# Patient Record
Sex: FEMALE | Race: WHITE | NOT HISPANIC OR LATINO | Employment: FULL TIME | URBAN - METROPOLITAN AREA
[De-identification: names, ages, dates, MRNs, and addresses within clinical notes are randomized per-mention and may not be internally consistent; named-entity substitution may affect disease eponyms.]

---

## 2017-11-08 ENCOUNTER — LAB (OUTPATIENT)
Dept: LAB | Facility: CLINIC | Age: 27
End: 2017-11-08
Payer: COMMERCIAL

## 2017-11-08 ENCOUNTER — TRANSCRIBE ORDERS (OUTPATIENT)
Dept: URGENT CARE | Facility: CLINIC | Age: 27
End: 2017-11-08

## 2017-11-08 DIAGNOSIS — Z02.1 PRE-EMPLOYMENT EXAMINATION: Primary | ICD-10-CM

## 2017-11-08 DIAGNOSIS — Z02.1 PRE-EMPLOYMENT EXAMINATION: ICD-10-CM

## 2017-11-08 PROCEDURE — 86480 TB TEST CELL IMMUN MEASURE: CPT

## 2017-11-08 PROCEDURE — 36415 COLL VENOUS BLD VENIPUNCTURE: CPT

## 2017-11-08 PROCEDURE — 86787 VARICELLA-ZOSTER ANTIBODY: CPT

## 2017-11-09 LAB — VZV IGG SER IA-ACNC: NORMAL

## 2017-11-10 LAB
ANNOTATION COMMENT IMP: NORMAL
GAMMA INTERFERON BACKGROUND BLD IA-ACNC: 0.09 IU/ML
M TB IFN-G BLD-IMP: NEGATIVE
M TB IFN-G CD4+ BCKGRND COR BLD-ACNC: <0.01 IU/ML
M TB IFN-G CD4+ T-CELLS BLD-ACNC: 0.06 IU/ML
MITOGEN IGNF BLD-ACNC: >10 IU/ML
QUANTIFERON-TB GOLD IN TUBE: NORMAL
SERVICE CMNT-IMP: NORMAL

## 2017-12-22 ENCOUNTER — APPOINTMENT (OUTPATIENT)
Dept: LAB | Facility: HOSPITAL | Age: 27
End: 2017-12-22
Attending: INTERNAL MEDICINE
Payer: COMMERCIAL

## 2017-12-22 ENCOUNTER — ALLSCRIPTS OFFICE VISIT (OUTPATIENT)
Dept: OTHER | Facility: OTHER | Age: 27
End: 2017-12-22

## 2017-12-22 ENCOUNTER — TRANSCRIBE ORDERS (OUTPATIENT)
Dept: ADMINISTRATIVE | Facility: HOSPITAL | Age: 27
End: 2017-12-22

## 2017-12-22 DIAGNOSIS — E10.9 TYPE 1 DIABETES MELLITUS WITHOUT COMPLICATIONS (HCC): ICD-10-CM

## 2017-12-22 LAB
ALBUMIN SERPL BCP-MCNC: 3.9 G/DL (ref 3.5–5)
ALP SERPL-CCNC: 52 U/L (ref 46–116)
ALT SERPL W P-5'-P-CCNC: 19 U/L (ref 12–78)
ANION GAP SERPL CALCULATED.3IONS-SCNC: 4 MMOL/L (ref 4–13)
AST SERPL W P-5'-P-CCNC: 18 U/L (ref 5–45)
BILIRUB SERPL-MCNC: 0.6 MG/DL (ref 0.2–1)
BUN SERPL-MCNC: 13 MG/DL (ref 5–25)
CALCIUM SERPL-MCNC: 8.8 MG/DL (ref 8.3–10.1)
CHLORIDE SERPL-SCNC: 103 MMOL/L (ref 100–108)
CHOLEST SERPL-MCNC: 195 MG/DL (ref 50–200)
CO2 SERPL-SCNC: 31 MMOL/L (ref 21–32)
CREAT SERPL-MCNC: 0.72 MG/DL (ref 0.6–1.3)
CREAT UR-MCNC: 146 MG/DL
EST. AVERAGE GLUCOSE BLD GHB EST-MCNC: 183 MG/DL
GFR SERPL CREATININE-BSD FRML MDRD: 116 ML/MIN/1.73SQ M
GLUCOSE P FAST SERPL-MCNC: 160 MG/DL (ref 65–99)
HBA1C MFR BLD: 8 % (ref 4.2–6.3)
HDLC SERPL-MCNC: 111 MG/DL (ref 40–60)
LDLC SERPL CALC-MCNC: 75 MG/DL (ref 0–100)
MICROALBUMIN UR-MCNC: 6 MG/L (ref 0–20)
MICROALBUMIN/CREAT 24H UR: 4 MG/G CREATININE (ref 0–30)
POTASSIUM SERPL-SCNC: 4 MMOL/L (ref 3.5–5.3)
PROT SERPL-MCNC: 7.1 G/DL (ref 6.4–8.2)
SODIUM SERPL-SCNC: 138 MMOL/L (ref 136–145)
TRIGL SERPL-MCNC: 44 MG/DL
TSH SERPL DL<=0.05 MIU/L-ACNC: 0.92 UIU/ML (ref 0.36–3.74)

## 2017-12-22 PROCEDURE — 83036 HEMOGLOBIN GLYCOSYLATED A1C: CPT

## 2017-12-22 PROCEDURE — 80061 LIPID PANEL: CPT

## 2017-12-22 PROCEDURE — 84443 ASSAY THYROID STIM HORMONE: CPT

## 2017-12-22 PROCEDURE — 82043 UR ALBUMIN QUANTITATIVE: CPT

## 2017-12-22 PROCEDURE — 36415 COLL VENOUS BLD VENIPUNCTURE: CPT

## 2017-12-22 PROCEDURE — 80053 COMPREHEN METABOLIC PANEL: CPT

## 2017-12-22 PROCEDURE — 82570 ASSAY OF URINE CREATININE: CPT

## 2017-12-23 NOTE — CONSULTS
Assessment   1  Diabetes mellitus type 1 (250 01) (E10 9)    Plan   Diabetes mellitus type 1    · (1) COMPREHENSIVE METABOLIC PANEL; Status:Active; Requested for:30Uvl3450;    Perform:[facility]; Due:20Ahs4373; Ordered; For:Diabetes mellitus type 1; Ordered By:Kendrick Cm;   · (1) HEMOGLOBIN A1C; Status:Active; Requested VVY:72ULO8832; Perform:[facility]; QUE:[id number];JMMFWLO; For:Diabetes mellitus type 1; Ordered By:Kendrick Cm;   · (1) HEMOGLOBIN A1C; Status:Active; Requested for:57Fmc3164;    Perform:[facility]; Due:63Thk5360; Ordered; For:Diabetes mellitus type 1; Ordered By:Kendrick Cm;   · (1) LIPID PANEL FASTING W DIRECT LDL REFLEX; Status:Active; Requested    for:83Qho2690;    Perform:[facility]; Due:20Oyc5681; Ordered; For:Diabetes mellitus type 1; Ordered By:Kendrick Cm;   · (1) MICROALBUMIN CREATININE RATIO, RANDOM URINE; Status:Active; Requested    for:36Zpc1685;    Perform:[facility]; Due:55Yof1752; Ordered; For:Diabetes mellitus type 1; Ordered By:Kendrick Cm;   · (1) TSH; Status:Active; Requested for:96Hga5958;    Perform:[facility]; Due:37Gcx9070; Ordered; For:Diabetes mellitus type 1; Ordered By:Robert Cm;   · *1 - SL DIABETES SELF MANAGEMENT TRAINING OUTPATIENT Co-Management     dexcom diagnostic cgm  Status: Hold For - Scheduling  Requested for:    10Ycd8639   Ordered; For: Diabetes mellitus type 1; Ordered By: Angely Butler Performed:  Due: 30WIO9010  Upgrade : Yes  Pump Training : Yes  requires instruction : No  Needs requiring Individual DSMT? : No  Self-Management Education/Trainng : Individual Education  Care Summary provided  : Yes   · Follow-up visit in 3 months Evaluation and Treatment  Follow-up  Status: Complete     Done: 69TJC7579   Ordered; For: Diabetes mellitus type 1; Ordered By: Angely Butler Performed:  Due: 43UHC9042; Last Updated By: Shira Paz; 12/22/2017 12:13:05 PM    Discussion/Summary   Discussion Summary:    Type 1 diabetes: Reviewing the patient's insulin pump download reveals average blood sugar of 199 and she is checking her sugar 1 time per day based on insulin pump data  She uses 35 3 units per day on average  53% is in her basal rate in 47% is in her bolus  Reviewing blood sugars reveals inconsistent glucose data due to infrequent checks and occasional hyperglycemia in the morning  I am hesitant to increase her basal rates overnight given her clinical history of hypoglycemia overnight as the highs in the morning could be related to rebound from hypoglycemia  Discussed diagnostic CGM with the patient and she is agreeable to dexcom diagnostic CGM which I have ordered  Additionally I provided her log sheets that she can send me logs in a few weeks for review  Additionally, I have ordered an appointment with our education team for discussion about pump upgrade  The patient expressed interest in the Medtronic 670G system which I think would be good for her given her blood sugars as well as her lifestyle/work schedule  After that appointment, if it is feasible and she is still interested in that pump, we will start the process of acquiring this  Will see her back in 3 months with an A1c just prior  We will check blood work today for maintenance purposes  insulin pump settings are: rates: 12 a 0 85, 8 a 0 8, 10 P 0 8 to carb ratio 11 45 110  Counseling Documentation With Imm: The patient was counseled regarding diagnostic results,-- instructions for management,-- risk factor reductions,-- impressions,-- importance of compliance with treatment  Medication SE Review and Pt Understands Tx: The treatment plan was reviewed with the patient/guardian   The patient/guardian understands and agrees with the treatment plan      Chief Complaint   Chief Complaint Free Text Note Form: consult      History of Present Illness   HPI: 25-year-old female history of type 1 diabetes for 10 years presents to establish care  No known complications of diabetes  Overall, she feels well and denies polyuria polydipsia blurred vision  She follows with her eye doctor regularly  No symptoms of neuropathy  She uses an animas insulin pump for delivery of insulin with below listed settings  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 and does admit given this schedule she is unable to check her sugar and input carbs and glucose levels in her pump  She has had her current pump for several years and believe she may be due for a new 1  She has never worn a continuous glucose monitor  She denies frequent hypoglycemia, but does report occasional hypoglycemia overnight  She does note that she feels hypoglycemia symptoms below 70  She is very comfortable with counting carbs  Has not had recent lab work due to changes in appointment in insurance  Review of Systems   Endo Adult ROS Female New Patient:      Constitutional/General: no change in ring size,-- no change in shoe size,-- no chills,-- no dizziness,-- no fainting,-- no fatigue,-- no fever,-- no forgetfulness,-- no headache,-- no loss of sleep,-- no recent weight loss,-- no nervousness,-- no numbness,-- no temperature intolerance,-- no excessive sweating-- and-- no weight gain  Muscle/Joint/Bone: no arm pain,-- no back pain,-- no hip pain,-- no leg pain,-- no foot pain,-- no neck pain,-- no hand pain,-- no shoulder pain,-- no arm weakness,-- no back weakness,-- no hip weakness,-- no leg weakness,-- no foot weakness,-- no neck weakness,-- no hand weakness,-- no shoulder weakness,-- no arm numbness,-- no back numbness,-- no hip numbness,-- no leg numbness,-- no foot numbness,-- no neck numbness,-- no hand numbness-- and-- no shoulder numbness        Gastrointestinal: no constipation,-- no diarrhea,-- no excessive hunger,-- no excessive thirst,-- no nausea,-- no poor appetite,-- no rectal bleeding,-- no stomach pain,-- no vomiting-- and-- no vomiting blood  Cardiovascular: no chest pain,-- no hypertension,-- no irregular heart beat,-- no hypotension,-- no poor circulation,-- no rapid heart beat-- and-- no ankle swelling  Eye/Ear/Nose/Throat: no bleeding gums,-- no blurred vision,-- no difficulty swallowing,-- no double vision,-- no gritty eyes,-- no hoarseness,-- no hearing loss,-- no persistent cough,-- no sinus problems,-- not seeing flashes-- and-- not seeing halos  Skin: no easy bruising,-- no hives,-- no itching,-- no change in a mole,-- no rashes,-- no scar-- and-- no slow healing sores  Genitourinary no blood in urine,-- no frequent urination,-- no night time urination-- and-- no painful urination  Genitourinary - Reproductive normal period,-- no bleeding between periods,-- no breast lump,-- no hot flashes,-- no nipple discharge,-- no vaginal discharge,-- not pregnant-- and-- no children  date of last menstruation is unknown  periods occur every 32 days periods usually lasts 5 days             ROS Reviewed:    ROS reviewed  Past Medical History   Active Problems And Past Medical History Reviewed: The active problems and past medical history were reviewed and updated today  Surgical History   Surgical History Reviewed: The surgical history was reviewed and updated today  Family History   Family History Reviewed: The family history was reviewed and updated today  Social History   Social History Reviewed: The social history was reviewed and updated today  Current Meds    1  Apidra 100 UNIT/ML Injection Solution; Therapy: 08MEI7986 to Recorded   2  OneTouch Ultra Blue In [employer]; Therapy: 61XOM1721 to Recorded  Medication List Reviewed: The medication list was reviewed and updated today  Allergies   1  No Known Drug Allergies  2   Seasonal    Vitals   Vital Signs    Recorded: 68Pjf8288 11:30AM   Heart Rate 72   Systolic 316   Diastolic 62   Height 5 ft 2 2 in Weight 137 lb 6 oz   BMI Calculated 24 96   BSA Calculated 1 63     Physical Exam        Constitutional      General appearance: No acute distress, well appearing and well nourished  Eyes      Conjunctiva and lids: No swelling, erythema, or discharge  Pupils: Equal, round and reactive to light  The sclera are anicteric  Extraocular movements are intact  Ears, Nose, Mouth, and Throat      Neck: The neck is supple  The thyroid is normal in size with no palpable nodules  Pulmonary      Respiratory effort: No increased work of breathing or signs of respiratory distress  Auscultation of lungs: Clear to auscultation bilaterally with normal chest expansion  Cardiovascular      Auscultation of heart: Normal rate and rhythm with no murmurs, gallops or rubs  Examination of extremities for edema and/or varicosities: Normal        Lymphatic      Palpation of lymph nodes: No supraclavicular or suboccipital lymphadenopathy  Skin      Skin and subcutaneous tissue: Normal skin temperature and color  Neurologic      Motor Strength: Strength is 5/5 bilaterally         Psychiatric      Orientation to person, place and time: Normal        Mood and affect: Affect and attention span are normal        Future Appointments      Date/Time Provider Specialty Site   03/23/2018 11:15 AM Nelly Munguia, [address] [facility]     Signatures    Electronically signed by : OLIVER Rosenberg ; Dec 22 2017 12:26PM EST                       (Author)

## 2018-01-23 VITALS
WEIGHT: 137.38 LBS | DIASTOLIC BLOOD PRESSURE: 62 MMHG | HEART RATE: 72 BPM | SYSTOLIC BLOOD PRESSURE: 110 MMHG | HEIGHT: 62 IN | BODY MASS INDEX: 25.28 KG/M2

## 2018-03-01 DIAGNOSIS — E10.9 TYPE 1 DIABETES MELLITUS WITHOUT COMPLICATIONS (HCC): ICD-10-CM

## 2018-03-15 ENCOUNTER — TELEPHONE (OUTPATIENT)
Dept: ENDOCRINOLOGY | Facility: HOSPITAL | Age: 28
End: 2018-03-15

## 2018-03-22 ENCOUNTER — APPOINTMENT (OUTPATIENT)
Dept: LAB | Facility: HOSPITAL | Age: 28
End: 2018-03-22
Attending: INTERNAL MEDICINE
Payer: COMMERCIAL

## 2018-03-22 DIAGNOSIS — E10.9 TYPE 1 DIABETES MELLITUS WITHOUT COMPLICATIONS (HCC): ICD-10-CM

## 2018-03-22 LAB
EST. AVERAGE GLUCOSE BLD GHB EST-MCNC: 174 MG/DL
HBA1C MFR BLD: 7.7 % (ref 4.2–6.3)

## 2018-03-22 PROCEDURE — 83036 HEMOGLOBIN GLYCOSYLATED A1C: CPT

## 2018-03-22 PROCEDURE — 36415 COLL VENOUS BLD VENIPUNCTURE: CPT

## 2018-03-22 RX ORDER — MELATONIN
COMMUNITY
Start: 2013-07-23 | End: 2018-09-11

## 2018-03-22 RX ORDER — MULTIVITAMIN
CAPSULE ORAL
COMMUNITY
Start: 2013-07-23 | End: 2019-05-09 | Stop reason: ALTCHOICE

## 2018-03-22 NOTE — TELEPHONE ENCOUNTER
Reached out to insurance, paperwork was filled out and sent  Received call from insurance today who stated Dexcom needs to received the proper documentation before proceeding  CMN was faxed on 3/21/18

## 2018-03-23 ENCOUNTER — OFFICE VISIT (OUTPATIENT)
Dept: ENDOCRINOLOGY | Facility: HOSPITAL | Age: 28
End: 2018-03-23
Payer: COMMERCIAL

## 2018-03-23 VITALS
DIASTOLIC BLOOD PRESSURE: 66 MMHG | HEIGHT: 61 IN | HEART RATE: 80 BPM | SYSTOLIC BLOOD PRESSURE: 102 MMHG | BODY MASS INDEX: 25.68 KG/M2 | WEIGHT: 136 LBS

## 2018-03-23 DIAGNOSIS — Z96.41 INSULIN PUMP IN PLACE: ICD-10-CM

## 2018-03-23 DIAGNOSIS — E55.9 VITAMIN D INSUFFICIENCY: ICD-10-CM

## 2018-03-23 DIAGNOSIS — E10.649 TYPE 1 DIABETES MELLITUS WITH HYPOGLYCEMIA AND WITHOUT COMA (HCC): Primary | ICD-10-CM

## 2018-03-23 PROBLEM — E10.9 DIABETES MELLITUS TYPE 1 (HCC): Status: ACTIVE | Noted: 2017-12-22

## 2018-03-23 PROCEDURE — 99214 OFFICE O/P EST MOD 30 MIN: CPT | Performed by: NURSE PRACTITIONER

## 2018-03-23 NOTE — PROGRESS NOTES
Follow Up - Ravindar Jensen 33 y o  female MRN: [id number]   @ Encounter: [id number]      Assessment/Plan     Assessment: This is a 33y o -year-old female with type 1 diabetes on insulin pump therapy  Plan:  1  Type 1 diabetes:  Reviewed and reinforced the importance of checking blood sugars regularly and during data into the pump for review  Discussed diagnostic CGM to gain more insight into her glycemic control throughout the day, as well as, for personal use  Due to lack of blood sugar information in the pump, there is not enough information to make changes to her pump settings at this time  Additionally, I have ordered an appointment with our education team for discussion about pump upgrade  The patient expressed interest in the Medtronic 670G system which would be good for her given her blood sugars, as well as, her lifestyle/work schedule  She was also provided supplemental information on the Medtronic 670 G insulin pump with the Medtronic representative contact information at the time of the visit  Will see her back in 2 months  2   Vitamin-D deficiency:  Continue supplementation with vitamin D3 daily  CC: Diabetes Follow Up    History of Present Illness     HPI:  20-year-old female history of type 1 diabetes for 10 years presents to establish care  No known complications of diabetes  Overall, she feels well  She admits to some mild and infrequent episodes of hypoglycemia which were readily identified and treated appropriately with juice or soda  She does admit to some excessive thirst when her blood sugars are high  She follows with her eye doctor regularly, her last diabetic eye exam was in June 2017  She denies blurry vision and retinopathy  No symptoms of neuropathy  She uses an animas ping insulin pump and utilizes Apidra insulin with below listed settings:  BASAL rates:   12 am- 0 85  8 am-   0 8,   10 pm- 0 8     Insulin to carb ratio: 11   Correction:  45   BG Target:  110  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 and does admit given this schedule she is unable to check her sugar and input carbs and glucose levels in her pump  She has had her current pump for several years and is interested in upgrade She has never worn a continuous glucose monitor  She does note that she feels hypoglycemia symptoms below 70  She is very comfortable with counting carbs  For her vitamin-D deficiency, she supplements with 1000 units of vitamin D3 daily and a woman's Health multivitamin  Consults    Review of Systems   Constitutional: Positive for fatigue (Works night shift)  Negative for chills and fever  HENT: Negative  Eyes: Negative  Negative for visual disturbance  Respiratory: Negative  Negative for chest tightness and shortness of breath  Cardiovascular: Negative  Negative for chest pain and palpitations  Gastrointestinal: Negative  Negative for abdominal pain, constipation, diarrhea and vomiting  Endocrine: Positive for polydipsia ( at times when blood sugars are high) and polyuria ( at times when blood sugars are high)  Negative for cold intolerance, heat intolerance and polyphagia  Genitourinary: Negative  Musculoskeletal: Negative  Skin: Negative  Allergic/Immunologic: Negative  Neurological: Negative  Negative for dizziness, syncope, light-headedness and headaches  Hematological: Negative  Psychiatric/Behavioral: Negative  Historical Information   No past medical history on file  No past surgical history on file    Social History   History   Alcohol use Not on file     History   Drug use: Unknown     History   Smoking Status    Never Smoker   Smokeless Tobacco    Never Used     Family History:   Family History   Problem Relation Age of Onset    No Known Problems Mother     No Known Problems Father     Diabetes type I Brother        Meds/Allergies   Current Outpatient Prescriptions   Medication Sig Dispense Refill  acetone, urine, test strip       glucagon 1 MG injection Inject 1 mL into muscle  Inject entire contents   glucose blood (ONE TOUCH ULTRA TEST) test strip by In Vitro route      insulin aspart (NovoLOG) 100 units/mL injection Up to 100 units daily as directed   insulin glulisine (APIDRA) 100 units/mL injection Inject as directed      Multiple Vitamin (MULTIVITAMIN) capsule Take by mouth      ONE TOUCH LANCETS MISC       cholecalciferol (VITAMIN D3) 1,000 units tablet Take by mouth       No current facility-administered medications for this visit  Allergies   Allergen Reactions    Other      Other reaction(s): Resp Distress  Other reaction(s): Resp Sympt Mild  Other reaction(s): Resp Distress    Mold Extract  [Trichophyton]      Other reaction(s): Resp Sympt Mild    Molds & Smuts      Other reaction(s): Resp Sympt Mild       Objective   Vitals: Blood pressure 102/66, pulse 80, height 5' 1" (1 549 m), weight 61 7 kg (136 lb)  Physical Exam   Constitutional: She is oriented to person, place, and time  She appears well-developed and well-nourished  No distress  HENT:   Head: Normocephalic and atraumatic  Mouth/Throat: Oropharynx is clear and moist    Eyes: Conjunctivae and EOM are normal  Pupils are equal, round, and reactive to light  Neck: Normal range of motion  Neck supple  No tracheal deviation present  No thyromegaly present  Cardiovascular: Normal rate, regular rhythm, normal heart sounds and intact distal pulses  Pulmonary/Chest: Effort normal and breath sounds normal  No respiratory distress  She has no wheezes  She has no rales  She exhibits no tenderness  Abdominal: Soft  Bowel sounds are normal  She exhibits no distension  There is no tenderness  Musculoskeletal: Normal range of motion  She exhibits no edema, tenderness or deformity  Neurological: She is alert and oriented to person, place, and time  No cranial nerve deficit   Coordination normal    Skin: Skin is warm and dry  No rash noted  No erythema  No pallor  Psychiatric: She has a normal mood and affect  Her behavior is normal  Judgment and thought content normal    Vitals reviewed  Lab Results:     Results from last 7 days  Lab Units 03/22/18  1357   HEMOGLOBIN A1C % 7 7*     No results found for: WBC, HGB, HCT, MCV, PLT  Lab Results   Component Value Date/Time    BUN 13 12/22/2017 12:33 PM     12/22/2017 12:33 PM    K 4 0 12/22/2017 12:33 PM     12/22/2017 12:33 PM    CO2 31 12/22/2017 12:33 PM    CREATININE 0 72 12/22/2017 12:33 PM    AST 18 12/22/2017 12:33 PM    ALT 19 12/22/2017 12:33 PM    ALB 3 9 12/22/2017 12:33 PM     No results for input(s): CHOL, HDL, LDL, TRIG, VLDL in the last 72 hours  No results found for: Fred Salcido  No results found for: POCGLU    Portions of the record may have been created with voice recognition software

## 2018-03-23 NOTE — PATIENT INSTRUCTIONS
Please follow up with diabetic education for a DEXCOM trial to gain more insight into your blood sugars  They can also meet with you to discuss insulin pump options if you are available for upgrade  For now, please check your blood sugars and enter the information in the pump for review

## 2018-03-27 DIAGNOSIS — E10.65 TYPE 1 DIABETES MELLITUS WITH HYPERGLYCEMIA (HCC): Primary | ICD-10-CM

## 2018-04-09 ENCOUNTER — OFFICE VISIT (OUTPATIENT)
Dept: DIABETES SERVICES | Facility: CLINIC | Age: 28
End: 2018-04-09

## 2018-04-09 ENCOUNTER — OFFICE VISIT (OUTPATIENT)
Dept: ENDOCRINOLOGY | Facility: CLINIC | Age: 28
End: 2018-04-09
Payer: COMMERCIAL

## 2018-04-09 DIAGNOSIS — E11.8 TYPE 2 DIABETES MELLITUS WITH COMPLICATION, UNSPECIFIED WHETHER LONG TERM INSULIN USE: Primary | ICD-10-CM

## 2018-04-09 DIAGNOSIS — E10.649 TYPE 1 DIABETES MELLITUS WITH HYPOGLYCEMIA AND WITHOUT COMA (HCC): ICD-10-CM

## 2018-04-09 PROCEDURE — 95250 CONT GLUC MNTR PHYS/QHP EQP: CPT | Performed by: INTERNAL MEDICINE

## 2018-04-09 PROCEDURE — DEXON

## 2018-04-09 NOTE — PROGRESS NOTES
Dexcom Professional Training    Met with Santo Bermudez for [employer]  Training today included:    - Explained procedure to [facility]  - Patient agreement signed  - Checked sensor expiration date; Sensor lot number: [id number], Transmitter Id: [id number]  - Patient has a blood glucose meter and strips  -  has been charged and unblinded  - Transmitter has been cleaned with alcohol and dried  - Discussed site selection; identified insertion site: left upper abdomin  - Cleansed the skin with alcohol  - Inserted sensor per instructions: smooth tape on the skin, insert sensor, withdraw needle, remove insertion tool  - Snapped in grey transmitter  - Verified transmitter fully snapped in on both sides (2clicks)  - Removed transmitter latch  - Disposed insertion tool in sharps container  - Started Sensor and verified communication with  (antenna symbol)    Patient instructions reviewed with patient:    - Perform 2 start up finger stick BG's 2 hours after insertion and calibrate  - Calibrate  every 12 hours using BG meter readings  - Fill out log sheets, one for each day  - Sensor is waterproof for showering and swimming, remove for hot tub, MRI  -  is not waterproof  - Remove if redness, bleeding, swelling, discomfort at site  - Removal is in 1 week with return instructions  Sensor inserted by: Belen Contreras will return in one week for sensor removal and data download        Domingo Mustafa, SHELIA  [address] [place] [address] [address] [facility] [id number]

## 2018-04-09 NOTE — PATIENT INSTRUCTIONS
In 2 hours you will be prompted to enter 2 blood glucoses back to back  Then test glucose daily at least every 12 hours,  Best to do before 1st meal , bedtime, and another before meals

## 2018-04-10 NOTE — PROGRESS NOTES
Continous glucose monitoring dexcom placement     Date/Time 4/9/2018 12:45 PM     Performed by  Flavia Brooke by Coni Greenberg       Consent: Verbal consent obtained  Written consent obtained    Risks and benefits: risks, benefits and alternatives were discussed  Consent given by: patient  Patient understanding: patient states understanding of the procedure being performed  Patient consent: the patient's understanding of the procedure matches consent given  Procedure consent: procedure consent matches procedure scheduled  Relevant documents: relevant documents present and verified  Test results: test results not available  Site marked: the operative site was not marked  Imaging studies: imaging studies not available  Required items: required blood products, implants, devices, and special equipment available  Patient identity confirmed: verbally with patient      Site preparation: Isopropyl alcohol    Local anesthesia used: no     Anesthesia   Local anesthesia used: no     Sedation   Patient sedated: no      Specimen: no    Culture: no   Procedure Details   Patient tolerance: Patient tolerated the procedure well with no immediate complications

## 2018-04-16 ENCOUNTER — OFFICE VISIT (OUTPATIENT)
Dept: ENDOCRINOLOGY | Facility: HOSPITAL | Age: 28
End: 2018-04-16
Payer: COMMERCIAL

## 2018-04-16 ENCOUNTER — OFFICE VISIT (OUTPATIENT)
Dept: DIABETES SERVICES | Facility: CLINIC | Age: 28
End: 2018-04-16
Payer: COMMERCIAL

## 2018-04-16 DIAGNOSIS — E10.649 TYPE 1 DIABETES MELLITUS WITH HYPOGLYCEMIA AND WITHOUT COMA (HCC): ICD-10-CM

## 2018-04-16 DIAGNOSIS — E10.65 TYPE 1 DIABETES MELLITUS WITH HYPERGLYCEMIA (HCC): ICD-10-CM

## 2018-04-16 PROCEDURE — DEXOFF

## 2018-04-16 PROCEDURE — G0108 DIAB MANAGE TRN  PER INDIV: HCPCS

## 2018-04-16 PROCEDURE — 95251 CONT GLUC MNTR ANALYSIS I&R: CPT | Performed by: INTERNAL MEDICINE

## 2018-04-17 ENCOUNTER — TELEPHONE (OUTPATIENT)
Dept: ENDOCRINOLOGY | Facility: HOSPITAL | Age: 28
End: 2018-04-17

## 2018-04-17 NOTE — PATIENT INSTRUCTIONS
Contact pump company and the endocrinology office once she selects the pump  Contact Anneliese (medtronic) regarding replacement for her current pump

## 2018-04-17 NOTE — PROGRESS NOTES
Continous glucose monitoring dexcom intrepretation     Date/Time 4/17/2018 11:35 AM     Performed by  Viri Breen by Jose Willoughby       Consent: Verbal consent obtained  Written consent obtained    Risks and benefits: risks, benefits and alternatives were discussed  Consent given by: patient  Patient understanding: patient states understanding of the procedure being performed  Patient consent: the patient's understanding of the procedure matches consent given  Procedure consent: procedure consent matches procedure scheduled  Relevant documents: relevant documents present and verified  Test results: test results available and properly labeled  Required items: required blood products, implants, devices, and special equipment available  Patient identity confirmed: verbally with patient      Local anesthesia used: no     Anesthesia   Local anesthesia used: no     Sedation   Patient sedated: no      Specimen: no    Culture: no

## 2018-04-17 NOTE — PROGRESS NOTES
Mee Stokes is aware that the [address] is no longer  Her current pump needs to be replaced, she is able to give her bolus doses and it is administering her basal dose but her screen has turned a pinkish color and is is difficulty to view  She has already checked with her insurance regarding coverage for a new pump  She was seen today to view pump options  After wearing the DexCom sensor she does want to use a pump and sensor  She is interested in either the Medtronic 670  And guardian sensor or the tslim with the DexCom sensor  She will contact the endocrinology office once she decides on the pump that will best meet her needs  OmniPod, Medtronic and Tslim were demonstrated and she was given information regarding contacting the pump representatives and pump information fron the manufactory

## 2018-04-17 NOTE — PROGRESS NOTES
Dexcom Professional Removal     [facility]  via [facility] to provider for review   - Cleared Data and reblinded   - Sanitized transmitter,  and case    Sensor Removal By: Oswaldo Thomson  Download Performed by: Oswaldo Thomson    Date: 4/16/18     She is using an [employer] pump, her screen is difficulty to see  I did download her pump but the only information that appeared  Is her setting  I have recommended that she obtain a new pump soon

## 2018-04-17 NOTE — TELEPHONE ENCOUNTER
Left message advising that Soto reviewed her Dexcom and she was to increase her midnight basal rate to 0 700

## 2018-05-01 ENCOUNTER — TELEPHONE (OUTPATIENT)
Dept: ENDOCRINOLOGY | Facility: HOSPITAL | Age: 28
End: 2018-05-01

## 2018-05-01 DIAGNOSIS — E10.9 TYPE 1 DIABETES MELLITUS WITHOUT COMPLICATION (HCC): Primary | ICD-10-CM

## 2018-07-03 ENCOUNTER — LAB (OUTPATIENT)
Dept: LAB | Facility: HOSPITAL | Age: 28
End: 2018-07-03
Payer: COMMERCIAL

## 2018-07-03 ENCOUNTER — OFFICE VISIT (OUTPATIENT)
Dept: ENDOCRINOLOGY | Facility: HOSPITAL | Age: 28
End: 2018-07-03
Payer: COMMERCIAL

## 2018-07-03 VITALS
HEART RATE: 82 BPM | BODY MASS INDEX: 26.58 KG/M2 | SYSTOLIC BLOOD PRESSURE: 100 MMHG | WEIGHT: 140.8 LBS | HEIGHT: 61 IN | DIASTOLIC BLOOD PRESSURE: 64 MMHG

## 2018-07-03 DIAGNOSIS — E55.9 VITAMIN D INSUFFICIENCY: ICD-10-CM

## 2018-07-03 DIAGNOSIS — Z96.41 INSULIN PUMP IN PLACE: ICD-10-CM

## 2018-07-03 DIAGNOSIS — E10.649 TYPE 1 DIABETES MELLITUS WITH HYPOGLYCEMIA AND WITHOUT COMA (HCC): ICD-10-CM

## 2018-07-03 DIAGNOSIS — E10.649 TYPE 1 DIABETES MELLITUS WITH HYPOGLYCEMIA AND WITHOUT COMA (HCC): Primary | ICD-10-CM

## 2018-07-03 LAB
ALBUMIN SERPL BCP-MCNC: 3.6 G/DL (ref 3.5–5)
ALP SERPL-CCNC: 55 U/L (ref 46–116)
ALT SERPL W P-5'-P-CCNC: 15 U/L (ref 12–78)
ANION GAP SERPL CALCULATED.3IONS-SCNC: 9 MMOL/L (ref 4–13)
AST SERPL W P-5'-P-CCNC: 14 U/L (ref 5–45)
BILIRUB SERPL-MCNC: 0.5 MG/DL (ref 0.2–1)
BUN SERPL-MCNC: 12 MG/DL (ref 5–25)
CALCIUM SERPL-MCNC: 8.2 MG/DL (ref 8.3–10.1)
CHLORIDE SERPL-SCNC: 102 MMOL/L (ref 100–108)
CO2 SERPL-SCNC: 29 MMOL/L (ref 21–32)
CREAT SERPL-MCNC: 0.95 MG/DL (ref 0.6–1.3)
EST. AVERAGE GLUCOSE BLD GHB EST-MCNC: 186 MG/DL
GFR SERPL CREATININE-BSD FRML MDRD: 82 ML/MIN/1.73SQ M
GLUCOSE SERPL-MCNC: 110 MG/DL (ref 65–140)
HBA1C MFR BLD: 8.1 % (ref 4.2–6.3)
POTASSIUM SERPL-SCNC: 3.6 MMOL/L (ref 3.5–5.3)
PROT SERPL-MCNC: 6.9 G/DL (ref 6.4–8.2)
SODIUM SERPL-SCNC: 140 MMOL/L (ref 136–145)

## 2018-07-03 PROCEDURE — 83036 HEMOGLOBIN GLYCOSYLATED A1C: CPT

## 2018-07-03 PROCEDURE — 99214 OFFICE O/P EST MOD 30 MIN: CPT | Performed by: NURSE PRACTITIONER

## 2018-07-03 PROCEDURE — 80053 COMPREHEN METABOLIC PANEL: CPT

## 2018-07-03 PROCEDURE — 36415 COLL VENOUS BLD VENIPUNCTURE: CPT

## 2018-07-03 NOTE — PATIENT INSTRUCTIONS
Please follow up to initate DEX COM for personal use  Discuss starting T-Slim pump with Diabetes Education  For now, please check your blood sugars and enter the information in the pump for review  Please stop by the office on Friday, as discussed, to download your pump  Obtain a diabetic eye exam, as discussed

## 2018-07-03 NOTE — PROGRESS NOTES
Gordon French 33 y o  female MRN: [id number]    Encounter: [id number]      Assessment/Plan     Assessment: This is a 33y o -year-old female with type 1 diabetes on insulin pump therapy  Plan:  1  Type 1 diabetes on insulin pump therapy:  Unfortunately, her animas pump could not be downloaded today  She will present the office this week for pump download to review her blood sugars  Applicable changes will be made to her pump settings after review of the download report later this week  She has recently participated in a dex com diagnostic trial and has met with diabetes education to gain more information for a pump upgrade  She is interested in utilizing the dex com continuous glucose monitor with the T slim pump  She will continue to work with diabetic education and her insurance to start the process of the upgrade      2   Vitamin-D deficiency:  Continue supplementation with vitamin D3 daily  CC:  Type 1 Diabetes follow-up    History of Present Illness     HPI:  21-year-old female history of type 1 diabetes for 10 years presents to establish care  No known complications of diabetes  Overall, she feels well  She admits to some mild and infrequent episodes of hypoglycemia which were readily identified and treated appropriately with juice or soda  She does admit to some excessive thirst when her blood sugars are high  Her most recent hemoglobin A1c from July 3, 2018 was 8 1  She follows with her eye doctor regularly, her last diabetic eye exam was in June 2017  She denies blurry vision and retinopathy  No symptoms of neuropathy   She uses an animas ping insulin pump and utilizes NovoLog insulin with below listed settings from previous encounter:  BASAL rates:   12 am- 0 85  8 am-   0 8  10 pm- 0 8      Insulin to carb ratio: 11   Correction:  45   BG Target:  110        She participated in a diagnostic dex com trial in April 2018 and met with diabetes education to learn more about her options for another insulin pump  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 and does admit given this schedule she is unable to check her sugar and input carbs and glucose levels in her pump  She has had her current pump for several years and is interested in upgrade She has never worn a continuous glucose monitor  She does note that she feels hypoglycemia symptoms below 70  She is very comfortable with counting carbs      For her vitamin-D deficiency, she supplements with 1000 units of vitamin D3 daily and a woman's Health multivitamin         Review of Systems   Constitutional: Negative  Negative for chills and fever  HENT: Negative  Eyes: Negative  Respiratory: Negative  Negative for chest tightness and shortness of breath  Cardiovascular: Negative  Negative for chest pain  Gastrointestinal: Negative  Negative for abdominal pain, constipation, diarrhea and vomiting  Genitourinary: Negative  Musculoskeletal: Negative  Skin: Negative  Allergic/Immunologic: Negative  Neurological: Negative  Negative for dizziness, syncope, light-headedness and headaches  Hematological: Negative  Psychiatric/Behavioral: Negative  All other systems reviewed and are negative  Historical Information   No past medical history on file  No past surgical history on file  Social History   History   Alcohol use Not on file     History   Drug use: Unknown     History   Smoking Status    Never Smoker   Smokeless Tobacco    Never Used     Family History:   Family History   Problem Relation Age of Onset    No Known Problems Mother     No Known Problems Father     Diabetes type I Brother        Meds/Allergies   Current Outpatient Prescriptions   Medication Sig Dispense Refill    acetone, urine, test strip       cholecalciferol (VITAMIN D3) 1,000 units tablet Take by mouth      glucagon 1 MG injection Inject 1 mL into muscle  Inject entire contents        glucose blood (ONE TOUCH ULTRA TEST) test strip by In Vitro route      insulin aspart (NovoLOG) 100 units/mL injection Use up to 120 units daily via insulin pump  30 mL 11    Multiple Vitamin (MULTIVITAMIN) capsule Take by mouth      ONE TOUCH LANCETS MISC        No current facility-administered medications for this visit  Allergies   Allergen Reactions    Other      Other reaction(s): Resp Distress  Other reaction(s): Resp Sympt Mild  Other reaction(s): Resp Distress    Mold Extract  [Trichophyton]      Other reaction(s): Resp Sympt Mild    Molds & Smuts      Other reaction(s): Resp Sympt Mild       Objective   Vitals: Blood pressure 100/64, pulse 82, height 5' 1" (1 549 m), weight 63 9 kg (140 lb 12 8 oz)  Physical Exam   Constitutional: She is oriented to person, place, and time  She appears well-developed and well-nourished  HENT:   Head: Normocephalic and atraumatic  Mouth/Throat: Oropharynx is clear and moist    Eyes: Conjunctivae and EOM are normal  Pupils are equal, round, and reactive to light  Neck: Normal range of motion  Neck supple  Cardiovascular: Normal rate, regular rhythm, normal heart sounds and intact distal pulses  Pulmonary/Chest: Effort normal and breath sounds normal    Abdominal: Soft  Bowel sounds are normal    Musculoskeletal: Normal range of motion  Neurological: She is alert and oriented to person, place, and time  Skin: Skin is warm and dry  Psychiatric: She has a normal mood and affect  Her behavior is normal  Judgment and thought content normal    Vitals reviewed      Lab Results:   Lab Results   Component Value Date/Time    Hemoglobin A1C 8 1 (H) 07/03/2018 10:44 AM    Hemoglobin A1C 7 7 (H) 03/22/2018 01:57 PM    Hemoglobin A1C 8 0 (H) 12/22/2017 12:33 PM    BUN 12 07/03/2018 10:44 AM    BUN 13 12/22/2017 12:33 PM    Sodium 140 07/03/2018 10:44 AM    Sodium 138 12/22/2017 12:33 PM    Potassium 3 6 07/03/2018 10:44 AM    Potassium 4 0 12/22/2017 12:33 PM    Chloride 102 07/03/2018 10:44 AM    Chloride 103 12/22/2017 12:33 PM    CO2 29 07/03/2018 10:44 AM    CO2 31 12/22/2017 12:33 PM    Creatinine 0 95 07/03/2018 10:44 AM    Creatinine 0 72 12/22/2017 12:33 PM    AST 14 07/03/2018 10:44 AM    AST 18 12/22/2017 12:33 PM    ALT 15 07/03/2018 10:44 AM    ALT 19 12/22/2017 12:33 PM    Albumin 3 6 07/03/2018 10:44 AM    Albumin 3 9 12/22/2017 12:33 PM    Cholesterol 195 12/22/2017 12:33 PM    HDL, Direct 111 (H) 12/22/2017 12:33 PM    Triglycerides 44 12/22/2017 12:33 PM     Portions of the record may have been created with voice recognition software  Occasional wrong word or "sound a like" substitutions may have occurred due to the inherent limitations of voice recognition software  Read the chart carefully and recognize, using context, where substitutions have occurred

## 2018-08-02 DIAGNOSIS — E10.9 TYPE 1 DIABETES MELLITUS WITHOUT COMPLICATION (HCC): ICD-10-CM

## 2018-08-23 LAB
LEFT EYE DIABETIC RETINOPATHY: NORMAL
RIGHT EYE DIABETIC RETINOPATHY: NORMAL

## 2018-09-07 DIAGNOSIS — Z13.6 SCREENING FOR CARDIOVASCULAR CONDITION: Primary | ICD-10-CM

## 2018-09-11 ENCOUNTER — APPOINTMENT (OUTPATIENT)
Dept: LAB | Facility: CLINIC | Age: 28
End: 2018-09-11
Payer: COMMERCIAL

## 2018-09-11 ENCOUNTER — OFFICE VISIT (OUTPATIENT)
Dept: FAMILY MEDICINE CLINIC | Facility: CLINIC | Age: 28
End: 2018-09-11
Payer: COMMERCIAL

## 2018-09-11 VITALS
HEART RATE: 74 BPM | SYSTOLIC BLOOD PRESSURE: 118 MMHG | RESPIRATION RATE: 12 BRPM | WEIGHT: 144.02 LBS | TEMPERATURE: 97 F | DIASTOLIC BLOOD PRESSURE: 76 MMHG | BODY MASS INDEX: 26.5 KG/M2 | HEIGHT: 62 IN

## 2018-09-11 DIAGNOSIS — Z00.00 WELL ADULT EXAM: Primary | ICD-10-CM

## 2018-09-11 DIAGNOSIS — Z13.6 SCREENING FOR CARDIOVASCULAR CONDITION: ICD-10-CM

## 2018-09-11 LAB
CHOLEST SERPL-MCNC: 188 MG/DL (ref 50–200)
HDLC SERPL-MCNC: 102 MG/DL (ref 40–60)
LDLC SERPL CALC-MCNC: 75 MG/DL (ref 0–100)
TRIGL SERPL-MCNC: 53 MG/DL

## 2018-09-11 PROCEDURE — 36415 COLL VENOUS BLD VENIPUNCTURE: CPT

## 2018-09-11 PROCEDURE — 99385 PREV VISIT NEW AGE 18-39: CPT | Performed by: NURSE PRACTITIONER

## 2018-09-11 PROCEDURE — 80061 LIPID PANEL: CPT

## 2018-09-11 NOTE — PROGRESS NOTES
Assessment/Plan   Diagnoses and all orders for this visit:    Well adult exam  Comments: Without abnormal findings  HA1C-7 5, await lipid profile  Chief Complaint   Patient presents with    Physical Exam     33years old        Subjective   Patient ID: Johanne Short is a 33 y o  female  Vitals:    09/11/18 0918   BP: 118/76   Pulse: 74   Resp: 12   Temp: (!) 97 °F (36 1 °C)      Patient here today for a physical and to establish care with this practice  Marie Barone is a type 1 insulin-dependent diabetic, with an insulin pump which is regulated by Dr Dorothea Lozano, endocrinologist   She sees Dr Dorothea Lozano every 3 months  Depression screening was completed today negative, diabetic foot exam was completed in July, but endocrine  Today there are no ulcerations or opening in her skin  Her last diabetic eye exam was July of 2018  Her next urine microalbumin is scheduled for December of 2018, hemoglobin A1c was 7 5 in July  Today she will obtain her lipid profile for her employee wellness benefit  She sees a dentist twice a year  Next GYN exam with Pap is scheduled for October of 2018  She has no new issues with her health or anything that she would like to discuss today  The following portions of the patient's history were reviewed and updated as appropriate: allergies, current medications, past medical history, past social history, past surgical history and problem list     Review of Systems   Constitutional: Negative  Negative for fatigue and fever  HENT: Negative  Negative for congestion, rhinorrhea, sinus pain and sinus pressure  Eyes: Negative  Respiratory: Negative  Negative for cough and shortness of breath  Cardiovascular: Negative  Gastrointestinal: Negative  Endocrine: Negative  Genitourinary: Negative  Musculoskeletal: Negative  Negative for arthralgias and myalgias  Skin: Negative  Allergic/Immunologic: Negative  Neurological: Negative  Hematological: Negative  Psychiatric/Behavioral: Negative  Objective     Physical Exam   Constitutional: She is oriented to person, place, and time  She appears well-developed and well-nourished  No distress  HENT:   Head: Normocephalic and atraumatic  Right Ear: External ear normal    Left Ear: External ear normal    Nose: Nose normal    Mouth/Throat: Oropharynx is clear and moist  No oropharyngeal exudate  Eyes: Conjunctivae and EOM are normal  Pupils are equal, round, and reactive to light  Right eye exhibits no discharge  Left eye exhibits no discharge  Neck: Normal range of motion  Neck supple  No thyromegaly present  Cardiovascular: Normal rate, regular rhythm, normal heart sounds and intact distal pulses  No murmur heard  Pulmonary/Chest: Effort normal and breath sounds normal    Abdominal: Soft  Bowel sounds are normal  She exhibits no distension and no mass  There is no tenderness  There is no guarding  Musculoskeletal: Normal range of motion  She exhibits no edema, tenderness or deformity  Lymphadenopathy:     She has no cervical adenopathy  Neurological: She is alert and oriented to person, place, and time  She displays normal reflexes  No cranial nerve deficit  She exhibits normal muscle tone  Coordination normal    Skin: Skin is warm and dry  Capillary refill takes less than 2 seconds  No rash noted  No erythema  Psychiatric: She has a normal mood and affect  Her behavior is normal  Judgment and thought content normal    Nursing note and vitals reviewed      Allergies   Allergen Reactions    Other      Other reaction(s): Resp Distress  Other reaction(s): Resp Sympt Mild  Other reaction(s): Resp Distress    Mold Extract  [Trichophyton]      Other reaction(s): Resp Sympt Mild    Molds & Smuts      Other reaction(s): Resp Sympt Mild    Pollen Extract      Other reaction(s): Resp Sympt Mild     Patient Active Problem List   Diagnosis    [facility]  Insulin pump in place    Vitamin D insufficiency       Current Outpatient Prescriptions:     glucose blood (ONE TOUCH ULTRA TEST) test strip, by In Vitro route, Disp: , Rfl:     insulin aspart (NovoLOG) 100 units/mL injection, Use up to 120 units daily via insulin pump , Disp: 30 mL, Rfl: 1    Multiple Vitamin (MULTIVITAMIN) capsule, Take by mouth, Disp: , Rfl:     ONE TOUCH LANCETS MISC, , Disp: , Rfl:   Social History     Social History    Marital status: /Civil Union     Spouse name: N/A    Number of children: N/A    Years of education: N/A     Occupational History    Not on file       Social History Main Topics    Smoking status: Never Smoker    Smokeless tobacco: Never Used    Alcohol use Not on file    Drug use: Unknown    Sexual activity: Not on file     Other Topics Concern    Not on file     Social History Narrative    No narrative on file     Family History   Problem Relation Age of Onset    No Known Problems Mother     No Known Problems Father     Diabetes type I Brother

## 2018-09-11 NOTE — PATIENT INSTRUCTIONS

## 2018-09-19 ENCOUNTER — TELEPHONE (OUTPATIENT)
Dept: ENDOCRINOLOGY | Facility: HOSPITAL | Age: 28
End: 2018-09-19

## 2018-09-19 NOTE — TELEPHONE ENCOUNTER
Dorita from [employer] came into the office today, she wanted to let you know that she trained the pt on t:slim pump

## 2018-10-02 ENCOUNTER — OFFICE VISIT (OUTPATIENT)
Dept: OBGYN CLINIC | Facility: CLINIC | Age: 28
End: 2018-10-02
Payer: COMMERCIAL

## 2018-10-02 VITALS
DIASTOLIC BLOOD PRESSURE: 62 MMHG | HEIGHT: 62 IN | SYSTOLIC BLOOD PRESSURE: 118 MMHG | WEIGHT: 145.8 LBS | BODY MASS INDEX: 26.83 KG/M2

## 2018-10-02 DIAGNOSIS — Z12.4 CERVICAL CANCER SCREENING: Primary | ICD-10-CM

## 2018-10-02 PROCEDURE — G0145 SCR C/V CYTO,THINLAYER,RESCR: HCPCS | Performed by: OBSTETRICS & GYNECOLOGY

## 2018-10-02 PROCEDURE — 99385 PREV VISIT NEW AGE 18-39: CPT | Performed by: OBSTETRICS & GYNECOLOGY

## 2018-10-02 NOTE — PROGRESS NOTES
A/p    1  Annual exam    Pap with reflex HPV unsure of last one > 1 year and never had abnormal pap    Discussed schedule and need for yearly exams but pap every 3 years if normal     2  Preconceptual Counseling   Type 1 DM - on pump    Last HGA1c was 8 1   Pt under the care of endocrinology and working on getting the cont monitor for better control    Advised the risk of DM in pregnancy and the higher the A1c the higher the risk to the baby and mom - fetal demise, cardiac, neurological ect    She and  just stopped using condoms x 1 month advised if not pregnant now would wait to see the endocrinologist on the 14th and gets the new HgA1c to try   Pt is currently taking MV so cont   Advised to avoid ETOH     Pt is agreeable with that       32 y/o Po  Presents for annual exam and to discuss pregnancy     Past medical / social / surgical / family history reviewed and updated   Medication and allergies discussed in detail and updated       Review of Systems - History obtained from chart review and the patient  General ROS: negative  Psychological ROS: negative  Ophthalmic ROS: negative  ENT ROS: negative  Allergy and Immunology ROS: negative  Hematological and Lymphatic ROS: negative  Endocrine ROS: negative  Breast ROS: negative for breast lumps  Respiratory ROS: no cough, shortness of breath, or wheezing  Cardiovascular ROS: no chest pain or dyspnea on exertion  Gastrointestinal ROS: no abdominal pain, change in bowel habits, or black or bloody stools  Genito-Urinary ROS: no dysuria, trouble voiding, or hematuria  Musculoskeletal ROS: negative  Neurological ROS: no TIA or stroke symptoms  Dermatological ROS: negative    /62   Ht 5' 1 5" (1 562 m)   Wt 66 1 kg (145 lb 12 8 oz)   LMP 08/28/2018 (Exact Date)   BMI 27 10 kg/m²     Physical Exam   Constitutional: She is oriented to person, place, and time  She appears well-developed  Eyes: Pupils are equal, round, and reactive to light     Neck: Normal range of motion  No thyromegaly present  Cardiovascular: Normal rate  Pulmonary/Chest: Effort normal  No respiratory distress  Right breast exhibits no inverted nipple, no mass and no tenderness  Left breast exhibits no inverted nipple, no mass and no tenderness  Breasts are symmetrical    Abdominal: Soft  She exhibits no distension  There is no tenderness  Genitourinary: Vagina normal and uterus normal  Cervix exhibits no motion tenderness and no discharge  Right adnexum displays no mass, no tenderness and no fullness  Left adnexum displays no mass, no tenderness and no fullness  No vaginal discharge found  Lymphadenopathy:     She has no cervical adenopathy  Neurological: She is alert and oriented to person, place, and time  Psychiatric: She has a normal mood and affect  Her behavior is normal  Judgment and thought content normal    Vitals reviewed

## 2018-10-02 NOTE — PATIENT INSTRUCTIONS
Pap Smear   GENERAL INFORMATION:   What is a Pap smear? A Pap smear, or Pap test, is a procedure to check your cervix for abnormal cells  The cervix is the narrow opening at the bottom of your uterus  The cervix meets the top part of the vagina  How do I prepare for a Pap smear? The best time to schedule the test is right after your period stops  Do not have a Pap smear during your monthly period  Do not have intercourse or put anything in your vagina for 24 hours before your test    What will happen during a Pap smear? · You will lie on your back and place your feet on footrests called stirrups  Your caregiver will gently insert a device called a speculum into your vagina  The speculum is used to spread the walls of your vagina so he can see your cervix  He will use a thin brush or cotton swab to collect cells from the inside of your cervix  · Your caregiver will also collect cells from the surface of your cervix with a plastic or wooden tool called a spatula  He may also gently scrape the upper part of your vagina for a sample  The samples are placed in a container with liquid or on a glass slide  They are sent to a lab and examined for abnormal cells  How often do I need a Pap smear? Pap smears are usually done every 1 to 3 years  You may need a Pap smear more often if you have any of the following:  · Positive test result for the human papillomavirus (HPV)    · Cervical intraepithelial neoplasm or cervical cancer    · HIV    · A weak immune system    · Exposure to diethylstilbestrol (BOBO) medicine when your mother was pregnant with you  CARE AGREEMENT:   You have the right to help plan your care  Learn about your health condition and how it may be treated  Discuss treatment options with your caregivers to decide what care you want to receive  You always have the right to refuse treatment  The above information is an  only   It is not intended as medical advice for individual conditions or treatments  Talk to your doctor, nurse or pharmacist before following any medical regimen to see if it is safe and effective for you  © 2014 [address] is for End User's use only and may not be sold, redistributed or otherwise used for commercial purposes  All illustrations and images included in CareNotes® are the copyrighted property of [employer]  or Valentin [employer]

## 2018-10-04 LAB
LAB AP GYN PRIMARY INTERPRETATION: NORMAL
LAB AP LMP: NORMAL
Lab: NORMAL

## 2018-10-05 ENCOUNTER — TELEPHONE (OUTPATIENT)
Dept: OBGYN CLINIC | Facility: CLINIC | Age: 28
End: 2018-10-05

## 2018-12-19 ENCOUNTER — LAB (OUTPATIENT)
Dept: LAB | Facility: HOSPITAL | Age: 28
End: 2018-12-19
Payer: COMMERCIAL

## 2018-12-19 DIAGNOSIS — E10.649 TYPE 1 DIABETES MELLITUS WITH HYPOGLYCEMIA AND WITHOUT COMA (HCC): ICD-10-CM

## 2018-12-19 LAB
ALBUMIN SERPL BCP-MCNC: 3.6 G/DL (ref 3.5–5)
ALP SERPL-CCNC: 54 U/L (ref 46–116)
ALT SERPL W P-5'-P-CCNC: 15 U/L (ref 12–78)
ANION GAP SERPL CALCULATED.3IONS-SCNC: 8 MMOL/L (ref 4–13)
AST SERPL W P-5'-P-CCNC: 14 U/L (ref 5–45)
BILIRUB SERPL-MCNC: 0.7 MG/DL (ref 0.2–1)
BUN SERPL-MCNC: 14 MG/DL (ref 5–25)
CALCIUM SERPL-MCNC: 8.3 MG/DL (ref 8.3–10.1)
CHLORIDE SERPL-SCNC: 102 MMOL/L (ref 100–108)
CO2 SERPL-SCNC: 29 MMOL/L (ref 21–32)
CREAT SERPL-MCNC: 0.82 MG/DL (ref 0.6–1.3)
EST. AVERAGE GLUCOSE BLD GHB EST-MCNC: 174 MG/DL
GFR SERPL CREATININE-BSD FRML MDRD: 98 ML/MIN/1.73SQ M
GLUCOSE P FAST SERPL-MCNC: 157 MG/DL (ref 65–99)
HBA1C MFR BLD: 7.7 % (ref 4.2–6.3)
POTASSIUM SERPL-SCNC: 4.1 MMOL/L (ref 3.5–5.3)
PROT SERPL-MCNC: 7 G/DL (ref 6.4–8.2)
SODIUM SERPL-SCNC: 139 MMOL/L (ref 136–145)

## 2018-12-19 PROCEDURE — 80053 COMPREHEN METABOLIC PANEL: CPT

## 2018-12-19 PROCEDURE — 36415 COLL VENOUS BLD VENIPUNCTURE: CPT

## 2018-12-19 PROCEDURE — 83036 HEMOGLOBIN GLYCOSYLATED A1C: CPT

## 2018-12-20 ENCOUNTER — OFFICE VISIT (OUTPATIENT)
Dept: ENDOCRINOLOGY | Facility: HOSPITAL | Age: 28
End: 2018-12-20
Payer: COMMERCIAL

## 2018-12-20 VITALS
HEART RATE: 86 BPM | BODY MASS INDEX: 27.68 KG/M2 | SYSTOLIC BLOOD PRESSURE: 114 MMHG | DIASTOLIC BLOOD PRESSURE: 70 MMHG | HEIGHT: 62 IN | WEIGHT: 150.4 LBS

## 2018-12-20 DIAGNOSIS — Z96.41 INSULIN PUMP IN PLACE: ICD-10-CM

## 2018-12-20 DIAGNOSIS — E55.9 VITAMIN D INSUFFICIENCY: ICD-10-CM

## 2018-12-20 DIAGNOSIS — E10.65 TYPE 1 DIABETES MELLITUS WITH HYPERGLYCEMIA (HCC): Primary | ICD-10-CM

## 2018-12-20 PROCEDURE — 99214 OFFICE O/P EST MOD 30 MIN: CPT | Performed by: NURSE PRACTITIONER

## 2018-12-20 PROCEDURE — 95251 CONT GLUC MNTR ANALYSIS I&R: CPT | Performed by: NURSE PRACTITIONER

## 2018-12-20 RX ORDER — AZITHROMYCIN 250 MG/1
TABLET, FILM COATED ORAL
Refills: 0 | COMMUNITY
Start: 2018-12-17 | End: 2019-04-24

## 2018-12-20 NOTE — PROGRESS NOTES
Davis Altamirano 33 y o  female MRN: [id number]    Encounter: [id number]      Assessment/Plan     Assessment: This is a 33y o -year-old female with type 1 diabetes on insulin pump therapy with vitamin-D deficiency  Plan:  1   Type 1 diabetes on insulin pump therapy:  Dex com report and T slim report downloaded and reviewed with the patient at the time of the visit  Her most recent hemoglobin A1c is elevated to 7 7  However she did recently transition to a T slim tandem pump with the dex com continuous glucose monitor  This was further complicated in recent weeks as she was trying to utilize Apidra via the pump which caused multiple errors resulting in hyperglycemia for extended periods  She was advised that Apidra is not recommended for T slim pumps and not to use it  She has since switched to insulin and her blood sugars have begun to moderate and normalized  For now, we will continue her current pump settings  Dex com clarity and T slim connect accounts worse tablets 2 in the office visit today  She will perform downloads in approximately 2 weeks for review so that further adjustments can be made  Check hemoglobin A1c, comprehensive metabolic panel, lipid panel, urine microalbumin and TSH prior to next visit      2   Vitamin-D deficiency:  Continue supplementation with vitamin D3 daily  CC:   Type 1 Diabetes follow-up    History of Present Illness     HPI:  61-year-old female history of type 1 diabetes for 10 years presents for follow-up  No known complications of diabetes  Overall, she feels well   She admits to some mild and infrequent episodes of hypoglycemia which were readily identified and treated appropriately with juice or soda   She does admit to some excessive thirst when her blood sugars are high  Her most recent hemoglobin A1c from December 19, 2018 was 7 7  She states that she was trained on the T-slim Tandem pump with [facility] and started in October 2018  Anton Hoover  She uses T-Slim Tandem insulin pump and utilizes NovoLog insulin with below listed settings from previous encounter:  BASAL rates:   12 am- 0 85  8 am-   0 8  10 pm- 0 8      Insulin to carb ratio: 11   Correction:  45   BG Target:  110        She states that she is up-to-date with her annual diabetic eye exam as of August 2018 and denies retinopathy  She has no complaints about her feet and does not follow Podiatry for regular diabetic foot care      For her vitamin-D deficiency, she supplements with 1000 units of vitamin D3 daily and a woman's Health multivitamin       Review of Systems   Constitutional: Positive for fatigue (Tired at time)  Negative for chills and fever  HENT: Negative  Negative for trouble swallowing and voice change  Eyes: Negative  Negative for photophobia, pain, discharge, redness, itching and visual disturbance  Respiratory: Negative  Negative for chest tightness and shortness of breath  Cardiovascular: Negative  Negative for chest pain  Gastrointestinal: Negative  Negative for abdominal pain, constipation, diarrhea and vomiting  Endocrine: Negative for cold intolerance, heat intolerance, polydipsia, polyphagia and polyuria  Genitourinary: Negative  Musculoskeletal: Negative  Skin: Negative  Allergic/Immunologic: Negative  Neurological: Negative  Negative for dizziness, syncope, light-headedness and headaches  Hematological: Negative  Psychiatric/Behavioral: Negative  All other systems reviewed and are negative      Historical Information   Past Medical History:   Diagnosis Date    Diabetes [facility])      Past Surgical History:   Procedure Laterality Date    TONSILLECTOMY AND ADENOIDECTOMY       Social History   History   Alcohol Use    Yes     History   Drug use: Unknown     History   Smoking Status    Never Smoker   Smokeless Tobacco    Never Used     Family History:   Family History   Problem Relation Age of Onset    No Known Problems Mother     No Known Problems Father     Diabetes type I Brother     Skin cancer Family        Meds/Allergies   Current Outpatient Prescriptions   Medication Sig Dispense Refill    glucose blood (ONE TOUCH ULTRA TEST) test strip by In Vitro route      insulin aspart (NovoLOG) 100 units/mL injection Use up to 120 units daily via insulin pump  30 mL 1    Multiple Vitamin (MULTIVITAMIN) capsule Take by mouth      ONE TOUCH LANCETS MISC        No current facility-administered medications for this visit  Allergies   Allergen Reactions    Other      Other reaction(s): Resp Distress  Other reaction(s): Resp Sympt Mild  Other reaction(s): Resp Distress    Mold Extract  [Trichophyton]      Other reaction(s): Resp Sympt Mild    Molds & Smuts      Other reaction(s): Resp Sympt Mild    Pollen Extract      Other reaction(s): Resp Sympt Mild       Objective     Physical Exam   Constitutional: She is oriented to person, place, and time  She appears well-developed and well-nourished  No distress  HENT:   Head: Normocephalic and atraumatic  Mouth/Throat: Oropharynx is clear and moist    Eyes: Pupils are equal, round, and reactive to light  Conjunctivae and EOM are normal  Right eye exhibits no discharge  Left eye exhibits no discharge  No scleral icterus  Neck: Normal range of motion  Neck supple  No JVD present  No tracheal deviation present  No thyromegaly present  Cardiovascular: Normal rate, regular rhythm, normal heart sounds and intact distal pulses  Exam reveals no gallop and no friction rub  No murmur heard  Pulmonary/Chest: Effort normal and breath sounds normal  No stridor  No respiratory distress  She has no wheezes  She has no rales  She exhibits no tenderness  Abdominal: Soft  Bowel sounds are normal  She exhibits no distension  There is no tenderness  Musculoskeletal: Normal range of motion  She exhibits no edema, tenderness or deformity  Lymphadenopathy:     She has no cervical adenopathy     Neurological: She is alert and oriented to person, place, and time  She displays normal reflexes  No cranial nerve deficit  Coordination normal    Skin: Skin is warm and dry  No rash noted  No erythema  No pallor  Dry skin   Psychiatric: She has a normal mood and affect  Her behavior is normal  Judgment and thought content normal    Vitals reviewed  Lab Results:   Lab Results   Component Value Date/Time    Hemoglobin A1C 7 7 (H) 12/19/2018 03:40 PM    Hemoglobin A1C 8 1 (H) 07/03/2018 10:44 AM    Hemoglobin A1C 7 7 (H) 03/22/2018 01:57 PM    BUN 14 12/19/2018 03:40 PM    BUN 12 07/03/2018 10:44 AM    BUN 13 12/22/2017 12:33 PM    Potassium 4 1 12/19/2018 03:40 PM    Potassium 3 6 07/03/2018 10:44 AM    Potassium 4 0 12/22/2017 12:33 PM    Chloride 102 12/19/2018 03:40 PM    Chloride 102 07/03/2018 10:44 AM    Chloride 103 12/22/2017 12:33 PM    CO2 29 12/19/2018 03:40 PM    CO2 29 07/03/2018 10:44 AM    CO2 31 12/22/2017 12:33 PM    Creatinine 0 82 12/19/2018 03:40 PM    Creatinine 0 95 07/03/2018 10:44 AM    Creatinine 0 72 12/22/2017 12:33 PM    AST 14 12/19/2018 03:40 PM    AST 14 07/03/2018 10:44 AM    AST 18 12/22/2017 12:33 PM    ALT 15 12/19/2018 03:40 PM    ALT 15 07/03/2018 10:44 AM    ALT 19 12/22/2017 12:33 PM    Albumin 3 6 12/19/2018 03:40 PM    Albumin 3 6 07/03/2018 10:44 AM    Albumin 3 9 12/22/2017 12:33 PM    HDL, Direct 102 (H) 09/11/2018 10:05 AM    HDL, Direct 111 (H) 12/22/2017 12:33 PM    Triglycerides 53 09/11/2018 10:05 AM    Triglycerides 44 12/22/2017 12:33 PM     Portions of the record may have been created with voice recognition software  Occasional wrong word or "sound a like" substitutions may have occurred due to the inherent limitations of voice recognition software  Read the chart carefully and recognize, using context, where substitutions have occurred

## 2018-12-20 NOTE — PATIENT INSTRUCTIONS
Be mindful of diet  Stay active and stay hydrated  Please confirm that you are set with T-Connect and DEX COM clarity to share information with our office  Please perform another DEX COM download in 2 weeks for review  Contact the office when you download the report  Obtain lab work as discussed  Do not use Apridra in your pump

## 2019-01-08 ENCOUNTER — TELEPHONE (OUTPATIENT)
Dept: ENDOCRINOLOGY | Facility: HOSPITAL | Age: 29
End: 2019-01-08

## 2019-01-08 NOTE — TELEPHONE ENCOUNTER
Reviewed her dex com from December 25th through January 7th  Her average glucose was 139 with a standard deviation of 60  She is generally well controlled with minimal hyper and hypoglycemia  For now, continue current settings

## 2019-01-31 DIAGNOSIS — E10.9 TYPE 1 DIABETES MELLITUS WITHOUT COMPLICATION (HCC): ICD-10-CM

## 2019-02-05 ENCOUNTER — TELEPHONE (OUTPATIENT)
Dept: ENDOCRINOLOGY | Facility: HOSPITAL | Age: 29
End: 2019-02-05

## 2019-02-05 NOTE — TELEPHONE ENCOUNTER
Spoke to patient  She will change sensor and see if it levels out  She did calibrate and it is now reading 390s on sensor but 240 on glucometer  Advised her to call on call if needed  Advised if running lower consistently can use temp basal decrease  No signs or symptoms of adrenal insufficiency but if lows continue may need to check for it

## 2019-02-05 NOTE — TELEPHONE ENCOUNTER
Called and left voicemail  Also noted in my voicemail that she can call on call provider if needed  I would advise her to use her glucometer reading  I would see if she can calibrate her dexcom with the regular meter reading  Since her meter reading is 290 and she still has insulin on board I would ask her to hold off on additional insulin and check her fingerstick with her regular meter in another hour to make sure it is trending down

## 2019-03-18 ENCOUNTER — TELEPHONE (OUTPATIENT)
Dept: OBGYN CLINIC | Facility: CLINIC | Age: 29
End: 2019-03-18

## 2019-03-22 ENCOUNTER — APPOINTMENT (OUTPATIENT)
Dept: LAB | Facility: HOSPITAL | Age: 29
End: 2019-03-22
Payer: COMMERCIAL

## 2019-03-22 DIAGNOSIS — E10.65 TYPE 1 DIABETES MELLITUS WITH HYPERGLYCEMIA (HCC): ICD-10-CM

## 2019-03-22 LAB
ALBUMIN SERPL BCP-MCNC: 3.8 G/DL (ref 3.5–5)
ALP SERPL-CCNC: 58 U/L (ref 46–116)
ALT SERPL W P-5'-P-CCNC: 19 U/L (ref 12–78)
ANION GAP SERPL CALCULATED.3IONS-SCNC: 8 MMOL/L (ref 4–13)
AST SERPL W P-5'-P-CCNC: 14 U/L (ref 5–45)
BILIRUB SERPL-MCNC: 0.4 MG/DL (ref 0.2–1)
BUN SERPL-MCNC: 17 MG/DL (ref 5–25)
CALCIUM SERPL-MCNC: 9 MG/DL (ref 8.3–10.1)
CHLORIDE SERPL-SCNC: 104 MMOL/L (ref 100–108)
CHOLEST SERPL-MCNC: 198 MG/DL (ref 50–200)
CO2 SERPL-SCNC: 30 MMOL/L (ref 21–32)
CREAT SERPL-MCNC: 0.94 MG/DL (ref 0.6–1.3)
CREAT UR-MCNC: 317 MG/DL
EST. AVERAGE GLUCOSE BLD GHB EST-MCNC: 146 MG/DL
GFR SERPL CREATININE-BSD FRML MDRD: 83 ML/MIN/1.73SQ M
GLUCOSE P FAST SERPL-MCNC: 89 MG/DL (ref 65–99)
HBA1C MFR BLD: 6.7 % (ref 4.2–6.3)
HDLC SERPL-MCNC: 88 MG/DL (ref 40–60)
LDLC SERPL CALC-MCNC: 100 MG/DL (ref 0–100)
MICROALBUMIN UR-MCNC: 15.1 MG/L (ref 0–20)
MICROALBUMIN/CREAT 24H UR: 5 MG/G CREATININE (ref 0–30)
NONHDLC SERPL-MCNC: 110 MG/DL
POTASSIUM SERPL-SCNC: 3.8 MMOL/L (ref 3.5–5.3)
PROT SERPL-MCNC: 7.3 G/DL (ref 6.4–8.2)
SODIUM SERPL-SCNC: 142 MMOL/L (ref 136–145)
TRIGL SERPL-MCNC: 48 MG/DL
TSH SERPL DL<=0.05 MIU/L-ACNC: 1.73 UIU/ML (ref 0.36–3.74)

## 2019-03-22 PROCEDURE — 83036 HEMOGLOBIN GLYCOSYLATED A1C: CPT

## 2019-03-22 PROCEDURE — 80061 LIPID PANEL: CPT

## 2019-03-22 PROCEDURE — 80053 COMPREHEN METABOLIC PANEL: CPT

## 2019-03-22 PROCEDURE — 82570 ASSAY OF URINE CREATININE: CPT

## 2019-03-22 PROCEDURE — 84443 ASSAY THYROID STIM HORMONE: CPT

## 2019-03-22 PROCEDURE — 82043 UR ALBUMIN QUANTITATIVE: CPT

## 2019-03-22 PROCEDURE — 36415 COLL VENOUS BLD VENIPUNCTURE: CPT

## 2019-03-25 ENCOUNTER — OFFICE VISIT (OUTPATIENT)
Dept: ENDOCRINOLOGY | Facility: HOSPITAL | Age: 29
End: 2019-03-25
Payer: COMMERCIAL

## 2019-03-25 VITALS
SYSTOLIC BLOOD PRESSURE: 102 MMHG | BODY MASS INDEX: 26.76 KG/M2 | HEIGHT: 62 IN | HEART RATE: 96 BPM | WEIGHT: 145.4 LBS | DIASTOLIC BLOOD PRESSURE: 62 MMHG

## 2019-03-25 DIAGNOSIS — Z96.41 INSULIN PUMP IN PLACE: ICD-10-CM

## 2019-03-25 DIAGNOSIS — E10.9 TYPE 1 DIABETES MELLITUS WITHOUT COMPLICATION (HCC): Primary | ICD-10-CM

## 2019-03-25 DIAGNOSIS — E55.9 VITAMIN D INSUFFICIENCY: ICD-10-CM

## 2019-03-25 PROCEDURE — 99214 OFFICE O/P EST MOD 30 MIN: CPT | Performed by: INTERNAL MEDICINE

## 2019-03-25 NOTE — PROGRESS NOTES
3/25/2019    Assessment/Plan      Diagnoses and all orders for this visit:    Type 1 diabetes mellitus without complication ([facility] )  -     HEMOGLOBIN A1C W/ EAG ESTIMATION Lab Collect; Future  -     Comprehensive metabolic panel Lab Collect; Future  -     Lipid Panel with Direct LDL reflex Lab Collect; Future  -     insulin aspart (NovoLOG) 100 units/mL injection; Use up to 120 units daily via insulin pump  Insulin pump in place    Vitamin D insufficiency        Assessment/Plan:  1  Type 1 DM with insulin pump status:  A1c is much improved  She has no immediate plans for pregnancy but this may change in the near future  I suggested she touch base with her OB as if she was to [address] [place] [address] she will have seen looks Maternal-Fetal Medicine manage her diabetes her pregnancy  She subjectively does note when she gives an insulin bolus prior to her meals, she has hypoglycemia than hyperglycemia later  I showed her how to use the extended bolus feature on her insulin pump to see if this helps mood things out a little bit  Follow-up in 3 months with labs as ordered above just prior  CC: Diabetes Consult    History of Present Illness     HPI: Kiko Tanner is a 30y o  year old female with type 1 diabetes for 11 years  She is on insulin at home and takes NovoLog via insulin pump  She denies any polyuria, polydipsia, nocturia and blurry vision  She denies neuropathy, nephropathy and retinopathy  Hypoglycemic episodes: No using basal IQ feature of tandem x2 pump  The patient's last eye exam was in August 2018  Blood Sugar/Glucometer/Pump/CGM review: Insulin pump download from 2/23 through 3/24 shows on C GM average sugar was 161  She is using about 30 units per day and testing over 9 times daily  She is having consistent pattern of high blood sugars before lunch and occasionally at dinner time    67% of her total daily insulin is basal     For vitamin-D insufficiency she supplements with 1000 units of vitamin-D daily as well as a multivitamin  Review of Systems   Constitutional: Negative for fatigue  HENT: Negative for trouble swallowing and voice change  Eyes: Negative for visual disturbance  Respiratory: Negative for shortness of breath  Cardiovascular: Negative for palpitations and leg swelling  Gastrointestinal: Negative for abdominal pain, nausea and vomiting  Endocrine: Negative for polydipsia and polyuria  Musculoskeletal: Negative for arthralgias and myalgias  Skin: Negative for rash  Neurological: Negative for dizziness, tremors and weakness  Hematological: Negative for adenopathy  Psychiatric/Behavioral: Negative for agitation and confusion  Historical Information   Past Medical History:   Diagnosis Date    Diabetes [facility])      Past Surgical History:   Procedure Laterality Date    TONSILLECTOMY AND ADENOIDECTOMY       Social History   Social History     Substance and Sexual Activity   Alcohol Use Yes     Social History     Substance and Sexual Activity   Drug Use Not on file     Social History     Tobacco Use   Smoking Status Never Smoker   Smokeless Tobacco Never Used     Family History:   Family History   Problem Relation Age of Onset    No Known Problems Mother     No Known Problems Father     Diabetes type I Brother     Skin cancer Family        Meds/Allergies   Current Outpatient Medications   Medication Sig Dispense Refill    glucose blood (ONE TOUCH ULTRA TEST) test strip by In Vitro route      insulin aspart (NovoLOG) 100 units/mL injection Use up to 120 units daily via insulin pump  30 mL 11    Multiple Vitamin (MULTIVITAMIN) capsule Take by mouth      ONE TOUCH LANCETS MISC       azithromycin (ZITHROMAX) 250 mg tablet take 2 tablets by mouth today then take 1 tablet DAILY FOR 4 DAYS  0     No current facility-administered medications for this visit        Allergies   Allergen Reactions    Other      Other reaction(s): Resp Distress  Other reaction(s): Resp Sympt Mild  Other reaction(s): Resp Distress    Mold Extract  [Trichophyton]      Other reaction(s): Resp Sympt Mild    Molds & Smuts      Other reaction(s): Resp Sympt Mild    Pollen Extract      Other reaction(s): Resp Sympt Mild       Objective   Vitals: Blood pressure 102/62, pulse 96, height 5' 1 5" (1 562 m), weight 66 kg (145 lb 6 4 oz)  Invasive Devices          None          Physical Exam   Constitutional: She is oriented to person, place, and time  She appears well-developed and well-nourished  No distress  HENT:   Head: Normocephalic and atraumatic  Neck: Normal range of motion  Neck supple  No thyromegaly present  Cardiovascular: Normal rate and regular rhythm  Pulses:       Dorsalis pedis pulses are 2+ on the right side, and 2+ on the left side  Posterior tibial pulses are 2+ on the right side, and 2+ on the left side  Pulmonary/Chest: Effort normal and breath sounds normal    Abdominal: Soft  Bowel sounds are normal    Musculoskeletal: Normal range of motion  She exhibits no edema  Feet:   Right Foot:   Skin Integrity: Negative for ulcer, skin breakdown, erythema, warmth, callus or dry skin  Left Foot:   Skin Integrity: Negative for ulcer, skin breakdown, erythema, warmth, callus or dry skin  Neurological: She is alert and oriented to person, place, and time  She exhibits normal muscle tone  Skin: Skin is warm and dry  No rash noted  She is not diaphoretic  Psychiatric: She has a normal mood and affect  Her behavior is normal    Vitals reviewed  Patient's shoes and socks removed  Right Foot/Ankle   Right Foot Inspection  Skin Exam: skin normal and skin intact no dry skin, no warmth, no callus, no erythema, no maceration, no abnormal color, no pre-ulcer, no ulcer and no callus                            Sensory   Vibration: intact    Monofilament testing: intact  Vascular    The right DP pulse is 2+  The right PT pulse is 2+       Left Foot/Ankle  Left Foot Inspection  Skin Exam: skin normal and skin intactno dry skin, no warmth, no erythema, no maceration, normal color, no pre-ulcer, no ulcer and no callus                                         Sensory   Vibration: intact    Monofilament: intact  Vascular    The left DP pulse is 2+  The left PT pulse is 2+  Assign Risk Category:  No deformity present; No loss of protective sensation;        Risk: 0        The history was obtained from the review of the chart and from the patient      Lab Results:    Most recent Alc is  Lab Results   Component Value Date    HGBA1C 6 7 (H) 03/22/2019           No components found for: HA1C  No components found for: GLU    Lab Results   Component Value Date    CREATININE 0 94 03/22/2019    CREATININE 0 82 12/19/2018    CREATININE 0 95 07/03/2018    BUN 17 03/22/2019    K 3 8 03/22/2019     03/22/2019    CO2 30 03/22/2019     eGFR   Date Value Ref Range Status   03/22/2019 83 ml/min/1 73sq m Final     No components found for: [facility]    Lab Results   Component Value Date    HDL 88 (H) 03/22/2019    TRIG 48 03/22/2019       Lab Results   Component Value Date    ALT 19 03/22/2019    AST 14 03/22/2019    ALKPHOS 58 03/22/2019       No results found for: TSH, FREET4, TSI    Recent Results (from the past 47274 hour(s))   Hemoglobin A1c    Collection Time: 03/22/18  1:57 PM   Result Value Ref Range    Hemoglobin A1C 7 7 (H) 4 2 - 6 3 %     mg/dl   HEMOGLOBIN A1C W/ EAG ESTIMATION Lab Collect    Collection Time: 07/03/18 10:44 AM   Result Value Ref Range    Hemoglobin A1C 8 1 (H) 4 2 - 6 3 %     mg/dl   Comprehensive metabolic panel Lab Collect    Collection Time: 07/03/18 10:44 AM   Result Value Ref Range    Sodium 140 136 - 145 mmol/L    Potassium 3 6 3 5 - 5 3 mmol/L    Chloride 102 100 - 108 mmol/L    CO2 29 21 - 32 mmol/L    ANION GAP 9 4 - 13 mmol/L    BUN 12 5 - 25 mg/dL    Creatinine 0 95 0 60 - 1 30 mg/dL    Glucose 110 65 - 140 mg/dL    Calcium 8 2 (L) 8 3 - 10 1 mg/dL    AST 14 5 - 45 U/L    ALT 15 12 - 78 U/L    Alkaline Phosphatase 55 46 - 116 U/L    Total Protein 6 9 6 4 - 8 2 g/dL    Albumin 3 6 3 5 - 5 0 g/dL    Total Bilirubin 0 50 0 20 - 1 00 mg/dL    eGFR 82 ml/min/1 73sq m    Diabetes Eye Exam    Collection Time: 08/23/18 12:00 AM   Result Value Ref Range    Right Eye Diabetic Retinopathy None     Left Eye Diabetic Retinopathy None    Lipid Panel with Direct LDL reflex    Collection Time: 09/11/18 10:05 AM   Result Value Ref Range    Cholesterol 188 50 - 200 mg/dL    Triglycerides 53 <=150 mg/dL    HDL, Direct 102 (H) 40 - 60 mg/dL    LDL Calculated 75 0 - 100 mg/dL   Liquid-based pap, screening    Collection Time: 10/02/18  8:59 AM   Result Value Ref Range    Case Report       Gynecologic Cytology Report                       Case: [id number]                                  Authorizing Provider:  Magalis Pierre MD          Collected:           10/02/2018 0859              Ordering Location:     [facility] [address] [facility]    Received:            10/02/2018 0859                                     [facility]                                                                   First Screen:          AUTUMN Rudolph                                                       Specimen:    LIQUID-BASED PAP, SCREENING, Cervix, Endocervical                                          Primary Interpretation Negative for intraepithelial lesion or malignancy     Specimen Adequacy       Satisfactory for evaluation  Endocervical/transformation zone component present  Additional Information       [employer]'s FDA approved ,  and ThinPrep Imaging System are utilized with strict adherence to the 's instruction manual to prepare gynecologic and non-gynecologic cytology specimens for the production of ThinPrep slides as well as for gynecologic ThinPrep imaging  These processes have been validated by our laboratory and/or by the     The Pap test is not a diagnostic procedure and should not be used as the sole means to detect cervical cancer  It is only a screening procedure to aid in the detection of cervical cancer and its precursors  Both false-negative and false-positive results have been experienced  Your patient's test result should be interpreted in this context together with the history and clinical findings        LMP 8/28/2018    HEMOGLOBIN A1C W/ EAG ESTIMATION Lab Collect    Collection Time: 12/19/18  3:40 PM   Result Value Ref Range    Hemoglobin A1C 7 7 (H) 4 2 - 6 3 %     mg/dl   Comprehensive metabolic panel Lab Collect    Collection Time: 12/19/18  3:40 PM   Result Value Ref Range    Sodium 139 136 - 145 mmol/L    Potassium 4 1 3 5 - 5 3 mmol/L    Chloride 102 100 - 108 mmol/L    CO2 29 21 - 32 mmol/L    ANION GAP 8 4 - 13 mmol/L    BUN 14 5 - 25 mg/dL    Creatinine 0 82 0 60 - 1 30 mg/dL    Glucose, Fasting 157 (H) 65 - 99 mg/dL    Calcium 8 3 8 3 - 10 1 mg/dL    AST 14 5 - 45 U/L    ALT 15 12 - 78 U/L    Alkaline Phosphatase 54 46 - 116 U/L    Total Protein 7 0 6 4 - 8 2 g/dL    Albumin 3 6 3 5 - 5 0 g/dL    Total Bilirubin 0 70 0 20 - 1 00 mg/dL    eGFR 98 ml/min/1 73sq m   HEMOGLOBIN A1C W/ EAG ESTIMATION Lab Collect    Collection Time: 03/22/19 12:25 PM   Result Value Ref Range    Hemoglobin A1C 6 7 (H) 4 2 - 6 3 %     mg/dl   Comprehensive metabolic panel Lab Collect    Collection Time: 03/22/19 12:25 PM   Result Value Ref Range    Sodium 142 136 - 145 mmol/L    Potassium 3 8 3 5 - 5 3 mmol/L    Chloride 104 100 - 108 mmol/L    CO2 30 21 - 32 mmol/L    ANION GAP 8 4 - 13 mmol/L    BUN 17 5 - 25 mg/dL    Creatinine 0 94 0 60 - 1 30 mg/dL    Glucose, Fasting 89 65 - 99 mg/dL    Calcium 9 0 8 3 - 10 1 mg/dL    AST 14 5 - 45 U/L    ALT 19 12 - 78 U/L    Alkaline Phosphatase 58 46 - 116 U/L    Total Protein 7 3 6 4 - 8 2 g/dL    Albumin 3 8 3 5 - 5 0 g/dL    Total Bilirubin 0 40 0 20 - 1 00 mg/dL    eGFR 83 ml/min/1 73sq m   Lipid panel Lab Collect Lab Collect    Collection Time: 03/22/19 12:25 PM   Result Value Ref Range    Cholesterol 198 50 - 200 mg/dL    Triglycerides 48 <=150 mg/dL    HDL, Direct 88 (H) 40 - 60 mg/dL    LDL Calculated 100 0 - 100 mg/dL    Non-HDL-Chol (CHOL-HDL) 110 mg/dl   Microalbumin / creatinine urine ratio Lab Collect    Collection Time: 03/22/19 12:25 PM   Result Value Ref Range    Creatinine, Ur 317 0 mg/dL    Microalbum  ,U,Random 15 1 0 0 - 20 0 mg/L    Microalb Creat Ratio 5 0 - 30 mg/g creatinine   TSH, 3rd generation Lab Collect    Collection Time: 03/22/19 12:25 PM   Result Value Ref Range    TSH 3RD GENERATON 1 730 0 358 - 3 740 uIU/mL         No future appointments  Portions of the record may have been created with voice recognition software  Occasional wrong word or "sound a like" substitutions may have occurred due to the inherent limitations of voice recognition software  Read the chart carefully and recognize, using context, where substitutions have occurred

## 2019-04-20 ENCOUNTER — DOCUMENTATION (OUTPATIENT)
Dept: LABOR AND DELIVERY | Facility: HOSPITAL | Age: 29
End: 2019-04-20

## 2019-04-22 ENCOUNTER — TELEPHONE (OUTPATIENT)
Dept: OBGYN CLINIC | Facility: CLINIC | Age: 29
End: 2019-04-22

## 2019-04-22 ENCOUNTER — TELEPHONE (OUTPATIENT)
Dept: PERINATAL CARE | Facility: CLINIC | Age: 29
End: 2019-04-22

## 2019-04-22 DIAGNOSIS — O24.011 TYPE 1 DIABETES MELLITUS IN PREGNANCY, FIRST TRIMESTER: Primary | ICD-10-CM

## 2019-04-24 ENCOUNTER — OFFICE VISIT (OUTPATIENT)
Dept: OBGYN CLINIC | Facility: CLINIC | Age: 29
End: 2019-04-24
Payer: COMMERCIAL

## 2019-04-24 VITALS — DIASTOLIC BLOOD PRESSURE: 64 MMHG | WEIGHT: 143.4 LBS | BODY MASS INDEX: 26.66 KG/M2 | SYSTOLIC BLOOD PRESSURE: 110 MMHG

## 2019-04-24 DIAGNOSIS — N91.2 AMENORRHEA: Primary | ICD-10-CM

## 2019-04-24 DIAGNOSIS — E10.9 TYPE 1 DIABETES MELLITUS WITHOUT COMPLICATION (HCC): ICD-10-CM

## 2019-04-24 LAB — SL AMB POCT URINE HCG: POSITIVE

## 2019-04-24 PROCEDURE — 99213 OFFICE O/P EST LOW 20 MIN: CPT | Performed by: OBSTETRICS & GYNECOLOGY

## 2019-04-24 PROCEDURE — 81025 URINE PREGNANCY TEST: CPT | Performed by: OBSTETRICS & GYNECOLOGY

## 2019-04-25 ENCOUNTER — OFFICE VISIT (OUTPATIENT)
Dept: PERINATAL CARE | Facility: CLINIC | Age: 29
End: 2019-04-25
Payer: COMMERCIAL

## 2019-04-25 VITALS
BODY MASS INDEX: 27.08 KG/M2 | HEART RATE: 120 BPM | HEIGHT: 61 IN | WEIGHT: 143.4 LBS | SYSTOLIC BLOOD PRESSURE: 114 MMHG | DIASTOLIC BLOOD PRESSURE: 79 MMHG

## 2019-04-25 VITALS
HEIGHT: 61 IN | BODY MASS INDEX: 27 KG/M2 | HEART RATE: 120 BPM | WEIGHT: 143 LBS | SYSTOLIC BLOOD PRESSURE: 114 MMHG | DIASTOLIC BLOOD PRESSURE: 79 MMHG

## 2019-04-25 DIAGNOSIS — Z96.41 INSULIN PUMP IN PLACE: ICD-10-CM

## 2019-04-25 DIAGNOSIS — O24.011 PRE-EXISTING TYPE 1 DIABETES MELLITUS WITH HYPERGLYCEMIA DURING PREGNANCY IN FIRST TRIMESTER (HCC): Primary | ICD-10-CM

## 2019-04-25 DIAGNOSIS — E10.65 PRE-EXISTING TYPE 1 DIABETES MELLITUS WITH HYPERGLYCEMIA DURING PREGNANCY IN FIRST TRIMESTER (HCC): Primary | ICD-10-CM

## 2019-04-25 DIAGNOSIS — E10.9 TYPE 1 DIABETES MELLITUS WITHOUT COMPLICATION (HCC): Primary | ICD-10-CM

## 2019-04-25 DIAGNOSIS — Z3A.01 5 WEEKS GESTATION OF PREGNANCY: ICD-10-CM

## 2019-04-25 DIAGNOSIS — Z3A.01 LESS THAN 8 WEEKS GESTATION OF PREGNANCY: ICD-10-CM

## 2019-04-25 PROCEDURE — G0108 DIAB MANAGE TRN  PER INDIV: HCPCS | Performed by: DIETITIAN, REGISTERED

## 2019-04-25 PROCEDURE — 99215 OFFICE O/P EST HI 40 MIN: CPT | Performed by: NURSE PRACTITIONER

## 2019-04-26 PROBLEM — O24.011 PRE-EXISTING TYPE 1 DIABETES MELLITUS WITH HYPERGLYCEMIA DURING PREGNANCY IN FIRST TRIMESTER (HCC): Status: ACTIVE | Noted: 2019-04-26

## 2019-04-26 PROBLEM — Z3A.01 LESS THAN 8 WEEKS GESTATION OF PREGNANCY: Status: ACTIVE | Noted: 2019-04-26

## 2019-04-26 PROBLEM — E10.65 PRE-EXISTING TYPE 1 DIABETES MELLITUS WITH HYPERGLYCEMIA DURING PREGNANCY IN FIRST TRIMESTER (HCC): Status: ACTIVE | Noted: 2019-04-26

## 2019-04-26 RX ORDER — BLOOD SUGAR DIAGNOSTIC
STRIP MISCELLANEOUS
Qty: 300 EACH | Refills: 3 | Status: SHIPPED | OUTPATIENT
Start: 2019-04-26 | End: 2020-10-13 | Stop reason: SDUPTHER

## 2019-04-29 ENCOUNTER — TELEPHONE (OUTPATIENT)
Dept: PERINATAL CARE | Facility: CLINIC | Age: 29
End: 2019-04-29

## 2019-04-30 ENCOUNTER — TELEPHONE (OUTPATIENT)
Dept: OBGYN CLINIC | Facility: CLINIC | Age: 29
End: 2019-04-30

## 2019-04-30 ENCOUNTER — ULTRASOUND (OUTPATIENT)
Dept: OBGYN CLINIC | Facility: CLINIC | Age: 29
End: 2019-04-30
Payer: COMMERCIAL

## 2019-04-30 DIAGNOSIS — O20.9 BLEEDING IN EARLY PREGNANCY: ICD-10-CM

## 2019-04-30 PROCEDURE — 76817 TRANSVAGINAL US OBSTETRIC: CPT | Performed by: OBSTETRICS & GYNECOLOGY

## 2019-04-30 PROCEDURE — 99214 OFFICE O/P EST MOD 30 MIN: CPT | Performed by: OBSTETRICS & GYNECOLOGY

## 2019-05-02 ENCOUNTER — DOCUMENTATION (OUTPATIENT)
Dept: OBGYN CLINIC | Facility: CLINIC | Age: 29
End: 2019-05-02

## 2019-05-02 ENCOUNTER — OFFICE VISIT (OUTPATIENT)
Dept: PERINATAL CARE | Facility: CLINIC | Age: 29
End: 2019-05-02
Payer: COMMERCIAL

## 2019-05-02 ENCOUNTER — DOCUMENTATION (OUTPATIENT)
Dept: PERINATAL CARE | Facility: CLINIC | Age: 29
End: 2019-05-02

## 2019-05-02 VITALS
HEIGHT: 61 IN | SYSTOLIC BLOOD PRESSURE: 113 MMHG | BODY MASS INDEX: 26.7 KG/M2 | WEIGHT: 141.4 LBS | HEART RATE: 106 BPM | DIASTOLIC BLOOD PRESSURE: 72 MMHG

## 2019-05-02 DIAGNOSIS — E10.65 PRE-EXISTING TYPE 1 DIABETES MELLITUS WITH HYPERGLYCEMIA DURING PREGNANCY IN FIRST TRIMESTER (HCC): Primary | ICD-10-CM

## 2019-05-02 DIAGNOSIS — O24.011 PRE-EXISTING TYPE 1 DIABETES MELLITUS WITH HYPERGLYCEMIA DURING PREGNANCY IN FIRST TRIMESTER (HCC): Primary | ICD-10-CM

## 2019-05-02 DIAGNOSIS — Z3A.01 LESS THAN 8 WEEKS GESTATION OF PREGNANCY: ICD-10-CM

## 2019-05-02 DIAGNOSIS — Z96.41 INSULIN PUMP IN PLACE: ICD-10-CM

## 2019-05-02 PROCEDURE — 99214 OFFICE O/P EST MOD 30 MIN: CPT | Performed by: NURSE PRACTITIONER

## 2019-05-03 ENCOUNTER — APPOINTMENT (OUTPATIENT)
Dept: LAB | Facility: HOSPITAL | Age: 29
End: 2019-05-03
Attending: OBSTETRICS & GYNECOLOGY
Payer: COMMERCIAL

## 2019-05-03 DIAGNOSIS — N91.2 AMENORRHEA: ICD-10-CM

## 2019-05-03 LAB
ABO GROUP BLD: NORMAL
BACTERIA UR QL AUTO: ABNORMAL /HPF
BASOPHILS # BLD AUTO: 0.07 THOUSANDS/ΜL (ref 0–0.1)
BASOPHILS NFR BLD AUTO: 1 % (ref 0–1)
BILIRUB UR QL STRIP: NEGATIVE
BLD GP AB SCN SERPL QL: NEGATIVE
CLARITY UR: CLEAR
COLOR UR: YELLOW
EOSINOPHIL # BLD AUTO: 0.22 THOUSAND/ΜL (ref 0–0.61)
EOSINOPHIL NFR BLD AUTO: 2 % (ref 0–6)
ERYTHROCYTE [DISTWIDTH] IN BLOOD BY AUTOMATED COUNT: 12.7 % (ref 11.6–15.1)
GLUCOSE UR STRIP-MCNC: NEGATIVE MG/DL
HBV SURFACE AG SER QL: NORMAL
HCT VFR BLD AUTO: 37.9 % (ref 34.8–46.1)
HGB BLD-MCNC: 12 G/DL (ref 11.5–15.4)
HGB UR QL STRIP.AUTO: ABNORMAL
IMM GRANULOCYTES # BLD AUTO: 0.03 THOUSAND/UL (ref 0–0.2)
IMM GRANULOCYTES NFR BLD AUTO: 0 % (ref 0–2)
KETONES UR STRIP-MCNC: ABNORMAL MG/DL
LEUKOCYTE ESTERASE UR QL STRIP: NEGATIVE
LYMPHOCYTES # BLD AUTO: 2.58 THOUSANDS/ΜL (ref 0.6–4.47)
LYMPHOCYTES NFR BLD AUTO: 28 % (ref 14–44)
MCH RBC QN AUTO: 26.7 PG (ref 26.8–34.3)
MCHC RBC AUTO-ENTMCNC: 31.7 G/DL (ref 31.4–37.4)
MCV RBC AUTO: 84 FL (ref 82–98)
MONOCYTES # BLD AUTO: 0.64 THOUSAND/ΜL (ref 0.17–1.22)
MONOCYTES NFR BLD AUTO: 7 % (ref 4–12)
MUCOUS THREADS UR QL AUTO: ABNORMAL
NEUTROPHILS # BLD AUTO: 5.71 THOUSANDS/ΜL (ref 1.85–7.62)
NEUTS SEG NFR BLD AUTO: 62 % (ref 43–75)
NITRITE UR QL STRIP: NEGATIVE
NON-SQ EPI CELLS URNS QL MICRO: ABNORMAL /HPF
NRBC BLD AUTO-RTO: 0 /100 WBCS
PH UR STRIP.AUTO: 6 [PH]
PLATELET # BLD AUTO: 254 THOUSANDS/UL (ref 149–390)
PMV BLD AUTO: 10.9 FL (ref 8.9–12.7)
PROT UR STRIP-MCNC: NEGATIVE MG/DL
RBC # BLD AUTO: 4.5 MILLION/UL (ref 3.81–5.12)
RBC #/AREA URNS AUTO: ABNORMAL /HPF
RH BLD: POSITIVE
RUBV IGG SERPL IA-ACNC: 152.9 IU/ML
SP GR UR STRIP.AUTO: >=1.03 (ref 1–1.03)
SPECIMEN EXPIRATION DATE: NORMAL
UROBILINOGEN UR QL STRIP.AUTO: 0.2 E.U./DL
WBC # BLD AUTO: 9.25 THOUSAND/UL (ref 4.31–10.16)
WBC #/AREA URNS AUTO: ABNORMAL /HPF

## 2019-05-03 PROCEDURE — 36415 COLL VENOUS BLD VENIPUNCTURE: CPT

## 2019-05-03 PROCEDURE — 87086 URINE CULTURE/COLONY COUNT: CPT

## 2019-05-03 PROCEDURE — 80081 OBSTETRIC PANEL INC HIV TSTG: CPT

## 2019-05-03 PROCEDURE — 81001 URINALYSIS AUTO W/SCOPE: CPT

## 2019-05-04 LAB — BACTERIA UR CULT: NORMAL

## 2019-05-05 ENCOUNTER — DOCUMENTATION (OUTPATIENT)
Dept: LABOR AND DELIVERY | Facility: HOSPITAL | Age: 29
End: 2019-05-05

## 2019-05-05 LAB — RPR SER QL: NORMAL

## 2019-05-06 ENCOUNTER — TELEPHONE (OUTPATIENT)
Dept: OBGYN CLINIC | Facility: CLINIC | Age: 29
End: 2019-05-06

## 2019-05-06 LAB — HIV 1+2 AB+HIV1 P24 AG SERPL QL IA: NORMAL

## 2019-05-07 ENCOUNTER — TELEPHONE (OUTPATIENT)
Dept: PERINATAL CARE | Facility: CLINIC | Age: 29
End: 2019-05-07

## 2019-05-09 ENCOUNTER — OFFICE VISIT (OUTPATIENT)
Dept: PERINATAL CARE | Facility: CLINIC | Age: 29
End: 2019-05-09
Payer: COMMERCIAL

## 2019-05-09 VITALS
HEIGHT: 61 IN | HEART RATE: 95 BPM | SYSTOLIC BLOOD PRESSURE: 137 MMHG | BODY MASS INDEX: 27.23 KG/M2 | WEIGHT: 144.2 LBS | DIASTOLIC BLOOD PRESSURE: 89 MMHG

## 2019-05-09 DIAGNOSIS — Z46.81 INSULIN PUMP TITRATION: ICD-10-CM

## 2019-05-09 DIAGNOSIS — Z3A.01 LESS THAN 8 WEEKS GESTATION OF PREGNANCY: ICD-10-CM

## 2019-05-09 DIAGNOSIS — O24.011 PRE-EXISTING TYPE 1 DIABETES MELLITUS WITH HYPERGLYCEMIA DURING PREGNANCY IN FIRST TRIMESTER (HCC): Primary | ICD-10-CM

## 2019-05-09 DIAGNOSIS — E10.65 PRE-EXISTING TYPE 1 DIABETES MELLITUS WITH HYPERGLYCEMIA DURING PREGNANCY IN FIRST TRIMESTER (HCC): Primary | ICD-10-CM

## 2019-05-09 DIAGNOSIS — Z96.41 INSULIN PUMP IN PLACE: ICD-10-CM

## 2019-05-09 PROBLEM — Z3A.10 10 WEEKS GESTATION OF PREGNANCY: Status: ACTIVE | Noted: 2019-04-26

## 2019-05-09 PROCEDURE — 99214 OFFICE O/P EST MOD 30 MIN: CPT | Performed by: NURSE PRACTITIONER

## 2019-05-10 ENCOUNTER — APPOINTMENT (OUTPATIENT)
Dept: LAB | Facility: HOSPITAL | Age: 29
End: 2019-05-10
Payer: COMMERCIAL

## 2019-05-10 DIAGNOSIS — E10.65 PRE-EXISTING TYPE 1 DIABETES MELLITUS WITH HYPERGLYCEMIA DURING PREGNANCY IN FIRST TRIMESTER (HCC): ICD-10-CM

## 2019-05-10 DIAGNOSIS — O24.011 PRE-EXISTING TYPE 1 DIABETES MELLITUS WITH HYPERGLYCEMIA DURING PREGNANCY IN FIRST TRIMESTER (HCC): ICD-10-CM

## 2019-05-10 DIAGNOSIS — Z3A.01 LESS THAN 8 WEEKS GESTATION OF PREGNANCY: ICD-10-CM

## 2019-05-10 DIAGNOSIS — Z96.41 INSULIN PUMP IN PLACE: ICD-10-CM

## 2019-05-10 LAB
ALBUMIN SERPL BCP-MCNC: 3.6 G/DL (ref 3.5–5)
ALP SERPL-CCNC: 49 U/L (ref 46–116)
ALT SERPL W P-5'-P-CCNC: 18 U/L (ref 12–78)
ANION GAP SERPL CALCULATED.3IONS-SCNC: 11 MMOL/L (ref 4–13)
AST SERPL W P-5'-P-CCNC: 12 U/L (ref 5–45)
BILIRUB SERPL-MCNC: 0.4 MG/DL (ref 0.2–1)
BUN SERPL-MCNC: 10 MG/DL (ref 5–25)
CALCIUM SERPL-MCNC: 8.6 MG/DL (ref 8.3–10.1)
CHLORIDE SERPL-SCNC: 101 MMOL/L (ref 100–108)
CO2 SERPL-SCNC: 25 MMOL/L (ref 21–32)
CREAT SERPL-MCNC: 0.7 MG/DL (ref 0.6–1.3)
EST. AVERAGE GLUCOSE BLD GHB EST-MCNC: 140 MG/DL
GFR SERPL CREATININE-BSD FRML MDRD: 118 ML/MIN/1.73SQ M
GLUCOSE P FAST SERPL-MCNC: 116 MG/DL (ref 65–99)
HBA1C MFR BLD: 6.5 % (ref 4.2–6.3)
POTASSIUM SERPL-SCNC: 3.7 MMOL/L (ref 3.5–5.3)
PROT SERPL-MCNC: 6.9 G/DL (ref 6.4–8.2)
SODIUM SERPL-SCNC: 137 MMOL/L (ref 136–145)
TSH SERPL DL<=0.05 MIU/L-ACNC: 1.6 UIU/ML (ref 0.36–3.74)

## 2019-05-10 PROCEDURE — 83036 HEMOGLOBIN GLYCOSYLATED A1C: CPT | Performed by: NURSE PRACTITIONER

## 2019-05-10 PROCEDURE — 36415 COLL VENOUS BLD VENIPUNCTURE: CPT | Performed by: NURSE PRACTITIONER

## 2019-05-10 PROCEDURE — 84443 ASSAY THYROID STIM HORMONE: CPT

## 2019-05-10 PROCEDURE — 80053 COMPREHEN METABOLIC PANEL: CPT

## 2019-05-15 ENCOUNTER — OFFICE VISIT (OUTPATIENT)
Dept: PERINATAL CARE | Facility: CLINIC | Age: 29
End: 2019-05-15
Payer: COMMERCIAL

## 2019-05-15 VITALS
WEIGHT: 141 LBS | SYSTOLIC BLOOD PRESSURE: 103 MMHG | HEIGHT: 61 IN | HEART RATE: 85 BPM | BODY MASS INDEX: 26.62 KG/M2 | DIASTOLIC BLOOD PRESSURE: 70 MMHG

## 2019-05-15 DIAGNOSIS — E10.65 PRE-EXISTING TYPE 1 DIABETES MELLITUS WITH HYPERGLYCEMIA DURING PREGNANCY IN FIRST TRIMESTER (HCC): Primary | ICD-10-CM

## 2019-05-15 DIAGNOSIS — Z3A.01 LESS THAN 8 WEEKS GESTATION OF PREGNANCY: ICD-10-CM

## 2019-05-15 DIAGNOSIS — Z46.81 INSULIN PUMP TITRATION: ICD-10-CM

## 2019-05-15 DIAGNOSIS — O24.011 PRE-EXISTING TYPE 1 DIABETES MELLITUS WITH HYPERGLYCEMIA DURING PREGNANCY IN FIRST TRIMESTER (HCC): Primary | ICD-10-CM

## 2019-05-15 PROCEDURE — 99214 OFFICE O/P EST MOD 30 MIN: CPT | Performed by: NURSE PRACTITIONER

## 2019-05-20 ENCOUNTER — ULTRASOUND (OUTPATIENT)
Dept: PERINATAL CARE | Facility: CLINIC | Age: 29
End: 2019-05-20
Payer: COMMERCIAL

## 2019-05-20 VITALS
BODY MASS INDEX: 26.62 KG/M2 | WEIGHT: 141 LBS | DIASTOLIC BLOOD PRESSURE: 52 MMHG | HEART RATE: 85 BPM | HEIGHT: 61 IN | SYSTOLIC BLOOD PRESSURE: 104 MMHG

## 2019-05-20 DIAGNOSIS — Z46.81 INSULIN PUMP TITRATION: ICD-10-CM

## 2019-05-20 DIAGNOSIS — E10.65 PRE-EXISTING TYPE 1 DIABETES MELLITUS WITH HYPERGLYCEMIA DURING PREGNANCY IN FIRST TRIMESTER (HCC): Primary | ICD-10-CM

## 2019-05-20 DIAGNOSIS — Z36.89 ENCOUNTER TO ESTABLISH GESTATIONAL AGE USING ULTRASOUND: ICD-10-CM

## 2019-05-20 DIAGNOSIS — Z3A.09 9 WEEKS GESTATION OF PREGNANCY: ICD-10-CM

## 2019-05-20 DIAGNOSIS — O24.011 PRE-EXISTING TYPE 1 DIABETES MELLITUS WITH HYPERGLYCEMIA DURING PREGNANCY IN FIRST TRIMESTER (HCC): Primary | ICD-10-CM

## 2019-05-20 PROCEDURE — 76801 OB US < 14 WKS SINGLE FETUS: CPT | Performed by: OBSTETRICS & GYNECOLOGY

## 2019-05-20 PROCEDURE — 99241 PR OFFICE CONSULTATION NEW/ESTAB PATIENT 15 MIN: CPT | Performed by: OBSTETRICS & GYNECOLOGY

## 2019-05-21 ENCOUNTER — TELEPHONE (OUTPATIENT)
Dept: PERINATAL CARE | Facility: CLINIC | Age: 29
End: 2019-05-21

## 2019-05-29 ENCOUNTER — INITIAL PRENATAL (OUTPATIENT)
Dept: OBGYN CLINIC | Facility: CLINIC | Age: 29
End: 2019-05-29

## 2019-05-29 DIAGNOSIS — Z34.01 ENCOUNTER FOR SUPERVISION OF NORMAL FIRST PREGNANCY IN FIRST TRIMESTER: Primary | ICD-10-CM

## 2019-05-29 DIAGNOSIS — Z46.81 INSULIN PUMP TITRATION: ICD-10-CM

## 2019-05-29 DIAGNOSIS — Z3A.01 LESS THAN 8 WEEKS GESTATION OF PREGNANCY: ICD-10-CM

## 2019-05-29 PROCEDURE — OBC

## 2019-05-29 RX ORDER — ASPIRIN 81 MG/1
162 TABLET, CHEWABLE ORAL DAILY
COMMUNITY
End: 2019-12-21 | Stop reason: HOSPADM

## 2019-05-30 ENCOUNTER — INITIAL PRENATAL (OUTPATIENT)
Dept: OBGYN CLINIC | Facility: CLINIC | Age: 29
End: 2019-05-30

## 2019-05-30 VITALS
DIASTOLIC BLOOD PRESSURE: 62 MMHG | WEIGHT: 140.6 LBS | BODY MASS INDEX: 26.55 KG/M2 | SYSTOLIC BLOOD PRESSURE: 94 MMHG | HEIGHT: 61 IN

## 2019-05-30 DIAGNOSIS — Z3A.10 10 WEEKS GESTATION OF PREGNANCY: ICD-10-CM

## 2019-05-30 DIAGNOSIS — Z46.81 INSULIN PUMP TITRATION: ICD-10-CM

## 2019-05-30 DIAGNOSIS — Z11.3 SCREENING FOR STD (SEXUALLY TRANSMITTED DISEASE): Primary | ICD-10-CM

## 2019-05-30 DIAGNOSIS — Z3A.01 LESS THAN 8 WEEKS GESTATION OF PREGNANCY: ICD-10-CM

## 2019-05-30 DIAGNOSIS — E10.9 TYPE 1 DIABETES MELLITUS WITHOUT COMPLICATION (HCC): ICD-10-CM

## 2019-05-30 PROCEDURE — 87591 N.GONORRHOEAE DNA AMP PROB: CPT | Performed by: OBSTETRICS & GYNECOLOGY

## 2019-05-30 PROCEDURE — 87491 CHLMYD TRACH DNA AMP PROBE: CPT | Performed by: OBSTETRICS & GYNECOLOGY

## 2019-05-30 PROCEDURE — PNV: Performed by: OBSTETRICS & GYNECOLOGY

## 2019-05-31 LAB
C TRACH DNA SPEC QL NAA+PROBE: NEGATIVE
N GONORRHOEA DNA SPEC QL NAA+PROBE: NEGATIVE

## 2019-06-11 ENCOUNTER — HOSPITAL ENCOUNTER (OUTPATIENT)
Dept: NON INVASIVE DIAGNOSTICS | Facility: CLINIC | Age: 29
Discharge: HOME | End: 2019-06-11
Payer: COMMERCIAL

## 2019-06-11 DIAGNOSIS — E10.9 TYPE 1 DIABETES MELLITUS WITHOUT COMPLICATION (HCC): ICD-10-CM

## 2019-06-11 PROCEDURE — 93005 ELECTROCARDIOGRAM TRACING: CPT

## 2019-06-11 PROCEDURE — 93010 ELECTROCARDIOGRAM REPORT: CPT | Performed by: INTERNAL MEDICINE

## 2019-06-14 ENCOUNTER — APPOINTMENT (OUTPATIENT)
Dept: LAB | Facility: HOSPITAL | Age: 29
End: 2019-06-14
Attending: OBSTETRICS & GYNECOLOGY
Payer: COMMERCIAL

## 2019-06-14 DIAGNOSIS — E10.9 TYPE 1 DIABETES MELLITUS WITHOUT COMPLICATION (HCC): ICD-10-CM

## 2019-06-14 LAB
CREAT 24H UR-MRATE: 1.1 G/24HR (ref 0.6–1.8)
CREAT CL 24H UR+SERPL-VRATE: 134.85 ML/MIN (ref 75–115)
CREAT SERPL-MCNC: 0.6 MG/DL (ref 0.6–1.3)
CREAT UR-MCNC: 55.9 MG/DL
PROT 24H UR-MCNC: <120 MG/24 HRS (ref 40–150)
SPECIMEN VOL UR: 2000 ML
SPECIMEN VOL UR: 2000 ML

## 2019-06-14 PROCEDURE — 82575 CREATININE CLEARANCE TEST: CPT

## 2019-06-14 PROCEDURE — 82565 ASSAY OF CREATININE: CPT

## 2019-06-14 PROCEDURE — 84156 ASSAY OF PROTEIN URINE: CPT

## 2019-06-21 ENCOUNTER — ROUTINE PRENATAL (OUTPATIENT)
Dept: PERINATAL CARE | Facility: CLINIC | Age: 29
End: 2019-06-21
Payer: COMMERCIAL

## 2019-06-21 VITALS
WEIGHT: 142.8 LBS | SYSTOLIC BLOOD PRESSURE: 107 MMHG | HEIGHT: 61 IN | HEART RATE: 90 BPM | BODY MASS INDEX: 26.96 KG/M2 | DIASTOLIC BLOOD PRESSURE: 69 MMHG

## 2019-06-21 DIAGNOSIS — Z3A.13 13 WEEKS GESTATION OF PREGNANCY: ICD-10-CM

## 2019-06-21 DIAGNOSIS — O24.011 PREGNANCY COMPLICATED BY PRE-EXISTING TYPE 1 DIABETES, FIRST TRIMESTER: Primary | ICD-10-CM

## 2019-06-21 PROCEDURE — 99212 OFFICE O/P EST SF 10 MIN: CPT | Performed by: OBSTETRICS & GYNECOLOGY

## 2019-06-21 PROCEDURE — 76801 OB US < 14 WKS SINGLE FETUS: CPT | Performed by: OBSTETRICS & GYNECOLOGY

## 2019-06-24 DIAGNOSIS — E10.9 TYPE 1 DIABETES MELLITUS WITHOUT COMPLICATION (HCC): ICD-10-CM

## 2019-06-27 ENCOUNTER — ROUTINE PRENATAL (OUTPATIENT)
Dept: OBGYN CLINIC | Facility: CLINIC | Age: 29
End: 2019-06-27
Payer: COMMERCIAL

## 2019-06-27 VITALS
WEIGHT: 147 LBS | HEIGHT: 61 IN | BODY MASS INDEX: 27.75 KG/M2 | DIASTOLIC BLOOD PRESSURE: 58 MMHG | SYSTOLIC BLOOD PRESSURE: 108 MMHG

## 2019-06-27 DIAGNOSIS — Z3A.14 14 WEEKS GESTATION OF PREGNANCY: Primary | ICD-10-CM

## 2019-06-27 DIAGNOSIS — Z46.81 INSULIN PUMP TITRATION: ICD-10-CM

## 2019-06-27 LAB
SL AMB  POCT GLUCOSE, UA: NORMAL
SL AMB LEUKOCYTE ESTERASE,UA: 25
SL AMB POCT BILIRUBIN,UA: NORMAL
SL AMB POCT BLOOD,UA: NORMAL
SL AMB POCT CLARITY,UA: CLEAR
SL AMB POCT COLOR,UA: YELLOW
SL AMB POCT KETONES,UA: NORMAL
SL AMB POCT NITRITE,UA: NORMAL
SL AMB POCT PH,UA: 5
SL AMB POCT SPECIFIC GRAVITY,UA: 1.02
SL AMB POCT URINE PROTEIN: NORMAL
SL AMB POCT UROBILINOGEN: NORMAL

## 2019-06-27 PROCEDURE — 81002 URINALYSIS NONAUTO W/O SCOPE: CPT | Performed by: OBSTETRICS & GYNECOLOGY

## 2019-06-27 PROCEDURE — PNV: Performed by: OBSTETRICS & GYNECOLOGY

## 2019-07-05 ENCOUNTER — TELEPHONE (OUTPATIENT)
Dept: OBGYN CLINIC | Facility: CLINIC | Age: 29
End: 2019-07-05

## 2019-07-11 ENCOUNTER — TELEPHONE (OUTPATIENT)
Dept: PERINATAL CARE | Facility: CLINIC | Age: 29
End: 2019-07-11

## 2019-07-12 ENCOUNTER — ULTRASOUND (OUTPATIENT)
Dept: PERINATAL CARE | Facility: CLINIC | Age: 29
End: 2019-07-12
Payer: COMMERCIAL

## 2019-07-12 VITALS
HEIGHT: 61 IN | DIASTOLIC BLOOD PRESSURE: 58 MMHG | BODY MASS INDEX: 27.75 KG/M2 | HEART RATE: 85 BPM | SYSTOLIC BLOOD PRESSURE: 108 MMHG | WEIGHT: 147 LBS

## 2019-07-12 DIAGNOSIS — O24.012 PREGNANCY COMPLICATED BY PRE-EXISTING TYPE 1 DIABETES, SECOND TRIMESTER: Primary | ICD-10-CM

## 2019-07-12 DIAGNOSIS — Z3A.16 16 WEEKS GESTATION OF PREGNANCY: ICD-10-CM

## 2019-07-12 PROCEDURE — 99213 OFFICE O/P EST LOW 20 MIN: CPT | Performed by: OBSTETRICS & GYNECOLOGY

## 2019-07-12 PROCEDURE — 76805 OB US >/= 14 WKS SNGL FETUS: CPT | Performed by: OBSTETRICS & GYNECOLOGY

## 2019-07-13 NOTE — PROGRESS NOTES
Simon Gonzalez is here today with her partner Kylee Tavares for an early anatomy scan secondary to her type 1 diabetes  She is on a insulin pump and has a continuous glucose sensor  Her last hemoglobin A1c was 5 8  Her baseline preeclamptic labs were normal   She states she has an eye exam set up for August 20th  Her EKG showed left atrial enlargement with normal sinus rhythm and it was read as a borderline EKG  Will message the cardiologist who read the EKG (Dr Hanna ) as I think this is just related to pregnancy changes  She reports she is down loading her blood sugars for our office but I cannot see any sugars after May 20th  Will  message diabetes education to review  She is set up for a visit with diabetes education on 07/25  Her ultrasound today for early anatomy shows normal fetal growth and no fetal malformations were suggested by today's findings  Follow up  Recommend a follow-up ultrasound for anatomy in four weeks and fetal echo in 6-8 weeks  Recommend close follow-up with her blood sugars by diabetes education at least once per week  The majority of time (greater then 50%) was spent on counseling and coordination of care of this patient and /or family member  Approximate face to face time was 15 minutes        Saira Hagan MD

## 2019-07-22 DIAGNOSIS — E10.65 PRE-EXISTING TYPE 1 DIABETES MELLITUS WITH HYPERGLYCEMIA DURING PREGNANCY IN SECOND TRIMESTER (HCC): Primary | ICD-10-CM

## 2019-07-22 DIAGNOSIS — O24.012 PRE-EXISTING TYPE 1 DIABETES MELLITUS WITH HYPERGLYCEMIA DURING PREGNANCY IN SECOND TRIMESTER (HCC): Primary | ICD-10-CM

## 2019-07-24 ENCOUNTER — APPOINTMENT (OUTPATIENT)
Dept: LAB | Facility: HOSPITAL | Age: 29
End: 2019-07-24
Attending: INTERNAL MEDICINE
Payer: COMMERCIAL

## 2019-07-24 DIAGNOSIS — E10.65 PRE-EXISTING TYPE 1 DIABETES MELLITUS WITH HYPERGLYCEMIA DURING PREGNANCY IN SECOND TRIMESTER (HCC): ICD-10-CM

## 2019-07-24 DIAGNOSIS — O24.012 PRE-EXISTING TYPE 1 DIABETES MELLITUS WITH HYPERGLYCEMIA DURING PREGNANCY IN SECOND TRIMESTER (HCC): ICD-10-CM

## 2019-07-24 DIAGNOSIS — E10.9 TYPE 1 DIABETES MELLITUS WITHOUT COMPLICATION (HCC): ICD-10-CM

## 2019-07-24 LAB
CHOLEST SERPL-MCNC: 211 MG/DL (ref 50–200)
EST. AVERAGE GLUCOSE BLD GHB EST-MCNC: 103 MG/DL
HBA1C MFR BLD: 5.2 % (ref 4.2–6.3)
HDLC SERPL-MCNC: 104 MG/DL (ref 40–60)
LDLC SERPL CALC-MCNC: 92 MG/DL (ref 0–100)
TRIGL SERPL-MCNC: 73 MG/DL

## 2019-07-24 PROCEDURE — 83036 HEMOGLOBIN GLYCOSYLATED A1C: CPT

## 2019-07-24 PROCEDURE — 36415 COLL VENOUS BLD VENIPUNCTURE: CPT

## 2019-07-24 PROCEDURE — 80061 LIPID PANEL: CPT

## 2019-07-25 ENCOUNTER — OFFICE VISIT (OUTPATIENT)
Dept: PERINATAL CARE | Facility: CLINIC | Age: 29
End: 2019-07-25
Payer: COMMERCIAL

## 2019-07-25 ENCOUNTER — ROUTINE PRENATAL (OUTPATIENT)
Dept: OBGYN CLINIC | Facility: CLINIC | Age: 29
End: 2019-07-25

## 2019-07-25 VITALS
WEIGHT: 146.4 LBS | HEART RATE: 82 BPM | DIASTOLIC BLOOD PRESSURE: 78 MMHG | BODY MASS INDEX: 27.64 KG/M2 | SYSTOLIC BLOOD PRESSURE: 116 MMHG | HEIGHT: 61 IN

## 2019-07-25 VITALS
BODY MASS INDEX: 27.75 KG/M2 | HEART RATE: 76 BPM | DIASTOLIC BLOOD PRESSURE: 67 MMHG | HEIGHT: 61 IN | SYSTOLIC BLOOD PRESSURE: 101 MMHG | WEIGHT: 147 LBS

## 2019-07-25 DIAGNOSIS — O24.012 PRE-EXISTING TYPE 1 DIABETES MELLITUS DURING PREGNANCY IN SECOND TRIMESTER: Primary | ICD-10-CM

## 2019-07-25 DIAGNOSIS — Z46.81 INSULIN PUMP TITRATION: ICD-10-CM

## 2019-07-25 DIAGNOSIS — Z3A.18 18 WEEKS GESTATION OF PREGNANCY: ICD-10-CM

## 2019-07-25 DIAGNOSIS — E10.9 TYPE 1 DIABETES MELLITUS WITHOUT COMPLICATION (HCC): Primary | ICD-10-CM

## 2019-07-25 PROCEDURE — 99354 PR PROLONGED SVC OUTPATIENT SETTING 1ST HOUR: CPT | Performed by: NURSE PRACTITIONER

## 2019-07-25 PROCEDURE — PNV: Performed by: OBSTETRICS & GYNECOLOGY

## 2019-07-25 PROCEDURE — 99215 OFFICE O/P EST HI 40 MIN: CPT | Performed by: NURSE PRACTITIONER

## 2019-07-25 NOTE — PATIENT INSTRUCTIONS
1  Continue 95810 calorie GDM diet with 3 meals and 3 snacks including recommended combination of carb, protein and fat per meal/snack  2  No more than 8 to 10 hours of fasting overnight  3  Basal 1 and work basal settings provided  4  Please do not give extra correction bolus  5  A1c 5 2% better than goal   6  Always have glucose available for hypoglycemia, use 15 by 15 rule  7  Fetal growth ultrasound every 4 weeks  Keep Level II ultrasound and fetal echo as scheduled  8  Continue follow-up with your OB and MFM as recommended  9  Follow-up in 1 month

## 2019-07-25 NOTE — PATIENT INSTRUCTIONS
Pregnancy at 23 to 22 [address]:   What changes are happening in my body? Now that you are in your second trimester, you have more energy  You may also be feeling hungrier than usual  You may be gaining about ½ to 1 pound a week, and your pregnancy is beginning to show  You may need to start wearing maternity clothes  As your baby gets larger, you may have other symptoms  These may include body aches or stretch marks on your abdomen, breasts, thighs, or buttocks  How do I care for myself at this stage of my pregnancy? · Eat a variety of healthy foods  Healthy foods include fruits, vegetables, whole-grain breads, low-fat dairy foods, beans, lean meats, and fish  Drink liquids as directed  Ask how much liquid to drink each day and which liquids are best for you  Limit caffeine to less than 200 milligrams each day  Limit your intake of fish to 2 servings each week  Choose fish low in mercury such as canned light tuna, shrimp, salmon, cod, or tilapia  Do not  eat fish high in mercury such as swordfish, tilefish, kimberley mackerel, and shark  · Take prenatal vitamins as directed  Your need for certain vitamins and minerals, such as folic acid, increases during pregnancy  Prenatal vitamins provide some of the extra vitamins and minerals you need  Prenatal vitamins may also help to decrease the risk of certain birth defects  · Talk to your healthcare provider about exercise  Moderate exercise can help you stay fit  Your healthcare provider will help you plan an exercise program that is safe for you during pregnancy  · Do not smoke  If you smoke, it is never too late to quit  Smoking increases your risk of a miscarriage and other health problems during your pregnancy  Smoking can cause your baby to be born too early or weigh less at birth  Ask your healthcare provider for information if you need help quitting  · Do not drink alcohol    Alcohol passes from your body to your baby through the placenta  It can affect your baby's brain development and cause fetal alcohol syndrome (FAS)  FAS is a group of conditions that causes mental, behavior, and growth problems  · Talk to your healthcare provider before you take any medicines  Many medicines may harm your baby if you take them when you are pregnant  Do not take any medicines, vitamins, herbs, or supplements without first talking to your healthcare provider  Never use illegal or street drugs (such as marijuana or cocaine) while you are pregnant  What are some safety tips during pregnancy? · Avoid hot tubs and saunas  Do not use a hot tub or sauna while you are pregnant, especially during your first trimester  Hot tubs and saunas may raise your baby's temperature and increase the risk of birth defects  · Avoid toxoplasmosis  This is an infection caused by eating raw meat or being around infected cat feces  It can cause birth defects, miscarriages, and other problems  Wash your hands after you touch raw meat  Make sure any meat is well-cooked before you eat it  Avoid raw eggs and unpasteurized milk  Use gloves or ask someone else to clean your cat's litter box while you are pregnant  What changes are happening with my baby? By 22 weeks, your baby is about 8 inches long from the top of the head to the rump (baby's bottom)  Your baby also weighs about 1 pound  Your baby is becoming much more active  You may be able to feel the baby move inside you now  The first movements may not be that noticeable  They may feel like a fluttering sensation  As time goes on, your baby's movements will become stronger and more noticeable  What do I need to know about prenatal care? During the first 28 weeks of your pregnancy, you will see your healthcare provider once a month  Your healthcare provider will check your blood pressure and weight  You may also need the following:  · A urine test  may also be done to check for sugar and protein   These can be signs of gestational diabetes or infection  Protein in your urine may also be a sign of preeclampsia  Preeclampsia is a condition that can develop during week 20 or later of your pregnancy  It causes high blood pressure, and it can cause problems with your kidneys and other organs  · Fundal height  is a measurement of your uterus to check your baby's growth  This number is usually the same as the number of weeks that you have been pregnant  · A fetal ultrasound  shows pictures of your baby inside your uterus  It shows your baby's development  The movement and position of your baby can also be seen  Your healthcare provider may be able to tell you what your baby's gender is during the ultrasound  · Your baby's heart rate  will be checked  When should I seek immediate care? · You develop a severe headache that does not go away  · You have new or increased vision changes, such as blurred or spotted vision  · You have new or increased swelling in your face or hands  · You have vaginal spotting or bleeding  · Your water broke or you feel warm water gushing or trickling from your vagina  When should I contact my healthcare provider? · You have abdominal cramps, pressure, or tightening  · You have a change in vaginal discharge  · You cannot keep food or drinks down, and you are losing weight  · You have chills or a fever  · You have vaginal itching, burning, or pain  · You have yellow, green, white, or foul-smelling vaginal discharge  · You have pain or burning when you urinate, less urine than usual, or pink or bloody urine  · You have questions or concerns about your condition or care  CARE AGREEMENT:   You have the right to help plan your care  Learn about your health condition and how it may be treated  Discuss treatment options with your caregivers to decide what care you want to receive  You always have the right to refuse treatment   The above information is an  only  It is not intended as medical advice for individual conditions or treatments  Talk to your doctor, nurse or pharmacist before following any medical regimen to see if it is safe and effective for you  © 2017 [employer] [employer] Information is for End User's use only and may not be sold, redistributed or otherwise used for commercial purposes  All illustrations and images included in CareNotes® are the copyrighted property of A D A M , Inc  or Valentin Dickinson

## 2019-07-25 NOTE — Clinical Note
Maynor Portillo,Just saw Josuémary today for prenatal visit, doing well  She was wondering if you had any response from the cardiologist regarding her EKG which showed left atrial enlargement  You had planned to message them to see if there is any concerns or evaluation needed for this  Patient does have appointment later today with Juany and she will ask at that time as well

## 2019-07-25 NOTE — PROGRESS NOTES
Patient is doing well  Some probable fetal movement noted  1  18 4 weeks-doing well  To follow up in 4 weeks time for prenatal visit  Patient declines genetic testing  2   Diabetes-noted since age 14  She is on insulin pump with overall good control  She has appointment with [facility] gestational diabetes program later today  24 hour urine testing was okay  She did have recent lipid profile with slightly elevated cholesterol and LDL, but this could be affected by pregnancy  EKG with some left atrial enlargement which was noted by Dr Katelyn Mccord, who planned to message the reading cardiologist   I have not seen any documentation regarding this and I will message Dr Katelyn Mccord regarding this  The patient also plans to message her as well since she she was awaiting that evaluation  Most recent hemoglobin A1c was 5 2  She has follow-up [facility] ultrasound at 20 weeks and fetal echocardiogram after that  Testing after 32 weeks is recommended with delivery between 39-40 if good control is noted

## 2019-07-25 NOTE — PROGRESS NOTES
Assessment/Plan:       Diagnoses and all orders for this visit:    Pre-existing type 1 diabetes mellitus during pregnancy in second trimester    18 weeks gestation of pregnancy    Insulin pump titration        1  Continue 21156 calorie GDM diet with 3 meals and 3 snacks including recommended combination of carb, protein and fat per meal/snack  2  No more than 8 to 10 hours of fasting overnight  3  Basal 1 and work basal settings provided  4  Please do not give extra correction bolus  5  A1c 5 2% better than goal   6  Always have glucose available for hypoglycemia, use 15 by 15 rule  7  Fetal growth ultrasound every 4 weeks  Keep Level II ultrasound and fetal echo as scheduled  8  Continue follow-up with your OB and MFM as recommended  9  Follow-up in 1 month  Continue Novolog via T-slim  Basal 1 settings: MN to 2 AM @ 0 65 units/hour; 2 AM to 6 AM @ 0 50 units/hour; 6 AM to MN @ 0 65 units/hour  ISF 1:45, ICR 1:11, BG target 90  Total basal 15 units  Work basal settings: MN to 11 AM @ 0 65 units/hour; 11 AM to 1 PM @ 0 50 units/hour; 1 PM to MN @ 0 65 units/hour  ISF 1:45, ICR 1:11, BG target 90  Total basal 15 3 units  Subjective:      Patient ID: Patsy Arellano is a 30 y o  female  [place] 12/22/19, currently 18 4/7 weeks gestation  Pre-existing T1DM on Novolog via T-slim insulin pump  Here for follow-up and insulin pump titration  On personal Dexcom G6, reports issues with hypoglycemia during sleep hours treating with a juice box, times identified 1 PM post working night shift, at 3 AM and 6 AM on off nights  Self monitoring blood glucose  Reports eating every 2 hours during wake hours  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   present during visit  Insulin pump and GCM downloaded during visit and reviewed with patient  Morris Duckworth is still correcting high glucose readings but has decreased the amount of times   Per download lowest glucose reading of 20 but Whit reports no glucose reading that low  The following portions of the patient's history were reviewed and updated as appropriate: allergies, current medications  Allergies   Allergen Reactions    Other      Other reaction(s): Resp Distress  Other reaction(s): Resp Sympt Mild  Other reaction(s): Resp Distress    Mold Extract  [Trichophyton]      Other reaction(s): Resp Sympt Mild    Molds & Smuts      Other reaction(s): Resp Sympt Mild    Pollen Extract      Other reaction(s): Resp Sympt Mild     Current Outpatient Medications on File Prior to Visit   Medication Sig Dispense Refill    insulin aspart (NovoLOG) 100 units/mL injection Use up to 120 units daily via insulin pump  40 mL 5    ONETOUCH DELICA LANCETS FINE MISC Use up to 8 a day and as directed  300 each 3    ONETOUCH VERIO test strip Test up to 8 times a day and as instructed 300 each 3    Prenatal Vit-DSS-Fe Cbn-FA (PRENATAL AD PO) Take by mouth      Urine Glucose-Ketones Test STRP by In Vitro route      aspirin 81 mg chewable tablet Chew 162 mg daily       No current facility-administered medications on file prior to visit  Review of Systems   Constitutional: Negative for fatigue and fever  HENT: Positive for congestion  Negative for trouble swallowing  Eyes: Negative for visual disturbance  Respiratory: Negative for cough and shortness of breath  Cardiovascular: Negative  Gastrointestinal: Negative for constipation, nausea and vomiting  Endocrine: Positive for polyphagia  Negative for polydipsia and polyuria  Genitourinary: Negative for difficulty urinating and vaginal bleeding  Musculoskeletal: Negative for arthralgias and myalgias  Skin: Negative for rash  Allergic/Immunologic: Positive for environmental allergies  Neurological: Negative for dizziness, light-headedness and headaches  Psychiatric/Behavioral: Negative for sleep disturbance           Objective:        Component Value Date/Time    BA1C 5 2 07/24/2019 0819    BA1C 5 8 06/14/2019 1247      /67 (BP Location: Right arm, Patient Position: Sitting, Cuff Size: Standard)   Pulse 76   Ht 5' 1" (1 549 m)   Wt 66 7 kg (147 lb)   LMP 03/17/2019 (Exact Date)   BMI 27 78 kg/m²      Physical Exam   Constitutional: She is oriented to person, place, and time  She appears well-developed and well-nourished  HENT:   Head: Normocephalic  Eyes: Conjunctivae and lids are normal    Neck: Normal range of motion  Pulmonary/Chest: Effort normal    Musculoskeletal: Normal range of motion  Neurological: She is alert and oriented to person, place, and time  Psychiatric: She has a normal mood and affect  Her speech is normal and behavior is normal  Thought content normal      heart and lungs not ausculted         Time in:14:30 PM  Time out:16:00 PM

## 2019-07-29 ENCOUNTER — OFFICE VISIT (OUTPATIENT)
Dept: FAMILY MEDICINE CLINIC | Facility: CLINIC | Age: 29
End: 2019-07-29
Payer: COMMERCIAL

## 2019-07-29 ENCOUNTER — TELEPHONE (OUTPATIENT)
Dept: OBGYN CLINIC | Facility: CLINIC | Age: 29
End: 2019-07-29

## 2019-07-29 ENCOUNTER — DOCUMENTATION (OUTPATIENT)
Dept: OBGYN CLINIC | Facility: CLINIC | Age: 29
End: 2019-07-29

## 2019-07-29 VITALS
DIASTOLIC BLOOD PRESSURE: 70 MMHG | HEART RATE: 105 BPM | SYSTOLIC BLOOD PRESSURE: 110 MMHG | HEIGHT: 61 IN | OXYGEN SATURATION: 99 % | BODY MASS INDEX: 27.45 KG/M2 | WEIGHT: 145.4 LBS | TEMPERATURE: 99.1 F

## 2019-07-29 DIAGNOSIS — R51.9 ACUTE NONINTRACTABLE HEADACHE, UNSPECIFIED HEADACHE TYPE: Primary | ICD-10-CM

## 2019-07-29 PROCEDURE — 3008F BODY MASS INDEX DOCD: CPT | Performed by: FAMILY MEDICINE

## 2019-07-29 PROCEDURE — 99213 OFFICE O/P EST LOW 20 MIN: CPT | Performed by: FAMILY MEDICINE

## 2019-07-29 NOTE — PROGRESS NOTES
Patient called on 7/28/19 with complaints of headache and nausea and vomiting  She inquired about possible preeclampsia  She was counseled that this is exceedingly unlikely at 19 weeks gestation  More likely, she has some type of viral infection or other illness  Practical recommendations were reviewed  She will continue oral hydration  Her nausea and vomiting has already improved and she will call with any worsening of symptoms  She is diabetic, with overall good control at this time

## 2019-07-29 NOTE — LETTER
July 29, 2019     Patient: Sasha Moy   YOB: 1990   Date of Visit: 7/29/2019       To Whom it May Concern:    Sasha Moy is under my professional care  She was seen in my office on 7/29/2019  She may return to work on 8/1/2019  If you have any questions or concerns, please don't hesitate to call           Sincerely,          CODY Cisse        CC: No Recipients

## 2019-07-29 NOTE — PROGRESS NOTES
Assessment/Plan   Diagnoses and all orders for this visit:    Acute nonintractable headache, unspecified headache type        Chief Complaint   Patient presents with    Headache     Headaches for 2 days, nasal congested, eyes puffy, and pressure face       Subjective   Patient ID: Magui Alcazar is a 30 y o  female  Vitals:    07/29/19 1127   BP: 110/70   Pulse: 105   Temp: 99 1 °F (37 3 °C)   SpO2: 99%     Type 1 diabetic - pregnant (19 weeks) due date 12/22/2019  Feels like she is drinking enough water  Had a low BGM yesterday - drank juice- "usually then spike  But then returns to norm" - yesterday FBS was 140 - "unusual for me"  Headache    This is a new problem  Episode onset: 2 days ago in the PM noticed the "beginnings of a headache", left eye around left side of head to base of right side of neck, 19 weeks pregnant called OB 1 time dose of motrin was allowed without relief, "pressure in head with bending" OB thought sinus inf  The problem occurs constantly  The problem has been waxing and waning  The pain is located in the left unilateral region  The pain radiates to the left neck  The pain quality is not similar to prior headaches  Quality: "pressure" Pain scale: 3/10 with rest and 6/10 with bending over  The pain is moderate  Associated symptoms include eye pain ("behind left eye"), neck pain, sinus pressure, vomiting (vomited 1 x -"more sensitive since pregnancy") and weight loss  Pertinent negatives include no abdominal pain, abnormal behavior, back pain, blurred vision, dizziness, drainage, ear pain, eye redness, eye watering, facial sweating, fever, hearing loss, insomnia, loss of balance, muscle aches, nausea, numbness, phonophobia, photophobia, rhinorrhea, scalp tenderness, seizures, sore throat, swollen glands, tingling or weakness  The symptoms are aggravated by activity  She has tried acetaminophen (1 500 mg tylenol and 400 mg motrin) for the symptoms   The treatment provided mild (reports that it may feel less intense than yesterday) relief  There is no history of migraine headaches, recent head traumas, sinus disease or TMJ  The following portions of the patient's history were reviewed and updated as appropriate: allergies, past family history, past medical history, past social history, past surgical history and problem list     Review of Systems   Constitutional: Positive for activity change and weight loss  Negative for chills, fatigue and fever  HENT: Positive for sinus pressure  Negative for ear pain, hearing loss, rhinorrhea and sore throat  Eyes: Positive for pain ("behind left eye")  Negative for blurred vision, photophobia and redness  Respiratory: Negative  Cardiovascular: Negative  Gastrointestinal: Positive for vomiting (vomited 1 x -"more sensitive since pregnancy")  Negative for abdominal pain and nausea  Endocrine: Negative  Genitourinary: Negative  Musculoskeletal: Positive for neck pain  Negative for back pain  Skin: Negative  Allergic/Immunologic: Negative  Neurological: Positive for headaches  Negative for dizziness, tingling, seizures, weakness, numbness and loss of balance  Hematological: Negative  Psychiatric/Behavioral: Negative  The patient does not have insomnia  Objective     Physical Exam   Constitutional: She is oriented to person, place, and time  She appears well-developed and well-nourished  No distress  HENT:   Head: Normocephalic and atraumatic  Right Ear: External ear normal  Tympanic membrane is not perforated, not erythematous and not bulging  No middle ear effusion  Left Ear: External ear normal  Tympanic membrane is not perforated, not erythematous and not bulging  No middle ear effusion  Nose: No mucosal edema, rhinorrhea or sinus tenderness  Left sinus exhibits maxillary sinus tenderness     Mouth/Throat: Uvula is midline, oropharynx is clear and moist and mucous membranes are normal  No oropharyngeal exudate or posterior oropharyngeal erythema  Tonsils are 1+ on the right  Tonsils are 1+ on the left  No tonsillar exudate  Eyes: Pupils are equal, round, and reactive to light  Conjunctivae and EOM are normal  Right eye exhibits no discharge  Left eye exhibits no discharge  No scleral icterus  Neck: Normal range of motion  Neck supple  No thyromegaly present  Cardiovascular: Normal rate, regular rhythm, normal heart sounds and intact distal pulses  No murmur heard  Pulmonary/Chest: Effort normal and breath sounds normal  No respiratory distress  She has no wheezes  Abdominal: Soft  Musculoskeletal: Normal range of motion  She exhibits no edema  Lymphadenopathy:     She has no cervical adenopathy  Neurological: She is alert and oriented to person, place, and time  Skin: Skin is warm and dry  Capillary refill takes less than 2 seconds  She is not diaphoretic  Psychiatric: She has a normal mood and affect  Her behavior is normal  Judgment and thought content normal    Nursing note and vitals reviewed      Allergies   Allergen Reactions    Other      Other reaction(s): Resp Distress  Other reaction(s): Resp Sympt Mild  Other reaction(s): Resp Distress    Mold Extract  [Trichophyton]      Other reaction(s): Resp Sympt Mild    Molds & Smuts      Other reaction(s): Resp Sympt Mild    Pollen Extract      Other reaction(s): Resp Sympt Mild     Patient Active Problem List   Diagnosis    Type 1 diabetes mellitus without complication (HCC)    Insulin pump in place    Vitamin D insufficiency    Insulin pump titration    Pre-existing type 1 diabetes mellitus during pregnancy in second trimester    18 weeks gestation of pregnancy     Current Outpatient Medications:     aspirin 81 mg chewable tablet, Chew 162 mg daily, Disp: , Rfl:     insulin aspart (NovoLOG) 100 units/mL injection, Use up to 120 units daily via insulin pump , Disp: 40 mL, Rfl: 5    ONETOUCH DELICA LANCETS FINE MISC, Use up to 8 a day and as directed , Disp: 300 each, Rfl: 3    ONETOUCH VERIO test strip, Test up to 8 times a day and as instructed, Disp: 300 each, Rfl: 3    Prenatal Vit-DSS-Fe Cbn-FA (PRENATAL AD PO), Take by mouth, Disp: , Rfl:     Urine Glucose-Ketones Test STRP, by In Vitro route, Disp: , Rfl:   Social History     Socioeconomic History    Marital status: /Civil Union     Spouse name: Not on file    Number of children: Not on file    Years of education: Not on file    Highest education level: Not on file   Occupational History    Not on file   Social Needs    Financial resource strain: Not on file    Food insecurity:     Worry: Not on file     Inability: Not on file    Transportation needs:     Medical: Not on file     Non-medical: Not on file   Tobacco Use    Smoking status: Never Smoker    Smokeless tobacco: Never Used   Substance and Sexual Activity    Alcohol use: Not Currently    Drug use: Never    Sexual activity: Yes     Birth control/protection: None   Lifestyle    Physical activity:     Days per week: Not on file     Minutes per session: Not on file    Stress: Not on file   Relationships    Social connections:     Talks on phone: Not on file     Gets together: Not on file     Attends Yarsanism service: Not on file     Active member of club or organization: Not on file     Attends meetings of clubs or organizations: Not on file     Relationship status: Not on file    Intimate partner violence:     Fear of current or ex partner: Not on file     Emotionally abused: Not on file     Physically abused: Not on file     Forced sexual activity: Not on file   Other Topics Concern    Not on file   Social History Narrative    Not on file     Family History   Problem Relation Age of Onset    No Known Problems Mother     No Known Problems Father     Diabetes type I Brother     Skin cancer Family

## 2019-07-29 NOTE — PATIENT INSTRUCTIONS
1  Increase fluid intake  2  Try benadryl and NSS nasal irrigation   3  Normal dose of tylenol  4   Rest and call OB with what transpired

## 2019-08-06 LAB
ATRIAL RATE: 83 BPM
P AXIS: 57 DEGREES
PR INTERVAL: 128 MS
QRS AXIS: 72 DEGREES
QRSD INTERVAL: 84 MS
QT INTERVAL: 348 MS
QTC INTERVAL: 408 MS
T WAVE AXIS: 44 DEGREES
VENTRICULAR RATE: 83 BPM

## 2019-08-09 ENCOUNTER — DOCUMENTATION (OUTPATIENT)
Dept: PERINATAL CARE | Facility: CLINIC | Age: 29
End: 2019-08-09

## 2019-08-09 ENCOUNTER — ROUTINE PRENATAL (OUTPATIENT)
Dept: PERINATAL CARE | Facility: CLINIC | Age: 29
End: 2019-08-09
Payer: COMMERCIAL

## 2019-08-09 VITALS
DIASTOLIC BLOOD PRESSURE: 54 MMHG | HEIGHT: 61 IN | HEART RATE: 99 BPM | BODY MASS INDEX: 27.53 KG/M2 | SYSTOLIC BLOOD PRESSURE: 102 MMHG | WEIGHT: 145.8 LBS

## 2019-08-09 DIAGNOSIS — Z36.86 ENCOUNTER FOR ANTENATAL SCREENING FOR CERVICAL LENGTH: ICD-10-CM

## 2019-08-09 DIAGNOSIS — Z3A.20 20 WEEKS GESTATION OF PREGNANCY: ICD-10-CM

## 2019-08-09 DIAGNOSIS — O24.012 PRE-EXISTING TYPE 1 DIABETES MELLITUS DURING PREGNANCY IN SECOND TRIMESTER: Primary | ICD-10-CM

## 2019-08-09 PROCEDURE — 99212 OFFICE O/P EST SF 10 MIN: CPT | Performed by: OBSTETRICS & GYNECOLOGY

## 2019-08-09 PROCEDURE — 76817 TRANSVAGINAL US OBSTETRIC: CPT | Performed by: OBSTETRICS & GYNECOLOGY

## 2019-08-09 PROCEDURE — 76811 OB US DETAILED SNGL FETUS: CPT | Performed by: OBSTETRICS & GYNECOLOGY

## 2019-08-09 NOTE — PROGRESS NOTES
Please refer to the [facility] ultrasound report in Ob Procedures for additional information regarding the visit to the [facility]  today

## 2019-08-09 NOTE — LETTER
August 9, 2019     Sarmad Arambula MD  [address] [facility]    Patient: Ruthie Ibrahim   YOB: 1990   Date of Visit: 8/9/2019       Dear Dr Andino Sees: Thank you for referring Ruthie Ibrahim to me for evaluation  Below are my notes for this consultation  If you have questions, please do not hesitate to call me  I look forward to following your patient along with you  Sincerely,        Sandrita Almanzar MD        CC: No Recipients  Sandrita Almanzar MD  8/9/2019  9:18 AM  Sign at close encounter  Please refer to the [facility] ultrasound report in Ob Procedures for additional information regarding the visit to the [facility]  today

## 2019-08-09 NOTE — PROGRESS NOTES
A transvaginal ultrasound was performed  Sonographer note on use of High Level Disinfection Process (Trophon) for transvaginal probe# 2  used, serial # [id number]    Yin Russ RDMS

## 2019-08-21 ENCOUNTER — ROUTINE PRENATAL (OUTPATIENT)
Dept: PERINATAL CARE | Facility: CLINIC | Age: 29
End: 2019-08-21
Payer: COMMERCIAL

## 2019-08-21 VITALS
HEART RATE: 90 BPM | BODY MASS INDEX: 28.05 KG/M2 | DIASTOLIC BLOOD PRESSURE: 56 MMHG | HEIGHT: 61 IN | SYSTOLIC BLOOD PRESSURE: 100 MMHG | WEIGHT: 148.6 LBS

## 2019-08-21 DIAGNOSIS — Z3A.22 22 WEEKS GESTATION OF PREGNANCY: ICD-10-CM

## 2019-08-21 DIAGNOSIS — O24.012 PRE-EXISTING TYPE 1 DIABETES MELLITUS DURING PREGNANCY IN SECOND TRIMESTER: Primary | ICD-10-CM

## 2019-08-21 PROCEDURE — 93325 DOPPLER ECHO COLOR FLOW MAPG: CPT | Performed by: OBSTETRICS & GYNECOLOGY

## 2019-08-21 PROCEDURE — 99212 OFFICE O/P EST SF 10 MIN: CPT | Performed by: OBSTETRICS & GYNECOLOGY

## 2019-08-21 PROCEDURE — 76827 ECHO EXAM OF FETAL HEART: CPT | Performed by: OBSTETRICS & GYNECOLOGY

## 2019-08-21 PROCEDURE — 76825 ECHO EXAM OF FETAL HEART: CPT | Performed by: OBSTETRICS & GYNECOLOGY

## 2019-08-21 NOTE — PROGRESS NOTES
Please refer to the [facility] ultrasound report in Ob Procedures for additional information regarding the visit to the [facility]  today    Nicholas Schuster MD

## 2019-08-21 NOTE — LETTER
August 21, 2019     Abner Luke MD  [facility]    Patient: Sasha oMy   YOB: 1990   Date of Visit: 8/21/2019       Dear Dr Darshana Teixeira: Thank you for referring Sasha Moy to me for evaluation  Below are my notes for this consultation  If you have questions, please do not hesitate to call me  I look forward to following your patient along with you  Sincerely,        Ga Roca MD        CC: No Recipients  Ga Roca MD  8/21/2019  2:42 PM  Sign at close encounter    Please refer to the [facility] ultrasound report in Ob Procedures for additional information regarding the visit to the [facility]  today    Ga Roca MD

## 2019-08-22 ENCOUNTER — OFFICE VISIT (OUTPATIENT)
Dept: PERINATAL CARE | Facility: CLINIC | Age: 29
End: 2019-08-22
Payer: COMMERCIAL

## 2019-08-22 ENCOUNTER — ROUTINE PRENATAL (OUTPATIENT)
Dept: OBGYN CLINIC | Facility: CLINIC | Age: 29
End: 2019-08-22

## 2019-08-22 VITALS
DIASTOLIC BLOOD PRESSURE: 64 MMHG | SYSTOLIC BLOOD PRESSURE: 107 MMHG | HEART RATE: 86 BPM | HEIGHT: 61 IN | BODY MASS INDEX: 28.28 KG/M2 | WEIGHT: 149.8 LBS

## 2019-08-22 VITALS
BODY MASS INDEX: 27.98 KG/M2 | WEIGHT: 148.2 LBS | HEIGHT: 61 IN | HEART RATE: 83 BPM | DIASTOLIC BLOOD PRESSURE: 68 MMHG | SYSTOLIC BLOOD PRESSURE: 104 MMHG

## 2019-08-22 DIAGNOSIS — Z3A.22 22 WEEKS GESTATION OF PREGNANCY: ICD-10-CM

## 2019-08-22 DIAGNOSIS — Z46.81 INSULIN PUMP TITRATION: ICD-10-CM

## 2019-08-22 DIAGNOSIS — Z3A.22 22 WEEKS GESTATION OF PREGNANCY: Primary | ICD-10-CM

## 2019-08-22 DIAGNOSIS — O24.012 PRE-EXISTING TYPE 1 DIABETES MELLITUS DURING PREGNANCY IN SECOND TRIMESTER: Primary | ICD-10-CM

## 2019-08-22 DIAGNOSIS — E55.9 VITAMIN D INSUFFICIENCY: ICD-10-CM

## 2019-08-22 DIAGNOSIS — O24.012 PRE-EXISTING TYPE 1 DIABETES MELLITUS DURING PREGNANCY IN SECOND TRIMESTER: ICD-10-CM

## 2019-08-22 PROCEDURE — PNV: Performed by: OBSTETRICS & GYNECOLOGY

## 2019-08-22 PROCEDURE — 99215 OFFICE O/P EST HI 40 MIN: CPT | Performed by: NURSE PRACTITIONER

## 2019-08-22 NOTE — PATIENT INSTRUCTIONS
Pregnancy at 23 to 26 [facility] [address]:   What changes are happening in my body? You are now close to or at the beginning of the third trimester  The third trimester starts at 24 weeks and ends with delivery  As your baby gets larger, you may develop certain symptoms  These may include pain in your back or down the sides of your abdomen  You may also have stretch marks on your abdomen, breasts, thighs, or buttocks  You may also have constipation  How do I care for myself at this stage of my pregnancy? · Eat a variety of healthy foods  Healthy foods include fruits, vegetables, whole-grain breads, low-fat dairy foods, beans, lean meats, and fish  Drink liquids as directed  Ask how much liquid to drink each day and which liquids are best for you  Limit caffeine to less than 200 milligrams each day  Limit your intake of fish to 2 servings each week  Choose fish low in mercury such as canned light tuna, shrimp, salmon, cod, or tilapia  Do not  eat fish high in mercury such as swordfish, tilefish, kimberley mackerel, and shark  · Manage back pain  Do not stand for long periods of time or lift heavy items  Use good posture while you stand, squat, or bend  Wear low-heeled shoes with good support  Rest may also help to relieve back pain  Ask your healthcare provider about exercises you can do to strengthen your back muscles  · Take prenatal vitamins as directed  Your need for certain vitamins and minerals, such as folic acid, increases during pregnancy  Prenatal vitamins provide some of the extra vitamins and minerals you need  Prenatal vitamins may also help to decrease the risk of certain birth defects  · Talk to your healthcare provider about exercise  Moderate exercise can help you stay fit  Your healthcare provider will help you plan an exercise program that is safe for you during pregnancy  · Do not smoke  If you smoke, it is never too late to quit   Smoking increases your risk of a miscarriage and other health problems during your pregnancy  Smoking can cause your baby to be born too early or weigh less at birth  Ask your healthcare provider for information if you need help quitting  · Do not drink alcohol  Alcohol passes from your body to your baby through the placenta  It can affect your baby's brain development and cause fetal alcohol syndrome (FAS)  FAS is a group of conditions that causes mental, behavior, and growth problems  · Talk to your healthcare provider before you take any medicines  Many medicines may harm your baby if you take them when you are pregnant  Do not take any medicines, vitamins, herbs, or supplements without first talking to your healthcare provider  Never use illegal or street drugs (such as marijuana or cocaine) while you are pregnant  What are some safety tips during pregnancy? · Avoid hot tubs and saunas  Do not use a hot tub or sauna while you are pregnant, especially during your first trimester  Hot tubs and saunas may raise your baby's temperature and increase the risk of birth defects  · Avoid toxoplasmosis  This is an infection caused by eating raw meat or being around infected cat feces  It can cause birth defects, miscarriages, and other problems  Wash your hands after you touch raw meat  Make sure any meat is well-cooked before you eat it  Avoid raw eggs and unpasteurized milk  Use gloves or ask someone else to clean your cat's litter box while you are pregnant  What changes are happening with my baby? By 26 weeks, your baby will weigh about 2 pounds  Your baby will be about 10 inches long from the top of the head to the rump (baby's bottom)  Your baby's movements are much stronger now  Your baby's eyes are almost completely formed and can partially open  Your baby also sleeps and wakes up  What do I need to know about prenatal care? Your healthcare provider will check your blood pressure and weight   You may also need the following:  · A urine test  may also be done to check for sugar and protein  These can be signs of gestational diabetes or infection  Protein in your urine may also be a sign of preeclampsia  Preeclampsia is a condition that can develop during week 20 or later of your pregnancy  It causes high blood pressure, and it can cause problems with your kidneys and other organs  · Fundal height  is a measurement of your uterus to check your baby's growth  This number is usually the same as the number of weeks that you have been pregnant  · Your baby's heart rate  will be checked  When should I seek immediate care? · You develop a severe headache that does not go away  · You have new or increased vision changes, such as blurred or spotted vision  · You have new or increased swelling in your face or hands  · You have vaginal spotting or bleeding  · Your water broke or you feel warm water gushing or trickling from your vagina  When should I contact my healthcare provider? · You have abdominal cramps, pressure, or tightening  · You have a change in vaginal discharge  · You have light bleeding  · You have chills or a fever  · You have vaginal itching, burning, or pain  · You have yellow, green, white, or foul-smelling vaginal discharge  · You have pain or burning when you urinate, less urine than usual, or pink or bloody urine  · You have questions or concerns about your condition or care  CARE AGREEMENT:   You have the right to help plan your care  Learn about your health condition and how it may be treated  Discuss treatment options with your caregivers to decide what care you want to receive  You always have the right to refuse treatment  The above information is an  only  It is not intended as medical advice for individual conditions or treatments   Talk to your doctor, nurse or pharmacist before following any medical regimen to see if it is safe and effective for you   © 2017 [facility] Information is for End User's use only and may not be sold, redistributed or otherwise used for commercial purposes  All illustrations and images included in CareNotes® are the copyrighted property of A D A M , Inc  or Valentin Dickinson

## 2019-08-22 NOTE — PATIENT INSTRUCTIONS
1  Due to pregnancy hyperglycemia and hypoglycemia (readings <60), basal rates for work and day adjusted  Refer to settings sheet  2  Remember during wake hours main basal rate will be 0 75 units/hour  3  When starting night shift switch to work basal settings at 9 PM and then when off switch to day basal settings after waking up  4  Always have glucose available for hypoglycemia, use 15 by 15 rule  5  Continue baby aspirin and prenatal vitamin  6  Fetal growth ultrasound every 4 weeks or as recommended by MFM  7  Starting at 32 weeks gestation, NST twice a week and MARTINEZ weekly  8  Continue Novolog via T-slim insulin pump and personal Dexcom CGM G6    9  Continue follow-up with your OBGYN and MFM as recommended  10  Download in 1 week or sooner if needed  11  Please contact office with pump failure issues  12  Follow-up in 2 weeks  13  Back up plan with pump failure, have Lantus available

## 2019-08-22 NOTE — PROGRESS NOTES
Assessment/Plan:     Diagnoses and all orders for this visit:    Pre-existing type 1 diabetes mellitus during pregnancy in second trimester    Insulin pump titration    Vitamin D insufficiency    22 weeks gestation of pregnancy    Other orders  -     glucagon (GLUCAGON EMERGENCY) 1 MG injection; Inject 1 mg under the skin once as needed for low blood sugar      1  Due to pregnancy hyperglycemia and hypoglycemia (readings <60), basal rates for work and day adjusted  Refer to settings sheet  2  Remember during wake hours main basal rate will be 0 75 units/hour  3  When starting night shift switch to work basal settings at 9 PM and then when off switch to day basal settings after waking up  4  Always have glucose available for hypoglycemia, use 15 by 15 rule  5  Continue baby aspirin and prenatal vitamin  6  Fetal growth ultrasound every 4 weeks or as recommended by MFM  7  Starting at 32 weeks gestation, NST twice a week and MARTINEZ weekly  8  Continue Novolog via T-slim insulin pump and personal Dexcom CGM G6    9  Continue follow-up with your OBGYN and MFM as recommended  10  Download in 1 week or sooner if needed  11  Please contact office with pump failure issues  12  Follow-up in 2 weeks  13  Back up plan with pump failure, have Lantus available  Refer to device settings sheet with insulin pump adjustments  Last A1c 5 2% better than goal    Encouraged not to micro-bolus and try bolus recommended dose before meals  Has glucagon at home and  knows how to use  Subjective:      Patient ID: Kashmir Mejia is a 30 y o  female  , [place] [birth year], currently 22 4/7 weeks gestation  Pre-existing T1DM, on Novolog via T-slim insulin pump and personal CGM  Here for follow-up insulin pump titration  Insulin pump and Dexcom G6 downloaded in office  Reports reviewed with Whit during office visit, 3 different times identified as issues with glucose readings less than 60   Works night shift 3 days a week and together we came up with another work and day basal settings  Had EKG with cardiology follow-up and everything was within normal        The following portions of the patient's history were reviewed and updated as appropriate: allergies, current medications, problem list      Allergies   Allergen Reactions    Other      Other reaction(s): Resp Distress  Other reaction(s): Resp Sympt Mild  Other reaction(s): Resp Distress    Mold Extract  [Trichophyton]      Other reaction(s): Resp Sympt Mild    Molds & Smuts      Other reaction(s): Resp Sympt Mild    Pollen Extract      Other reaction(s): Resp Sympt Mild     Current Outpatient Medications on File Prior to Visit   Medication Sig Dispense Refill    aspirin 81 mg chewable tablet Chew 162 mg daily      glucagon (GLUCAGON EMERGENCY) 1 MG injection Inject 1 mg under the skin once as needed for low blood sugar      insulin aspart (NovoLOG) 100 units/mL injection Use up to 120 units daily via insulin pump  40 mL 5    ONETOUCH DELICA LANCETS FINE MISC Use up to 8 a day and as directed  300 each 3    ONETOUCH VERIO test strip Test up to 8 times a day and as instructed 300 each 3    Prenatal Vit-DSS-Fe Cbn-FA (PRENATAL AD PO) Take by mouth      Urine Glucose-Ketones Test STRP by In Vitro route       No current facility-administered medications on file prior to visit  Review of Systems   Constitutional: Negative for fatigue and fever  HENT: Positive for congestion  Negative for trouble swallowing  Eyes: Negative for visual disturbance  Respiratory: Negative for cough and shortness of breath  Cardiovascular: Negative  Gastrointestinal: Negative for constipation, nausea and vomiting  Endocrine: Negative for cold intolerance, heat intolerance, polydipsia, polyphagia and polyuria  Genitourinary: Negative for difficulty urinating and vaginal bleeding  Musculoskeletal: Negative for arthralgias and myalgias     Skin: Negative for rash    Allergic/Immunologic: Positive for environmental allergies  Neurological: Positive for headaches  Negative for dizziness and light-headedness  Psychiatric/Behavioral: Negative for sleep disturbance  Objective:  Refer to attachments for insulin pump and CGM download  Component Value Date/Time    HGBA1C 5 2 07/24/2019 0819    HGBA1C 5 8 06/14/2019 1247      /64 (BP Location: Right arm, Patient Position: Sitting, Cuff Size: Standard)   Pulse 86   Ht 5' 1" (1 549 m)   Wt 67 9 kg (149 lb 12 8 oz)   LMP 03/17/2019 (Exact Date)   BMI 28 30 kg/m²        Physical Exam   Constitutional: She is oriented to person, place, and time  She appears well-developed and well-nourished  She is active and cooperative  HENT:   Head: Normocephalic  Eyes: Conjunctivae are normal    Cardiovascular: Normal rate, regular rhythm, S1 normal and S2 normal    Pulmonary/Chest: Effort normal and breath sounds normal    Musculoskeletal: Normal range of motion  Neurological: She is alert and oriented to person, place, and time  Skin: Skin is warm, dry and intact  Psychiatric: She has a normal mood and affect   Her speech is normal and behavior is normal  Thought content normal

## 2019-08-22 NOTE — PROGRESS NOTES
Patient is doing well  Seen for prenatal visit  1  22 4 weeks-doing well  She denies any complaints  Fetal movement and  labor precautions were reviewed  She will follow-up in 4 weeks time for prenatal visit and CBC/RPR blood draw  2  Diabetes -since age 14  Patient on insulin pump with overall good control  Recent hemoglobin A1c was 5 2  She continues close monitoring with the [facility] gestational diabetes program   Fetal echocardiogram done yesterday was normal   She will follow-up as recommended by them, with appointment later this week  Cardiology review of her EKG was normal   Testing after 32 weeks is recommended with delivery between 39-40 weeks

## 2019-08-26 LAB
LEFT EYE DIABETIC RETINOPATHY: NORMAL
RIGHT EYE DIABETIC RETINOPATHY: NORMAL

## 2019-08-26 PROCEDURE — 3072F LOW RISK FOR RETINOPATHY: CPT | Performed by: FAMILY MEDICINE

## 2019-09-05 ENCOUNTER — TELEPHONE (OUTPATIENT)
Dept: PERINATAL CARE | Facility: CLINIC | Age: 29
End: 2019-09-05

## 2019-09-05 ENCOUNTER — OFFICE VISIT (OUTPATIENT)
Dept: PERINATAL CARE | Facility: CLINIC | Age: 29
End: 2019-09-05
Payer: COMMERCIAL

## 2019-09-05 ENCOUNTER — APPOINTMENT (OUTPATIENT)
Dept: LAB | Facility: HOSPITAL | Age: 29
End: 2019-09-05
Payer: COMMERCIAL

## 2019-09-05 VITALS
HEIGHT: 61 IN | DIASTOLIC BLOOD PRESSURE: 77 MMHG | WEIGHT: 154.4 LBS | BODY MASS INDEX: 29.15 KG/M2 | SYSTOLIC BLOOD PRESSURE: 116 MMHG | HEART RATE: 98 BPM

## 2019-09-05 DIAGNOSIS — O24.012 PRE-EXISTING TYPE 1 DIABETES MELLITUS WITH HYPERGLYCEMIA DURING PREGNANCY IN SECOND TRIMESTER (HCC): Primary | ICD-10-CM

## 2019-09-05 DIAGNOSIS — Z46.81 INSULIN PUMP TITRATION: ICD-10-CM

## 2019-09-05 DIAGNOSIS — Z3A.24 24 WEEKS GESTATION OF PREGNANCY: ICD-10-CM

## 2019-09-05 DIAGNOSIS — E55.9 VITAMIN D INSUFFICIENCY: ICD-10-CM

## 2019-09-05 DIAGNOSIS — E10.65 PRE-EXISTING TYPE 1 DIABETES MELLITUS WITH HYPERGLYCEMIA DURING PREGNANCY IN SECOND TRIMESTER (HCC): Primary | ICD-10-CM

## 2019-09-05 DIAGNOSIS — Z3A.22 22 WEEKS GESTATION OF PREGNANCY: ICD-10-CM

## 2019-09-05 DIAGNOSIS — E10.9 TYPE 1 DIABETES MELLITUS WITHOUT COMPLICATION (HCC): ICD-10-CM

## 2019-09-05 DIAGNOSIS — O24.012 PRE-EXISTING TYPE 1 DIABETES MELLITUS DURING PREGNANCY IN SECOND TRIMESTER: ICD-10-CM

## 2019-09-05 LAB
ALBUMIN SERPL BCP-MCNC: 2.9 G/DL (ref 3.5–5)
ALP SERPL-CCNC: 53 U/L (ref 46–116)
ALT SERPL W P-5'-P-CCNC: 16 U/L (ref 12–78)
ANION GAP SERPL CALCULATED.3IONS-SCNC: 7 MMOL/L (ref 4–13)
AST SERPL W P-5'-P-CCNC: 14 U/L (ref 5–45)
BILIRUB SERPL-MCNC: 0.2 MG/DL (ref 0.2–1)
BUN SERPL-MCNC: 8 MG/DL (ref 5–25)
CALCIUM SERPL-MCNC: 8.3 MG/DL (ref 8.3–10.1)
CHLORIDE SERPL-SCNC: 103 MMOL/L (ref 100–108)
CO2 SERPL-SCNC: 28 MMOL/L (ref 21–32)
CREAT SERPL-MCNC: 0.51 MG/DL (ref 0.6–1.3)
EST. AVERAGE GLUCOSE BLD GHB EST-MCNC: 105 MG/DL
GFR SERPL CREATININE-BSD FRML MDRD: 131 ML/MIN/1.73SQ M
GLUCOSE SERPL-MCNC: 99 MG/DL (ref 65–140)
HBA1C MFR BLD: 5.3 % (ref 4.2–6.3)
POTASSIUM SERPL-SCNC: 3.3 MMOL/L (ref 3.5–5.3)
PROT SERPL-MCNC: 6.4 G/DL (ref 6.4–8.2)
SODIUM SERPL-SCNC: 138 MMOL/L (ref 136–145)

## 2019-09-05 PROCEDURE — 80053 COMPREHEN METABOLIC PANEL: CPT

## 2019-09-05 PROCEDURE — 36415 COLL VENOUS BLD VENIPUNCTURE: CPT | Performed by: NURSE PRACTITIONER

## 2019-09-05 PROCEDURE — 99215 OFFICE O/P EST HI 40 MIN: CPT | Performed by: NURSE PRACTITIONER

## 2019-09-05 PROCEDURE — 83036 HEMOGLOBIN GLYCOSYLATED A1C: CPT | Performed by: NURSE PRACTITIONER

## 2019-09-05 NOTE — PATIENT INSTRUCTIONS
Thank you for choosing [facility] [id number] for your  care today  If you have any questions about your ultrasound or care, please do not hesitate to contact us or your primary obstetrician  Continue taking your aspirin 162mg daily for prevention of preeclampsia  If you have asthma and notice an increase in symptom frequency or severity, consider stopping this medication  Please communicate your blood sugars at least weekly with the diabetic educators at the [address] [place] [address] Diabetes in Pregnancy program   The telephone number is [phone]  The e-mail address is [email or web address]  If you do not hear back from the program within 48 hours of sending your blood sugars, please call CODY Scruggs at [phone]  Thank you

## 2019-09-05 NOTE — PATIENT INSTRUCTIONS
1  Due to hyperglycemia, all basal rates increased and ISF decreased from 45 to 30 and ICR decreased from 10 to 8   2  Please do not micro-bolus especially with decreasing ISF  3  Continue self monitoring blood glucose before meals and 2 hours after each meal    4  Continue Novolog via T-slim insulin pump and personal Dexcom G6   5  Always have glucose available for hypoglycemia, use 15 by 15 rule  6  Continue GDM diet with 3 meals and 3 snacks including recommended combination of carb, protein and fat per meal/snack  7  No more than 8 to 10 hours of fasting during sleep hours  8  Continue prenatal vitamin and baby aspirin  9  Fetal growth ultrasound every 4 weeks or as recommended by MFM  10  Pending A1c, CMP within normal for pregnancy  11  Download on Monday, 9/9/19    12  Contact office with any concerns or issues  13  Follow-up in 4 weeks

## 2019-09-05 NOTE — PROGRESS NOTES
Assessment/Plan:       Diagnoses and all orders for this visit:    Pre-existing type 1 diabetes mellitus with hyperglycemia during pregnancy in second trimester (HCC)    Insulin pump titration    Type 1 diabetes mellitus without complication (HCC)  -     insulin aspart (NovoLOG) 100 units/mL injection; Use up to 120 units daily via insulin pump  24 weeks gestation of pregnancy      1  Due to hyperglycemia, all basal rates increased and ISF decreased from 45 to 30 and ICR decreased from 10 to 8   2  Please do not micro-bolus especially with decreasing ISF  3  Continue self monitoring blood glucose before meals and 2 hours after each meal    4  Continue Novolog via T-slim insulin pump and personal Dexcom G6   5  Always have glucose available for hypoglycemia, use 15 by 15 rule  6  Continue GDM diet with 3 meals and 3 snacks including recommended combination of carb, protein and fat per meal/snack  7  No more than 8 to 10 hours of fasting during sleep hours  8  Continue prenatal vitamin and baby aspirin  9  Fetal growth ultrasound every 4 weeks or as recommended by MFM  10  Pending A1c, CMP within normal for pregnancy  11  Download on Monday, 19    12  Contact office with any concerns or issues  13  Follow-up in 4 weeks  Refer to attachment for device settings changes  Subjective:      Patient ID: Nguyen Guevara is a 30 y o  female  , [place] [birth year]  Pre-existing T1DM, on Novolog via T-slim insulin pump  Here for follow up diabetes and pregnancy management  Insulin pump downloaded during visit and reviewed with Whit   present during visit  Reports noted increase in glucose readings  Works 7 PM to 7 AM, has been switching basal settings between work and off day  Reports eating every 2 hours         The following portions of the patient's history were reviewed and updated as appropriate: allergies, current medications, past family history, past medical history, past social history, past surgical history and problem list     Allergies   Allergen Reactions    Other      Other reaction(s): Resp Distress  Other reaction(s): Resp Sympt Mild  Other reaction(s): Resp Distress    Mold Extract  [Trichophyton]      Other reaction(s): Resp Sympt Mild    Molds & Smuts      Other reaction(s): Resp Sympt Mild    Pollen Extract      Other reaction(s): Resp Sympt Mild     Current Outpatient Medications on File Prior to Visit   Medication Sig Dispense Refill    aspirin 81 mg chewable tablet Chew 162 mg daily      glucagon (GLUCAGON EMERGENCY) 1 MG injection Inject 1 mg under the skin once as needed for low blood sugar      ONETOUCH DELICA LANCETS FINE MISC Use up to 8 a day and as directed  300 each 3    ONETOUCH VERIO test strip Test up to 8 times a day and as instructed 300 each 3    Prenatal Vit-DSS-Fe Cbn-FA (PRENATAL AD PO) Take by mouth      Urine Glucose-Ketones Test STRP by In Vitro route      [DISCONTINUED] insulin aspart (NovoLOG) 100 units/mL injection Use up to 120 units daily via insulin pump  40 mL 5     No current facility-administered medications on file prior to visit  Review of Systems   Constitutional: Positive for fatigue  Negative for fever  HENT: Positive for congestion  Negative for sore throat and trouble swallowing  Eyes: Negative for visual disturbance  Respiratory: Negative for cough and shortness of breath  Cardiovascular: Negative  Gastrointestinal: Negative for constipation, diarrhea, nausea and vomiting  Endocrine: Positive for polyphagia  Negative for polydipsia and polyuria  Genitourinary: Negative for difficulty urinating and vaginal bleeding  Musculoskeletal: Positive for back pain (intermittently)  Skin: Negative for rash  Allergic/Immunologic: Positive for environmental allergies  Negative for food allergies  Neurological: Positive for headaches  Negative for dizziness and light-headedness     Psychiatric/Behavioral: Negative for sleep disturbance  Objective:  Refer to attachment for T-slim insulin pump download, glucose reading of 23 not a true reading, data input incorrectly into insulin pump  Over the last week, lowest glucose reading noted was 58 with increase in glucose readings above recommended goals during pregnancy  Component Value Date/Time    HGBA1C 5 2 07/24/2019 0819    HGBA1C 5 8 06/14/2019 1247      /77 (BP Location: Right arm, Patient Position: Sitting, Cuff Size: Standard)   Pulse 98   Ht 5' 1" (1 549 m)   Wt 70 kg (154 lb 6 4 oz)   LMP 03/17/2019 (Exact Date)   BMI 29 17 kg/m²        Physical Exam   Constitutional: She is oriented to person, place, and time  She appears well-developed and well-nourished  She is cooperative  HENT:   Head: Normocephalic  Eyes: Conjunctivae and lids are normal    Cardiovascular: Normal rate, regular rhythm, S1 normal and S2 normal    Pulmonary/Chest: Effort normal and breath sounds normal    Musculoskeletal: Normal range of motion  Neurological: She is alert and oriented to person, place, and time  Skin: Skin is warm, dry and intact  Psychiatric: She has a normal mood and affect   Her speech is normal and behavior is normal  Judgment and thought content normal  Cognition and memory are normal          Time in:11:10 AM   Time out:12:05 PM

## 2019-09-05 NOTE — TELEPHONE ENCOUNTER
----- Message from Loki Bonds MD sent at 9/5/2019  6:27 PM EDT -----  I reviewed the lab study today and the results are normal

## 2019-09-06 ENCOUNTER — ULTRASOUND (OUTPATIENT)
Dept: PERINATAL CARE | Facility: CLINIC | Age: 29
End: 2019-09-06
Payer: COMMERCIAL

## 2019-09-06 VITALS
SYSTOLIC BLOOD PRESSURE: 100 MMHG | BODY MASS INDEX: 28.81 KG/M2 | HEIGHT: 61 IN | DIASTOLIC BLOOD PRESSURE: 62 MMHG | HEART RATE: 98 BPM | WEIGHT: 152.6 LBS

## 2019-09-06 DIAGNOSIS — Z36.89 ENCOUNTER FOR ULTRASOUND TO CHECK FETAL GROWTH: ICD-10-CM

## 2019-09-06 DIAGNOSIS — O24.012 PRE-EXISTING TYPE 1 DIABETES MELLITUS DURING PREGNANCY IN SECOND TRIMESTER: Primary | ICD-10-CM

## 2019-09-06 DIAGNOSIS — Z3A.24 24 WEEKS GESTATION OF PREGNANCY: ICD-10-CM

## 2019-09-06 DIAGNOSIS — Z96.41 INSULIN PUMP IN PLACE: ICD-10-CM

## 2019-09-06 PROCEDURE — 76816 OB US FOLLOW-UP PER FETUS: CPT | Performed by: OBSTETRICS & GYNECOLOGY

## 2019-09-06 PROCEDURE — 99212 OFFICE O/P EST SF 10 MIN: CPT | Performed by: OBSTETRICS & GYNECOLOGY

## 2019-09-06 NOTE — PROGRESS NOTES
[facility]: Ms Daisy Ortega was seen today at 24w5d for fetal growth assessment ultrasound  See ultrasound report under "OB Procedures" tab  Please don't hesitate to contact our office with any concerns or questions    Tami Booth MD

## 2019-09-16 ENCOUNTER — TELEPHONE (OUTPATIENT)
Dept: ENDOCRINOLOGY | Facility: HOSPITAL | Age: 29
End: 2019-09-16

## 2019-09-18 ENCOUNTER — ROUTINE PRENATAL (OUTPATIENT)
Dept: OBGYN CLINIC | Facility: CLINIC | Age: 29
End: 2019-09-18

## 2019-09-18 VITALS
SYSTOLIC BLOOD PRESSURE: 102 MMHG | BODY MASS INDEX: 29.27 KG/M2 | HEIGHT: 61 IN | DIASTOLIC BLOOD PRESSURE: 70 MMHG | WEIGHT: 155 LBS

## 2019-09-18 DIAGNOSIS — Z46.81 INSULIN PUMP TITRATION: ICD-10-CM

## 2019-09-18 DIAGNOSIS — Z3A.26 26 WEEKS GESTATION OF PREGNANCY: ICD-10-CM

## 2019-09-18 DIAGNOSIS — O24.013 PRE-EXISTING TYPE 1 DIABETES MELLITUS DURING PREGNANCY IN THIRD TRIMESTER: ICD-10-CM

## 2019-09-18 PROCEDURE — PNV: Performed by: OBSTETRICS & GYNECOLOGY

## 2019-09-18 NOTE — PATIENT INSTRUCTIONS
Pregnancy at 32 to 30 [address]:   What changes are happening in my body? You may notice new symptoms such as shortness of breath, heartburn, or swelling of your ankles and feet  You may also have trouble sleeping or contractions  How do I care for myself at this stage of my pregnancy? · Eat a variety of healthy foods  Healthy foods include fruits, vegetables, whole-grain breads, low-fat dairy foods, beans, lean meats, and fish  Drink liquids as directed  Ask how much liquid to drink each day and which liquids are best for you  Limit caffeine to less than 200 milligrams each day  Limit your intake of fish to 2 servings each week  Choose fish low in mercury such as canned light tuna, shrimp, salmon, cod, or tilapia  Do not  eat fish high in mercury such as swordfish, tilefish, kimberley mackerel, and shark  · Manage heartburn  by eating 4 or 5 small meals each day instead of large meals  Avoid spicy food  · Manage swelling  by lying down and putting your feet up  · Take prenatal vitamins as directed  Your need for certain vitamins and minerals, such as folic acid, increases during pregnancy  Prenatal vitamins provide some of the extra vitamins and minerals you need  Prenatal vitamins may also help to decrease the risk of certain birth defects  · Talk to your healthcare provider about exercise  Moderate exercise can help you stay fit  Your healthcare provider will help you plan an exercise program that is safe for you during pregnancy  · Do not smoke  If you smoke, it is never too late to quit  Smoking increases your risk of a miscarriage and other health problems during your pregnancy  Smoking can cause your baby to be born too early or weigh less at birth  Ask your healthcare provider for information if you need help quitting  · Do not drink alcohol  Alcohol passes from your body to your baby through the placenta   It can affect your baby's brain development and cause fetal alcohol syndrome (FAS)  FAS is a group of conditions that causes mental, behavior, and growth problems  · Talk to your healthcare provider before you take any medicines  Many medicines may harm your baby if you take them when you are pregnant  Do not take any medicines, vitamins, herbs, or supplements without first talking to your healthcare provider  Never use illegal or street drugs (such as marijuana or cocaine) while you are pregnant  What are some safety tips during pregnancy? · Avoid hot tubs and saunas  Do not use a hot tub or sauna while you are pregnant, especially during your first trimester  Hot tubs and saunas may raise your baby's temperature and increase the risk of birth defects  · Avoid toxoplasmosis  This is an infection caused by eating raw meat or being around infected cat feces  It can cause birth defects, miscarriages, and other problems  Wash your hands after you touch raw meat  Make sure any meat is well-cooked before you eat it  Avoid raw eggs and unpasteurized milk  Use gloves or ask someone else to clean your cat's litter box while you are pregnant  What changes are happening with my baby? By 30 weeks, your baby may weigh more than 3 pounds  Your baby may be about 11 inches long from the top of the head to the rump (baby's bottom)  Your baby's eyes open and close now  Your baby's kicks and movements are more forceful at this time  What do I need to know about prenatal care? Your healthcare provider will check your blood pressure and weight  You may also need the following:  · Blood tests  may be done to check for anemia or blood type  · A urine test  may also be done to check for sugar and protein  These can be signs of gestational diabetes or infection  Protein in your urine may also be a sign of preeclampsia  Preeclampsia is a condition that can develop during week 20 or later of your pregnancy   It causes high blood pressure, and it can cause problems with your kidneys and other organs  · A Tdap vaccine and flu vaccine  may be recommended by your healthcare provider  · A gestational diabetes screen  will be done using an oral glucose tolerance test (OGTT)  An OGTT starts with a blood sugar level check after you have not eaten for 8 hours  You are then given a glucose drink  Your blood sugar level is checked after 1 hour, 2 hours, and sometimes 3 hours  Healthcare providers look at how much your blood sugar level increases from the first check  · Fundal height  is a measurement of your uterus to check your baby's growth  This number is usually the same as the number of weeks that you have been pregnant  Your healthcare provider may also check your baby's position  · Your baby's heart rate  will be checked  When should I seek immediate care? · You develop a severe headache that does not go away  · You have new or increased vision changes, such as blurred or spotted vision  · You have new or increased swelling in your face or hands  · You have vaginal spotting or bleeding  · Your water broke or you feel warm water gushing or trickling from your vagina  When should I contact my healthcare provider? · You have more than 5 contractions in 1 hour  · You notice any changes in your baby's movements  · You have abdominal cramps, pressure, or tightening  · You have a change in vaginal discharge  · You have chills or a fever  · You have vaginal itching, burning, or pain  · You have yellow, green, white, or foul-smelling vaginal discharge  · You have pain or burning when you urinate, less urine than usual, or pink or bloody urine  · You have questions or concerns about your condition or care  CARE AGREEMENT:   You have the right to help plan your care  Learn about your health condition and how it may be treated  Discuss treatment options with your caregivers to decide what care you want to receive   You always have the right to refuse treatment  The above information is an  only  It is not intended as medical advice for individual conditions or treatments  Talk to your doctor, nurse or pharmacist before following any medical regimen to see if it is safe and effective for you  © 2017 [address] [employer] [employer] Information is for End User's use only and may not be sold, redistributed or otherwise used for commercial purposes  All illustrations and images included in CareNotes® are the copyrighted property of A D A M , Inc  or Valentin Dickinson

## 2019-09-18 NOTE — PROGRESS NOTES
Patient was seen today for prenatal visit  1  26 3 weeks-doing well  Fetal movement and  labor precautions were reviewed  Laboratory sheet for CBC and RPR were given, Glucola not needed due to diabetes history  Flu shot is recommended  Follow-up 3-4 weeks for prenatal visit and Tdap with possible flu shot  Breast pump prescription given at patient request   2   Diabetes-diagnosed since age 14  Continues on insulin pump  Recent hemoglobin A1c was 5 3  She states her sugars have been slightly elevated recently and she will continue to follow-up with Doris Peters in the gestational diabetes program   Other testing including fetal echocardiogram, EKG were normal   Testing after 32 weeks is recommended

## 2019-09-24 ENCOUNTER — APPOINTMENT (OUTPATIENT)
Dept: LAB | Facility: CLINIC | Age: 29
End: 2019-09-24
Payer: COMMERCIAL

## 2019-09-24 ENCOUNTER — OFFICE VISIT (OUTPATIENT)
Dept: FAMILY MEDICINE CLINIC | Facility: CLINIC | Age: 29
End: 2019-09-24
Payer: COMMERCIAL

## 2019-09-24 VITALS
HEIGHT: 61 IN | TEMPERATURE: 97.3 F | OXYGEN SATURATION: 98 % | DIASTOLIC BLOOD PRESSURE: 70 MMHG | RESPIRATION RATE: 18 BRPM | HEART RATE: 90 BPM | WEIGHT: 158.5 LBS | BODY MASS INDEX: 29.92 KG/M2 | SYSTOLIC BLOOD PRESSURE: 104 MMHG

## 2019-09-24 DIAGNOSIS — Z3A.26 26 WEEKS GESTATION OF PREGNANCY: ICD-10-CM

## 2019-09-24 DIAGNOSIS — L72.9 CYST OF SUBCUTANEOUS TISSUE: Primary | ICD-10-CM

## 2019-09-24 LAB
ERYTHROCYTE [DISTWIDTH] IN BLOOD BY AUTOMATED COUNT: 13.1 % (ref 11.6–15.1)
HCT VFR BLD AUTO: 32.9 % (ref 34.8–46.1)
HGB BLD-MCNC: 10.3 G/DL (ref 11.5–15.4)
MCH RBC QN AUTO: 27.2 PG (ref 26.8–34.3)
MCHC RBC AUTO-ENTMCNC: 31.3 G/DL (ref 31.4–37.4)
MCV RBC AUTO: 87 FL (ref 82–98)
PLATELET # BLD AUTO: 183 THOUSANDS/UL (ref 149–390)
PMV BLD AUTO: 11.6 FL (ref 8.9–12.7)
RBC # BLD AUTO: 3.78 MILLION/UL (ref 3.81–5.12)
WBC # BLD AUTO: 10.54 THOUSAND/UL (ref 4.31–10.16)

## 2019-09-24 PROCEDURE — 36415 COLL VENOUS BLD VENIPUNCTURE: CPT

## 2019-09-24 PROCEDURE — 85027 COMPLETE CBC AUTOMATED: CPT

## 2019-09-24 PROCEDURE — 86592 SYPHILIS TEST NON-TREP QUAL: CPT

## 2019-09-24 PROCEDURE — 99213 OFFICE O/P EST LOW 20 MIN: CPT | Performed by: NURSE PRACTITIONER

## 2019-09-24 NOTE — PROGRESS NOTES
Assessment/Plan   Diagnoses and all orders for this visit:    Cyst of subcutaneous tissue        Chief Complaint   Patient presents with    Cyst     dime sized cyst on right groin for 4 days       Subjective   Patient ID: Mila Croeas is a 30 y o  female  Vitals:    09/24/19 0830   BP: 104/70   Pulse: 90   Resp: 18   Temp: (!) 97 3 °F (36 3 °C)   SpO2: 98%     Tosha is here today with c/o a lump on her upper inner thigh  The noticed it 4 days ago due to the tenderness and redness but as each day has progressed it has become painless, non tender and redness has resolved  Now all that remains is a dime sized firm lesion  She is 26 weeks pregnant with a history of Type 1 diabetes, she was referred her for exam by OB/GYN for evaluation  The following portions of the patient's history were reviewed and updated as appropriate: allergies, past family history, past medical history, past social history, past surgical history and problem list     Review of Systems   Constitutional: Negative  HENT: Negative  Eyes: Negative  Respiratory: Negative  Cardiovascular: Negative  Gastrointestinal: Negative  Endocrine: Negative  Genitourinary: Negative  Musculoskeletal: Negative  Skin: Positive for color change  Allergic/Immunologic: Negative  Neurological: Negative  Hematological: Negative  Negative for adenopathy  Psychiatric/Behavioral: Negative  Objective     Physical Exam   Constitutional: She is oriented to person, place, and time  She appears well-developed and well-nourished  No distress  HENT:   Head: Normocephalic and atraumatic  Eyes: Conjunctivae are normal  Right eye exhibits no discharge  Left eye exhibits no discharge  Neck: Normal range of motion  Neck supple  Cardiovascular: Normal rate, regular rhythm, normal heart sounds and intact distal pulses  No murmur heard    Pulmonary/Chest: Effort normal and breath sounds normal  No respiratory distress  She has no wheezes  Abdominal: Soft  She exhibits distension (26 week gestation)  There is no tenderness  Musculoskeletal: Normal range of motion  Neurological: She is alert and oriented to person, place, and time  Skin: Skin is warm and dry  Capillary refill takes less than 2 seconds  She is not diaphoretic  Psychiatric: She has a normal mood and affect  Her behavior is normal  Judgment and thought content normal    Nursing note and vitals reviewed      Allergies   Allergen Reactions    Other      Other reaction(s): Resp Distress  Other reaction(s): Resp Sympt Mild  Other reaction(s): Resp Distress    Mold Extract  [Trichophyton]      Other reaction(s): Resp Sympt Mild    Molds & Smuts      Other reaction(s): Resp Sympt Mild    Pollen Extract      Other reaction(s): Resp Sympt Mild     Patient Active Problem List   Diagnosis    Type 1 diabetes mellitus without complication ([facility])    Insulin pump in place    Vitamin D insufficiency    Insulin pump titration    Pre-existing type 1 diabetes mellitus during pregnancy in third trimester    26 weeks gestation of pregnancy       Current Outpatient Medications:     aspirin 81 mg chewable tablet, Chew 162 mg daily, Disp: , Rfl:     glucagon (GLUCAGON EMERGENCY) 1 MG injection, Inject 1 mg under the skin once as needed for low blood sugar, Disp: , Rfl:     insulin aspart (NovoLOG) 100 units/mL injection, Use up to 120 units daily via insulin pump , Disp: 40 mL, Rfl: 5    ONETOUCH DELICA LANCETS FINE MISC, Use up to 8 a day and as directed , Disp: 300 each, Rfl: 3    ONETOUCH VERIO test strip, Test up to 8 times a day and as instructed, Disp: 300 each, Rfl: 3    Prenatal Vit-DSS-Fe Cbn-FA (PRENATAL AD PO), Take by mouth, Disp: , Rfl:     Urine Glucose-Ketones Test STRP, by In Vitro route, Disp: , Rfl:   Social History     Socioeconomic History    Marital status: /Civil Union     Spouse name: Not on file    Number of children: Not on file    Years of education: Not on file    Highest education level: Not on file   Occupational History    Not on file   Social Needs    Financial resource strain: Not on file    Food insecurity:     Worry: Not on file     Inability: Not on file    Transportation needs:     Medical: Not on file     Non-medical: Not on file   Tobacco Use    Smoking status: Never Smoker    Smokeless tobacco: Never Used   Substance and Sexual Activity    Alcohol use: Not Currently    Drug use: Never    Sexual activity: Yes     Birth control/protection: None   Lifestyle    Physical activity:     Days per week: Not on file     Minutes per session: Not on file    Stress: Not on file   Relationships    Social connections:     Talks on phone: Not on file     Gets together: Not on file     Attends Yarsanism service: Not on file     Active member of club or organization: Not on file     Attends meetings of clubs or organizations: Not on file     Relationship status: Not on file    Intimate partner violence:     Fear of current or ex partner: Not on file     Emotionally abused: Not on file     Physically abused: Not on file     Forced sexual activity: Not on file   Other Topics Concern    Not on file   Social History Narrative    Not on file     Family History   Problem Relation Age of Onset    No Known Problems Mother     No Known Problems Father     Diabetes type I Brother     Skin cancer Family

## 2019-09-25 LAB — RPR SER QL: NORMAL

## 2019-09-26 ENCOUNTER — TELEPHONE (OUTPATIENT)
Dept: OBGYN CLINIC | Facility: CLINIC | Age: 29
End: 2019-09-26

## 2019-09-26 ENCOUNTER — DOCUMENTATION (OUTPATIENT)
Dept: PERINATAL CARE | Facility: OTHER | Age: 29
End: 2019-09-26

## 2019-09-26 ENCOUNTER — TELEPHONE (OUTPATIENT)
Dept: PERINATAL CARE | Facility: CLINIC | Age: 29
End: 2019-09-26

## 2019-09-26 NOTE — TELEPHONE ENCOUNTER
I called Josuémary and asked her to come at 65 instead of 930 for ultrasound in [facility] office of [facility] on 10/18/19

## 2019-09-27 NOTE — PROGRESS NOTES
Date:  19  RE: Jaycee Ye    : [id number]  Estimated Date of Delivery: 19  EGA: 27w5d  OB/GYN: [facility]    Pre-existing type 1 diabetes  T-slim insulin pump and personal Dexcom G6  Insulin: Novolog    Spoke with patient by phone yesterday to discuss download and insulin pump settings  Patient reported to self adjusting settings on  following pump download on  which can be viewed on attached files  Patient now reports improved blood sugars since self adjusting  Patient reported FBG range upper 60's to 70's and 2 hr pp <150 mg/d  She also reported that Wednesday,  2 hr pp dinner measurement was 200 mg/dl  Patient stated she ate a pasta meal containing spinach, 3 ounces of protein with paige sauce  Patient reported she does not micro-bolus as often since visit with CODY Pelayo on   Current regimen:  Two basal rate settings for days off and work days  Patient works as a RN 7PM-7AM three days per week  Patient reported she adjusted work basal rate setting for 11 AM to 0 75 u/hr and also changes Carb Ratio to 1u:7 0 g, all other setting remain the same  2000 calorie gestational diabetes meal plan; 3 meals and 3 snacks including recommended combination of carb, protein and fat per meal/snack  SMBG 8 times per day with a Onetouch Verio glucose meter  Plan:  Advised that self adjusting is not recommended  Suggested patient report concerns for recommendations  Continue current settings  Advised patient to continue diet and avoid higher fat meals with sauces and to watch carb portion sizes, include protein with all meals/snacks  19 A1c 5 3%  19 US: AC 24%, EFW 28%, fetal growth and MARTINEZ appeared normal   Patient has a follow up appointment with Fred Henry on 10/2/19     Date due to report next:  Requested patient download pump again on Monday,  for [address] to review       Jw Sutton RD,LDN,CDE  Diabetes Educator   [facility]

## 2019-10-01 ENCOUNTER — TELEPHONE (OUTPATIENT)
Dept: FAMILY MEDICINE CLINIC | Facility: CLINIC | Age: 29
End: 2019-10-01

## 2019-10-01 NOTE — TELEPHONE ENCOUNTER
Pt called and said the lump on her groin is larger pt was seen last week call [phone] call anytime best 10 or 10;30am

## 2019-10-02 ENCOUNTER — OFFICE VISIT (OUTPATIENT)
Dept: FAMILY MEDICINE CLINIC | Facility: CLINIC | Age: 29
End: 2019-10-02
Payer: COMMERCIAL

## 2019-10-02 ENCOUNTER — TELEPHONE (OUTPATIENT)
Dept: OBGYN CLINIC | Facility: CLINIC | Age: 29
End: 2019-10-02

## 2019-10-02 ENCOUNTER — OFFICE VISIT (OUTPATIENT)
Dept: PERINATAL CARE | Facility: CLINIC | Age: 29
End: 2019-10-02

## 2019-10-02 VITALS
HEIGHT: 61 IN | WEIGHT: 158 LBS | HEART RATE: 81 BPM | DIASTOLIC BLOOD PRESSURE: 64 MMHG | BODY MASS INDEX: 29.83 KG/M2 | TEMPERATURE: 98 F | OXYGEN SATURATION: 99 % | SYSTOLIC BLOOD PRESSURE: 98 MMHG

## 2019-10-02 VITALS
BODY MASS INDEX: 29.91 KG/M2 | HEART RATE: 101 BPM | DIASTOLIC BLOOD PRESSURE: 78 MMHG | WEIGHT: 158.4 LBS | SYSTOLIC BLOOD PRESSURE: 110 MMHG | HEIGHT: 61 IN

## 2019-10-02 DIAGNOSIS — O24.013 PRE-EXISTING TYPE 1 DIABETES MELLITUS WITH HYPERGLYCEMIA DURING PREGNANCY IN THIRD TRIMESTER (HCC): Primary | ICD-10-CM

## 2019-10-02 DIAGNOSIS — Z3A.28 28 WEEKS GESTATION OF PREGNANCY: ICD-10-CM

## 2019-10-02 DIAGNOSIS — E10.65 PRE-EXISTING TYPE 1 DIABETES MELLITUS WITH HYPERGLYCEMIA DURING PREGNANCY IN THIRD TRIMESTER (HCC): Primary | ICD-10-CM

## 2019-10-02 DIAGNOSIS — L02.419 ABSCESS OF THIGH: Primary | ICD-10-CM

## 2019-10-02 DIAGNOSIS — E10.649 HYPOGLYCEMIA DUE TO TYPE 1 DIABETES MELLITUS (HCC): ICD-10-CM

## 2019-10-02 DIAGNOSIS — Z46.81 INSULIN PUMP TITRATION: ICD-10-CM

## 2019-10-02 PROCEDURE — 99213 OFFICE O/P EST LOW 20 MIN: CPT | Performed by: NURSE PRACTITIONER

## 2019-10-02 PROCEDURE — NC001 PR NO CHARGE: Performed by: NURSE PRACTITIONER

## 2019-10-02 RX ORDER — CEPHALEXIN 500 MG/1
500 CAPSULE ORAL 4 TIMES DAILY
Qty: 28 CAPSULE | Refills: 0 | Status: SHIPPED | OUTPATIENT
Start: 2019-10-02 | End: 2019-10-07 | Stop reason: SDUPTHER

## 2019-10-02 RX ORDER — CEPHALEXIN 500 MG/1
500 CAPSULE ORAL 4 TIMES DAILY
Qty: 28 CAPSULE | Refills: 0 | Status: SHIPPED | OUTPATIENT
Start: 2019-10-02 | End: 2019-10-02 | Stop reason: SDUPTHER

## 2019-10-02 NOTE — PATIENT INSTRUCTIONS
1  Continue Novolog via T-slim insulin pump with current basal settings  2  Due to 2 hours PP>120 secondary to snacks and lack of protein, recommend adjusting ISF to 1:30 and ICR to 1:8  Follow diet as recommended by dietitian, bolus for meal and snack  Only bolus before meals for a total of 3 times a day  Try not to stack insulin  3  Please contact office iwith hyperglycemia or hypoglycemia for further adjustments  Download by Friday, 10/4/19 and as recommended  4  Last A1c 5 3%, repeat A1c after 10/5/19    5  Always have glucose available for hypoglycemia and use 15 by 15 rule  6  Continue 2000 calorie GDM diet with 3 meals and 3 snacks including recommended combination of carb, protein and fat per meal/snack  7  Fetal growth ultrasound every 4 weeks or as recommended by MFM  8  Starting at 32 weeks gestation NST twice a week and MARTINEZ weekly  9  Continue prenatal vitamin and baby aspirin  10  Continue follow up with OBGYN and MFM as recommended  11  Glucose goals FBS 90 or less, 1 hour post meal 135 or less and 2 hours post meal 120 or less, overnight 60 to 100    12  Follow up in 4 weeks

## 2019-10-02 NOTE — PROGRESS NOTES
Assessment/Plan:     Diagnoses and all orders for this visit:    Pre-existing type 1 diabetes mellitus with hyperglycemia during pregnancy in third trimester ([facility] [address] )    Hypoglycemia due to type 1 diabetes mellitus ([facility] [address] )    28 weeks gestation of pregnancy    Insulin pump titration      1  Continue Novolog via T-slim insulin pump with current basal settings  2  Due to 2 hours PP>120 secondary to snacks and lack of protein, recommend adjusting ISF to 1:30 and ICR to 1:8  Follow diet as recommended by dietitian, bolus for meal and snack  Only bolus before meals for a total of 3 times a day  Try not to stack insulin  3  Please contact office iwith hyperglycemia or hypoglycemia for further adjustments  Download by Friday, 10/4/19 and as recommended  4  Last A1c 5 3%, repeat A1c after 10/5/19    5  Always have glucose available for hypoglycemia and use 15 by 15 rule  6  Continue 2000 calorie GDM diet with 3 meals and 3 snacks including recommended combination of carb, protein and fat per meal/snack  7  Fetal growth ultrasound every 4 weeks or as recommended by MFM  8  Starting at 32 weeks gestation NST twice a week and MARTINEZ weekly  9  Continue prenatal vitamin and baby aspirin  10  Continue follow up with OBGYN and MFM as recommended  11  Glucose goals FBS 90 or less, 1 hour post meal 135 or less and 2 hours post meal 120 or less, overnight 60 to 100    12  Follow up in 4 weeks  Current TDD around 45 units, per current gestation, T1DM and 0 8-0 9 units/kg calculation current TDD should be between 57 to 64 units a day  Whit needs to increase bolus doses before meals and stop stacking insulin  Wide fluctuations noted in glucose readings during 12 hour night shifts  Unable to make adjustments to basal rates secondary to glucose readings within normal during sleep hours   Hyperglycemia noted with increase in eating during work hours, encouraged to try to eat combination of carbohydrates and protein plus bolus for both meal/snack  Subjective:      Patient ID: Augustus Glass is a 30 y o  female  , [place] 19, currently 28 3/7 weeks gestation  Pre-existing T1DM on Novolog via T-slim insulin pump  Here for follow-up insulin pump download and titration  Reports increase in hunger, does eat every 2 hours  Has had a low reading of 47 with symptoms  Reports self adjusting insulin pump due to increase in glucose readings noted, decreased ICR from 1:8 to 1:7 and ISF from 1:27 to 1:25  Does have a Dexcom CGM in place but during sleep hours has noticed glucose readings are inaccurate, she is aware SMBG are used to guide treatment  Diet reviewed by Tyrone García RD during visit and snacks may be an issue at times causing above goal glucose  Whit is stacking insulin and encouraged not to stack insulin  The following portions of the patient's history were reviewed and updated as appropriate: allergies, current medications and problem list     Allergies   Allergen Reactions    Other      Other reaction(s): Resp Distress  Other reaction(s): Resp Sympt Mild  Other reaction(s): Resp Distress    Mold Extract  [Trichophyton]      Other reaction(s): Resp Sympt Mild    Molds & Smuts      Other reaction(s): Resp Sympt Mild    Pollen Extract      Other reaction(s): Resp Sympt Mild     Current Outpatient Medications on File Prior to Visit   Medication Sig Dispense Refill    aspirin 81 mg chewable tablet Chew 162 mg daily      glucagon (GLUCAGON EMERGENCY) 1 MG injection Inject 1 mg under the skin once as needed for low blood sugar      insulin aspart (NovoLOG) 100 units/mL injection Use up to 120 units daily via insulin pump  40 mL 5    ONETOUCH DELICA LANCETS FINE MISC Use up to 8 a day and as directed   300 each 3    ONETOUCH VERIO test strip Test up to 8 times a day and as instructed 300 each 3    Prenatal Vit-DSS-Fe Cbn-FA (PRENATAL AD PO) Take by mouth      Urine Glucose-Ketones Test STRP by In Vitro route No current facility-administered medications on file prior to visit  Review of Systems   Constitutional: Positive for fatigue  Negative for fever  HENT: Negative for congestion, sore throat and trouble swallowing  Eyes: Negative for visual disturbance  Respiratory: Negative for cough and shortness of breath  Cardiovascular: Negative  Gastrointestinal: Negative for constipation, diarrhea, nausea and vomiting  Endocrine: Positive for polyphagia  Negative for polydipsia and polyuria  Genitourinary: Negative for difficulty urinating and vaginal bleeding  Musculoskeletal: Positive for back pain  Skin: Negative for rash  Neurological: Negative for dizziness, light-headedness and headaches  Psychiatric/Behavioral: Negative for sleep disturbance  Objective:        Component Value Date/Time    HGBA1C 5 3 09/05/2019 0859    HGBA1C 5 2 07/24/2019 0819      /78 (BP Location: Right arm, Patient Position: Sitting, Cuff Size: Standard)   Pulse 101   Ht 5' 1" (1 549 m)   Wt 71 8 kg (158 lb 6 4 oz)   LMP 03/17/2019 (Exact Date)   BMI 29 93 kg/m²        Physical Exam not completed  Time spent analyzing download and diet/bolus education     Time in:09:05AM  Time out:10:20 AM

## 2019-10-02 NOTE — PROGRESS NOTES
Assessment/Plan   Diagnoses and all orders for this visit:    Abscess of thigh  -     Discontinue: cephalexin (KEFLEX) 500 mg capsule; Take 1 capsule (500 mg total) by mouth 4 (four) times a day for 7 days  -     cephalexin (KEFLEX) 500 mg capsule; Take 1 capsule (500 mg total) by mouth 4 (four) times a day for 7 days        Chief Complaint   Patient presents with    Follow-up     f/u for cyst on thigh       Subjective   Patient ID: Afsaneh Hood is a 30 y o  female  Vitals:    10/02/19 1105   BP: 98/64   Pulse: 81   Temp: 98 °F (36 7 °C)   SpO2: 99%     Rachel Galvan returns today with c/o redness developing in groin area around cyst  Was here last week 9/24 with flesh tone cyst, without redness  Yesterday while in the shower she noticed the lump felt larger  Reports only has pain with the rubbing of the seam of her scrubs, which is "irritaing" not painful  She is 28 weeks gestation, good movement noted of fetus, no changes  She reports that she is unable to see the lump due to her baby bump but denies it feeling hot to touch  She denies fever, malaise of fatigue  She does not appeal       The following portions of the patient's history were reviewed and updated as appropriate: allergies, current medications, past family history, past medical history, past surgical history and problem list     Review of Systems   Constitutional: Negative  HENT: Negative  Eyes: Negative  Respiratory: Negative  Cardiovascular: Negative  Gastrointestinal: Negative  Endocrine: Negative  Genitourinary: Negative  Musculoskeletal: Negative  Skin: Positive for color change  Allergic/Immunologic: Negative  Neurological: Negative  Hematological: Negative  Psychiatric/Behavioral: Negative  Objective     Physical Exam   Constitutional: She is oriented to person, place, and time  She appears well-developed and well-nourished  No distress  HENT:   Head: Normocephalic and atraumatic     Eyes: Conjunctivae are normal  Right eye exhibits no discharge  Left eye exhibits no discharge  Neck: Normal range of motion  Neck supple  Cardiovascular: Normal rate, regular rhythm, normal heart sounds and intact distal pulses  No murmur heard  Pulmonary/Chest: Effort normal and breath sounds normal    Abdominal: Soft  She exhibits distension (28 week gestation )  Musculoskeletal: Normal range of motion  She exhibits no edema or tenderness  Lymphadenopathy:        Right: No inguinal adenopathy present  Neurological: She is alert and oriented to person, place, and time  Skin: Skin is warm, dry and intact  Capillary refill takes less than 2 seconds  She is not diaphoretic  Psychiatric: She has a normal mood and affect  Her behavior is normal  Judgment and thought content normal    Nursing note and vitals reviewed      Allergies   Allergen Reactions    Other      Other reaction(s): Resp Distress  Other reaction(s): Resp Sympt Mild  Other reaction(s): Resp Distress    Mold Extract  [Trichophyton]      Other reaction(s): Resp Sympt Mild    Molds & Smuts      Other reaction(s): Resp Sympt Mild    Pollen Extract      Other reaction(s): Resp Sympt Mild     Patient Active Problem List   Diagnosis    Type 1 diabetes mellitus without complication (HCC)    Insulin pump in place    Vitamin D insufficiency    Insulin pump titration    Pre-existing type 1 diabetes mellitus during pregnancy in third trimester    28 weeks gestation of pregnancy    Hypoglycemia due to type 1 diabetes mellitus (HCC)       Current Outpatient Medications:     aspirin 81 mg chewable tablet, Chew 162 mg daily, Disp: , Rfl:     glucagon (GLUCAGON EMERGENCY) 1 MG injection, Inject 1 mg under the skin once as needed for low blood sugar, Disp: , Rfl:     insulin aspart (NovoLOG) 100 units/mL injection, Use up to 120 units daily via insulin pump , Disp: 40 mL, Rfl: 5    ONETOUCH DELICA LANCETS FINE MISC, Use up to 8 a day and as directed , Disp: 300 each, Rfl: 3    ONETOUCH VERIO test strip, Test up to 8 times a day and as instructed, Disp: 300 each, Rfl: 3    Prenatal Vit-DSS-Fe Cbn-FA (PRENATAL AD PO), Take by mouth, Disp: , Rfl:     Urine Glucose-Ketones Test STRP, by In Vitro route, Disp: , Rfl:     cephalexin (KEFLEX) 500 mg capsule, Take 1 capsule (500 mg total) by mouth 4 (four) times a day for 7 days, Disp: 28 capsule, Rfl: 0  Social History     Socioeconomic History    Marital status: /Civil Union     Spouse name: Not on file    Number of children: Not on file    Years of education: Not on file    Highest education level: Not on file   Occupational History    Not on file   Social Needs    Financial resource strain: Not on file    Food insecurity:     Worry: Not on file     Inability: Not on file    Transportation needs:     Medical: Not on file     Non-medical: Not on file   Tobacco Use    Smoking status: Never Smoker    Smokeless tobacco: Never Used   Substance and Sexual Activity    Alcohol use: Not Currently    Drug use: Never    Sexual activity: Yes     Birth control/protection: None   Lifestyle    Physical activity:     Days per week: Not on file     Minutes per session: Not on file    Stress: Not on file   Relationships    Social connections:     Talks on phone: Not on file     Gets together: Not on file     Attends Restorationist service: Not on file     Active member of club or organization: Not on file     Attends meetings of clubs or organizations: Not on file     Relationship status: Not on file    Intimate partner violence:     Fear of current or ex partner: Not on file     Emotionally abused: Not on file     Physically abused: Not on file     Forced sexual activity: Not on file   Other Topics Concern    Not on file   Social History Narrative    Not on file     Family History   Problem Relation Age of Onset    No Known Problems Mother     No Known Problems Father     Diabetes type I Brother  Skin cancer Family

## 2019-10-02 NOTE — PATIENT INSTRUCTIONS
Abscess   WHAT YOU NEED TO KNOW:   A warm compress may help your abscess drain  Your healthcare provider may make a cut in the abscess so it can drain  You may need surgery to remove an abscess that is on your hands or buttocks  DISCHARGE INSTRUCTIONS:   Return to the emergency department if:   · The area around your abscess becomes very painful, warm, or has red streaks  · You have a fever and chills  · Your heart is beating faster than usual      · You feel faint or confused  Contact your healthcare provider if:   · Your abscess gets bigger or does not get better  · Your abscess returns  · You have questions or concerns about your condition or care  Medicines: You may  need any of the following:  · Antibiotics  help treat a bacterial infection  · Acetaminophen  decreases pain and fever  It is available without a doctor's order  Ask how much to take and how often to take it  Follow directions  Acetaminophen can cause liver damage if not taken correctly  · NSAIDs , such as ibuprofen, help decrease swelling, pain, and fever  This medicine is available with or without a doctor's order  NSAIDs can cause stomach bleeding or kidney problems in certain people  If you take blood thinner medicine, always ask your healthcare provider if NSAIDs are safe for you  Always read the medicine label and follow directions  · Take your medicine as directed  Contact your healthcare provider if you think your medicine is not helping or if you have side effects  Tell him or her if you are allergic to any medicine  Keep a list of the medicines, vitamins, and herbs you take  Include the amounts, and when and why you take them  Bring the list or the pill bottles to follow-up visits  Carry your medicine list with you in case of an emergency  Self-care:   · Apply a warm compress to your abscess  This will help it open and drain  Wet a washcloth in warm, but not hot, water  Apply the compress for 10 minutes  Repeat this 4 times each day  Do not  press on an abscess or try to open it with a needle  You may push the bacteria deeper or into your blood  · Do not share your clothes, towels, or sheets with anyone  This can spread the infection to others  · Wash your hands often  This can help prevent the spread of germs  Use soap and water or an alcohol-based hand rub  Care for your wound after it is drained:   · Care for your wound as directed  If your healthcare provider says it is okay, carefully remove the bandage and gauze packing  You may need to soak the gauze to get it out of your wound  Clean your wound and the area around it as directed  Dry the area and put on new, clean bandages  Change your bandages when they get wet or dirty  · Ask your healthcare provider how to change the gauze in your wound  Keep track of how many pieces of gauze are placed inside the wound  Do not put too much packing in the wound  Do not pack the gauze too tightly in your wound  Follow up with your healthcare provider in 1 to 3 days: You may need to have your packing removed or your bandage changed  Write down your questions so you remember to ask them during your visits  © 2017 [employer]  Information is for End User's use only and may not be sold, redistributed or otherwise used for commercial purposes  All illustrations and images included in CareNotes® are the copyrighted property of [employer]  or Valentin Dickinson  The above information is an  only  It is not intended as medical advice for individual conditions or treatments  Talk to your doctor, nurse or pharmacist before following any medical regimen to see if it is safe and effective for you

## 2019-10-02 NOTE — TELEPHONE ENCOUNTER
Can you please call Tosha and schedule her this aM  If you need to open my schedule early just ask Suleman Schultz  I need to see her early because I will probably be ordering stat US

## 2019-10-02 NOTE — TELEPHONE ENCOUNTER
Called and left message for patient that I scheduled her a 10:30 appt and to call us back if that appt doesn't work for her

## 2019-10-07 ENCOUNTER — DOCUMENTATION (OUTPATIENT)
Dept: PERINATAL CARE | Facility: CLINIC | Age: 29
End: 2019-10-07

## 2019-10-07 ENCOUNTER — OFFICE VISIT (OUTPATIENT)
Dept: FAMILY MEDICINE CLINIC | Facility: CLINIC | Age: 29
End: 2019-10-07
Payer: COMMERCIAL

## 2019-10-07 VITALS
HEART RATE: 99 BPM | TEMPERATURE: 98.1 F | OXYGEN SATURATION: 98 % | DIASTOLIC BLOOD PRESSURE: 68 MMHG | WEIGHT: 156.8 LBS | RESPIRATION RATE: 15 BRPM | BODY MASS INDEX: 29.6 KG/M2 | SYSTOLIC BLOOD PRESSURE: 104 MMHG | HEIGHT: 61 IN

## 2019-10-07 DIAGNOSIS — L02.419 ABSCESS OF THIGH: ICD-10-CM

## 2019-10-07 PROCEDURE — 99212 OFFICE O/P EST SF 10 MIN: CPT | Performed by: NURSE PRACTITIONER

## 2019-10-07 PROCEDURE — 3008F BODY MASS INDEX DOCD: CPT | Performed by: NURSE PRACTITIONER

## 2019-10-07 RX ORDER — CEPHALEXIN 500 MG/1
500 CAPSULE ORAL 4 TIMES DAILY
Qty: 12 CAPSULE | Refills: 0 | Status: SHIPPED | OUTPATIENT
Start: 2019-10-07 | End: 2019-10-10

## 2019-10-07 NOTE — PROGRESS NOTES
Date:  10/07/19  RE: Huma Gregory    :   Estimated Date of Delivery: 19  EGA: 29w1d  OB/GYN: [facility]    Pre-existing type 1 diabetes  T-slim insulin pump and personal Dexcom G6  Insulin: Novolog    Patient sent CONEXANCE MD messages regarding increase in glucose readings, currently dealing with thigh abscess and on Keflex  Insulin pump downloaded and self adjustments made to basal/bolus settings secondary to increase in glucose readings  Informed last insulin pump TDD 45 units and this download TDD around 42 units  Per current gestation, T1DM and 0 80-0 90 units/kg calculation TDD can range from 57 to 64 units  Current regimen:  Two basal rate settings for days off and work days  Patient works as a RN 7PM-7AM three days per week  Refer to attached download and current insulin pump settings, unknown when basal/bolus rates self adjusted  2000 calorie gestational diabetes meal plan; 3 meals and 3 snacks including recommended combination of carb, protein and fat per meal/snack  SMBG 8 times per day with a Onetouch Verio glucose meter and Dexcom CGM in place but is relying on SMBG for management  Plan:  CONEXANCE MD message to patient regarding TDD range, 45% of 57 units equals 25 65 units for daily basal, currently highest daily basal 21 6 units  Per calculation basal rates during sleep hours can be 0 85 units/hour and during wake hours up to 1 05 units/hour for main basal rate  Due to 2 hours PP>120, decrease ICR to 1:7 and ISF to 1:27 per previous insulin pump download  If 2 hours PP>120 continue, will decrease ICR to 1:6    Encouraged to report when basal/bolus settings adjusted and to wait at least 3 days to assess glucose readings before making further adjustments  Always recommend bolus before main meals and to avoid micro-boluses for correction  Pending response thru MyChart  Continue diet as recommended with combination of carbohydrates, protein and fat per meal/snack  Continue SMBG as recommended and always have glucose available to treat hypoglycemia, using 15 by 15 rule  9/5/19 A1c 5 3%, repeat A1c   9/6/19 US: AC 24%, EFW 28%, fetal growth and MARTINEZ appeared normal  Repeat ultrasound as scheduled  Date due to report next:  Download on Thursday, 10/10/19 with insulin pump adjustments       CODY Anthony, CDE  Diabetes Educator   [facility]

## 2019-10-07 NOTE — PROGRESS NOTES
Assessment/Plan   Diagnoses and all orders for this visit:    Abscess of thigh  -     cephalexin (KEFLEX) 500 mg capsule; Take 1 capsule (500 mg total) by mouth 4 (four) times a day for 3 days        Chief Complaint   Patient presents with    Follow-up     follow up for abscess on thigh        Subjective   Patient ID: Victorina Henderson is a 30 y o  female  Vitals:    10/07/19 1101   BP: 104/68   Pulse: 99   Resp: 15   Temp: 98 1 °F (36 7 °C)   SpO2: 98%     Follow up for infected cyst, right upper groin- cyst now reduced to 1/3 of the original size, without redness, slight bruising  No tenderness  Will complete the course of keflex and continue warm compresses  She will call if redness or increased size returns       The following portions of the patient's history were reviewed and updated as appropriate: allergies, current medications, past family history, past medical history, past surgical history and problem list     Review of Systems   Constitutional: Negative  Negative for fatigue and fever  HENT: Negative  Eyes: Negative  Respiratory: Negative  Cardiovascular: Negative  Gastrointestinal: Negative  Endocrine: Negative  Genitourinary: Negative  Musculoskeletal: Negative  Skin: Positive for color change  Allergic/Immunologic: Negative  Neurological: Negative  Hematological: Negative  Psychiatric/Behavioral: Negative  Objective     Physical Exam   Constitutional: She is oriented to person, place, and time  She appears well-developed and well-nourished  No distress  HENT:   Head: Normocephalic and atraumatic  Eyes: Conjunctivae are normal  Right eye exhibits no discharge  Left eye exhibits no discharge  Neck: Normal range of motion  Neck supple  Cardiovascular: Normal rate and regular rhythm  Pulmonary/Chest: Effort normal and breath sounds normal    Musculoskeletal: Normal range of motion  She exhibits no tenderness     Neurological: She is alert and oriented to person, place, and time  Skin: Skin is warm and dry  Capillary refill takes less than 2 seconds  She is not diaphoretic  Psychiatric: She has a normal mood and affect  Her behavior is normal  Judgment and thought content normal    Nursing note and vitals reviewed      Allergies   Allergen Reactions    Other      Other reaction(s): Resp Distress  Other reaction(s): Resp Sympt Mild  Other reaction(s): Resp Distress    Mold Extract  [Trichophyton]      Other reaction(s): Resp Sympt Mild    Molds & Smuts      Other reaction(s): Resp Sympt Mild    Pollen Extract      Other reaction(s): Resp Sympt Mild     Patient Active Problem List   Diagnosis    Type 1 diabetes mellitus without complication (HCC)    Insulin pump in place    Vitamin D insufficiency    Insulin pump titration    Pre-existing type 1 diabetes mellitus during pregnancy in third trimester    28 weeks gestation of pregnancy    Hypoglycemia due to type 1 diabetes mellitus (HCC)       Current Outpatient Medications:     aspirin 81 mg chewable tablet, Chew 162 mg daily, Disp: , Rfl:     cephalexin (KEFLEX) 500 mg capsule, Take 1 capsule (500 mg total) by mouth 4 (four) times a day for 3 days, Disp: 12 capsule, Rfl: 0    glucagon (GLUCAGON EMERGENCY) 1 MG injection, Inject 1 mg under the skin once as needed for low blood sugar, Disp: , Rfl:     insulin aspart (NovoLOG) 100 units/mL injection, Use up to 120 units daily via insulin pump , Disp: 40 mL, Rfl: 5    ONETOUCH DELICA LANCETS FINE MISC, Use up to 8 a day and as directed , Disp: 300 each, Rfl: 3    ONETOUCH VERIO test strip, Test up to 8 times a day and as instructed, Disp: 300 each, Rfl: 3    Prenatal Vit-DSS-Fe Cbn-FA (PRENATAL AD PO), Take by mouth, Disp: , Rfl:   Social History     Socioeconomic History    Marital status: /Civil Union     Spouse name: Not on file    Number of children: Not on file    Years of education: Not on file   [place] education level: Not on file   Occupational History    Not on file   Social Needs    Financial resource strain: Not on file    Food insecurity:     Worry: Not on file     Inability: Not on file    Transportation needs:     Medical: Not on file     Non-medical: Not on file   Tobacco Use    Smoking status: Never Smoker    Smokeless tobacco: Never Used   Substance and Sexual Activity    Alcohol use: Not Currently    Drug use: Never    Sexual activity: Yes     Birth control/protection: None   Lifestyle    Physical activity:     Days per week: Not on file     Minutes per session: Not on file    Stress: Not on file   Relationships    Social connections:     Talks on phone: Not on file     Gets together: Not on file     Attends Orthodoxy service: Not on file     Active member of club or organization: Not on file     Attends meetings of clubs or organizations: Not on file     Relationship status: Not on file    Intimate partner violence:     Fear of current or ex partner: Not on file     Emotionally abused: Not on file     Physically abused: Not on file     Forced sexual activity: Not on file   Other Topics Concern    Not on file   Social History Narrative    Not on file     Family History   Problem Relation Age of Onset    No Known Problems Mother     No Known Problems Father     Diabetes type I Brother     Skin cancer Family

## 2019-10-16 ENCOUNTER — TELEPHONE (OUTPATIENT)
Dept: ENDOCRINOLOGY | Facility: HOSPITAL | Age: 29
End: 2019-10-16

## 2019-10-16 ENCOUNTER — ROUTINE PRENATAL (OUTPATIENT)
Dept: OBGYN CLINIC | Facility: CLINIC | Age: 29
End: 2019-10-16
Payer: COMMERCIAL

## 2019-10-16 VITALS
SYSTOLIC BLOOD PRESSURE: 108 MMHG | DIASTOLIC BLOOD PRESSURE: 70 MMHG | HEART RATE: 83 BPM | HEIGHT: 61 IN | WEIGHT: 161 LBS | BODY MASS INDEX: 30.4 KG/M2

## 2019-10-16 DIAGNOSIS — Z3A.30 30 WEEKS GESTATION OF PREGNANCY: Primary | ICD-10-CM

## 2019-10-16 DIAGNOSIS — Z46.81 INSULIN PUMP TITRATION: ICD-10-CM

## 2019-10-16 DIAGNOSIS — E10.649 HYPOGLYCEMIA DUE TO TYPE 1 DIABETES MELLITUS (HCC): ICD-10-CM

## 2019-10-16 DIAGNOSIS — O24.013 PRE-EXISTING TYPE 1 DIABETES MELLITUS DURING PREGNANCY IN THIRD TRIMESTER: ICD-10-CM

## 2019-10-16 PROCEDURE — PNV: Performed by: OBSTETRICS & GYNECOLOGY

## 2019-10-16 PROCEDURE — 90472 IMMUNIZATION ADMIN EACH ADD: CPT | Performed by: OBSTETRICS & GYNECOLOGY

## 2019-10-16 PROCEDURE — 90471 IMMUNIZATION ADMIN: CPT | Performed by: OBSTETRICS & GYNECOLOGY

## 2019-10-16 PROCEDURE — 90715 TDAP VACCINE 7 YRS/> IM: CPT | Performed by: OBSTETRICS & GYNECOLOGY

## 2019-10-16 PROCEDURE — 90686 IIV4 VACC NO PRSV 0.5 ML IM: CPT | Performed by: OBSTETRICS & GYNECOLOGY

## 2019-10-16 NOTE — PROGRESS NOTES
Patient was seen today for follow-up visit  1  30 3 weeks-doing well  Good fetal movement noted  Fetal movement and  labor precautions were reviewed  Flu shot and Tdap were given today  She will follow-up in 2 weeks time for prenatal visit  2  Anemia-hemoglobin 10 3  Recommend iron 1-2 times daily as tolerated  She can use Colace for stool softener as needed  3  Diabetes-patient on insulin pump since diagnosis age 14  She will continue to follow-up with [facility] gestational diabetes program   Fetal echo and maternal EKG were normal   Testing after 32 weeks is recommended and we will coordinate this through our office and M

## 2019-10-16 NOTE — PATIENT INSTRUCTIONS
Pregnancy at [address]:   What changes are happening in my body? You may continue to have symptoms such as shortness of breath, heartburn, contractions, or swelling of your ankles and feet  You may be gaining about 1 pound a week now  How do I care for myself at this stage of my pregnancy? · Eat a variety of healthy foods  Healthy foods include fruits, vegetables, whole-grain breads, low-fat dairy foods, beans, lean meats, and fish  Drink liquids as directed  Ask how much liquid to drink each day and which liquids are best for you  Limit caffeine to less than 200 milligrams each day  Limit your intake of fish to 2 servings each week  Choose fish low in mercury such as canned light tuna, shrimp, salmon, cod, or tilapia  Do not  eat fish high in mercury such as swordfish, tilefish, kimberley mackerel, and shark  · Manage heartburn  by eating 4 or 5 small meals each day instead of large meals  Avoid spicy food  · Manage swelling  by lying down and putting your feet up  · Take prenatal vitamins as directed  Your need for certain vitamins and minerals, such as folic acid, increases during pregnancy  Prenatal vitamins provide some of the extra vitamins and minerals you need  Prenatal vitamins may also help to decrease the risk of certain birth defects  · Talk to your healthcare provider about exercise  Moderate exercise can help you stay fit  Your healthcare provider will help you plan an exercise program that is safe for you during pregnancy  · Do not smoke  If you smoke, it is never too late to quit  Smoking increases your risk of a miscarriage and other health problems during your pregnancy  Smoking can cause your baby to be born too early or weigh less at birth  Ask your healthcare provider for information if you need help quitting  · Do not drink alcohol  Alcohol passes from your body to your baby through the placenta   It can affect your baby's brain development and cause fetal alcohol syndrome (FAS)  FAS is a group of conditions that causes mental, behavior, and growth problems  · Talk to your healthcare provider before you take any medicines  Many medicines may harm your baby if you take them when you are pregnant  Do not take any medicines, vitamins, herbs, or supplements without first talking to your healthcare provider  Never use illegal or street drugs (such as marijuana or cocaine) while you are pregnant  What are some safety tips during pregnancy? · Avoid hot tubs and saunas  Do not use a hot tub or sauna while you are pregnant, especially during your first trimester  Hot tubs and saunas may raise your baby's temperature and increase the risk of birth defects  · Avoid toxoplasmosis  This is an infection caused by eating raw meat or being around infected cat feces  It can cause birth defects, miscarriages, and other problems  Wash your hands after you touch raw meat  Make sure any meat is well-cooked before you eat it  Avoid raw eggs and unpasteurized milk  Use gloves or ask someone else to clean your cat's litter box while you are pregnant  What changes are happening with my baby? By 34 weeks, your baby may weigh more than 5 pounds  Your baby will be about 12 ½ inches long from the top of the head to the rump (baby's bottom)  Your baby is gaining about ½ pound a week  Your baby's eyes open and close now  Your baby's kicks and movements are more forceful at this time  What do I need to know about prenatal care? Your healthcare provider will check your blood pressure and weight  You may also need the following:  · A urine test  may also be done to check for sugar and protein  These can be signs of gestational diabetes or infection  Protein in your urine may also be a sign of preeclampsia  Preeclampsia is a condition that can develop during week 20 or later of your pregnancy   It causes high blood pressure, and it can cause problems with your kidneys and other organs  · A Tdap vaccine  may be recommended by your healthcare provider  · Fundal height  is a measurement of your uterus to check your baby's growth  This number is usually the same as the number of weeks that you have been pregnant  Your healthcare provider may also check your baby's position  · Your baby's heart rate  will be checked  When should I seek immediate care? · You develop a severe headache that does not go away  · You have new or increased vision changes, such as blurred or spotted vision  · You have new or increased swelling in your face or hands  · You have vaginal spotting or bleeding  · Your water broke or you feel warm water gushing or trickling from your vagina  When should I contact my healthcare provider? · You have more than 5 contractions in 1 hour  · You notice any changes in your baby's movements  · You have abdominal cramps, pressure, or tightening  · You have a change in vaginal discharge  · You have chills or a fever  · You have vaginal itching, burning, or pain  · You have yellow, green, white, or foul-smelling vaginal discharge  · You have pain or burning when you urinate, less urine than usual, or pink or bloody urine  · You have questions or concerns about your condition or care  CARE AGREEMENT:   You have the right to help plan your care  Learn about your health condition and how it may be treated  Discuss treatment options with your caregivers to decide what care you want to receive  You always have the right to refuse treatment  The above information is an  only  It is not intended as medical advice for individual conditions or treatments  Talk to your doctor, nurse or pharmacist before following any medical regimen to see if it is safe and effective for you    © 2017 [employer] Information is for End User's use only and may not be sold, redistributed or otherwise used for commercial purposes  All illustrations and images included in CareNotes® are the copyrighted property of A [employer]  or Valentin Dickinson

## 2019-10-18 ENCOUNTER — ULTRASOUND (OUTPATIENT)
Dept: PERINATAL CARE | Facility: CLINIC | Age: 29
End: 2019-10-18
Payer: COMMERCIAL

## 2019-10-18 VITALS
SYSTOLIC BLOOD PRESSURE: 106 MMHG | HEIGHT: 61 IN | WEIGHT: 160.4 LBS | HEART RATE: 92 BPM | DIASTOLIC BLOOD PRESSURE: 62 MMHG | BODY MASS INDEX: 30.29 KG/M2

## 2019-10-18 DIAGNOSIS — O24.013 PRE-EXISTING TYPE 1 DIABETES MELLITUS DURING PREGNANCY IN THIRD TRIMESTER: Primary | ICD-10-CM

## 2019-10-18 DIAGNOSIS — Z3A.30 30 WEEKS GESTATION OF PREGNANCY: ICD-10-CM

## 2019-10-18 PROCEDURE — 76816 OB US FOLLOW-UP PER FETUS: CPT | Performed by: OBSTETRICS & GYNECOLOGY

## 2019-10-18 PROCEDURE — 99212 OFFICE O/P EST SF 10 MIN: CPT | Performed by: OBSTETRICS & GYNECOLOGY

## 2019-10-18 NOTE — PATIENT INSTRUCTIONS
Kick Counts in Pregnancy   WHAT YOU NEED TO KNOW:   Kick counts measure how much your baby is moving in your womb  A kick from your baby can be felt as a twist, turn, swish, roll, or jab  It is common to feel your baby kicking at 26 to 28 weeks of pregnancy  You may feel your baby kick as early as 20 weeks of pregnancy  DISCHARGE INSTRUCTIONS:   Return to the emergency department if:   · You feel your baby kick less as the day goes on      · You do not feel any kicks in a day  Contact your healthcare provider if:   · You feel a change in the number of kicks or movements of your baby  · You feel fewer than 10 kicks within 2 hours after counting twice  · You have questions or concerns about your baby's movements  Why measure kick counts:  Your baby's movement may provide information about your baby's health  He may move less, or not at all, if there are problems  He may move less if he does not have enough room to grow in your uterus (womb)  He may also move less if he is not getting enough oxygen or nutrition from the placenta  Tell your healthcare provider as soon as you feel a change in your baby's movements  Problems that are found earlier are easier to treat  When to measure kick counts:   · Measure kick counts at the same time every day  · Measure kick counts when your baby is awake and most active  Your baby may be most active in the evening  · Measure kick counts after a meal or snack  Your baby may be more active after you eat  Wait 2 hours after you drink liquids that contain caffeine  Caffeine can make your baby more active than usual     · You should not smoke while you are pregnant  Smoking increases the risk of health problems for you and for your baby during your pregnancy  If you do smoke, wait 2 hours to measure kick counts  Nicotine can make your baby more active than usual   How to measure kick counts:  Check that your baby is awake before you measure kick counts   You can wake up your baby by lightly pushing on your belly, walking, or drinking something cold  Your healthcare provider may tell you different ways to measure kick counts  He may tell you to do the following:  · Use a chart or clock to keep track of the time you start and finish counting  · Sit in a chair or lie on your left side  · Place your hands on the largest part of your belly  · Count until you reach 10 kicks  Write down how much time it takes to count 10 kicks  · It may take 30 minutes to 2 hours to count 10 kicks  It should not take more than 2 hours to count 10 kicks  · If you do not feel 10 kicks within 2 hours, wait 1 hour and count again  Your baby can sleep for up to 40 minutes at one time  Follow up with your healthcare provider as directed:  Write down your questions so you remember to ask them during your visits  © 2017 [employer] Information is for End User's use only and may not be sold, redistributed or otherwise used for commercial purposes  All illustrations and images included in CareNotes® are the copyrighted property of [employer]  or Valentin Dickinson  The above information is an  only  It is not intended as medical advice for individual conditions or treatments  Talk to your doctor, nurse or pharmacist before following any medical regimen to see if it is safe and effective for you

## 2019-10-18 NOTE — PROGRESS NOTES
Please refer to the [facility] ultrasound report in Ob Procedures for additional information regarding the visit to the [facility], [facility]  today

## 2019-10-22 ENCOUNTER — TELEPHONE (OUTPATIENT)
Dept: PERINATAL CARE | Facility: CLINIC | Age: 29
End: 2019-10-22

## 2019-10-22 NOTE — TELEPHONE ENCOUNTER
Voice message left for Whit regarding issues with [employer] web site and I have not been able to download insulin pump for review  Request that she take photos of insulin pump history times 2 weeks and send in for review  Pending response

## 2019-10-23 ENCOUNTER — DOCUMENTATION (OUTPATIENT)
Dept: PERINATAL CARE | Facility: CLINIC | Age: 29
End: 2019-10-23

## 2019-10-23 NOTE — PROGRESS NOTES
Whit downloaded insulin pump on 10/21/19 and unable to obtain data due to Ang having issues with system  Message left 10/22/19 for patient to send pictures of insulin pump history for adjustments  Attempted to print download 10/21, 10/22 and today

## 2019-10-28 ENCOUNTER — TELEPHONE (OUTPATIENT)
Dept: PERINATAL CARE | Facility: CLINIC | Age: 29
End: 2019-10-28

## 2019-10-28 NOTE — TELEPHONE ENCOUNTER
Mozio and voice mail message left to send a copy of insulin pump download  Informed Whit unable to view download in T-slim

## 2019-10-29 ENCOUNTER — ULTRASOUND (OUTPATIENT)
Dept: OBGYN CLINIC | Facility: CLINIC | Age: 29
End: 2019-10-29
Payer: COMMERCIAL

## 2019-10-29 DIAGNOSIS — O24.419 GESTATIONAL DIABETES MELLITUS (GDM), ANTEPARTUM, GESTATIONAL DIABETES METHOD OF CONTROL UNSPECIFIED: Primary | ICD-10-CM

## 2019-10-29 DIAGNOSIS — O24.013 PRE-EXISTING TYPE 1 DIABETES MELLITUS DURING PREGNANCY IN THIRD TRIMESTER: ICD-10-CM

## 2019-10-29 PROCEDURE — 59025 FETAL NON-STRESS TEST: CPT | Performed by: OBSTETRICS & GYNECOLOGY

## 2019-10-29 PROCEDURE — 76815 OB US LIMITED FETUS(S): CPT | Performed by: OBSTETRICS & GYNECOLOGY

## 2019-10-30 ENCOUNTER — OFFICE VISIT (OUTPATIENT)
Dept: PERINATAL CARE | Facility: CLINIC | Age: 29
End: 2019-10-30
Payer: COMMERCIAL

## 2019-10-30 VITALS
BODY MASS INDEX: 30.58 KG/M2 | HEART RATE: 89 BPM | WEIGHT: 162 LBS | DIASTOLIC BLOOD PRESSURE: 60 MMHG | HEIGHT: 61 IN | SYSTOLIC BLOOD PRESSURE: 101 MMHG

## 2019-10-30 DIAGNOSIS — E10.649 HYPOGLYCEMIA DUE TO TYPE 1 DIABETES MELLITUS (HCC): ICD-10-CM

## 2019-10-30 DIAGNOSIS — O24.013 PRE-EXISTING TYPE 1 DIABETES MELLITUS WITH HYPERGLYCEMIA DURING PREGNANCY IN THIRD TRIMESTER (HCC): ICD-10-CM

## 2019-10-30 DIAGNOSIS — E10.65 PRE-EXISTING TYPE 1 DIABETES MELLITUS WITH HYPERGLYCEMIA DURING PREGNANCY IN THIRD TRIMESTER (HCC): ICD-10-CM

## 2019-10-30 DIAGNOSIS — Z3A.32 32 WEEKS GESTATION OF PREGNANCY: ICD-10-CM

## 2019-10-30 DIAGNOSIS — Z46.81 INSULIN PUMP TITRATION: ICD-10-CM

## 2019-10-30 PROCEDURE — 99244 OFF/OP CNSLTJ NEW/EST MOD 40: CPT | Performed by: NURSE PRACTITIONER

## 2019-10-30 PROCEDURE — 99354 PR PROLONGED SVC OUTPATIENT SETTING 1ST HOUR: CPT | Performed by: NURSE PRACTITIONER

## 2019-10-30 RX ORDER — FERROUS SULFATE 325(65) MG
325 TABLET ORAL
COMMUNITY
End: 2020-06-17

## 2019-10-30 NOTE — PROGRESS NOTES
Assessment/Plan:     Diagnoses and all orders for this visit:    Hypoglycemia due to type 1 diabetes mellitus ([facility] [address] )    32 weeks gestation of pregnancy    Pre-existing type 1 diabetes mellitus with hyperglycemia during pregnancy in third trimester ([facility] [address] )    Insulin pump titration    Other orders  -     ferrous sulfate 325 (65 Fe) mg tablet; Take 325 mg by mouth daily with breakfast           1  Due to hyperglycemia for pregnancy, basal rates increased by 20%, ICR and ISF decreased  IOB decreased to 3 hours and 30 minutes  Refer to device settings copy  2  Please remember to switch work and off day basal settings  3  Check fasting blood sugar before getting up  4  Try to bolus before meals with recommended bolus  5  Try to avoid correcting glucose if possible  6  Continue GDM diet with 3 meals and 3 snacks including recommended combination of carb, protein and fat per meal/snack  7  Last A1c 5 3% better than goal, repeat A1c   8  NST twice a week and MARTINEZ weekly  9  Fetal growth every 4 weeks  10  Continue follow up with your OBGYN and MFM as recommended  11  Continue prenatal vitamin and baby aspirin  12  Always have glucose available for hypoglycemia, use 15 by 15 rule  13  Complete fetal kick counts  14  Follow up in 1 week for insulin pump download and follow-up  Please refer to below with insulin pump settings adjustments secondary to glucose readings above recommended goals during pregnancy  Subjective:      Patient ID: Radhames Hodge is a 30 y o  female  Positive fetal movement  Here for follow up pre-existing T1DM and insulin pump download  On Novolog via insulin pump  Self monitoring blood glucose up to 8 times a day  Denies hypoglycemia, reports higher glucose readings  Works night shift  Has 2 different insulin pump settings  Does report forgetting to switch basal settings and noting higher glucose readings   Has personal Dexcom G6 and it appears CGM consistent with finger sheryl Moscoso is correcting high glucose readings at time  The following portions of the patient's history were reviewed and updated as appropriate: allergies, current medications, past family history, past medical history, past social history, past surgical history and problem list     Allergies   Allergen Reactions    Other      Other reaction(s): Resp Distress  Other reaction(s): Resp Sympt Mild  Other reaction(s): Resp Distress    Mold Extract  [Trichophyton]      Other reaction(s): Resp Sympt Mild    Molds & Smuts      Other reaction(s): Resp Sympt Mild    Pollen Extract      Other reaction(s): Resp Sympt Mild     Current Outpatient Medications on File Prior to Visit   Medication Sig Dispense Refill    aspirin 81 mg chewable tablet Chew 162 mg daily      ferrous sulfate 325 (65 Fe) mg tablet Take 325 mg by mouth daily with breakfast      glucagon (GLUCAGON EMERGENCY) 1 MG injection Inject 1 mg under the skin once as needed for low blood sugar      insulin aspart (NovoLOG) 100 units/mL injection Use up to 120 units daily via insulin pump  40 mL 5    ONETOUCH DELICA LANCETS FINE MISC Use up to 8 a day and as directed  300 each 3    ONETOUCH VERIO test strip Test up to 8 times a day and as instructed 300 each 3    Prenatal Vit-DSS-Fe Cbn-FA (PRENATAL AD PO) Take by mouth       No current facility-administered medications on file prior to visit  Review of Systems   Constitutional: Positive for fatigue  Negative for fever  HENT: Negative for congestion and trouble swallowing  Eyes: Negative for visual disturbance  Respiratory: Negative for cough and shortness of breath  Cardiovascular: Negative  Gastrointestinal: Negative for constipation, diarrhea, nausea and vomiting  Endocrine: Negative for polydipsia, polyphagia and polyuria  Genitourinary: Negative for difficulty urinating and vaginal bleeding  Musculoskeletal: Negative for arthralgias, back pain and myalgias     Skin: Negative for rash  Neurological: Negative for dizziness, light-headedness and headaches  Psychiatric/Behavioral: Negative for sleep disturbance  Objective:        Component Value Date/Time    HGBA1C 5 3 09/05/2019 0859    HGBA1C 5 2 07/24/2019 0819      /60 (BP Location: Left arm, Patient Position: Sitting, Cuff Size: Adult)   Pulse 89   Ht 5' 1" (1 549 m)   Wt 73 5 kg (162 lb)   LMP 03/17/2019 (Exact Date)   BMI 30 61 kg/m²   Refer to attached file for insulin pump download, glucose readings from 10/23 to 10/28 reviewed in detail, patient input on 10/24/19 for glucose readings of 50 and 45 were incorrect  Physical Exam   Constitutional: She is oriented to person, place, and time  She appears well-developed and well-nourished  She is cooperative  HENT:   Head: Normocephalic  Eyes: Conjunctivae and lids are normal    Cardiovascular: Normal rate, regular rhythm, S1 normal and S2 normal    Pulmonary/Chest: Effort normal and breath sounds normal    Musculoskeletal: Normal range of motion  Neurological: She is alert and oriented to person, place, and time  Skin: Skin is warm and dry  Psychiatric: She has a normal mood and affect   Her behavior is normal  Thought content normal          Time in:09:30 AM  Time out:10:40 AM

## 2019-10-30 NOTE — PATIENT INSTRUCTIONS
1  Due to hyperglycemia for pregnancy, basal rates increased by 20%, ICR and ISF decreased  IOB decreased to 3 hours and 30 minutes  Refer to device settings copy  2  Please remember to switch work and off day basal settings  3  Check fasting blood sugar before getting up  4  Try to bolus before meals with recommended bolus  5  Try to avoid correcting glucose if possible  6  Continue GDM diet with 3 meals and 3 snacks including recommended combination of carb, protein and fat per meal/snack  7  Last A1c 5 3% better than goal, repeat A1c   8  NST twice a week and MARTINEZ weekly  9  Fetal growth every 4 weeks  10  Continue follow up with your OBGYN and MFM as recommended  11  Continue prenatal vitamin and baby aspirin  12  Always have glucose available for hypoglycemia, use 15 by 15 rule  13  Complete fetal kick counts  14  Follow up in 1 week for insulin pump download and follow-up

## 2019-11-01 ENCOUNTER — APPOINTMENT (OUTPATIENT)
Dept: LAB | Facility: HOSPITAL | Age: 29
End: 2019-11-01
Payer: COMMERCIAL

## 2019-11-01 ENCOUNTER — ULTRASOUND (OUTPATIENT)
Dept: OBGYN CLINIC | Facility: CLINIC | Age: 29
End: 2019-11-01
Payer: COMMERCIAL

## 2019-11-01 ENCOUNTER — ROUTINE PRENATAL (OUTPATIENT)
Dept: OBGYN CLINIC | Facility: CLINIC | Age: 29
End: 2019-11-01
Payer: COMMERCIAL

## 2019-11-01 VITALS — BODY MASS INDEX: 30.43 KG/M2 | HEIGHT: 61 IN | WEIGHT: 161.2 LBS

## 2019-11-01 DIAGNOSIS — O24.013 TYPE 1 DIABETES MELLITUS IN PREGNANCY, THIRD TRIMESTER: ICD-10-CM

## 2019-11-01 DIAGNOSIS — E10.649 HYPOGLYCEMIA DUE TO TYPE 1 DIABETES MELLITUS (HCC): ICD-10-CM

## 2019-11-01 DIAGNOSIS — Z46.81 INSULIN PUMP TITRATION: ICD-10-CM

## 2019-11-01 DIAGNOSIS — O24.013 PRE-EXISTING TYPE 1 DIABETES MELLITUS WITH HYPERGLYCEMIA DURING PREGNANCY IN THIRD TRIMESTER (HCC): ICD-10-CM

## 2019-11-01 DIAGNOSIS — E10.65 PRE-EXISTING TYPE 1 DIABETES MELLITUS WITH HYPERGLYCEMIA DURING PREGNANCY IN THIRD TRIMESTER (HCC): ICD-10-CM

## 2019-11-01 DIAGNOSIS — Z3A.32 32 WEEKS GESTATION OF PREGNANCY: ICD-10-CM

## 2019-11-01 PROCEDURE — 59025 FETAL NON-STRESS TEST: CPT | Performed by: OBSTETRICS & GYNECOLOGY

## 2019-11-01 PROCEDURE — PNV: Performed by: OBSTETRICS & GYNECOLOGY

## 2019-11-01 NOTE — PATIENT INSTRUCTIONS
Pregnancy at [address] to [address]:   What changes are happening in my body? You may continue to have symptoms such as shortness of breath, heartburn, contractions, or swelling of your ankles and feet  You may be gaining about 1 pound a week now  How do I care for myself at this stage of my pregnancy? · Eat a variety of healthy foods  Healthy foods include fruits, vegetables, whole-grain breads, low-fat dairy foods, beans, lean meats, and fish  Drink liquids as directed  Ask how much liquid to drink each day and which liquids are best for you  Limit caffeine to less than 200 milligrams each day  Limit your intake of fish to 2 servings each week  Choose fish low in mercury such as canned light tuna, shrimp, salmon, cod, or tilapia  Do not  eat fish high in mercury such as swordfish, tilefish, kimberley mackerel, and shark  · Manage heartburn  by eating 4 or 5 small meals each day instead of large meals  Avoid spicy food  · Manage swelling  by lying down and putting your feet up  · Take prenatal vitamins as directed  Your need for certain vitamins and minerals, such as folic acid, increases during pregnancy  Prenatal vitamins provide some of the extra vitamins and minerals you need  Prenatal vitamins may also help to decrease the risk of certain birth defects  · Talk to your healthcare provider about exercise  Moderate exercise can help you stay fit  Your healthcare provider will help you plan an exercise program that is safe for you during pregnancy  · Do not smoke  If you smoke, it is never too late to quit  Smoking increases your risk of a miscarriage and other health problems during your pregnancy  Smoking can cause your baby to be born too early or weigh less at birth  Ask your healthcare provider for information if you need help quitting  · Do not drink alcohol  Alcohol passes from your body to your baby through the placenta   It can affect your baby's brain development and cause fetal alcohol syndrome (FAS)  FAS is a group of conditions that causes mental, behavior, and growth problems  · Talk to your healthcare provider before you take any medicines  Many medicines may harm your baby if you take them when you are pregnant  Do not take any medicines, vitamins, herbs, or supplements without first talking to your healthcare provider  Never use illegal or street drugs (such as marijuana or cocaine) while you are pregnant  What are some safety tips during pregnancy? · Avoid hot tubs and saunas  Do not use a hot tub or sauna while you are pregnant, especially during your first trimester  Hot tubs and saunas may raise your baby's temperature and increase the risk of birth defects  · Avoid toxoplasmosis  This is an infection caused by eating raw meat or being around infected cat feces  It can cause birth defects, miscarriages, and other problems  Wash your hands after you touch raw meat  Make sure any meat is well-cooked before you eat it  Avoid raw eggs and unpasteurized milk  Use gloves or ask someone else to clean your cat's litter box while you are pregnant  What changes are happening with my baby? By 34 weeks, your baby may weigh more than 5 pounds  Your baby will be about 12 ½ inches long from the top of the head to the rump (baby's bottom)  Your baby is gaining about ½ pound a week  Your baby's eyes open and close now  Your baby's kicks and movements are more forceful at this time  What do I need to know about prenatal care? Your healthcare provider will check your blood pressure and weight  You may also need the following:  · A urine test  may also be done to check for sugar and protein  These can be signs of gestational diabetes or infection  Protein in your urine may also be a sign of preeclampsia  Preeclampsia is a condition that can develop during week 20 or later of your pregnancy   It causes high blood pressure, and it can cause problems with your kidneys and other organs  · A Tdap vaccine  may be recommended by your healthcare provider  · Fundal height  is a measurement of your uterus to check your baby's growth  This number is usually the same as the number of weeks that you have been pregnant  Your healthcare provider may also check your baby's position  · Your baby's heart rate  will be checked  When should I seek immediate care? · You develop a severe headache that does not go away  · You have new or increased vision changes, such as blurred or spotted vision  · You have new or increased swelling in your face or hands  · You have vaginal spotting or bleeding  · Your water broke or you feel warm water gushing or trickling from your vagina  When should I contact my healthcare provider? · You have more than 5 contractions in 1 hour  · You notice any changes in your baby's movements  · You have abdominal cramps, pressure, or tightening  · You have a change in vaginal discharge  · You have chills or a fever  · You have vaginal itching, burning, or pain  · You have yellow, green, white, or foul-smelling vaginal discharge  · You have pain or burning when you urinate, less urine than usual, or pink or bloody urine  · You have questions or concerns about your condition or care  CARE AGREEMENT:   You have the right to help plan your care  Learn about your health condition and how it may be treated  Discuss treatment options with your caregivers to decide what care you want to receive  You always have the right to refuse treatment  The above information is an  only  It is not intended as medical advice for individual conditions or treatments  Talk to your doctor, nurse or pharmacist before following any medical regimen to see if it is safe and effective for you    © 2017 [employer] Information is for End User's use only and may not be sold, redistributed or otherwise used for commercial purposes  All illustrations and images included in CareNotes® are the copyrighted property of A [employer]  or Valentin Dickinson

## 2019-11-01 NOTE — PROGRESS NOTES
Patient is doing well  Good fetal movement noted  Fetal movement and  labor precautions were reviewed  1  32 5 weeks-doing well  Precautions were reviewed as noted above  NST was 150s reactive category 1 without decelerations noted  One contraction seen  2  Anemia-continue iron  3  Diabetes-continues on insulin pump since age 14  She continues to follow-up with [facility] gestational diabetes program   She plans to get follow-up hemoglobin A1c as recommended by them in the near future  Previous fetal echo and maternal EKG were normal   Continue twice weekly NST with weekly MARTINEZ coordinated through our office and [facility]

## 2019-11-05 ENCOUNTER — ULTRASOUND (OUTPATIENT)
Dept: OBGYN CLINIC | Facility: CLINIC | Age: 29
End: 2019-11-05
Payer: COMMERCIAL

## 2019-11-05 DIAGNOSIS — O24.013 TYPE 1 DIABETES MELLITUS IN PREGNANCY, THIRD TRIMESTER: Primary | ICD-10-CM

## 2019-11-05 PROCEDURE — 59025 FETAL NON-STRESS TEST: CPT | Performed by: OBSTETRICS & GYNECOLOGY

## 2019-11-05 PROCEDURE — 76815 OB US LIMITED FETUS(S): CPT | Performed by: OBSTETRICS & GYNECOLOGY

## 2019-11-07 ENCOUNTER — OFFICE VISIT (OUTPATIENT)
Dept: PERINATAL CARE | Facility: CLINIC | Age: 29
End: 2019-11-07
Payer: COMMERCIAL

## 2019-11-07 VITALS
BODY MASS INDEX: 31.3 KG/M2 | DIASTOLIC BLOOD PRESSURE: 67 MMHG | SYSTOLIC BLOOD PRESSURE: 105 MMHG | WEIGHT: 165.8 LBS | HEIGHT: 61 IN | HEART RATE: 91 BPM

## 2019-11-07 DIAGNOSIS — E10.9 TYPE 1 DIABETES MELLITUS WITHOUT COMPLICATION (HCC): ICD-10-CM

## 2019-11-07 DIAGNOSIS — O24.013 PRE-EXISTING TYPE 1 DIABETES MELLITUS WITH HYPERGLYCEMIA DURING PREGNANCY IN THIRD TRIMESTER (HCC): ICD-10-CM

## 2019-11-07 DIAGNOSIS — Z46.81 INSULIN PUMP TITRATION: ICD-10-CM

## 2019-11-07 DIAGNOSIS — E10.649 HYPOGLYCEMIA DUE TO TYPE 1 DIABETES MELLITUS (HCC): ICD-10-CM

## 2019-11-07 DIAGNOSIS — E10.65 PRE-EXISTING TYPE 1 DIABETES MELLITUS WITH HYPERGLYCEMIA DURING PREGNANCY IN THIRD TRIMESTER (HCC): ICD-10-CM

## 2019-11-07 DIAGNOSIS — Z3A.33 33 WEEKS GESTATION OF PREGNANCY: ICD-10-CM

## 2019-11-07 PROCEDURE — 99214 OFFICE O/P EST MOD 30 MIN: CPT | Performed by: NURSE PRACTITIONER

## 2019-11-07 NOTE — PROGRESS NOTES
Assessment/Plan:     Diagnoses and all orders for this visit:    Hypoglycemia due to type 1 diabetes mellitus ([facility] [address] )    Pre-existing type 1 diabetes mellitus with hyperglycemia during pregnancy in third trimester ([facility] )    33 weeks gestation of pregnancy    Insulin pump titration      1  Due to hypoglycemia, please stopped stacking insulin and no micro-bolus  2  Treating hypoglycemia use 15 by 15 rule, follow a low glucose reading with a snack that includes protein  Instead of juice, try to treat low with 8 ounces of fat free milk  3  Due to glucose reading below 60 prior to waking up, 8 AM time switched to 7 AM    4  Try to bolus 10 minutes before meal and only bolus for meals  Try to calculate carbohydrates correctly and not over estimate a carb  5  Continue Novolog via T-slim insulin pump  6  Continue GDM diet with 3 meals and 3 snacks including recommended combination of carb, protein and fat per meal/snack  7  Continue SMBG and please document glucose readings in PDM for meals  8  Fetal growth ultrasound every 4 weeks  9  NST twice a week and MARTINEZ weekly  10  Continue baby aspirin, prenatal vitamin and iron supplement  11  Continue follow up with MFM and your OBGYN as recommended  12  Download insulin pump in 1 week  13  Follow up in 2 weeks  19 A1c 5 1% better than goal, working on decreasing episodes of glucose readings <60  Repeat CMP and urine protein/creatinine ratio with next A1c  Refer to attached files for T-slim insulin pump device settings adjustments and for glucose readings  Per CGM 73% glucose range , 13% range 141-179, 9% range >180, 2% range 56-59 and 3% <55  Subjective:      Patient ID: Surinder rCow is a 30 y o  female  , [place] [birth year]  Pre-existing T1DM, on Novolog via T-slim insulin pump  Positive fetal movement  Here for follow up T1DM and pregnancy for insulin pump download  During visit download reviewed   Reports feeling hypoglycemia in the 60's and treats with juice, review of download showed treating glucose less 60 3 times in a row secondary to not following 15 by 15 rule  Readings of glucose less than 60 noted post stacking insulin for corrections  The following portions of the patient's history were reviewed and updated as appropriate: allergies, current medications, past family history, past medical history, past social history, past surgical history and problem list     Allergies   Allergen Reactions    Other      Other reaction(s): Resp Distress  Other reaction(s): Resp Sympt Mild  Other reaction(s): Resp Distress    Mold Extract  [Trichophyton]      Other reaction(s): Resp Sympt Mild    Molds & Smuts      Other reaction(s): Resp Sympt Mild    Pollen Extract      Other reaction(s): Resp Sympt Mild     Current Outpatient Medications on File Prior to Visit   Medication Sig Dispense Refill    aspirin 81 mg chewable tablet Chew 162 mg daily      ferrous sulfate 325 (65 Fe) mg tablet Take 325 mg by mouth daily with breakfast      glucagon (GLUCAGON EMERGENCY) 1 MG injection Inject 1 mg under the skin once as needed for low blood sugar      insulin aspart (NovoLOG) 100 units/mL injection Use up to 120 units daily via insulin pump  40 mL 5    ONETOUCH DELICA LANCETS FINE MISC Use up to 8 a day and as directed  300 each 3    ONETOUCH VERIO test strip Test up to 8 times a day and as instructed 300 each 3    Prenatal Vit-DSS-Fe Cbn-FA (PRENATAL AD PO) Take by mouth       No current facility-administered medications on file prior to visit  Review of Systems   Constitutional: Negative for fatigue and fever  HENT: Negative for congestion and trouble swallowing  Eyes: Negative for visual disturbance  Respiratory: Negative for cough and shortness of breath  Cardiovascular: Negative  Gastrointestinal: Negative for constipation, nausea and vomiting     Endocrine: Negative for cold intolerance, heat intolerance, polydipsia, polyphagia and polyuria  Genitourinary: Negative for difficulty urinating and vaginal bleeding  Musculoskeletal: Negative for back pain  Skin: Negative for rash  Neurological: Negative for dizziness, light-headedness and headaches  Psychiatric/Behavioral: Negative for sleep disturbance  Objective:        Component Value Date/Time    HGBA1C 5 1 11/01/2019 0935    HGBA1C 5 3 09/05/2019 0859      /67 (BP Location: Right arm, Patient Position: Sitting, Cuff Size: Standard)   Pulse 91   Ht 5' 1" (1 549 m)   Wt 75 2 kg (165 lb 12 8 oz)   LMP 03/17/2019 (Exact Date)   BMI 31 33 kg/m²      Physical Exam   Constitutional: She is oriented to person, place, and time  She appears well-developed and well-nourished  She is cooperative  HENT:   Head: Normocephalic  Eyes: Conjunctivae and lids are normal    Cardiovascular: Normal rate, regular rhythm, S1 normal and S2 normal    Pulmonary/Chest: Effort normal and breath sounds normal    Musculoskeletal: Normal range of motion  Neurological: She is alert and oriented to person, place, and time  Skin: Skin is warm, dry and intact  Psychiatric: She has a normal mood and affect  Her speech is normal and behavior is normal  Thought content normal        Time spent reviewing download and providing education regarding glucose readings less than 60     Time in:09:10 AM  Time out:11:30 AM

## 2019-11-07 NOTE — PATIENT INSTRUCTIONS
1  Due to hypoglycemia, please stopped stacking insulin and no micro-bolus  2  Treating hypoglycemia use 15 by 15 rule, follow a low glucose reading with a snack that includes protein  Instead of juice, try to treat low with 8 ounces of fat free milk  3  Due to glucose reading below 60 prior to waking up, 8 AM time switched to 7 AM    4  Try to bolus 10 minutes before meal and only bolus for meals  Try to calculate carbohydrates correctly and not over estimate a carb  5  Continue Novolog via T-slim insulin pump  6  Continue GDM diet with 3 meals and 3 snacks including recommended combination of carb, protein and fat per meal/snack  7  Continue SMBG and please document glucose readings in PDM for meals  8  Fetal growth ultrasound every 4 weeks  9  NST twice a week and MARTINEZ weekly  10  Continue baby aspirin, prenatal vitamin and iron supplement  11  Continue follow up with MFM and your OBGYN as recommended  12  Download insulin pump in 1 week  13  Follow up in 2 weeks

## 2019-11-11 ENCOUNTER — HOSPITAL ENCOUNTER (EMERGENCY)
Facility: HOSPITAL | Age: 29
Discharge: HOME | End: 2019-11-11
Attending: EMERGENCY MEDICINE | Admitting: EMERGENCY MEDICINE
Payer: OTHER MISCELLANEOUS

## 2019-11-11 VITALS
HEART RATE: 96 BPM | DIASTOLIC BLOOD PRESSURE: 77 MMHG | TEMPERATURE: 98 F | OXYGEN SATURATION: 97 % | RESPIRATION RATE: 20 BRPM | SYSTOLIC BLOOD PRESSURE: 119 MMHG
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
## 2019-11-11 LAB
ALT SERPL W P-5'-P-CCNC: 21 U/L (ref 12–78)
HBV SURFACE AB SER-ACNC: 13.27 MIU/ML
HBV SURFACE AG SER QL: NORMAL
HCV AB SER QL: NORMAL
HIV 1+2 AB+HIV1 P24 AG SERPL QL IA: NORMAL

## 2019-11-11 PROCEDURE — 86706 HEP B SURFACE ANTIBODY: CPT | Performed by: PHYSICIAN ASSISTANT

## 2019-11-11 PROCEDURE — 87340 HEPATITIS B SURFACE AG IA: CPT | Performed by: PHYSICIAN ASSISTANT

## 2019-11-11 PROCEDURE — 87389 HIV-1 AG W/HIV-1&-2 AB AG IA: CPT | Performed by: PHYSICIAN ASSISTANT

## 2019-11-11 PROCEDURE — 99282 EMERGENCY DEPT VISIT SF MDM: CPT | Performed by: PHYSICIAN ASSISTANT

## 2019-11-11 PROCEDURE — 99283 EMERGENCY DEPT VISIT LOW MDM: CPT

## 2019-11-11 PROCEDURE — 84460 ALANINE AMINO (ALT) (SGPT): CPT | Performed by: PHYSICIAN ASSISTANT

## 2019-11-11 PROCEDURE — 86803 HEPATITIS C AB TEST: CPT | Performed by: PHYSICIAN ASSISTANT

## 2019-11-11 PROCEDURE — 36415 COLL VENOUS BLD VENIPUNCTURE: CPT | Performed by: PHYSICIAN ASSISTANT

## 2019-11-11 NOTE — ED PROVIDER NOTES
History  Chief Complaint   Patient presents with    Infectious Exposure - Needlestick     patient attempted to draw blood from patient and grazed needle and poked self  UTD with vaccines     The patient is a 81-year-old female who presents for evaluation needle stick  Patient reports that she got some blood on her glove and accidentally stuck her finger with a clean needle through the bloody glove  She states she is up-to-date with her vaccinations  She is also in her 3rd trimester of pregnancy  Denies any pregnancy related complaints  Prior to Admission Medications   Prescriptions Last Dose Informant Patient Reported? Taking? ONETOUCH DELICA LANCETS FINE MISC  Self No No   Sig: Use up to 8 a day and as directed  ONETOUCH VERIO test strip  Self No No   Sig: Test up to 8 times a day and as instructed   Prenatal Vit-DSS-Fe Cbn-FA (PRENATAL AD PO)  Self Yes No   Sig: Take by mouth   aspirin 81 mg chewable tablet  Self Yes No   Sig: Chew 162 mg daily   ferrous sulfate 325 (65 Fe) mg tablet  Self Yes No   Sig: Take 325 mg by mouth daily with breakfast   glucagon (GLUCAGON EMERGENCY) 1 MG injection  Self Yes No   Sig: Inject 1 mg under the skin once as needed for low blood sugar   insulin aspart (NovoLOG) 100 units/mL injection   No No   Sig: Use up to 120 units daily via insulin pump  Facility-Administered Medications: None       Past Medical History:   Diagnosis Date    Pre-existing type 1 diabetes affecting pregnancy, antepartum     age 14    Varicella        Past Surgical History:   Procedure Laterality Date    TONSILLECTOMY AND ADENOIDECTOMY         Family History   Problem Relation Age of Onset    No Known Problems Mother     No Known Problems Father     Diabetes type I Brother     Skin cancer Family      I have reviewed and agree with the history as documented      Social History     Tobacco Use    Smoking status: Never Smoker    Smokeless tobacco: Never Used   Substance Use Topics  Alcohol use: Not Currently    Drug use: Never        Review of Systems   Constitutional: Negative for activity change, appetite change and fatigue  HENT: Negative for nosebleeds, sneezing, sore throat, trouble swallowing and voice change  Eyes: Negative for photophobia, pain and visual disturbance  Respiratory: Negative for apnea, choking and stridor  Cardiovascular: Negative for palpitations and leg swelling  Gastrointestinal: Negative for anal bleeding and constipation  Endocrine: Negative for cold intolerance, heat intolerance, polydipsia and polyphagia  Genitourinary: Negative for decreased urine volume, enuresis, frequency, genital sores and urgency  Musculoskeletal: Negative for joint swelling and myalgias  Allergic/Immunologic: Negative for environmental allergies and food allergies  Neurological: Negative for tremors, seizures, speech difficulty and weakness  Hematological: Negative for adenopathy  Psychiatric/Behavioral: Negative for behavioral problems, decreased concentration, dysphoric mood and hallucinations  All other systems reviewed and are negative  Physical Exam  Physical Exam   Constitutional: She is oriented to person, place, and time  She appears well-developed and well-nourished  No distress  HENT:   Head: Normocephalic and atraumatic  Right Ear: External ear normal    Left Ear: External ear normal    Nose: Nose normal    Mouth/Throat: Oropharynx is clear and moist    Eyes: Pupils are equal, round, and reactive to light  Conjunctivae and EOM are normal    Neck: Normal range of motion  Neck supple  Cardiovascular: Normal rate, regular rhythm and normal heart sounds  Exam reveals no gallop and no friction rub  No murmur heard  Pulmonary/Chest: Effort normal and breath sounds normal  No respiratory distress  She has no wheezes  Abdominal: Soft  Bowel sounds are normal    Neurological: She is alert and oriented to person, place, and time     Skin: Skin is warm and dry  She is not diaphoretic  Small puncture wound noted at the right distal 4th digit  Psychiatric: She has a normal mood and affect  Her behavior is normal    Vitals reviewed  Vital Signs  ED Triage Vitals [11/11/19 0018]   Temperature Pulse Respirations Blood Pressure SpO2   98 °F (36 7 °C) 96 20 119/77 97 %      Temp Source Heart Rate Source Patient Position - Orthostatic VS BP Location FiO2 (%)   Temporal Monitor Sitting Right arm --      Pain Score       No Pain           Vitals:    11/11/19 0018   BP: 119/77   Pulse: 96   Patient Position - Orthostatic VS: Sitting         Visual Acuity      ED Medications  Medications - No data to display    Diagnostic Studies  Results Reviewed     Procedure Component Value Units Date/Time    ALT [[id number]] Collected:  11/11/19 0028    Lab Status: In process Specimen:  Blood from Arm, Right Updated:  11/11/19 0032    Hepatitis B surface antigen [[id number]] Collected:  11/11/19 0028    Lab Status: In process Specimen:  Blood from Arm, Right Updated:  11/11/19 0032    Hepatitis C antibody [[id number]] Collected:  11/11/19 0028    Lab Status: In process Specimen:  Blood from Arm, Right Updated:  11/11/19 0032    Hepatitis B surface antibody [[id number]] Collected:  11/11/19 0028    Lab Status: In process Specimen:  Blood from Arm, Right Updated:  11/11/19 0032    HIV 1/2 AG-AB combo [[id number]] Collected:  11/11/19 0028    Lab Status:  No result Specimen:  Blood from Arm, Right                  No orders to display              Procedures  Procedures       ED Course                               MDM    Disposition  Final diagnoses:   Needlestick injury accident with exposure to body fluid     Time reflects when diagnosis was documented in both MDM as applicable and the Disposition within this note     Time User Action Codes Description Comment    11/11/2019 12:25 AM Amilcar Dan Add [H27  1XXA] Needlestick injury accident with exposure to body fluid ED Disposition     ED Disposition Condition Date/Time Comment    Discharge Stable Mon Nov 11, 2019 12:25 AM Margo Peña discharge to home/self care  Follow-up Information     Follow up With Specialties Details Why [id number], [address] Nurse Practitioner Schedule an appointment as soon as possible for a visit   [address] [place] [address] [place] [phone]            Patient's Medications   Discharge Prescriptions    No medications on file     No discharge procedures on file      ED Provider  Electronically Signed by           Domonique Wolfe PA-C  11/11/19 2873

## 2019-11-12 ENCOUNTER — DOCUMENTATION (OUTPATIENT)
Dept: OBGYN CLINIC | Facility: CLINIC | Age: 29
End: 2019-11-12

## 2019-11-12 ENCOUNTER — TELEPHONE (OUTPATIENT)
Dept: PERINATAL CARE | Facility: CLINIC | Age: 29
End: 2019-11-12

## 2019-11-12 ENCOUNTER — ULTRASOUND (OUTPATIENT)
Dept: OBGYN CLINIC | Facility: CLINIC | Age: 29
End: 2019-11-12
Payer: COMMERCIAL

## 2019-11-12 DIAGNOSIS — O24.013 TYPE 1 DIABETES MELLITUS IN PREGNANCY, THIRD TRIMESTER: Primary | ICD-10-CM

## 2019-11-12 PROCEDURE — 76815 OB US LIMITED FETUS(S): CPT | Performed by: OBSTETRICS & GYNECOLOGY

## 2019-11-12 PROCEDURE — 59025 FETAL NON-STRESS TEST: CPT | Performed by: OBSTETRICS & GYNECOLOGY

## 2019-11-12 NOTE — TELEPHONE ENCOUNTER
----- Message from Alia Matthews, [address] sent at 11/7/2019 10:54 AM EST -----  Maynor Brito,    Please schedule patient for 2 weeks insulin pump download      Thanks,  Jennifer

## 2019-11-12 NOTE — PROGRESS NOTES
NST from today reviewed-baseline 130s, reactive, category 1 without decelerations noted  No contractions appreciated    Additionally, MARTINEZ was normal

## 2019-11-13 ENCOUNTER — TELEPHONE (OUTPATIENT)
Dept: PERINATAL CARE | Facility: CLINIC | Age: 29
End: 2019-11-13

## 2019-11-13 NOTE — TELEPHONE ENCOUNTER
----- Message from [employer] Richie, [address] sent at 11/7/2019 10:54 AM EST -----  Maynor Brito,    Please schedule patient for 2 weeks insulin pump download      Thanks,  Jennifer

## 2019-11-14 ENCOUNTER — TELEPHONE (OUTPATIENT)
Dept: PERINATAL CARE | Facility: CLINIC | Age: 29
End: 2019-11-14

## 2019-11-14 NOTE — TELEPHONE ENCOUNTER
----- Message from Daryl Baptiste, [address] sent at 11/7/2019 10:54 AM EST -----  Maynor Brito,    Please schedule patient for 2 weeks insulin pump download      Thanks,  Jennifer

## 2019-11-15 ENCOUNTER — ROUTINE PRENATAL (OUTPATIENT)
Dept: PERINATAL CARE | Facility: CLINIC | Age: 29
End: 2019-11-15

## 2019-11-15 ENCOUNTER — ULTRASOUND (OUTPATIENT)
Dept: PERINATAL CARE | Facility: CLINIC | Age: 29
End: 2019-11-15
Payer: COMMERCIAL

## 2019-11-15 ENCOUNTER — ULTRASOUND (OUTPATIENT)
Dept: OBGYN CLINIC | Facility: CLINIC | Age: 29
End: 2019-11-15

## 2019-11-15 VITALS
HEART RATE: 92 BPM | HEIGHT: 61 IN | DIASTOLIC BLOOD PRESSURE: 56 MMHG | BODY MASS INDEX: 31.11 KG/M2 | WEIGHT: 164.8 LBS | SYSTOLIC BLOOD PRESSURE: 100 MMHG

## 2019-11-15 DIAGNOSIS — Z3A.34 34 WEEKS GESTATION OF PREGNANCY: ICD-10-CM

## 2019-11-15 DIAGNOSIS — Z46.81 INSULIN PUMP TITRATION: ICD-10-CM

## 2019-11-15 DIAGNOSIS — E10.649 HYPOGLYCEMIA DUE TO TYPE 1 DIABETES MELLITUS (HCC): ICD-10-CM

## 2019-11-15 DIAGNOSIS — O24.013 PRE-EXISTING TYPE 1 DIABETES MELLITUS WITH HYPERGLYCEMIA DURING PREGNANCY IN THIRD TRIMESTER (HCC): ICD-10-CM

## 2019-11-15 DIAGNOSIS — E10.65 PRE-EXISTING TYPE 1 DIABETES MELLITUS WITH HYPERGLYCEMIA DURING PREGNANCY IN THIRD TRIMESTER (HCC): Primary | ICD-10-CM

## 2019-11-15 DIAGNOSIS — O24.013 PRE-EXISTING TYPE 1 DIABETES MELLITUS WITH HYPERGLYCEMIA DURING PREGNANCY IN THIRD TRIMESTER (HCC): Primary | ICD-10-CM

## 2019-11-15 DIAGNOSIS — E10.65 PRE-EXISTING TYPE 1 DIABETES MELLITUS WITH HYPERGLYCEMIA DURING PREGNANCY IN THIRD TRIMESTER (HCC): ICD-10-CM

## 2019-11-15 PROCEDURE — 99212 OFFICE O/P EST SF 10 MIN: CPT | Performed by: OBSTETRICS & GYNECOLOGY

## 2019-11-15 PROCEDURE — 76818 FETAL BIOPHYS PROFILE W/NST: CPT | Performed by: OBSTETRICS & GYNECOLOGY

## 2019-11-15 PROCEDURE — NC001 PR NO CHARGE: Performed by: OBSTETRICS & GYNECOLOGY

## 2019-11-15 PROCEDURE — 76816 OB US FOLLOW-UP PER FETUS: CPT | Performed by: OBSTETRICS & GYNECOLOGY

## 2019-11-15 NOTE — PROGRESS NOTES
The patient was seen today for an ultrasound  Please see ultrasound report (located under Ob Procedures) for additional details  Thank you very much for allowing us to participate in the care of this very nice patient  Should you have any questions, please do not hesitate to contact me  Diogenes Vergara MD [address]  Attending Physician, Liya

## 2019-11-19 ENCOUNTER — ROUTINE PRENATAL (OUTPATIENT)
Dept: OBGYN CLINIC | Facility: CLINIC | Age: 29
End: 2019-11-19
Payer: COMMERCIAL

## 2019-11-19 ENCOUNTER — TELEPHONE (OUTPATIENT)
Dept: PERINATAL CARE | Facility: CLINIC | Age: 29
End: 2019-11-19

## 2019-11-19 VITALS — WEIGHT: 168 LBS | DIASTOLIC BLOOD PRESSURE: 64 MMHG | SYSTOLIC BLOOD PRESSURE: 124 MMHG | BODY MASS INDEX: 31.74 KG/M2

## 2019-11-19 DIAGNOSIS — Z3A.35 35 WEEKS GESTATION OF PREGNANCY: Primary | ICD-10-CM

## 2019-11-19 DIAGNOSIS — O24.013 PRE-EXISTING TYPE 1 DIABETES MELLITUS DURING PREGNANCY IN THIRD TRIMESTER: ICD-10-CM

## 2019-11-19 DIAGNOSIS — O99.013 ANEMIA AFFECTING PREGNANCY IN THIRD TRIMESTER: ICD-10-CM

## 2019-11-19 PROCEDURE — 76815 OB US LIMITED FETUS(S): CPT | Performed by: OBSTETRICS & GYNECOLOGY

## 2019-11-19 PROCEDURE — 87653 STREP B DNA AMP PROBE: CPT | Performed by: OBSTETRICS & GYNECOLOGY

## 2019-11-19 PROCEDURE — 59025 FETAL NON-STRESS TEST: CPT | Performed by: OBSTETRICS & GYNECOLOGY

## 2019-11-19 PROCEDURE — PNV: Performed by: OBSTETRICS & GYNECOLOGY

## 2019-11-19 NOTE — PROGRESS NOTES
IUP at 35 weeks and 2 days  Patient with type 1 diabetes  Receiving twice weekly testing her nonstress test was reactive today baseline heart rate in the 140s with positive accelerations  We checked her amniotic fluid which was approximately 16 cm  Reviewed signs of  labor and kick counts  GBS was collected today  She has had her flu shot and her Tdap    She is taking 2 baby aspirin daily and her iron daily

## 2019-11-19 NOTE — PROGRESS NOTES
The patient was seen today for an ultrasound  Please see ultrasound report (located under Ob Procedures) for additional details  Thank you very much for allowing us to participate in the care of this very nice patient  Should you have any questions, please do not hesitate to contact me  Diogenes Meng MD [address] [facility]  Attending Physician, Liya

## 2019-11-20 LAB — GP B STREP DNA SPEC QL NAA+PROBE: NORMAL

## 2019-11-21 ENCOUNTER — OFFICE VISIT (OUTPATIENT)
Dept: PERINATAL CARE | Facility: CLINIC | Age: 29
End: 2019-11-21
Payer: COMMERCIAL

## 2019-11-21 VITALS
HEART RATE: 85 BPM | SYSTOLIC BLOOD PRESSURE: 91 MMHG | HEIGHT: 61 IN | DIASTOLIC BLOOD PRESSURE: 60 MMHG | WEIGHT: 167.4 LBS | BODY MASS INDEX: 31.6 KG/M2

## 2019-11-21 DIAGNOSIS — Z3A.35 35 WEEKS GESTATION OF PREGNANCY: ICD-10-CM

## 2019-11-21 DIAGNOSIS — E10.649 HYPOGLYCEMIA DUE TO TYPE 1 DIABETES MELLITUS (HCC): ICD-10-CM

## 2019-11-21 DIAGNOSIS — O24.013 PRE-EXISTING TYPE 1 DIABETES MELLITUS WITH HYPERGLYCEMIA DURING PREGNANCY IN THIRD TRIMESTER (HCC): ICD-10-CM

## 2019-11-21 DIAGNOSIS — E10.65 PRE-EXISTING TYPE 1 DIABETES MELLITUS WITH HYPERGLYCEMIA DURING PREGNANCY IN THIRD TRIMESTER (HCC): ICD-10-CM

## 2019-11-21 DIAGNOSIS — Z46.81 INSULIN PUMP TITRATION: ICD-10-CM

## 2019-11-21 PROCEDURE — 99214 OFFICE O/P EST MOD 30 MIN: CPT | Performed by: NURSE PRACTITIONER

## 2019-11-21 NOTE — PATIENT INSTRUCTIONS
1  Continue Novolog via T-slim insulin pump  Due to hyperglycemia, all basal rates adjusted and ICR/ISF decreased to increase bolus amounts  2  Please do not stack insulin with decrease in ISF  3  Always have glucose available to treat hypoglycemia, use 15 by 15 rule  4  Continue GDM diet with 3 meals and 3 snacks including recommended combination of carb, protein and fat per meal/snack  5  Continue self monitoring blood glucose before meals, 2 hours after meals, with hypoglycemia and before driving  6  Download insulin pump on Monday, 11/25/19   7  Last A1c 5 1% better than goal    8  Fetal growth ultrasound every 4 weeks  9  NST twice a week and MARTINEZ weekly  10  Complete fetal kick counts  11  Continue follow up with your OBGYN and MFM as recommended  12  Follow up in 2 to 3 weeks

## 2019-11-21 NOTE — PROGRESS NOTES
Assessment/Plan:     Diagnoses and all orders for this visit:    Pre-existing type 1 diabetes mellitus with hyperglycemia during pregnancy in third trimester ([facility] [address] )    Hypoglycemia due to type 1 diabetes mellitus ([facility] [address] )    35 weeks gestation of pregnancy    Insulin pump titration      1  Continue Novolog via T-slim insulin pump  Due to hyperglycemia, all basal rates adjusted and ICR/ISF decreased to increase bolus amounts  2  Please do not stack insulin with decrease in ISF  3  Always have glucose available to treat hypoglycemia, use 15 by 15 rule  4  Continue GDM diet with 3 meals and 3 snacks including recommended combination of carb, protein and fat per meal/snack  5  Continue self monitoring blood glucose before meals, 2 hours after meals, with hypoglycemia and before driving  6  Download insulin pump on Monday, 19   7  Last A1c 5 1% better than goal    8  Fetal growth ultrasound every 4 weeks  9  NST twice a week and MARTINEZ weekly  10  Complete fetal kick counts  11  Continue follow up with your OBGYN and MFM as recommended  12  Follow up in 2 to 3 weeks  Will need repeat A1c, CMP and urine protein/creatinine ratio  Refer for device settings sheet for adjustments  Subjective:      Patient ID: Augustus Glass is a 30 y o  female  , [place] [address]  On Novolog via T-slim insulin pump and Dexcom G6 out of basal IQ  Self monitoring blood glucose before meals, 2 hours after meals and with hypoglycemia  Here for follow up pre-existing T1DM and insulin pump download  In last week lowest glucose reading 51, has stopped over treating glucose readings <60  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  Has started stacking insulin due to trying to correct high glucose readings  Is ready to have baby but at the same time is a little nervous  Will continue follow up with endocrinology post delivery       The following portions of the patient's history were reviewed and updated as appropriate: allergies, current medications, past family history, past medical history, past social history, past surgical history and problem list     Allergies   Allergen Reactions    Other      Other reaction(s): Resp Distress  Other reaction(s): Resp Sympt Mild  Other reaction(s): Resp Distress    Mold Extract  [Trichophyton]      Other reaction(s): Resp Sympt Mild    Molds & Smuts      Other reaction(s): Resp Sympt Mild    Pollen Extract      Other reaction(s): Resp Sympt Mild     Current Outpatient Medications on File Prior to Visit   Medication Sig Dispense Refill    aspirin 81 mg chewable tablet Chew 162 mg daily      ferrous sulfate 325 (65 Fe) mg tablet Take 325 mg by mouth daily with breakfast      glucagon (GLUCAGON EMERGENCY) 1 MG injection Inject 1 mg under the skin once as needed for low blood sugar      insulin aspart (NovoLOG) 100 units/mL injection Use up to 120 units daily via insulin pump  40 mL 5    ONETOUCH DELICA LANCETS FINE MISC Use up to 8 a day and as directed  300 each 3    ONETOUCH VERIO test strip Test up to 8 times a day and as instructed 300 each 3    Prenatal Vit-DSS-Fe Cbn-FA (PRENATAL AD PO) Take by mouth       No current facility-administered medications on file prior to visit  Review of Systems   Constitutional: Positive for fatigue  Negative for fever  HENT: Positive for congestion  Negative for trouble swallowing  Eyes: Negative for visual disturbance  Respiratory: Negative for cough and shortness of breath  Cardiovascular: Positive for leg swelling  Negative for chest pain and palpitations  Gastrointestinal: Negative for constipation, nausea and vomiting  Endocrine: Negative for polydipsia, polyphagia and polyuria  Genitourinary: Negative for difficulty urinating and vaginal bleeding  Musculoskeletal: Negative for arthralgias and myalgias  Skin: Negative for rash  Neurological: Negative for dizziness, light-headedness and headaches     Psychiatric/Behavioral: Negative for sleep disturbance  Objective:  Refer to attachment for glucose readings  Component Value Date/Time    HGBA1C 5 1 11/01/2019 0935    HGBA1C 5 3 09/05/2019 0859      BP 91/60 (BP Location: Left arm, Patient Position: Sitting, Cuff Size: Standard)   Pulse 85   Ht 5' 1" (1 549 m)   Wt 75 9 kg (167 lb 6 4 oz)   LMP 03/17/2019 (Exact Date)   BMI 31 63 kg/m²        Physical Exam   Constitutional: She is oriented to person, place, and time  She appears well-developed and well-nourished  She is cooperative  HENT:   Head: Normocephalic  Eyes: Conjunctivae and lids are normal    Cardiovascular: Normal rate, regular rhythm, S1 normal and S2 normal    Pulmonary/Chest: Effort normal and breath sounds normal    Musculoskeletal: Normal range of motion  Neurological: She is alert and oriented to person, place, and time  Skin: Skin is warm, dry and intact  Psychiatric: She has a normal mood and affect   Her speech is normal and behavior is normal  Thought content normal          Time in:8:40 AM  Time out:09:45 AM

## 2019-11-22 ENCOUNTER — ROUTINE PRENATAL (OUTPATIENT)
Dept: OBGYN CLINIC | Facility: CLINIC | Age: 29
End: 2019-11-22
Payer: COMMERCIAL

## 2019-11-22 ENCOUNTER — ULTRASOUND (OUTPATIENT)
Dept: OBGYN CLINIC | Facility: CLINIC | Age: 29
End: 2019-11-22
Payer: COMMERCIAL

## 2019-11-22 VITALS
BODY MASS INDEX: 31.34 KG/M2 | SYSTOLIC BLOOD PRESSURE: 120 MMHG | DIASTOLIC BLOOD PRESSURE: 80 MMHG | WEIGHT: 166 LBS | HEIGHT: 61 IN

## 2019-11-22 VITALS
DIASTOLIC BLOOD PRESSURE: 80 MMHG | BODY MASS INDEX: 31.38 KG/M2 | SYSTOLIC BLOOD PRESSURE: 120 MMHG | HEIGHT: 61 IN | WEIGHT: 166.2 LBS

## 2019-11-22 DIAGNOSIS — Z34.03 ENCOUNTER FOR SUPERVISION OF NORMAL FIRST PREGNANCY IN THIRD TRIMESTER: Primary | ICD-10-CM

## 2019-11-22 DIAGNOSIS — Z3A.35 35 WEEKS GESTATION OF PREGNANCY: Primary | ICD-10-CM

## 2019-11-22 PROCEDURE — 99213 OFFICE O/P EST LOW 20 MIN: CPT | Performed by: OBSTETRICS & GYNECOLOGY

## 2019-11-22 PROCEDURE — PNV: Performed by: OBSTETRICS & GYNECOLOGY

## 2019-11-22 NOTE — PROGRESS NOTES
NST reactive   No deceleration s  Will cont bi weekly NST and weekly MARTINEZ     No complaints  GBS negative   Precautions given

## 2019-11-26 ENCOUNTER — ULTRASOUND (OUTPATIENT)
Dept: OBGYN CLINIC | Facility: CLINIC | Age: 29
End: 2019-11-26

## 2019-11-26 DIAGNOSIS — O24.013 TYPE 1 DIABETES MELLITUS IN PREGNANCY, THIRD TRIMESTER: Primary | ICD-10-CM

## 2019-11-26 PROCEDURE — 59025 FETAL NON-STRESS TEST: CPT | Performed by: OBSTETRICS & GYNECOLOGY

## 2019-11-26 PROCEDURE — 76815 OB US LIMITED FETUS(S): CPT | Performed by: OBSTETRICS & GYNECOLOGY

## 2019-11-29 ENCOUNTER — ROUTINE PRENATAL (OUTPATIENT)
Dept: OBGYN CLINIC | Facility: CLINIC | Age: 29
End: 2019-11-29
Payer: COMMERCIAL

## 2019-11-29 ENCOUNTER — ULTRASOUND (OUTPATIENT)
Dept: OBGYN CLINIC | Facility: CLINIC | Age: 29
End: 2019-11-29
Payer: COMMERCIAL

## 2019-11-29 VITALS — WEIGHT: 169.6 LBS | BODY MASS INDEX: 32.05 KG/M2 | SYSTOLIC BLOOD PRESSURE: 100 MMHG | DIASTOLIC BLOOD PRESSURE: 66 MMHG

## 2019-11-29 DIAGNOSIS — O24.013 TYPE 1 DIABETES MELLITUS IN PREGNANCY, THIRD TRIMESTER: ICD-10-CM

## 2019-11-29 DIAGNOSIS — O24.019 PRE-EXISTING TYPE 1 DIABETES MELLITUS DURING PREGNANCY, ANTEPARTUM: ICD-10-CM

## 2019-11-29 DIAGNOSIS — Z3A.37 37 WEEKS GESTATION OF PREGNANCY: Primary | ICD-10-CM

## 2019-11-29 DIAGNOSIS — O99.013 ANEMIA AFFECTING PREGNANCY IN THIRD TRIMESTER: ICD-10-CM

## 2019-11-29 PROCEDURE — 59025 FETAL NON-STRESS TEST: CPT | Performed by: OBSTETRICS & GYNECOLOGY

## 2019-11-29 PROCEDURE — PNV: Performed by: OBSTETRICS & GYNECOLOGY

## 2019-11-29 NOTE — PROGRESS NOTES
IUP at 36 weeks and 5 days  Patient with type 1 diabetes, insulin dependent has pump  Reports positive fetal movement here for biweekly testing she had nonstress test today which was reactive  Her blood pressure is normal her we can is appropriate she will continue biweekly testing  Patient is also on iron for anemia and takes 2 baby aspirin daily reviewed signs of  labor and kick counts follow-up next week and p r n     Continue biweekly testing is recommended    Delivery recommended at or about 39 weeks questions answered

## 2019-12-03 ENCOUNTER — ULTRASOUND (OUTPATIENT)
Dept: OBGYN CLINIC | Facility: CLINIC | Age: 29
End: 2019-12-03

## 2019-12-03 DIAGNOSIS — O24.013 TYPE 1 DIABETES MELLITUS IN PREGNANCY, THIRD TRIMESTER: Primary | ICD-10-CM

## 2019-12-03 PROCEDURE — 59025 FETAL NON-STRESS TEST: CPT | Performed by: OBSTETRICS & GYNECOLOGY

## 2019-12-03 PROCEDURE — 76815 OB US LIMITED FETUS(S): CPT | Performed by: OBSTETRICS & GYNECOLOGY

## 2019-12-06 ENCOUNTER — ROUTINE PRENATAL (OUTPATIENT)
Dept: OBGYN CLINIC | Facility: CLINIC | Age: 29
End: 2019-12-06
Payer: COMMERCIAL

## 2019-12-06 VITALS — BODY MASS INDEX: 32.31 KG/M2 | SYSTOLIC BLOOD PRESSURE: 120 MMHG | DIASTOLIC BLOOD PRESSURE: 82 MMHG | WEIGHT: 171 LBS

## 2019-12-06 DIAGNOSIS — Z34.03 ENCOUNTER FOR SUPERVISION OF NORMAL FIRST PREGNANCY IN THIRD TRIMESTER: Primary | ICD-10-CM

## 2019-12-06 LAB
SL AMB  POCT GLUCOSE, UA: NORMAL
SL AMB POCT URINE PROTEIN: NORMAL

## 2019-12-06 PROCEDURE — 81002 URINALYSIS NONAUTO W/O SCOPE: CPT | Performed by: OBSTETRICS & GYNECOLOGY

## 2019-12-06 PROCEDURE — PNV: Performed by: OBSTETRICS & GYNECOLOGY

## 2019-12-06 NOTE — PROGRESS NOTES
Pt doing well  Does feel pressure   No pain no contraction     NST reactive  VE 3/70/-3 vertex    For induction at 39 weeks if not in labor prior to that   Precautions given

## 2019-12-10 ENCOUNTER — TELEPHONE (OUTPATIENT)
Dept: PERINATAL CARE | Facility: CLINIC | Age: 29
End: 2019-12-10

## 2019-12-10 ENCOUNTER — ULTRASOUND (OUTPATIENT)
Dept: OBGYN CLINIC | Facility: CLINIC | Age: 29
End: 2019-12-10

## 2019-12-10 VITALS
SYSTOLIC BLOOD PRESSURE: 122 MMHG | BODY MASS INDEX: 33.44 KG/M2 | WEIGHT: 177 LBS | HEART RATE: 78 BPM | DIASTOLIC BLOOD PRESSURE: 80 MMHG

## 2019-12-10 DIAGNOSIS — O24.013 TYPE 1 DIABETES MELLITUS IN PREGNANCY, THIRD TRIMESTER: Primary | ICD-10-CM

## 2019-12-10 PROCEDURE — 59025 FETAL NON-STRESS TEST: CPT | Performed by: OBSTETRICS & GYNECOLOGY

## 2019-12-12 ENCOUNTER — ROUTINE PRENATAL (OUTPATIENT)
Dept: OBGYN CLINIC | Facility: CLINIC | Age: 29
End: 2019-12-12
Payer: COMMERCIAL

## 2019-12-12 VITALS — DIASTOLIC BLOOD PRESSURE: 74 MMHG | WEIGHT: 175 LBS | BODY MASS INDEX: 33.07 KG/M2 | SYSTOLIC BLOOD PRESSURE: 116 MMHG

## 2019-12-12 DIAGNOSIS — Z86.32 PREVIOUS GESTATIONAL DIABETES MELLITUS, ANTEPARTUM, THIRD TRIMESTER: ICD-10-CM

## 2019-12-12 DIAGNOSIS — Z3A.38 38 WEEKS GESTATION OF PREGNANCY: Primary | ICD-10-CM

## 2019-12-12 DIAGNOSIS — O09.293 PREVIOUS GESTATIONAL DIABETES MELLITUS, ANTEPARTUM, THIRD TRIMESTER: ICD-10-CM

## 2019-12-12 LAB
SL AMB  POCT GLUCOSE, UA: NORMAL
SL AMB POCT URINE PROTEIN: NORMAL

## 2019-12-12 PROCEDURE — 81002 URINALYSIS NONAUTO W/O SCOPE: CPT | Performed by: OBSTETRICS & GYNECOLOGY

## 2019-12-12 PROCEDURE — 59025 FETAL NON-STRESS TEST: CPT | Performed by: OBSTETRICS & GYNECOLOGY

## 2019-12-12 PROCEDURE — PNV: Performed by: OBSTETRICS & GYNECOLOGY

## 2019-12-12 NOTE — PROGRESS NOTES
IUP at 38 weeks 4 days  Type 1 insulin-dependent diabetes on insulin pump  She is GBS negative most recent cervical check she was already 3 cm  Recommendation for induction between 39/0 and 39/6  She is scheduled for induction next week on the 18th  Risks and benefits previously reviewed  Discussed signs of labor and kick counts  She had MARTINEZ checked on the  December 10th which was 10 cm  Here for a fetal nonstress test today which was reactive baseline heart rate 140s with positive accelerations  She return on Monday for NST MARTINEZ  All questions answered

## 2019-12-13 ENCOUNTER — ULTRASOUND (OUTPATIENT)
Dept: PERINATAL CARE | Facility: CLINIC | Age: 29
End: 2019-12-13
Payer: COMMERCIAL

## 2019-12-13 VITALS
WEIGHT: 176.8 LBS | HEIGHT: 61 IN | DIASTOLIC BLOOD PRESSURE: 60 MMHG | SYSTOLIC BLOOD PRESSURE: 96 MMHG | HEART RATE: 107 BPM | BODY MASS INDEX: 33.38 KG/M2

## 2019-12-13 DIAGNOSIS — O24.013 TYPE 1 DIABETES MELLITUS IN PREGNANCY, THIRD TRIMESTER: Primary | ICD-10-CM

## 2019-12-13 DIAGNOSIS — Z3A.38 38 WEEKS GESTATION OF PREGNANCY: ICD-10-CM

## 2019-12-13 DIAGNOSIS — O40.3XX0 POLYHYDRAMNIOS IN SINGLETON PREGNANCY IN THIRD TRIMESTER: ICD-10-CM

## 2019-12-13 PROCEDURE — 76816 OB US FOLLOW-UP PER FETUS: CPT | Performed by: OBSTETRICS & GYNECOLOGY

## 2019-12-13 NOTE — PATIENT INSTRUCTIONS
Kick Counts in Pregnancy   WHAT YOU NEED TO KNOW:   Kick counts measure how much your baby is moving in your womb  A kick from your baby can be felt as a twist, turn, swish, roll, or jab  It is common to feel your baby kicking at 26 to 28 weeks of pregnancy  You may feel your baby kick as early as 20 weeks of pregnancy  DISCHARGE INSTRUCTIONS:   Return to the emergency department if:   · You feel your baby kick less as the day goes on      · You do not feel any kicks in a day  Contact your healthcare provider if:   · You feel a change in the number of kicks or movements of your baby  · You feel fewer than 10 kicks within 2 hours after counting twice  · You have questions or concerns about your baby's movements  Why measure kick counts:  Your baby's movement may provide information about your baby's health  He may move less, or not at all, if there are problems  He may move less if he does not have enough room to grow in your uterus (womb)  He may also move less if he is not getting enough oxygen or nutrition from the placenta  Tell your healthcare provider as soon as you feel a change in your baby's movements  Problems that are found earlier are easier to treat  When to measure kick counts:   · Measure kick counts at the same time every day  · Measure kick counts when your baby is awake and most active  Your baby may be most active in the evening  · Measure kick counts after a meal or snack  Your baby may be more active after you eat  Wait 2 hours after you drink liquids that contain caffeine  Caffeine can make your baby more active than usual     · You should not smoke while you are pregnant  Smoking increases the risk of health problems for you and for your baby during your pregnancy  If you do smoke, wait 2 hours to measure kick counts  Nicotine can make your baby more active than usual   How to measure kick counts:  Check that your baby is awake before you measure kick counts   You can wake up your baby by lightly pushing on your belly, walking, or drinking something cold  Your healthcare provider may tell you different ways to measure kick counts  He may tell you to do the following:  · Use a chart or clock to keep track of the time you start and finish counting  · Sit in a chair or lie on your left side  · Place your hands on the largest part of your belly  · Count until you reach 10 kicks  Write down how much time it takes to count 10 kicks  · It may take 30 minutes to 2 hours to count 10 kicks  It should not take more than 2 hours to count 10 kicks  · If you do not feel 10 kicks within 2 hours, wait 1 hour and count again  Your baby can sleep for up to 40 minutes at one time  Follow up with your healthcare provider as directed:  Write down your questions so you remember to ask them during your visits  © 2017 [address] Information is for End User's use only and may not be sold, redistributed or otherwise used for commercial purposes  All illustrations and images included in CareNotes® are the copyrighted property of [employer]  or Valentin Dickinson  The above information is an  only  It is not intended as medical advice for individual conditions or treatments  Talk to your doctor, nurse or pharmacist before following any medical regimen to see if it is safe and effective for you

## 2019-12-13 NOTE — LETTER
December 13, 2019     [address] [facility],   [address]  [address]  [place] [address] [place] [address]    Patient: Michael Spencer   YOB: 1990   Date of Visit: 12/13/2019       Dear Dr Pola Tavares: Thank you for referring Michael Spencer to me for evaluation  Below are my notes for this consultation  If you have questions, please do not hesitate to call me  I look forward to following your patient along with you  Sincerely,        Livan Pimentel MD        CC: No Recipients  Livan Pimentel MD  12/13/2019 12:10 PM  Signed  Please refer to the [facility] ultrasound report in Ob Procedures for additional information regarding the visit to the [address] [place] [address]

## 2019-12-16 ENCOUNTER — ROUTINE PRENATAL (OUTPATIENT)
Dept: OBGYN CLINIC | Facility: CLINIC | Age: 29
End: 2019-12-16
Payer: COMMERCIAL

## 2019-12-16 ENCOUNTER — TELEPHONE (OUTPATIENT)
Dept: PERINATAL CARE | Facility: CLINIC | Age: 29
End: 2019-12-16

## 2019-12-16 VITALS
SYSTOLIC BLOOD PRESSURE: 102 MMHG | WEIGHT: 178.4 LBS | BODY MASS INDEX: 33.71 KG/M2 | DIASTOLIC BLOOD PRESSURE: 86 MMHG | HEART RATE: 103 BPM

## 2019-12-16 DIAGNOSIS — Z3A.39 39 WEEKS GESTATION OF PREGNANCY: Primary | ICD-10-CM

## 2019-12-16 DIAGNOSIS — O24.013 PRE-EXISTING TYPE 1 DIABETES MELLITUS DURING PREGNANCY IN THIRD TRIMESTER: ICD-10-CM

## 2019-12-16 LAB
SL AMB  POCT GLUCOSE, UA: NORMAL
SL AMB POCT URINE PROTEIN: NORMAL

## 2019-12-16 PROCEDURE — PNV: Performed by: OBSTETRICS & GYNECOLOGY

## 2019-12-16 PROCEDURE — 81002 URINALYSIS NONAUTO W/O SCOPE: CPT | Performed by: OBSTETRICS & GYNECOLOGY

## 2019-12-16 NOTE — PROGRESS NOTES
IUP at 39 weeks and 1 day  Patient here for prenatal visit and nonstress test   Nonstress test was reactive baseline heart rate in the 160s  Positive accelerations  Reviewed signs of labor and kick counts  She is pre gestational type 1 insulin-dependent diabetes on insulin pump  She is scheduled for elective induction of labor this coming Wednesday 12/18/2019  Risks and benefits previously reviewed  All questions answered nonstress test today was reactive her MARTINEZ was 12

## 2019-12-18 ENCOUNTER — ANESTHESIA (INPATIENT)
Dept: ANESTHESIOLOGY | Facility: HOSPITAL | Age: 29
End: 2019-12-18
Payer: COMMERCIAL

## 2019-12-18 ENCOUNTER — ANESTHESIA EVENT (INPATIENT)
Dept: ANESTHESIOLOGY | Facility: HOSPITAL | Age: 29
End: 2019-12-18
Payer: COMMERCIAL

## 2019-12-18 ENCOUNTER — HOSPITAL ENCOUNTER (OUTPATIENT)
Dept: LABOR AND DELIVERY | Facility: HOSPITAL | Age: 29
Discharge: HOME | End: 2019-12-18
Payer: COMMERCIAL

## 2019-12-18 ENCOUNTER — HOSPITAL ENCOUNTER (INPATIENT)
Facility: HOSPITAL | Age: 29
LOS: 3 days | Discharge: HOME | End: 2019-12-21
Attending: OBSTETRICS & GYNECOLOGY | Admitting: OBSTETRICS & GYNECOLOGY
Payer: COMMERCIAL

## 2019-12-18 DIAGNOSIS — Z3A.39 39 WEEKS GESTATION OF PREGNANCY: Primary | ICD-10-CM

## 2019-12-18 LAB
ABO GROUP BLD: NORMAL
BASOPHILS # BLD AUTO: 0.09 THOUSANDS/ΜL (ref 0–0.1)
BASOPHILS NFR BLD AUTO: 1 % (ref 0–1)
BLD GP AB SCN SERPL QL: NEGATIVE
EOSINOPHIL # BLD AUTO: 0.29 THOUSAND/ΜL (ref 0–0.61)
EOSINOPHIL NFR BLD AUTO: 2 % (ref 0–6)
ERYTHROCYTE [DISTWIDTH] IN BLOOD BY AUTOMATED COUNT: 13 % (ref 11.6–15.1)
GLUCOSE SERPL-MCNC: 104 MG/DL (ref 65–140)
GLUCOSE SERPL-MCNC: 104 MG/DL (ref 65–140)
GLUCOSE SERPL-MCNC: 108 MG/DL (ref 65–140)
GLUCOSE SERPL-MCNC: 113 MG/DL (ref 65–140)
GLUCOSE SERPL-MCNC: 114 MG/DL (ref 65–140)
GLUCOSE SERPL-MCNC: 124 MG/DL (ref 65–140)
GLUCOSE SERPL-MCNC: 85 MG/DL (ref 65–140)
GLUCOSE SERPL-MCNC: 89 MG/DL (ref 65–140)
GLUCOSE SERPL-MCNC: 90 MG/DL (ref 65–140)
GLUCOSE SERPL-MCNC: 93 MG/DL (ref 65–140)
GLUCOSE SERPL-MCNC: 95 MG/DL (ref 65–140)
HCT VFR BLD AUTO: 41.5 % (ref 34.8–46.1)
HGB BLD-MCNC: 13 G/DL (ref 11.5–15.4)
IMM GRANULOCYTES # BLD AUTO: 0.11 THOUSAND/UL (ref 0–0.2)
IMM GRANULOCYTES NFR BLD AUTO: 1 % (ref 0–2)
LYMPHOCYTES # BLD AUTO: 2.9 THOUSANDS/ΜL (ref 0.6–4.47)
LYMPHOCYTES NFR BLD AUTO: 23 % (ref 14–44)
MCH RBC QN AUTO: 27.3 PG (ref 26.8–34.3)
MCHC RBC AUTO-ENTMCNC: 31.3 G/DL (ref 31.4–37.4)
MCV RBC AUTO: 87 FL (ref 82–98)
MONOCYTES # BLD AUTO: 1.06 THOUSAND/ΜL (ref 0.17–1.22)
MONOCYTES NFR BLD AUTO: 8 % (ref 4–12)
NEUTROPHILS # BLD AUTO: 8.44 THOUSANDS/ΜL (ref 1.85–7.62)
NEUTS SEG NFR BLD AUTO: 65 % (ref 43–75)
NRBC BLD AUTO-RTO: 0 /100 WBCS
PLATELET # BLD AUTO: 214 THOUSANDS/UL (ref 149–390)
PMV BLD AUTO: 11.1 FL (ref 8.9–12.7)
RBC # BLD AUTO: 4.76 MILLION/UL (ref 3.81–5.12)
RH BLD: POSITIVE
SPECIMEN EXPIRATION DATE: NORMAL
WBC # BLD AUTO: 12.89 THOUSAND/UL (ref 4.31–10.16)

## 2019-12-18 PROCEDURE — 99024 POSTOP FOLLOW-UP VISIT: CPT | Performed by: OBSTETRICS & GYNECOLOGY

## 2019-12-18 PROCEDURE — 86850 RBC ANTIBODY SCREEN: CPT | Performed by: OBSTETRICS & GYNECOLOGY

## 2019-12-18 PROCEDURE — 82948 REAGENT STRIP/BLOOD GLUCOSE: CPT

## 2019-12-18 PROCEDURE — 3E033VJ INTRODUCTION OF OTHER HORMONE INTO PERIPHERAL VEIN, PERCUTANEOUS APPROACH: ICD-10-PCS | Performed by: OBSTETRICS & GYNECOLOGY

## 2019-12-18 PROCEDURE — 86900 BLOOD TYPING SEROLOGIC ABO: CPT | Performed by: OBSTETRICS & GYNECOLOGY

## 2019-12-18 PROCEDURE — 10907ZC DRAINAGE OF AMNIOTIC FLUID, THERAPEUTIC FROM PRODUCTS OF CONCEPTION, VIA NATURAL OR ARTIFICIAL OPENING: ICD-10-PCS | Performed by: OBSTETRICS & GYNECOLOGY

## 2019-12-18 PROCEDURE — 86901 BLOOD TYPING SEROLOGIC RH(D): CPT | Performed by: OBSTETRICS & GYNECOLOGY

## 2019-12-18 PROCEDURE — 86592 SYPHILIS TEST NON-TREP QUAL: CPT | Performed by: OBSTETRICS & GYNECOLOGY

## 2019-12-18 PROCEDURE — 85025 COMPLETE CBC W/AUTO DIFF WBC: CPT | Performed by: OBSTETRICS & GYNECOLOGY

## 2019-12-18 RX ORDER — ROPIVACAINE HYDROCHLORIDE 2 MG/ML
INJECTION, SOLUTION EPIDURAL; INFILTRATION; PERINEURAL CONTINUOUS PRN
Status: DISCONTINUED | OUTPATIENT
Start: 2019-12-18 | End: 2019-12-19 | Stop reason: SURG

## 2019-12-18 RX ORDER — METOCLOPRAMIDE HYDROCHLORIDE 5 MG/ML
10 INJECTION INTRAMUSCULAR; INTRAVENOUS EVERY 6 HOURS PRN
Status: DISCONTINUED | OUTPATIENT
Start: 2019-12-18 | End: 2019-12-19

## 2019-12-18 RX ORDER — DEXTROSE AND SODIUM CHLORIDE 5; .9 G/100ML; G/100ML
100 INJECTION, SOLUTION INTRAVENOUS CONTINUOUS
Status: DISCONTINUED | OUTPATIENT
Start: 2019-12-18 | End: 2019-12-21 | Stop reason: HOSPADM

## 2019-12-18 RX ORDER — ROPIVACAINE HYDROCHLORIDE 2 MG/ML
INJECTION, SOLUTION EPIDURAL; INFILTRATION; PERINEURAL
Status: COMPLETED
Start: 2019-12-18 | End: 2019-12-18

## 2019-12-18 RX ORDER — SODIUM CHLORIDE 9 MG/ML
100 INJECTION, SOLUTION INTRAVENOUS CONTINUOUS
Status: DISCONTINUED | OUTPATIENT
Start: 2019-12-18 | End: 2019-12-19

## 2019-12-18 RX ORDER — OXYTOCIN/RINGER'S LACTATE 30/500 ML
1-30 PLASTIC BAG, INJECTION (ML) INTRAVENOUS
Status: DISCONTINUED | OUTPATIENT
Start: 2019-12-18 | End: 2019-12-19

## 2019-12-18 RX ORDER — ONDANSETRON 2 MG/ML
4 INJECTION INTRAMUSCULAR; INTRAVENOUS EVERY 6 HOURS PRN
Status: DISCONTINUED | OUTPATIENT
Start: 2019-12-18 | End: 2019-12-19

## 2019-12-18 RX ORDER — SODIUM CHLORIDE 9 MG/ML
125 INJECTION, SOLUTION INTRAVENOUS CONTINUOUS
Status: DISCONTINUED | OUTPATIENT
Start: 2019-12-18 | End: 2019-12-18

## 2019-12-18 RX ORDER — ONDANSETRON 2 MG/ML
INJECTION INTRAMUSCULAR; INTRAVENOUS
Status: COMPLETED
Start: 2019-12-18 | End: 2019-12-18

## 2019-12-18 RX ORDER — ROPIVACAINE HYDROCHLORIDE 2 MG/ML
INJECTION, SOLUTION EPIDURAL; INFILTRATION; PERINEURAL
Status: COMPLETED | OUTPATIENT
Start: 2019-12-18 | End: 2019-12-19

## 2019-12-18 RX ADMIN — SODIUM CHLORIDE 100 ML/HR: 0.9 INJECTION, SOLUTION INTRAVENOUS at 09:34

## 2019-12-18 RX ADMIN — SODIUM CHLORIDE 125 ML/HR: 0.9 INJECTION, SOLUTION INTRAVENOUS at 07:32

## 2019-12-18 RX ADMIN — SODIUM CHLORIDE 100 ML/HR: 0.9 INJECTION, SOLUTION INTRAVENOUS at 10:42

## 2019-12-18 RX ADMIN — SODIUM CHLORIDE 0.5 UNITS/HR: 9 INJECTION, SOLUTION INTRAVENOUS at 08:25

## 2019-12-18 RX ADMIN — Medication 2 MILLI-UNITS/MIN: at 19:54

## 2019-12-18 RX ADMIN — ONDANSETRON 4 MG: 2 INJECTION INTRAMUSCULAR; INTRAVENOUS at 16:09

## 2019-12-18 RX ADMIN — ROPIVACAINE HYDROCHLORIDE 10 ML/HR: 2 INJECTION, SOLUTION EPIDURAL; INFILTRATION at 11:22

## 2019-12-18 RX ADMIN — DEXTROSE AND SODIUM CHLORIDE 100 ML/HR: 5; .9 INJECTION, SOLUTION INTRAVENOUS at 08:21

## 2019-12-18 RX ADMIN — ONDANSETRON 4 MG: 2 INJECTION INTRAMUSCULAR; INTRAVENOUS at 21:31

## 2019-12-18 RX ADMIN — ROPIVACAINE HYDROCHLORIDE: 2 INJECTION, SOLUTION EPIDURAL; INFILTRATION at 11:32

## 2019-12-18 RX ADMIN — ROPIVACAINE HYDROCHLORIDE: 2 INJECTION, SOLUTION EPIDURAL; INFILTRATION at 17:24

## 2019-12-18 RX ADMIN — ROPIVACAINE HYDROCHLORIDE 10 ML: 2 INJECTION, SOLUTION EPIDURAL; INFILTRATION at 11:10

## 2019-12-18 RX ADMIN — SODIUM CHLORIDE 100 ML/HR: 0.9 INJECTION, SOLUTION INTRAVENOUS at 23:37

## 2019-12-18 RX ADMIN — METOCLOPRAMIDE 10 MG: 5 INJECTION, SOLUTION INTRAMUSCULAR; INTRAVENOUS at 23:55

## 2019-12-18 RX ADMIN — DEXTROSE AND SODIUM CHLORIDE 100 ML/HR: 5; .9 INJECTION, SOLUTION INTRAVENOUS at 17:33

## 2019-12-18 NOTE — OB LABOR/OXYTOCIN SAFETY PROGRESS
Labor Progress Note - Renee Almonte 30 y o  female MRN: [id number]    Unit/Bed#: L&D 325-01 Encounter: [id number]       Contraction Frequency (minutes): 2-3  Contraction Quality: Moderate  Tachysystole: No   Dilation: 6        Effacement (%): 90  Station: 0  Baseline Rate: 150 bpm  Fetal Heart Rate: 155 BPM  FHR Category: Category I             Notes/comments:   Patient making some cervical change, comfortable after getting epidural   Will consider pitocin later if no change      Ciara Garcia MD 12/18/2019 2:54 PM

## 2019-12-18 NOTE — ANESTHESIA PREPROCEDURE EVALUATION
Review of Systems/Medical History  Patient summary reviewed  Chart reviewed      Cardiovascular  Exercise tolerance (METS): >4,     Pulmonary  Negative pulmonary ROS        GI/Hepatic       Negative  ROS        Endo/Other  Diabetes well controlled type 1 Insulin,      GYN  Currently pregnant ,          Hematology   Musculoskeletal  Negative musculoskeletal ROS        Neurology  Negative neurology ROS      Psychology   Negative psychology ROS              Physical Exam    Airway    Mallampati score: II  TM Distance: <3 FB  Neck ROM: full     Dental       Cardiovascular  Rhythm: regular, Rate: normal,     Pulmonary  Breath sounds clear to auscultation,     Other Findings        Anesthesia Plan  ASA Score- 2     Anesthesia Type- epidural with ASA Monitors  Additional Monitors:   Airway Plan:         Plan Factors-Patient not instructed to abstain from smoking on day of procedure  Patient did not smoke on day of surgery  Induction- intravenous  Postoperative Plan-     Informed Consent- Anesthetic plan and risks discussed with patient

## 2019-12-18 NOTE — H&P
H&P Exam - Obstetrics   Victorina Henderson 30 y o  female MRN: [id number]  Unit/Bed#: L&D 325-01 Encounter: [id number]      History of Present Illness     Chief Complaint: induction of labor for pre-gestational diabetes    HPI:  Victorina Henderson is a 30 y o  [id number] female with an [place] of 2019, by Last Menstrual Period at 39w3d weeks gestation who is being admitted for induction of labor for pregestational diabetes   Contractions: yes  Loss of fluid: no3  Vaginal bleeding: no  Fetal movement: yes    She is valey patient       PREGNANCY COMPLICATIONS:   1) pre gestational DM on insulin pump     OB History    Para Term  AB Living   1 0 0 0 0 0   SAB TAB Ectopic Multiple Live Births   0 0 0 0 0      # Outcome Date GA Lbr Davis/2nd Weight Sex Delivery Anes PTL Lv   1 Current                Baby complications/comments: non complaints     Review of Systems      Historical Information   Past Medical History:   Diagnosis Date    Anemia     H/O vitamin D deficiency     Pre-existing type 1 diabetes affecting pregnancy, antepartum     age 14   [facility]      Past Surgical History:   Procedure Laterality Date    TONSILLECTOMY AND ADENOIDECTOMY       Social History   Social History     Substance and Sexual Activity   Alcohol Use Not Currently     Social History     Substance and Sexual Activity   Drug Use Never     Social History     Tobacco Use   Smoking Status Never Smoker   Smokeless Tobacco Never Used     Family History: non-contributory    Meds/Allergies      Medications Prior to Admission   Medication    aspirin 81 mg chewable tablet    ferrous sulfate 325 (65 Fe) mg tablet    glucagon (GLUCAGON EMERGENCY) 1 MG injection    insulin aspart (NovoLOG) 100 units/mL injection    ONETOUCH DELICA LANCETS FINE MISC    ONETOUCH VERIO test strip    Prenatal Vit-DSS-Fe Cbn-FA (PRENATAL AD PO)        Allergies   Allergen Reactions    Other      Other reaction(s): Resp Distress  Other reaction(s): Resp Sympt Mild  Other reaction(s): Resp Distress    Mold Extract  [Trichophyton]      Other reaction(s): Resp Sympt Mild    Molds & Smuts      Other reaction(s): Resp Sympt Mild    Pollen Extract      Other reaction(s): Resp Sympt Mild       OBJECTIVE:    Vitals: Last menstrual period 2019  There is no height or weight on file to calculate BMI  Physical Exam    Cervix: 4/80/-3 in office last week        Fetal heart rate: 130 reactive cat 1        [place]: q3       EFW: 7lb3oz    GBS: negative    Prenatal Labs:   Blood Type:   Lab Results   Component Value Date/Time    ABO Grouping O 2019 07:20 AM     , D (Rh type):   Lab Results   Component Value Date/Time    Rh Factor Positive 2019 07:20 AM     , HCT/HGB:   Lab Results   Component Value Date/Time    Hematocrit 41 5 2019 07:20 AM    Hemoglobin 13 0 2019 07:20 AM      , Platelets:   Lab Results   Component Value Date/Time    Platelets 877  07:20 AM      , Rubella:   Lab Results   Component Value Date/Time    Rubella IgG Quant 152 9 2019 08:59 AM        , VDRL/RPR:   Lab Results   Component Value Date/Time    RPR Non-Reactive 2019 09:27 AM      , Hep B:   Lab Results   Component Value Date/Time    Hepatitis B Surface Ag Non-reactive 2019 12:28 AM     , HIV:   Lab Results   Component Value Date/Time    HIV-1/HIV-2 Ab Non-Reactive 2019 12:28 AM     , Group B Strep:    Lab Results   Component Value Date/Time    Strep Grp B PCR Negative for Beta Hemolytic Strep Grp B by PCR 2019 06:33 PM          Invasive Devices     None                   Assessment/Plan     ASSESSMENT:  29 y/oyo  at 39w3d weeks gestation who is being admitted for induction of labor      PLAN:   - Admit   - CBC, RPR, Blood Type   - Start with observation and AROM    - GBS negative status:    - Analgesia and/or epidural at patient request   - Insulin pump     This patient will be an INPATIENT  and I certify the anticipated length of stay is >2 Midnights      Josh Loza   12/18/2019  6:38 AM

## 2019-12-18 NOTE — NURSING NOTE
Patient stopped personal insulin pump and removed it  BS at 90  Insulin gtt started, now following insulin protocol and orders per Dr Shanthi Maldonado

## 2019-12-18 NOTE — OB LABOR/OXYTOCIN SAFETY PROGRESS
Labor Progress Note - Loren Dey 30 y o  female MRN: [id number]    Unit/Bed#: L&D 325-01 Encounter: [id number]       Contraction Frequency (minutes): 2-5  Contraction Quality: Mild  Tachysystole: No   Dilation: 4        Effacement (%): 80  Station: -1  Baseline Rate: 135 bpm  Fetal Heart Rate: 145 BPM  FHR Category: Category I             Notes/comments:     Pt contraction are regular  AROM clear  Will get epidural  Re check in 2 hours no change for pitocin       Beatrice Villeda MD 12/18/2019 10:15 AM

## 2019-12-18 NOTE — ANESTHESIA PROCEDURE NOTES
Epidural Block    Patient location during procedure: OB  Start time: 12/18/2019 11:10 AM  Reason for block: at surgeon's request  Staffing  Anesthesiologist: Morenita Lopez, DO  Performed: anesthesiologist   Preanesthetic Checklist  Completed: patient identified, site marked, surgical consent, pre-op evaluation, timeout performed, IV checked, risks and benefits discussed and monitors and equipment checked  Epidural  Patient position: sitting  Prep: Betadine  Patient monitoring: heart rate and frequent blood pressure checks  Approach: midline  Location: lumbar (1-5)  Injection technique: PARADISE air  Needle  Needle type: Tuohy   Needle gauge: 18 G  Catheter type: side hole  Catheter size: 20 G  Test dose: negativeropivacaine (NAROPIN) 0 2% epidural injection, 10 mL  Assessment  Sensory level: [id number] aspiration for CSF, negative aspiration for heme and no paresthesia on injection  patient tolerated the procedure well with no immediate complications

## 2019-12-19 PROBLEM — Z37.9 VACUUM-ASSISTED VAGINAL DELIVERY: Status: ACTIVE | Noted: 2019-12-19

## 2019-12-19 LAB
BASE EXCESS BLDCOA CALC-SCNC: -10.4 MMOL/L (ref 3–11)
BASE EXCESS BLDCOV CALC-SCNC: -10 MMOL/L (ref 1–9)
GLUCOSE SERPL-MCNC: 113 MG/DL (ref 65–140)
GLUCOSE SERPL-MCNC: 153 MG/DL (ref 65–140)
GLUCOSE SERPL-MCNC: 179 MG/DL (ref 65–140)
GLUCOSE SERPL-MCNC: 179 MG/DL (ref 65–140)
GLUCOSE SERPL-MCNC: 92 MG/DL (ref 65–140)
HCO3 BLDCOA-SCNC: 20.3 MMOL/L (ref 17.3–27.3)
HCO3 BLDCOV-SCNC: 19.2 MMOL/L (ref 12.2–28.6)
O2 CT VFR BLDCOA CALC: 6.4 ML/DL
OXYHGB MFR BLDCOA: 29.6 %
OXYHGB MFR BLDCOV: 34.7 %
PCO2 BLDCOA: 66.7 MM[HG] (ref 30–60)
PCO2 BLDCOV: 54.8 MM HG (ref 27–43)
PH BLDCOA: 7.1 [PH] (ref 7.23–7.43)
PH BLDCOV: 7.16 [PH] (ref 7.19–7.49)
PO2 BLDCOA: 18.8 MM HG (ref 5–25)
PO2 BLDCOV: 19.9 MM HG (ref 15–45)
RPR SER QL: NORMAL
SAO2 % BLDCOV: 7.5 ML/DL

## 2019-12-19 PROCEDURE — 82805 BLOOD GASES W/O2 SATURATION: CPT | Performed by: OBSTETRICS & GYNECOLOGY

## 2019-12-19 PROCEDURE — 82948 REAGENT STRIP/BLOOD GLUCOSE: CPT

## 2019-12-19 PROCEDURE — 99024 POSTOP FOLLOW-UP VISIT: CPT | Performed by: OBSTETRICS & GYNECOLOGY

## 2019-12-19 PROCEDURE — 0DQR0ZZ REPAIR ANAL SPHINCTER, OPEN APPROACH: ICD-10-PCS | Performed by: OBSTETRICS & GYNECOLOGY

## 2019-12-19 PROCEDURE — 59400 OBSTETRICAL CARE: CPT | Performed by: OBSTETRICS & GYNECOLOGY

## 2019-12-19 RX ORDER — CEFOXITIN SODIUM 1 G/50ML
1000 INJECTION, SOLUTION INTRAVENOUS ONCE
Status: COMPLETED | OUTPATIENT
Start: 2019-12-19 | End: 2019-12-19

## 2019-12-19 RX ORDER — CEFAZOLIN SODIUM 2 G/50ML
2000 SOLUTION INTRAVENOUS ONCE
Status: DISCONTINUED | OUTPATIENT
Start: 2019-12-19 | End: 2019-12-19

## 2019-12-19 RX ORDER — SIMETHICONE 80 MG
80 TABLET,CHEWABLE ORAL 4 TIMES DAILY PRN
Status: DISCONTINUED | OUTPATIENT
Start: 2019-12-19 | End: 2019-12-21 | Stop reason: HOSPADM

## 2019-12-19 RX ORDER — BUPIVACAINE HYDROCHLORIDE 2.5 MG/ML
INJECTION, SOLUTION EPIDURAL; INFILTRATION; INTRACAUDAL
Status: COMPLETED
Start: 2019-12-19 | End: 2019-12-19

## 2019-12-19 RX ORDER — CEFOXITIN SODIUM 1 G/50ML
INJECTION, SOLUTION INTRAVENOUS
Status: COMPLETED
Start: 2019-12-19 | End: 2019-12-19

## 2019-12-19 RX ORDER — SENNOSIDES 8.6 MG
1 TABLET ORAL DAILY
Status: DISCONTINUED | OUTPATIENT
Start: 2019-12-19 | End: 2019-12-21 | Stop reason: HOSPADM

## 2019-12-19 RX ORDER — DIAPER,BRIEF,INFANT-TODD,DISP
1 EACH MISCELLANEOUS AS NEEDED
Status: DISCONTINUED | OUTPATIENT
Start: 2019-12-19 | End: 2019-12-21 | Stop reason: HOSPADM

## 2019-12-19 RX ORDER — MAGNESIUM HYDROXIDE/ALUMINUM HYDROXICE/SIMETHICONE 120; 1200; 1200 MG/30ML; MG/30ML; MG/30ML
15 SUSPENSION ORAL EVERY 6 HOURS PRN
Status: DISCONTINUED | OUTPATIENT
Start: 2019-12-19 | End: 2019-12-21 | Stop reason: HOSPADM

## 2019-12-19 RX ORDER — DOCUSATE SODIUM 100 MG/1
100 CAPSULE, LIQUID FILLED ORAL 2 TIMES DAILY
Status: DISCONTINUED | OUTPATIENT
Start: 2019-12-19 | End: 2019-12-21 | Stop reason: HOSPADM

## 2019-12-19 RX ORDER — OXYCODONE HYDROCHLORIDE AND ACETAMINOPHEN 5; 325 MG/1; MG/1
2 TABLET ORAL EVERY 4 HOURS PRN
Status: DISCONTINUED | OUTPATIENT
Start: 2019-12-19 | End: 2019-12-21 | Stop reason: HOSPADM

## 2019-12-19 RX ORDER — OXYCODONE HYDROCHLORIDE AND ACETAMINOPHEN 5; 325 MG/1; MG/1
1 TABLET ORAL EVERY 4 HOURS PRN
Status: DISCONTINUED | OUTPATIENT
Start: 2019-12-19 | End: 2019-12-21 | Stop reason: HOSPADM

## 2019-12-19 RX ORDER — CALCIUM CARBONATE 200(500)MG
1000 TABLET,CHEWABLE ORAL DAILY PRN
Status: DISCONTINUED | OUTPATIENT
Start: 2019-12-19 | End: 2019-12-21 | Stop reason: HOSPADM

## 2019-12-19 RX ORDER — ACETAMINOPHEN 325 MG/1
650 TABLET ORAL EVERY 4 HOURS PRN
Status: DISCONTINUED | OUTPATIENT
Start: 2019-12-19 | End: 2019-12-21 | Stop reason: HOSPADM

## 2019-12-19 RX ORDER — CHLOROPROCAINE HYDROCHLORIDE 30 MG/ML
INJECTION, SOLUTION EPIDURAL; INFILTRATION; INTRACAUDAL; PERINEURAL AS NEEDED
Status: DISCONTINUED | OUTPATIENT
Start: 2019-12-19 | End: 2019-12-19 | Stop reason: SURG

## 2019-12-19 RX ORDER — IBUPROFEN 600 MG/1
600 TABLET ORAL EVERY 6 HOURS PRN
Status: DISCONTINUED | OUTPATIENT
Start: 2019-12-19 | End: 2019-12-21 | Stop reason: HOSPADM

## 2019-12-19 RX ADMIN — ROPIVACAINE HYDROCHLORIDE: 2 INJECTION, SOLUTION EPIDURAL; INFILTRATION at 00:22

## 2019-12-19 RX ADMIN — SENNOSIDES 8.6 MG: 8.6 TABLET, FILM COATED ORAL at 09:36

## 2019-12-19 RX ADMIN — CHLOROPROCAINE HYDROCHLORIDE 4 ML: 30 INJECTION, SOLUTION EPIDURAL; INFILTRATION; INTRACAUDAL; PERINEURAL at 01:29

## 2019-12-19 RX ADMIN — CEFOXITIN SODIUM 1000 MG: 1 INJECTION, SOLUTION INTRAVENOUS at 02:40

## 2019-12-19 RX ADMIN — IBUPROFEN 600 MG: 600 TABLET ORAL at 09:40

## 2019-12-19 RX ADMIN — DOCUSATE SODIUM 100 MG: 100 CAPSULE, LIQUID FILLED ORAL at 17:31

## 2019-12-19 RX ADMIN — ROPIVACAINE HYDROCHLORIDE 5 ML: 2 INJECTION, SOLUTION EPIDURAL; INFILTRATION at 00:38

## 2019-12-19 RX ADMIN — OXYCODONE HYDROCHLORIDE AND ACETAMINOPHEN 1 TABLET: 5; 325 TABLET ORAL at 05:49

## 2019-12-19 RX ADMIN — IBUPROFEN 600 MG: 600 TABLET ORAL at 19:44

## 2019-12-19 RX ADMIN — HYDROCORTISONE 1 APPLICATION: 1 CREAM TOPICAL at 04:23

## 2019-12-19 RX ADMIN — ACETAMINOPHEN 650 MG: 325 TABLET ORAL at 14:44

## 2019-12-19 RX ADMIN — BENZOCAINE AND LEVOMENTHOL: 200; 5 SPRAY TOPICAL at 04:23

## 2019-12-19 RX ADMIN — DOCUSATE SODIUM 100 MG: 100 CAPSULE, LIQUID FILLED ORAL at 09:36

## 2019-12-19 RX ADMIN — BUPIVACAINE HYDROCHLORIDE: 2.5 INJECTION, SOLUTION EPIDURAL; INFILTRATION; INTRACAUDAL at 02:40

## 2019-12-19 RX ADMIN — WITCH HAZEL 1 PAD: 500 SOLUTION RECTAL; TOPICAL at 04:23

## 2019-12-19 RX ADMIN — ROPIVACAINE HYDROCHLORIDE 4 ML: 2 INJECTION, SOLUTION EPIDURAL; INFILTRATION at 01:29

## 2019-12-19 NOTE — LACTATION NOTE
This note was copied from a baby's chart  CONSULT - LACTATION  Baby Girl Bhavana Ward 0 days female MRN: [id number]    [facility] Room / Bed: L&D 312(N)/L&D 312(N) Encounter: [id number]      Baby has a hard time latching  Mom is slightly flat but uses latch assist to help bring out nipples  Suck assessed with gloved finger and baby mostly bites and keeps tongue back  No tight frenulum seen but baby seems to have a shorter tongue  It takes baby a few sucks before bringing tongue over the gum line but still does not suck very strong  Attempted to use nipple shield, baby does latch to shield and sucks for a very short amount of time but is still not strong enough to bring the nipple fully into the shield  Mom set up to pump and hand expression reviewed  Mom to try to collect colostrum to feed to baby  Encouraged skin to skin while offering the baby the breast often to practice latching  Mom aware of the possibility of supplement if the baby's sugar is too low  Mom educated on donor milk availability with finger feed or SNS  Mom to call for assistance with next feed  Maternal Information     MOTHER:  Cathie Ward  Maternal Age: 30 y o    OB History: #: 1, Date: 12/19/19, Sex: Female, Weight: 3960 g (8 lb 11 7 oz), GA: 39w4d, Delivery: None, Apgar1: 5, Apgar5: 8, Living: Living, Birth Comments: None   Previouse breast reduction surgery? No    Lactation history:   Has patient previously breast fed:      How long had patient previously breast fed:     Previous breast feeding complications:       Past Surgical History:   Procedure Laterality Date    TONSILLECTOMY AND ADENOIDECTOMY         Birth information:  YOB: 2018   Time of birth: 4:27 AM   Sex: female   Delivery type:     Birth Weight: 3960 g (8 lb 11 7 oz)   Percent of Weight Change: 0%     Gestational Age: 43w3d   [unfilled]    Assessment     Breast and nipple assessment: flat nipple     Assessment: restricted tongue movement    Feeding assessment: no latch  LATCH:  Latch: Repeated attempts, hold nipple in mouth, stimulate to suck   Audible Swallowing: A few with stimulation   Type of Nipple: Flat   Comfort (Breast/Nipple): Soft/non-tender   Hold (Positioning): Partial assist, teach one side, mother does other, staff holds   [facility] CENTER Score: 6          Feeding recommendations:  pump every 2-3 hours with attempts at feeding first    John Munson RN 2019 10:31 AM

## 2019-12-19 NOTE — PLAN OF CARE
Problem: BIRTH - VAGINAL/ SECTION  Goal: Fetal and maternal status remain reassuring during the birth process  Description  INTERVENTIONS:  - Monitor vital signs  - Monitor fetal heart rate  - Monitor uterine activity  - Monitor labor progression (vaginal delivery)  - DVT prophylaxis  - Antibiotic prophylaxis  Outcome: Progressing  Goal: Emotionally satisfying birthing experience for mother/fetus  Description  Interventions:  - Assess, plan, implement and evaluate the nursing care given to the patient in labor  - Advocate the philosophy that each childbirth experience is a unique experience and support the family's chosen level of involvement and control during the labor process   - Actively participate in both the patient's and family's teaching of the birth process  - Consider cultural, Mandaeism and age-specific factors and plan care for the patient in labor  Outcome: Progressing     Problem: POSTPARTUM  Goal: Experiences normal postpartum course  Description  INTERVENTIONS:  - Monitor maternal vital signs  - Assess uterine involution and lochia  Outcome: Progressing  Goal: Appropriate maternal -  bonding  Description  INTERVENTIONS:  - Identify family support  - Assess for appropriate maternal/infant bonding   -Encourage maternal/infant bonding opportunities  - Referral to  or  as needed  Outcome: Progressing  Goal: Establishment of infant feeding pattern  Description  INTERVENTIONS:  - Assess breast/bottle feeding  - Refer to lactation as needed  Outcome: Progressing  Goal: Incision(s), wounds(s) or drain site(s) healing without S/S of infection  Description  INTERVENTIONS  - Assess and document risk factors for skin impairment   - Assess and document dressing, incision, wound bed, drain sites and surrounding tissue  - Consider nutrition services referral as needed  - Oral mucous membranes remain intact  - Provide patient/ family education  Outcome: Progressing Problem: PAIN - ADULT  Goal: Verbalizes/displays adequate comfort level or baseline comfort level  Description  Interventions:  - Encourage patient to monitor pain and request assistance  - Assess pain using appropriate pain scale  - Administer analgesics based on type and severity of pain and evaluate response  - Implement non-pharmacological measures as appropriate and evaluate response  - Consider cultural and social influences on pain and pain management  - Notify physician/advanced practitioner if interventions unsuccessful or patient reports new pain  Outcome: Progressing     Problem: INFECTION - ADULT  Goal: Absence or prevention of progression during hospitalization  Description  INTERVENTIONS:  - Assess and monitor for signs and symptoms of infection  - Monitor lab/diagnostic results  - Monitor all insertion sites, i e  indwelling lines, tubes, and drains  - Monitor endotracheal if appropriate and nasal secretions for changes in amount and color  - [place] appropriate cooling/warming therapies per order  - Administer medications as ordered  - Instruct and encourage patient and family to use good hand hygiene technique  - Identify and instruct in appropriate isolation precautions for identified infection/condition  Outcome: Progressing     Problem: SAFETY ADULT  Goal: Patient will remain free of falls  Description  INTERVENTIONS:  - Assess patient frequently for physical needs  -  Identify cognitive and physical deficits and behaviors that affect risk of falls    -  [place] fall precautions as indicated by assessment   - Educate patient/family on patient safety including physical limitations  - Instruct patient to call for assistance with activity based on assessment  - Modify environment to reduce risk of injury  - Consider OT/PT consult to assist with strengthening/mobility  Outcome: Progressing     Problem: DISCHARGE PLANNING  Goal: Discharge to home or other facility with appropriate resources  Description  INTERVENTIONS:  - Identify barriers to discharge w/patient and caregiver  - Arrange for needed discharge resources and transportation as appropriate  - Identify discharge learning needs (meds, wound care, etc )  - Arrange for interpretive services to assist at discharge as needed  - Refer to Case Management Department for coordinating discharge planning if the patient needs post-hospital services based on physician/advanced practitioner order or complex needs related to functional status, cognitive ability, or social support system  Outcome: Progressing     Problem: Potential for Falls  Goal: Patient will remain free of falls  Description  INTERVENTIONS:  - Assess patient frequently for physical needs  -  Identify cognitive and physical deficits and behaviors that affect risk of falls    -  [place] fall precautions as indicated by assessment   - Educate patient/family on patient safety including physical limitations  - Instruct patient to call for assistance with activity based on assessment  - Modify environment to reduce risk of injury  - Consider OT/PT consult to assist with strengthening/mobility  Outcome: Progressing     Problem: DISCHARGE PLANNING  Goal: Discharge to home or other facility with appropriate resources  Description  INTERVENTIONS:  - Identify barriers to discharge w/patient and caregiver  - Arrange for needed discharge resources and transportation as appropriate  - Identify discharge learning needs (meds, wound care, etc )  - Arrange for interpretive services to assist at discharge as needed  - Refer to Case Management Department for coordinating discharge planning if the patient needs post-hospital services based on physician/advanced practitioner order or complex needs related to functional status, cognitive ability, or social support system  Outcome: Progressing

## 2019-12-19 NOTE — OB LABOR/OXYTOCIN SAFETY PROGRESS
Oxytocin Safety Progress Check Note - Nai Garza 30 y o  female MRN: [id number]    Unit/Bed#: L&D 325-01 Encounter: [id number]    Dose (raman-units/min) Oxytocin: 6 raman-units/min  Contraction Frequency (minutes): 2-3  Contraction Quality: Moderate  Tachysystole: No   Dilation: 8-9        Effacement (%): 90  Station: 0  Baseline Rate: 150 bpm  Fetal Heart Rate: 155 BPM  FHR Category: Category I             Notes/comments:   SVE as above   Pitocin at [address], MD 12/18/2019 9:28 PM

## 2019-12-19 NOTE — DISCHARGE INSTRUCTIONS
Vaginal Delivery   WHAT YOU NEED TO KNOW:   A vaginal delivery occurs when your baby is born through your vagina (birth canal)  DISCHARGE INSTRUCTIONS:   Seek care immediately if:   · Your leg feels warm, tender, and painful  It may look swollen and red  · You have a fever  · You are urinating very little, or not at all  · You have heavy vaginal bleeding that fills 1 or more sanitary pads in 1 hour  · You feel weak, dizzy, or faint  Contact your healthcare provider if:   · Your abdominal or perineal pain does not go away, or gets worse  · You feel depressed  · You have questions or concerns about your condition or care  Medicines:  · NSAIDs , such as ibuprofen, help decrease swelling, pain, and fever  This medicine is available with or without a doctor's order  NSAIDs can cause stomach bleeding or kidney problems in certain people  If you take blood thinner medicine, always ask your healthcare provider if NSAIDs are safe for you  Always read the medicine label and follow directions  · Stool softeners  make it easier for you to have a bowel movement  You may need this medicine to treat or prevent constipation  · Take your medicine as directed  Contact your healthcare provider if you think your medicine is not helping or if you have side effects  Tell him or her if you are allergic to any medicine  Keep a list of the medicines, vitamins, and herbs you take  Include the amounts, and when and why you take them  Bring the list or the pill bottles to follow-up visits  Carry your medicine list with you in case of an emergency  Follow up with your healthcare provider:  Most women need to return 6 weeks after a vaginal delivery  Ask your healthcare provider how to care for your wounds or stitches, if you have them  Write down your questions so you remember to ask them during your visits  Activity:  Rest as much as possible  Try to keep all activities short   You may be able to do some exercise soon after you have your baby  Talk with your healthcare provider before you start exercising  If you work outside the home, ask when you can return to your job  Kegel exercises:  Kegel exercises may help your vaginal and rectal muscles heal faster  You can do Kegel exercises by tightening and relaxing the muscles around your vagina  Kegel exercises help make the muscles stronger  Breast care:  When your milk comes in, your breasts may feel full and hard  Ask how to care for your breasts, even if you are not breastfeeding  Constipation:  You may have constipation for a period of time after you have your baby  Do not try to push the bowel movement out if it is too hard  High-fiber foods and extra liquids can help you prevent constipation  Examples of high-fiber foods are fruit and bran  Prune juice and water are good liquids to drink  You may also be told to take over-the-counter fiber and stool softener medicines  Take these items as directed  Ask how to prevent or treat hemorrhoids  Perineum care: Your perineum is the area between your vagina and anus  Keep the area clean and dry  This will help it heal and prevent infection  Wash the area gently with soap and water when you bathe or shower  Rinse your perineum with warm water after you urinate or have a bowel movement  Your healthcare provider may suggest you use a warm sitz bath to help decrease pain  To take a sitz bath, fill a bathtub with 4 to 6 inches of warm water  You may also use a sitz bath pan that fits inside the toilet  Sit in the sitz bath for 20 minutes  Do this 2 to 3 times a day, or as directed  The warm water can help decrease pain and swelling  Vaginal discharge: You will have vaginal discharge, called lochia, after your delivery  The lochia is red or dark brown with clots for 1 to 3 days after the birth  The amount will decrease and turn pale pink or brown for 3 to 10 days  It will turn white or yellow on the 10th or 14th day  Lochia is usually gone within 3 weeks  Use a sanitary pad rather than a tampon to prevent a vaginal infection  You will have lochia for up to 3 weeks after your baby is born  Monthly periods: Your period may start again within 7 to 9 weeks after your baby is born  If you are breastfeeding, it may take longer for your period to start again  You can still get pregnant again even though you do not have your monthly period  Talk with your healthcare provider about a birth control method if you do not want to get pregnant  Mood changes: Many new mothers have some kind of mood changes after delivery  Some of these changes occur because of lack of sleep, hormone changes, and caring for a new baby  Some mood changes can be more serious, such as postpartum depression  Talk with your healthcare provider if you feel unable to care for yourself or your baby  Sexual activity:  Do not have sex until your healthcare provider says it is okay  You may notice you have a decreased desire for sex, or sex may be painful  You may need to use a vaginal lubricant (gel) to help make sex more comfortable  © 2017 [facility] Information is for End User's use only and may not be sold, redistributed or otherwise used for commercial purposes  All illustrations and images included in CareNotes® are the copyrighted property of A D A M , Inc  or Valentin Dickinson  The above information is an  only  It is not intended as medical advice for individual conditions or treatments  Talk to your doctor, nurse or pharmacist before following any medical regimen to see if it is safe and effective for you

## 2019-12-19 NOTE — OB LABOR/OXYTOCIN SAFETY PROGRESS
Oxytocin Safety Progress Check Note - Huma Claremore 30 y o  female MRN: [id number]    Unit/Bed#: L&D 325-01 Encounter: [id number]    Dose (raman-units/min) Oxytocin: 8 raman-units/min  Contraction Frequency (minutes): 2-3  Contraction Quality: Moderate  Tachysystole: No   Dilation: Lip/rim (Comment)        Effacement (%): 100  Station: 0  Baseline Rate: 160 bpm  Fetal Heart Rate: 155 BPM  FHR Category: Category I             Notes/comments:   SVE as above  FHT with late decelerations  Decreased pitocin and fluid therapy started with improvement  Will initiate maternal position change and oxygen therapy if patient continues to have late decelerations    D/w Dr Ariella Umanzor MD 12/18/2019 10:46 PM

## 2019-12-19 NOTE — ANESTHESIA POSTPROCEDURE EVALUATION
Post-Op Assessment Note    CV Status:  Stable  Pain Score: 1    Pain management: adequate     Mental Status:  Alert and awake   Hydration Status:  Euvolemic   PONV Controlled:  Controlled   Airway Patency:  Patent   Post Op Vitals Reviewed: Yes        Post-op block assessment: no complications        BP      Temp      Pulse     Resp      SpO2

## 2019-12-19 NOTE — LACTATION NOTE
This note was copied from a baby's chart  CONSULT - LACTATION  Baby Girl Doe Hillmaria elena Nesbitt) Sylvia 0 days female MRN: [id number]    [facility] Room / Bed: L&D 312(N)/L&D 312(N) Encounter: [id number]      Met with mother  Provided mother with Ready, Set, Baby booklet  Discussed Skin to Skin contact an benefits to mom and baby  Talked about the delay of the first bath until baby has adjusted  Spoke about the benefits of rooming in  Feeding on cue and what that means for recognizing infant's hunger  Avoidance of pacifiers for the first month discussed  Talked about exclusive breastfeeding for the first 6 months  Positioning and latch reviewed as well as showing images of other feeding positions  Discussed the properties of a good latch in any position  Reviewed hand/manual expression  Discussed s/s that baby is getting enough milk and some s/s that breastfeeding dyad may need further help  Gave information on common concerns, what to expect the first few weeks after delivery, preparing for other caregivers, and how partners can help  Resources for support also provided  Offered latch assistance with next feed, number provided  Maternal Information     MOTHER:  Cathie Ward  Maternal Age: 30 y o    OB History: #: 1, Date: 12/19/19, Sex: Female, Weight: 3960 g (8 lb 11 7 oz), GA: 39w4d, Delivery: None, Apgar1: 5, Apgar5: 8, Living: Living, Birth Comments: None   Previouse breast reduction surgery? No    Lactation history:   Has patient previously breast fed:      How long had patient previously breast fed:     Previous breast feeding complications:       Past Surgical History:   Procedure Laterality Date    TONSILLECTOMY AND ADENOIDECTOMY         Birth information:  YOB: 2018   Time of birth: 4:27 AM   Sex: female   Delivery type:     Birth Weight: 3960 g (8 lb 11 7 oz)   Percent of Weight Change: 0%     Gestational Age: 43w3d [unfilled]    Assessment         LATCH:  Latch: Repeated attempts, hold nipple in mouth, stimulate to suck   Audible Swallowing: A few with stimulation   Type of Nipple: Everted (After stimulation)   Comfort (Breast/Nipple): Soft/non-tender   Hold (Positioning): Full assist, staff holds infant at breast   LATCH Score: 6          Feeding recommendations:  breast feed on demand    iRley Ward RN 12/19/2019 9:07 AM

## 2019-12-19 NOTE — PROGRESS NOTES
Spoke to Pt private Endocrine     We spoke about her previous insulin pump settings they gave me her settings from march   Pt states she is still running high she will adjust as always  She has an apt with him after delivery

## 2019-12-19 NOTE — OB LABOR/OXYTOCIN SAFETY PROGRESS
Oxytocin Safety Progress Check Note - Adriana Murders 30 y o  female MRN: [id number]    Unit/Bed#: L&D 325-01 Encounter: [id number]       Contraction Frequency (minutes): 2-3  Contraction Quality: Moderate  Tachysystole: No   Dilation: 8        Effacement (%): 90  Station: 0  Baseline Rate: 150 bpm  Fetal Heart Rate: 155 BPM  FHR Category: Category I             Notes/comments:   SVE as above  Discussed with patient starting pitocin at this time given no change in the last two hours  Patient is agreeable  Will start pitocin now    D/w Dr Naida Hollis MD 12/18/2019 7:30 PM

## 2019-12-19 NOTE — DISCHARGE SUMMARY
Discharge Summary - OB/GYN   Esteban Patterson 30 y o  female MRN: [id number]  Unit/Bed#: L&D 325-01 Encounter: [id number]      Admission Date: 2019     Discharge Date: 19    Admitting Diagnosis:   1  Pregnancy at 39w4d  2  Pre-gestational diabetes    Discharge Diagnosis:   Same, delivered    Procedures: vacuum assisted vaginal delivery; repair of 3A laceration    Delivery Attending: Sherri Noel MD  Discharge Attending: Dr Hilliard Ends Course:   Ms Esteban Patterson is a 30 y o  D3Q2 at 38wk4d  She presented to labor and delivery for induction of labor for pre-gestational diabetes  On exam in triage she was noted to be 4/80/-3  She delivered a viable female  on 19 at 05 53 18 69 64  Weight 8lbs 11 7oz via vacuum, outlet  Apgars were 5 (1 min) and 8 (5 min)   was transferred to  nursery  Patient tolerated the procedure well and was transferred to recovery in stable condition  Her postpartum course was complicated by 3a laceration  Her postpartum pain was well controlled with oral analgesics  On day of discharge, she was ambulating and able to reasonably perform all ADLs  She was voiding and had appropriate bowel function  Pain was well controlled  She was discharged home on post-partum day #2 without complications  Patient was instructed to follow up with her OB as an outpatient and was given appropriate warnings to call provider if she develops signs of infection or uncontrolled pain  Complications: none apparent    Condition at discharge: stable     Discharge instructions/Information to patient and family:   - Do not place anything (no partner, tampons or douche) in your vagina for 6 weeks    -You may walk for exercise for the first 6 weeks then gradually return to your usual activities    -Please do not drive for 1 week if you have no stitches and for 2 weeks if you have stitches or underwent a  delivery     -You may take baths or shower per your preference    -Please look at your bust (breasts) in the mirror daily and call for redness or tenderness or increased warmth    -Please call us for temperature > 100 4*F or 38* C, worsening pain or a foul discharge  Discharge Medications:   Prenatal vitamin daily for 6 months or the duration of nursing whichever is longer    Motrin 600 mg orally every 6 hours as needed for pain  Tylenol (over the counter) per bottle directions as needed for pain: do NOT use with percocet  Hydrocortisone cream 1% (over the counter) applied 1-2x daily to hemorrhoids as needed    Planned Readmission: No

## 2019-12-19 NOTE — L&D DELIVERY NOTE
Vaginal Delivery Note - OB/GYN   Connie Denton 30 y o  female MRN: [id number]  Unit/Bed#: L&D 325-01 Encounter: [id number]          Predelivery Diagnosis:  1  Pregnancy at 39w3d  2  Pre-gestational type 1 diabetes  3  Induction of labor    Postdelivery Diagnosis:  1  Same as above  2  Delivery of term     Procedure:  Vacuum assisted vaginal delivery; repair of 3a laceration    Attending: Amy Callejas    Assistant: Lemuel Miranda    Anesthesia: Epidural    QBL: 201cc  Admission H 0  Admission platelets: 253B    Complications: none apparent    Specimens: cord blood, arterial and venous cord blood gasses, placenta to storage    Findings:   1  Viable female at 0228, with APGARS of 5 and 8 at 1 and 5 minutes respectively,  2  Spontaneous delivery of intact placenta at 0234  3  3a degree laceration repaired with 3-0 vicryl rapide  4  Blood gases:   Arterial pH: 7 102   Arterial base excess: -10 4   Venous pH: 7 162   Venous base excess: -10 0    Disposition:  Patient tolerated the procedure well and was recovering in labor and delivery room     Brief history and labor course:  Ms Connie Denton is a 30 y o  E7R3 at 38wk4d  She presented to labor and delivery for induction of labor for pre-gestational diabetes  On exam in triage she was noted to be 4/80/-3  Description of procedure  Indication: Indication for vacuum-assisted vaginal delivery was recurrent decelerations while pushing, patient's most recent exam was 10/100/+3, fetal position was AMAURY  Fetal heart tones were in the 90s  Patient was found to have a high likelihood of successful operative vaginal delivery  The patient was informed of the risks and benefits of the procedure  Risks included but weren't limited to bleeding, infection, injury to then vaginal, cervix, urethra, bladder, rectum, and perineum    The patient was counseled on fetal risks including scalp laceration, bruising, subgaleal hematoma, cephalohematoma, intracranial hematoma,  jaundice and competitions of shoulder dystocia  The patient expressed understanding of the risks involved all questions were answered and the patient consented to the procedure  The patient's bladder was empty to avoid injury, adequate anesthesia was noted and the patient was in the dorsal lithotomy position  The fetal position was checked and confirmed to be AMAURY at +3 station with confirmed rupture of membranes  The vacuum extractor cup was applied at [facility]  It was applied to the fetal median flexion point and centered over the sagittal suture approximately 2 cm anterior to the posterior fontanelle  The check of application was confirmed that there was no maternal tissue within the cut margin  With the start of the contraction a vacuum seal was established to a maximum pressure of 650 mmHg  Gentle traction was then applied advancement of the Fetal head was noted  1 number of pulls was performed,  1 number of pop offs occurred and 1 of 3 applications was performed  The vacuum was released upon  of the fetal head and delivery was performed thereafter with the assistance of maternal expulsive efforts   At 05 53 18 69 64 the patient delivered a viable female , wt pending, apgars of 5 (1 min) and 8 (5 min)  The fetal vertex delivered spontaneously  Baby was checked for nuchal  No nuchal cord was palpated  The anterior shoulder delivered atraumatically with maternal expulsive forces and the assistance of downward traction  The posterior shoulder delivered with maternal expulsive forces and the assistance of upward traction  The remainder of the fetus delivered spontaneously  Upon delivery, the infant was placed on the mothers abdomen and the cord was clamped and cut  Delayed cord clamping was not performed  The infant was not noted to cry spontaneously and had poor tone  There was no evidence for injury scalp injury  Awaiting nurse resuscitators evaluated the    Arterial and venous cord blood gases and cord blood was collected for analysis  These were promptly sent to the lab  In the immediate post-partum, 30 units of IV pitocin was administered, and the uterus was noted to contract down well with massage and pitocin  The placenta delivered spontaneously at 0234 and was noted to have a centrally inserted 3 vessel cord  The vagina, cervix, perineum, and rectum were inspected and there was noted to be a 3A laceration  1% lidocaine was injected into the vagina for increased pain control  Cefoxitin 1g IV was administered  The laceration was explored and the capsule of the EAS was noted to be lacerated  These were re-approximated with 2-0 vicryl/  The vaginal mucosa and submucosa were then re approximated using 3-0 vicryl rapide to the point of the hymenal ring  The superficial perineal fascia was then re approximated using 3-0 vicryl in a running non locked stitch downward followed by a running subcuticular stitch upward to the level of the introitus  Upon completion of the repair, a rectal exam was performed confirming the defect was fully repaired and there was no suture protruding through the rectal mucosa    At the conclusion of the procedure, all needle, sponge, and instrument counts were noted to be correct  Patient tolerated the procedure well and was allowed to recover in labor and delivery room with family and  before being transferred to the post-partum floor  The attending was present and participated in all key portions of the case      Jonathan Antonio MD, PGY-2  2019  8:45 AM

## 2019-12-20 LAB
GLUCOSE SERPL-MCNC: 105 MG/DL (ref 65–140)
GLUCOSE SERPL-MCNC: 199 MG/DL (ref 65–140)
GLUCOSE SERPL-MCNC: 34 MG/DL (ref 65–140)
GLUCOSE SERPL-MCNC: 36 MG/DL (ref 65–140)
GLUCOSE SERPL-MCNC: 97 MG/DL (ref 65–140)

## 2019-12-20 PROCEDURE — 99024 POSTOP FOLLOW-UP VISIT: CPT | Performed by: OBSTETRICS & GYNECOLOGY

## 2019-12-20 PROCEDURE — 82948 REAGENT STRIP/BLOOD GLUCOSE: CPT

## 2019-12-20 RX ADMIN — DOCUSATE SODIUM 100 MG: 100 CAPSULE, LIQUID FILLED ORAL at 17:23

## 2019-12-20 RX ADMIN — OXYCODONE HYDROCHLORIDE AND ACETAMINOPHEN 1 TABLET: 5; 325 TABLET ORAL at 13:32

## 2019-12-20 RX ADMIN — OXYCODONE HYDROCHLORIDE AND ACETAMINOPHEN 1 TABLET: 5; 325 TABLET ORAL at 22:16

## 2019-12-20 RX ADMIN — WITCH HAZEL 1 PAD: 500 SOLUTION RECTAL; TOPICAL at 13:32

## 2019-12-20 RX ADMIN — IBUPROFEN 600 MG: 600 TABLET ORAL at 06:14

## 2019-12-20 RX ADMIN — SENNOSIDES 8.6 MG: 8.6 TABLET, FILM COATED ORAL at 08:49

## 2019-12-20 RX ADMIN — IBUPROFEN 600 MG: 600 TABLET ORAL at 17:23

## 2019-12-20 RX ADMIN — DOCUSATE SODIUM 100 MG: 100 CAPSULE, LIQUID FILLED ORAL at 08:49

## 2019-12-20 RX ADMIN — OXYCODONE HYDROCHLORIDE AND ACETAMINOPHEN 1 TABLET: 5; 325 TABLET ORAL at 08:49

## 2019-12-20 NOTE — PROGRESS NOTES
Progress Note - OB/GYN   Esteban Patterson 30 y o  female MRN: [id number]  Unit/Bed#: L&D 312-01 Encounter: [id number]    Assessment:  Post partum Day #1 s/p VAVD, stable, baby in room  Term pregnancy   Single female viable fetus  Pregnancy complicated by   1  Pre gestational diabetes : resuming insulin pump, will continue evaluation with her endocrinologist in outpatient setting  Glucose levels: 179, 121, 148, 80  2  3a laceration, received one dose of cefoxitin for repair, on schedule colace    Plan:  Continue routine post partum care  Encourage ambulation  Encourage breastfeeding  Anticipate discharge tomorrow     Subjective/Objective   Chief Complaint:     Post delivery  Patient is doing well  Lochia WNL  Pain well controlled  Subjective:     Pain: yes, cramping, improved with meds  Tolerating PO: yes  Voiding: yes  Flatus: yes  BM: no  Ambulating: yes  Chest pain: no  Shortness of breath: no  Leg pain: no  Lochia: minimal    Objective:     Vitals: /78 (BP Location: Right arm)   Pulse (!) 111 Comment: Pt was standing   Temp 97 5 °F (36 4 °C) (Oral)   Resp 18   Ht 5' 1" (1 549 m)   Wt 80 7 kg (178 lb)   LMP 03/17/2019 (Exact Date)   SpO2 99%   Breastfeeding? Yes   BMI 33 63 kg/m²       Intake/Output Summary (Last 24 hours) at 12/20/2019 0626  Last data filed at 12/19/2019 0730  Gross per 24 hour   Intake --   Output 350 ml   Net -350 ml       Lab Results   Component Value Date    WBC 12 89 (H) 12/18/2019    HGB 13 0 12/18/2019    HCT 41 5 12/18/2019    MCV 87 12/18/2019     12/18/2019       Physical Exam:     Gen: AAOx3, NAD  CV: RRR  Lungs: CTA b/l  Abd: Soft, non-tender, non-distended, no rebound or guarding  Uterine fundus firm and non-tender, 1 cm below the umbilicus     Ext: Non tender    George sOei MD  12/20/2019  6:26 AM

## 2019-12-21 VITALS
WEIGHT: 178 LBS | BODY MASS INDEX: 33.61 KG/M2 | HEART RATE: 76 BPM | OXYGEN SATURATION: 97 % | SYSTOLIC BLOOD PRESSURE: 126 MMHG | HEIGHT: 61 IN | TEMPERATURE: 98.1 F | DIASTOLIC BLOOD PRESSURE: 83 MMHG | RESPIRATION RATE: 16 BRPM

## 2019-12-21 LAB
GLUCOSE SERPL-MCNC: 108 MG/DL (ref 65–140)
GLUCOSE SERPL-MCNC: 41 MG/DL (ref 65–140)

## 2019-12-21 PROCEDURE — 99024 POSTOP FOLLOW-UP VISIT: CPT | Performed by: OBSTETRICS & GYNECOLOGY

## 2019-12-21 PROCEDURE — 82948 REAGENT STRIP/BLOOD GLUCOSE: CPT

## 2019-12-21 RX ORDER — OXYCODONE HYDROCHLORIDE AND ACETAMINOPHEN 5; 325 MG/1; MG/1
1 TABLET ORAL EVERY 4 HOURS PRN
Qty: 5 TABLET | Refills: 0 | Status: SHIPPED | OUTPATIENT
Start: 2019-12-21 | End: 2019-12-31

## 2019-12-21 RX ORDER — IBUPROFEN 600 MG/1
600 TABLET ORAL EVERY 6 HOURS PRN
Qty: 30 TABLET | Refills: 0 | Status: SHIPPED | OUTPATIENT
Start: 2019-12-21 | End: 2020-02-11 | Stop reason: ALTCHOICE

## 2019-12-21 RX ORDER — DOCUSATE SODIUM 100 MG/1
100 CAPSULE, LIQUID FILLED ORAL 2 TIMES DAILY
Qty: 60 CAPSULE | Refills: 0 | Status: SHIPPED | OUTPATIENT
Start: 2019-12-21 | End: 2020-02-11 | Stop reason: ALTCHOICE

## 2019-12-21 RX ADMIN — DOCUSATE SODIUM 100 MG: 100 CAPSULE, LIQUID FILLED ORAL at 08:44

## 2019-12-21 RX ADMIN — SENNOSIDES 8.6 MG: 8.6 TABLET, FILM COATED ORAL at 08:44

## 2019-12-21 RX ADMIN — OXYCODONE HYDROCHLORIDE AND ACETAMINOPHEN 1 TABLET: 5; 325 TABLET ORAL at 08:44

## 2019-12-21 RX ADMIN — IBUPROFEN 600 MG: 600 TABLET ORAL at 08:44

## 2019-12-21 NOTE — PLAN OF CARE
Problem: Knowledge Deficit  Goal: Verbalizes understanding of labor plan  Description  Assess patient/family/caregiver's baseline knowledge level and ability to understand information  Provide education via patient/family/caregiver's preferred learning method at appropriate level of understanding  1  Provide teaching at level of understanding  2  Provide teaching via preferred learning method(s)  Outcome: Progressing     Problem: Labor & Delivery  Goal: Manages discomfort  Description  Assess and monitor for signs and symptoms of discomfort  Assess patient's pain level regularly and per hospital policy  Administer medications as ordered  Support use of nonpharmacological methods to help control pain such as distraction, imagery, relaxation, and application of heat and cold  Collaborate with interdisciplinary team and patient to determine appropriate pain management plan  1  Include patient in decisions related to comfort  2  Offer non-pharmacological pain management interventions  3  Report ineffective pain management to physician  Outcome: Progressing  Goal: Patient vital signs are stable  Description  1  Assess vital signs - vaginal delivery    Outcome: Progressing     Problem: BIRTH - VAGINAL/ SECTION  Goal: Fetal and maternal status remain reassuring during the birth process  Description  INTERVENTIONS:  - Monitor vital signs  - Monitor fetal heart rate  - Monitor uterine activity  - Monitor labor progression (vaginal delivery)  - DVT prophylaxis  - Antibiotic prophylaxis  Outcome: Progressing  Goal: Emotionally satisfying birthing experience for mother/fetus  Description  Interventions:  - Assess, plan, implement and evaluate the nursing care given to the patient in labor  - Advocate the philosophy that each childbirth experience is a unique experience and support the family's chosen level of involvement and control during the labor process   - Actively participate in both the patient's and family's teaching of the birth process  - Consider cultural, [place] and age-specific factors and plan care for the patient in labor  Outcome: Progressing     Problem: PAIN - ADULT  Goal: Verbalizes/displays adequate comfort level or baseline comfort level  Description  Interventions:  - Encourage patient to monitor pain and request assistance  - Assess pain using appropriate pain scale  - Administer analgesics based on type and severity of pain and evaluate response  - Implement non-pharmacological measures as appropriate and evaluate response  - Consider cultural and social influences on pain and pain management  - Notify physician/advanced practitioner if interventions unsuccessful or patient reports new pain  Outcome: Progressing     Problem: INFECTION - ADULT  Goal: Absence or prevention of progression during hospitalization  Description  INTERVENTIONS:  - Assess and monitor for signs and symptoms of infection  - Monitor lab/diagnostic results  - Monitor all insertion sites, i e  indwelling lines, tubes, and drains  - Monitor endotracheal if appropriate and nasal secretions for changes in amount and color  - [place] appropriate cooling/warming therapies per order  - Administer medications as ordered  - Instruct and encourage patient and family to use good hand hygiene technique  - Identify and instruct in appropriate isolation precautions for identified infection/condition  Outcome: Progressing     Problem: SAFETY ADULT  Goal: Patient will remain free of falls  Description  INTERVENTIONS:  - Assess patient frequently for physical needs  -  Identify cognitive and physical deficits and behaviors that affect risk of falls    -  [place] fall precautions as indicated by assessment   - Educate patient/family on patient safety including physical limitations  - Instruct patient to call for assistance with activity based on assessment  - Modify environment to reduce risk of injury  - Consider OT/PT consult to assist with strengthening/mobility  Outcome: Progressing     Problem: Potential for Falls  Goal: Patient will remain free of falls  Description  INTERVENTIONS:  - Assess patient frequently for physical needs  -  Identify cognitive and physical deficits and behaviors that affect risk of falls    -  [place] fall precautions as indicated by assessment   - Educate patient/family on patient safety including physical limitations  - Instruct patient to call for assistance with activity based on assessment  - Modify environment to reduce risk of injury  - Consider OT/PT consult to assist with strengthening/mobility  Outcome: Progressing     Problem: DISCHARGE PLANNING  Goal: Discharge to home or other facility with appropriate resources  Description  INTERVENTIONS:  - Identify barriers to discharge w/patient and caregiver  - Arrange for needed discharge resources and transportation as appropriate  - Identify discharge learning needs (meds, wound care, etc )  - Arrange for interpretive services to assist at discharge as needed  - Refer to Case Management Department for coordinating discharge planning if the patient needs post-hospital services based on physician/advanced practitioner order or complex needs related to functional status, cognitive ability, or social support system  Outcome: Progressing     Problem: POSTPARTUM  Goal: Experiences normal postpartum course  Description  INTERVENTIONS:  - Monitor maternal vital signs  - Assess uterine involution and lochia  Outcome: Progressing  Goal: Appropriate maternal -  bonding  Description  INTERVENTIONS:  - Identify family support  - Assess for appropriate maternal/infant bonding   -Encourage maternal/infant bonding opportunities  - Referral to  or  as needed  Outcome: Progressing  Goal: Establishment of infant feeding pattern  Description  INTERVENTIONS:  - Assess breast/bottle feeding  - Refer to lactation as needed  Outcome: Progressing  Goal: Incision(s), wounds(s) or drain site(s) healing without S/S of infection  Description  INTERVENTIONS  - Assess and document risk factors for skin impairment   - Assess and document dressing, incision, wound bed, drain sites and surrounding tissue  - Consider nutrition services referral as needed  - Oral mucous membranes remain intact  - Provide patient/ family education  Outcome: Progressing

## 2019-12-21 NOTE — PLAN OF CARE
Problem: Knowledge Deficit  Goal: Verbalizes understanding of labor plan  Description  Assess patient/family/caregiver's baseline knowledge level and ability to understand information  Provide education via patient/family/caregiver's preferred learning method at appropriate level of understanding  1  Provide teaching at level of understanding  2  Provide teaching via preferred learning method(s)  Outcome: Progressing     Problem: Labor & Delivery  Goal: Manages discomfort  Description  Assess and monitor for signs and symptoms of discomfort  Assess patient's pain level regularly and per hospital policy  Administer medications as ordered  Support use of nonpharmacological methods to help control pain such as distraction, imagery, relaxation, and application of heat and cold  Collaborate with interdisciplinary team and patient to determine appropriate pain management plan  1  Include patient in decisions related to comfort  2  Offer non-pharmacological pain management interventions  3  Report ineffective pain management to physician  Outcome: Progressing  Goal: Patient vital signs are stable  Description  1  Assess vital signs - vaginal delivery    Outcome: Progressing     Problem: BIRTH - VAGINAL/ SECTION  Goal: Fetal and maternal status remain reassuring during the birth process  Description  INTERVENTIONS:  - Monitor vital signs  - Monitor fetal heart rate  - Monitor uterine activity  - Monitor labor progression (vaginal delivery)  - DVT prophylaxis  - Antibiotic prophylaxis  Outcome: Progressing  Goal: Emotionally satisfying birthing experience for mother/fetus  Description  Interventions:  - Assess, plan, implement and evaluate the nursing care given to the patient in labor  - Advocate the philosophy that each childbirth experience is a unique experience and support the family's chosen level of involvement and control during the labor process   - Actively participate in both the patient's and family's teaching of the birth process  - Consider cultural, [employer]ism and age-specific factors and plan care for the patient in labor  Outcome: Progressing     Problem: PAIN - ADULT  Goal: Verbalizes/displays adequate comfort level or baseline comfort level  Description  Interventions:  - Encourage patient to monitor pain and request assistance  - Assess pain using appropriate pain scale  - Administer analgesics based on type and severity of pain and evaluate response  - Implement non-pharmacological measures as appropriate and evaluate response  - Consider cultural and social influences on pain and pain management  - Notify physician/advanced practitioner if interventions unsuccessful or patient reports new pain  Outcome: Progressing     Problem: INFECTION - ADULT  Goal: Absence or prevention of progression during hospitalization  Description  INTERVENTIONS:  - Assess and monitor for signs and symptoms of infection  - Monitor lab/diagnostic results  - Monitor all insertion sites, i e  indwelling lines, tubes, and drains  - Monitor endotracheal if appropriate and nasal secretions for changes in amount and color  - [place] appropriate cooling/warming therapies per order  - Administer medications as ordered  - Instruct and encourage patient and family to use good hand hygiene technique  - Identify and instruct in appropriate isolation precautions for identified infection/condition  Outcome: Progressing     Problem: SAFETY ADULT  Goal: Patient will remain free of falls  Description  INTERVENTIONS:  - Assess patient frequently for physical needs  -  Identify cognitive and physical deficits and behaviors that affect risk of falls    -  [place] fall precautions as indicated by assessment   - Educate patient/family on patient safety including physical limitations  - Instruct patient to call for assistance with activity based on assessment  - Modify environment to reduce risk of injury  - Consider OT/PT consult to assist with strengthening/mobility  Outcome: Progressing     Problem: Potential for Falls  Goal: Patient will remain free of falls  Description  INTERVENTIONS:  - Assess patient frequently for physical needs  -  Identify cognitive and physical deficits and behaviors that affect risk of falls    -  [place] fall precautions as indicated by assessment   - Educate patient/family on patient safety including physical limitations  - Instruct patient to call for assistance with activity based on assessment  - Modify environment to reduce risk of injury  - Consider OT/PT consult to assist with strengthening/mobility  Outcome: Progressing     Problem: DISCHARGE PLANNING  Goal: Discharge to home or other facility with appropriate resources  Description  INTERVENTIONS:  - Identify barriers to discharge w/patient and caregiver  - Arrange for needed discharge resources and transportation as appropriate  - Identify discharge learning needs (meds, wound care, etc )  - Arrange for interpretive services to assist at discharge as needed  - Refer to Case Management Department for coordinating discharge planning if the patient needs post-hospital services based on physician/advanced practitioner order or complex needs related to functional status, cognitive ability, or social support system  Outcome: Progressing     Problem: POSTPARTUM  Goal: Experiences normal postpartum course  Description  INTERVENTIONS:  - Monitor maternal vital signs  - Assess uterine involution and lochia  Outcome: Progressing  Goal: Appropriate maternal -  bonding  Description  INTERVENTIONS:  - Identify family support  - Assess for appropriate maternal/infant bonding   -Encourage maternal/infant bonding opportunities  - Referral to  or  as needed  Outcome: Progressing  Goal: Establishment of infant feeding pattern  Description  INTERVENTIONS:  - Assess breast/bottle feeding  - Refer to lactation as needed  Outcome: Progressing

## 2019-12-21 NOTE — PROGRESS NOTES
Progress Note - OB/GYN   Benitez Montenegro 30 y o  female MRN: [id number]  Unit/Bed#: L&D 312-01 Encounter: [id number]    Assessment:  Post partum Day #2 s/p VAVD, stable    Plan:  1  Continue routine post partum care   -Encourage ambulation   -Encourage breastfeeding  2  Pregestational diabetes   -resuming insulin pump, endocrine curbsided   -hypoglycemia 36 on 12/20, but glucose <200   3  3a laceration   -s/p cefoxitin, scheduled colace  4  Anticipate discharge today    Subjective/Objective   Chief Complaint:     Post delivery, no complaints    Subjective:     Pain: yes, cramping, improved with meds  Tolerating PO: yes  Voiding: yes  Flatus: yes  BM: no  Ambulating: yes  Breastfeeding:  Yes  Chest pain: no  Shortness of breath: no  Leg pain: no  Lochia: minimal    Objective:     Vitals: /83 (BP Location: Right arm)   Pulse 76   Temp 98 1 °F (36 7 °C) (Oral)   Resp 16   Ht 5' 1" (1 549 m)   Wt 80 7 kg (178 lb)   LMP 03/17/2019 (Exact Date)   SpO2 97%   Breastfeeding?  Yes   BMI 33 63 kg/m²     No intake or output data in the 24 hours ending 12/21/19 0931    Lab Results   Component Value Date    WBC 12 89 (H) 12/18/2019    HGB 13 0 12/18/2019    HCT 41 5 12/18/2019    MCV 87 12/18/2019     12/18/2019       Current Facility-Administered Medications   Medication Dose Route Frequency    acetaminophen (TYLENOL) tablet 650 mg  650 mg Oral Q4H PRN    aluminum-magnesium hydroxide-simethicone (MYLANTA) 200-200-20 mg/5 mL oral suspension 15 mL  15 mL Oral Q6H PRN    benzocaine-menthol-lanolin-aloe (DERMOPLAST) 20-0 5 % topical spray   Topical 4x Daily PRN    calcium carbonate (TUMS) chewable tablet 1,000 mg  1,000 mg Oral Daily PRN    dextrose 5 % and sodium chloride 0 9 % infusion  100 mL/hr Intravenous Continuous    docusate sodium (COLACE) capsule 100 mg  100 mg Oral BID    hydrocortisone 1 % cream 1 application  1 application Topical PRN    ibuprofen (MOTRIN) tablet 600 mg  600 mg Oral Q6H PRN    insulin aspart (NovoLOG) FOR PUMP REFILLS 100 Units  100 Units Subcutaneous Insulin Pump Once PRN    insulin regular (HumuLIN R,NovoLIN R) 1 Units/mL in sodium chloride 0 9 % 100 mL infusion  0 2-4 Units/hr Intravenous Continuous    oxyCODONE-acetaminophen (PERCOCET) 5-325 mg per tablet 1 tablet  1 tablet Oral Q4H PRN    oxyCODONE-acetaminophen (PERCOCET) 5-325 mg per tablet 2 tablet  2 tablet Oral Q4H PRN    senna (SENOKOT) tablet 8 6 mg  1 tablet Oral Daily    simethicone (MYLICON) chewable tablet 80 mg  80 mg Oral 4x Daily PRN    witch hazel-glycerin (TUCKS) topical pad 1 pad  1 pad Topical PRN       Physical Exam:     Gen: AAOx3, NAD  CV: RRR  Lungs: CTA b/l  Abd: Soft, non-tender, non-distended, no rebound or guarding  Uterine fundus firm and non-tender, at umbilicus  Ext: Non tender    Adam Wilson, PGY-3  OB/GYN  12/21/2019  9:31 AM

## 2019-12-21 NOTE — LACTATION NOTE
This note was copied from a baby's chart  Met with mother to go over feeding log since birth for the first week  Emphasized 8 or more (12) feedings in a 24 hour period, what to expect for the number of diapers per day of life and the progression of properties of the  stooling pattern  Discussed s/s that breastfeeding is going well after day 4 and when to get help from a pediatrician or lactation support person after day 4  Booklet included Breast Pumping Instructions, When You Go Back to Work or School, and Breastfeeding Resources for after discharge including access to the number for the [employer]  Mom states baby is latching better today  Mom is pumping to facilitate supply as she is diabetic and anticipates low supply and baby was not latching and stimulating  Encouraged parents to call for assistance, questions, and concerns about breastfeeding  Extension provided

## 2019-12-21 NOTE — PLAN OF CARE
Problem: Knowledge Deficit  Goal: Verbalizes understanding of labor plan  Description  Assess patient/family/caregiver's baseline knowledge level and ability to understand information  Provide education via patient/family/caregiver's preferred learning method at appropriate level of understanding  1  Provide teaching at level of understanding  2  Provide teaching via preferred learning method(s)  Outcome: Progressing     Problem: Labor & Delivery  Goal: Manages discomfort  Description  Assess and monitor for signs and symptoms of discomfort  Assess patient's pain level regularly and per hospital policy  Administer medications as ordered  Support use of nonpharmacological methods to help control pain such as distraction, imagery, relaxation, and application of heat and cold  Collaborate with interdisciplinary team and patient to determine appropriate pain management plan  1  Include patient in decisions related to comfort  2  Offer non-pharmacological pain management interventions  3  Report ineffective pain management to physician  Outcome: Progressing  Goal: Patient vital signs are stable  Description  1  Assess vital signs - vaginal delivery    Outcome: Progressing     Problem: BIRTH - VAGINAL/ SECTION  Goal: Fetal and maternal status remain reassuring during the birth process  Description  INTERVENTIONS:  - Monitor vital signs  - Monitor fetal heart rate  - Monitor uterine activity  - Monitor labor progression (vaginal delivery)  - DVT prophylaxis  - Antibiotic prophylaxis  Outcome: Progressing  Goal: Emotionally satisfying birthing experience for mother/fetus  Description  Interventions:  - Assess, plan, implement and evaluate the nursing care given to the patient in labor  - Advocate the philosophy that each childbirth experience is a unique experience and support the family's chosen level of involvement and control during the labor process   - Actively participate in both the patient's and family's teaching of the birth process  - Consider cultural, Evangelical and age-specific factors and plan care for the patient in labor  Outcome: Progressing     Problem: PAIN - ADULT  Goal: Verbalizes/displays adequate comfort level or baseline comfort level  Description  Interventions:  - Encourage patient to monitor pain and request assistance  - Assess pain using appropriate pain scale  - Administer analgesics based on type and severity of pain and evaluate response  - Implement non-pharmacological measures as appropriate and evaluate response  - Consider cultural and social influences on pain and pain management  - Notify physician/advanced practitioner if interventions unsuccessful or patient reports new pain  Outcome: Progressing     Problem: INFECTION - ADULT  Goal: Absence or prevention of progression during hospitalization  Description  INTERVENTIONS:  - Assess and monitor for signs and symptoms of infection  - Monitor lab/diagnostic results  - Monitor all insertion sites, i e  indwelling lines, tubes, and drains  - Monitor endotracheal if appropriate and nasal secretions for changes in amount and color  - [place] appropriate cooling/warming therapies per order  - Administer medications as ordered  - Instruct and encourage patient and family to use good hand hygiene technique  - Identify and instruct in appropriate isolation precautions for identified infection/condition  Outcome: Progressing     Problem: SAFETY ADULT  Goal: Patient will remain free of falls  Description  INTERVENTIONS:  - Assess patient frequently for physical needs  -  Identify cognitive and physical deficits and behaviors that affect risk of falls    -  [place] fall precautions as indicated by assessment   - Educate patient/family on patient safety including physical limitations  - Instruct patient to call for assistance with activity based on assessment  - Modify environment to reduce risk of injury  - Consider OT/PT consult to assist with strengthening/mobility  Outcome: Progressing     Problem: Potential for Falls  Goal: Patient will remain free of falls  Description  INTERVENTIONS:  - Assess patient frequently for physical needs  -  Identify cognitive and physical deficits and behaviors that affect risk of falls    -  [place] fall precautions as indicated by assessment   - Educate patient/family on patient safety including physical limitations  - Instruct patient to call for assistance with activity based on assessment  - Modify environment to reduce risk of injury  - Consider OT/PT consult to assist with strengthening/mobility  Outcome: Progressing     Problem: DISCHARGE PLANNING  Goal: Discharge to home or other facility with appropriate resources  Description  INTERVENTIONS:  - Identify barriers to discharge w/patient and caregiver  - Arrange for needed discharge resources and transportation as appropriate  - Identify discharge learning needs (meds, wound care, etc )  - Arrange for interpretive services to assist at discharge as needed  - Refer to Case Management Department for coordinating discharge planning if the patient needs post-hospital services based on physician/advanced practitioner order or complex needs related to functional status, cognitive ability, or social support system  Outcome: Progressing     Problem: POSTPARTUM  Goal: Experiences normal postpartum course  Description  INTERVENTIONS:  - Monitor maternal vital signs  - Assess uterine involution and lochia  Outcome: Progressing  Goal: Appropriate maternal -  bonding  Description  INTERVENTIONS:  - Identify family support  - Assess for appropriate maternal/infant bonding   -Encourage maternal/infant bonding opportunities  - Referral to  or  as needed  Outcome: Progressing  Goal: Establishment of infant feeding pattern  Description  INTERVENTIONS:  - Assess breast/bottle feeding  - Refer to lactation as needed  Outcome: Progressing  Goal: Incision(s), wounds(s) or drain site(s) healing without S/S of infection  Description  INTERVENTIONS  - Assess and document risk factors for skin impairment   - Assess and document dressing, incision, wound bed, drain sites and surrounding tissue  - Consider nutrition services referral as needed  - Oral mucous membranes remain intact  - Provide patient/ family education  Outcome: Progressing

## 2019-12-27 LAB — PLACENTA IN STORAGE: NORMAL

## 2019-12-29 DIAGNOSIS — E10.9 TYPE 1 DIABETES MELLITUS WITHOUT COMPLICATION (HCC): ICD-10-CM

## 2020-01-02 ENCOUNTER — OFFICE VISIT (OUTPATIENT)
Dept: ENDOCRINOLOGY | Facility: HOSPITAL | Age: 30
End: 2020-01-02

## 2020-01-02 VITALS
HEART RATE: 92 BPM | SYSTOLIC BLOOD PRESSURE: 94 MMHG | HEIGHT: 61 IN | DIASTOLIC BLOOD PRESSURE: 62 MMHG | WEIGHT: 149 LBS | BODY MASS INDEX: 28.13 KG/M2

## 2020-01-02 DIAGNOSIS — Z96.41 INSULIN PUMP STATUS: Primary | ICD-10-CM

## 2020-01-02 DIAGNOSIS — E10.9 TYPE 1 DIABETES MELLITUS WITHOUT COMPLICATION (HCC): ICD-10-CM

## 2020-01-02 PROBLEM — Z46.81 INSULIN PUMP TITRATION: Status: RESOLVED | Noted: 2019-05-09 | Resolved: 2020-01-02

## 2020-01-02 PROBLEM — O40.3XX0 POLYHYDRAMNIOS IN SINGLETON PREGNANCY IN THIRD TRIMESTER: Status: RESOLVED | Noted: 2019-12-13 | Resolved: 2020-01-02

## 2020-01-02 PROCEDURE — 99214 OFFICE O/P EST MOD 30 MIN: CPT | Performed by: INTERNAL MEDICINE

## 2020-01-02 NOTE — PROGRESS NOTES
1/2/2020    Assessment/Plan      Diagnoses and all orders for this visit:    Insulin pump status    Type 1 diabetes mellitus without complication (HCC)        Assessment/Plan:  Type 1 diabetes with insulin pump status: Will decrease her basal rates across the board to 0 55 units/hour  Will turn her basal IQ Program back on to prevent hypoglycemia  She will keep me posted if she has any additional frequent hypoglycemia  When control IQ is available, I encouraged her to consider upgrading her pump to this by downloading this program which will give extra insulin for hyperglycemia  Follow-up in 3 or 4 months with labs as ordered above just prior  Current insulin pump settings after changes made today should be as follows:  Basal rates:  12:00 a m  0 55  Correction factor 25, carb ratio 10, target 110  CC: Diabetes Consult    History of Present Illness     HPI: Adriana Whittington is a 30y o  year old female with type 1 diabetes for 12 years  She is on insulin at home and takes Novolog via tandem x2 insulin pump  She denies any polyuria, polydipsia, nocturia and blurry vision  She denies neuropathy, nephropathy and retinopathy  She is now status post delivery of a healthy 8 lb 12 oz baby girl 2 weeks ago  Hypoglycemic episodes:  Yes has been occurring more frequently over the past 2 weeks throughout the day especially overnight        The patient's last eye exam was in June 2019  The patient's last foot exam was in March 2019 in our office  Blood Sugar/Glucometer/Pump/CGM review:  Reviewed insulin pump and C GM download from 12/19 through 0 1/1/20  Average C GM reading was 121  10% of readings are below target  She is using an average of 33 11 units per day and 50% is basal   She is using the basal IQ feature 0% of the time the past 2 weeks  Review of Systems   Constitutional: Negative for fatigue  HENT: Negative for trouble swallowing and voice change      Eyes: Negative for visual disturbance  Respiratory: Negative for shortness of breath  Cardiovascular: Negative for palpitations and leg swelling  Gastrointestinal: Negative for abdominal pain, nausea and vomiting  Endocrine: Negative for polydipsia and polyuria  Musculoskeletal: Negative for arthralgias and myalgias  Skin: Negative for rash  Neurological: Negative for dizziness, tremors and weakness  Hematological: Negative for adenopathy  Psychiatric/Behavioral: Negative for agitation and confusion  Historical Information   Past Medical History:   Diagnosis Date    Anemia     H/O vitamin D deficiency     Pre-existing type 1 diabetes affecting pregnancy, antepartum     age 14   Crissy Adela Varicella      Past Surgical History:   Procedure Laterality Date    TONSILLECTOMY AND ADENOIDECTOMY       Social History   Social History     Substance and Sexual Activity   Alcohol Use Not Currently     Social History     Substance and Sexual Activity   Drug Use Never     Social History     Tobacco Use   Smoking Status Never Smoker   Smokeless Tobacco Never Used     Family History:   Family History   Problem Relation Age of Onset    No Known Problems Mother     No Known Problems Father     Diabetes type I Brother     Skin cancer Family        Meds/Allergies   Current Outpatient Medications   Medication Sig Dispense Refill    glucagon (GLUCAGON EMERGENCY) 1 MG injection Inject 1 mg under the skin once as needed for low blood sugar      insulin aspart (NovoLOG) 100 units/mL injection Use up to 120 units daily via insulin pump  40 mL 0    ONETOUCH DELICA LANCETS FINE MISC Use up to 8 a day and as directed   300 each 3    ONETOUCH VERIO test strip Test up to 8 times a day and as instructed 300 each 3    docusate sodium (COLACE) 100 mg capsule Take 1 capsule (100 mg total) by mouth 2 (two) times a day (Patient not taking: Reported on 1/2/2020) 60 capsule 0    ferrous sulfate 325 (65 Fe) mg tablet Take 325 mg by mouth daily with breakfast      ibuprofen (MOTRIN) 600 mg tablet Take 1 tablet (600 mg total) by mouth every 6 (six) hours as needed for mild pain (Patient not taking: Reported on 1/2/2020) 30 tablet 0    Prenatal Vit-DSS-Fe Cbn-FA (PRENATAL AD PO) Take by mouth       No current facility-administered medications for this visit  Allergies   Allergen Reactions    Other      Other reaction(s): Resp Distress  Other reaction(s): Resp Sympt Mild  Other reaction(s): Resp Distress    Mold Extract  [Trichophyton]      Other reaction(s): Resp Sympt Mild    Molds & Smuts      Other reaction(s): Resp Sympt Mild    Pollen Extract      Other reaction(s): Resp Sympt Mild       Objective   Vitals: Blood pressure 94/62, pulse 92, height 5' 1" (1 549 m), weight 67 6 kg (149 lb), last menstrual period 03/17/2019, currently breastfeeding  Invasive Devices     None                 Physical Exam   Constitutional: She is oriented to person, place, and time  She appears well-developed and well-nourished  No distress  HENT:   Head: Normocephalic and atraumatic  Neck: Normal range of motion  Neck supple  No thyromegaly present  Cardiovascular: Normal rate and regular rhythm  Pulmonary/Chest: Effort normal and breath sounds normal  No respiratory distress  Abdominal: Soft  Bowel sounds are normal    Musculoskeletal: Normal range of motion  She exhibits no edema  Neurological: She is alert and oriented to person, place, and time  She exhibits normal muscle tone  Skin: Skin is warm and dry  No rash noted  She is not diaphoretic  Psychiatric: She has a normal mood and affect  Her behavior is normal    Vitals reviewed  The history was obtained from the review of the chart and from the patient      Lab Results:    Most recent Alc is  Lab Results   Component Value Date    HGBA1C 5 1 11/01/2019           No components found for: HA1C  No components found for: GLU    Lab Results   Component Value Date    CREATININE 0 51 (L) 09/05/2019 CREATININE 0 60 06/14/2019    CREATININE 0 70 05/10/2019    BUN 8 09/05/2019    K 3 3 (L) 09/05/2019     09/05/2019    CO2 28 09/05/2019     eGFR   Date Value Ref Range Status   09/05/2019 131 ml/min/1 73sq m Final     No components found for: [facility]    Lab Results   Component Value Date     (H) 07/24/2019    TRIG 73 07/24/2019       Lab Results   Component Value Date    ALT 21 11/11/2019    AST 14 09/05/2019    ALKPHOS 53 09/05/2019         Future Appointments   Date Time Provider Vincenzo Na   1/30/2020 10:30 AM DO [facility]       Portions of the record may have been created with voice recognition software  Occasional wrong word or "sound a like" substitutions may have occurred due to the inherent limitations of voice recognition software  Read the chart carefully and recognize, using context, where substitutions have occurred

## 2020-01-24 ENCOUNTER — TELEPHONE (OUTPATIENT)
Dept: ENDOCRINOLOGY | Facility: HOSPITAL | Age: 30
End: 2020-01-24

## 2020-01-30 ENCOUNTER — POSTPARTUM VISIT (OUTPATIENT)
Dept: OBGYN CLINIC | Facility: CLINIC | Age: 30
End: 2020-01-30

## 2020-01-30 VITALS — DIASTOLIC BLOOD PRESSURE: 62 MMHG | WEIGHT: 144.4 LBS | BODY MASS INDEX: 27.28 KG/M2 | SYSTOLIC BLOOD PRESSURE: 104 MMHG

## 2020-01-30 DIAGNOSIS — E13.9 DIABETES 1.5, MANAGED AS TYPE 1 (HCC): ICD-10-CM

## 2020-01-30 PROCEDURE — 99024 POSTOP FOLLOW-UP VISIT: CPT | Performed by: OBSTETRICS & GYNECOLOGY

## 2020-01-30 NOTE — PROGRESS NOTES
OB POSTPARTUM VISIT PROGRESS NOTE  Date of Encounter:  01/30/2020    Stanley Givens is in for her postpartum visit  She is now  6 weeks postpartum  She delivered by  vacuum-assisted vaginal delivery  She did have a 3 a 3rd degree vaginal laceration which is healing normally  She's generally doing well, denies current pain or bleeding issues, and has no significant depression issues  She has not had a menstrual cycle yet She is pumping exclusively  We discussed all appropriate contraceptive options and she chooses Condoms we did discuss alternate means of contraception she would prefer to wait at this time  Objective  No Acute Distress  EXAM:  GENERAL: alert, well appearing, and in no distress  VITALS: Documented in the Nurses notes  BREASTS: Deferred    PELVIC:   VULVA: Nml EGBUS  VAGINA: Introitus well healed, slightly tender  CERVIX: Normal, no discharge  UTERUS: Anteverted, NSSC, [place], NT    RIGHT ADNEXUM: Nontender, no mass  LEFT ADNEXUM: Nontender, no mass  RECTAL: Deferred      Assessment/Plan   Normal postpartum visit and exam  Reviewed contraceptive planning  Follow-up for annual exam In May and p sruthi Frenching, DO

## 2020-02-11 ENCOUNTER — OFFICE VISIT (OUTPATIENT)
Dept: FAMILY MEDICINE CLINIC | Facility: CLINIC | Age: 30
End: 2020-02-11
Payer: COMMERCIAL

## 2020-02-11 VITALS
TEMPERATURE: 97.8 F | RESPIRATION RATE: 17 BRPM | DIASTOLIC BLOOD PRESSURE: 70 MMHG | OXYGEN SATURATION: 98 % | SYSTOLIC BLOOD PRESSURE: 110 MMHG | BODY MASS INDEX: 27.23 KG/M2 | HEIGHT: 61 IN | WEIGHT: 144.2 LBS | HEART RATE: 95 BPM

## 2020-02-11 DIAGNOSIS — Z00.00 ANNUAL PHYSICAL EXAM: Primary | ICD-10-CM

## 2020-02-11 PROCEDURE — 99395 PREV VISIT EST AGE 18-39: CPT | Performed by: NURSE PRACTITIONER

## 2020-02-11 NOTE — PROGRESS NOTES
[facility]    NAME: Mario Nelson  AGE: 30 y o  SEX: female  : 1990     DATE: 2020     Assessment and Plan:     Problem List Items Addressed This Visit     None      Visit Diagnoses     Annual physical exam    -  Primary          Immunizations and preventive care screenings were discussed with patient today  Appropriate education was printed on patient's after visit summary  Counseling:  Alcohol/drug use: discussed moderation in alcohol intake, the recommendations for healthy alcohol use, and avoidance of illicit drug use  Dental Health: discussed importance of regular tooth brushing, flossing, and dental visits  Injury prevention: discussed safety/seat belts, safety helmets, smoke detectors, carbon dioxide detectors, and smoking near bedding or upholstery  Sexual health: discussed sexually transmitted diseases, partner selection, use of condoms, avoidance of unintended pregnancy, and contraceptive alternatives  · Exercise: the importance of regular exercise/physical activity was discussed  Recommend exercise 3-5 times per week for at least 30 minutes  Return in 1 year (on 2021)  Chief Complaint:     Chief Complaint   Patient presents with    Annual Exam     30year old complete physical       History of Present Illness:     Adult Annual Physical   Patient here for a comprehensive physical exam  The patient reports no problems  Diet and Physical Activity  · Diet/Nutrition: well balanced diet  · Exercise: walking  Depression Screening  PHQ-9 Depression Screening    PHQ-9:    Frequency of the following problems over the past two weeks:       Little interest or pleasure in doing things:  0 - not at all  Feeling down, depressed, or hopeless:  0 - not at all  PHQ-2 Score:  0       General Health  · Sleep: sleeps well     · Hearing: normal - bilateral   · Vision: no vision problems  · Dental: regular dental visits, brushes teeth twice daily and flosses teeth occasionally  /GYN Health  · Last menstrual period: unknown 3/2019  · Contraceptive method: currently not sexually active  · History of STDs?: no      Review of Systems:     Review of Systems   Constitutional: Negative  HENT: Negative  Eyes: Negative  Respiratory: Negative  Cardiovascular: Negative  Gastrointestinal: Negative  Endocrine: Negative  Genitourinary: Negative  Musculoskeletal: Negative  Skin: Negative  Allergic/Immunologic: Negative  Neurological: Negative  Hematological: Negative  Psychiatric/Behavioral: Negative         Past Medical History:     Past Medical History:   Diagnosis Date    Anemia     H/O vitamin D deficiency     Pre-existing type 1 diabetes affecting pregnancy, antepartum     age 14    Varicella       Past Surgical History:     Past Surgical History:   Procedure Laterality Date    TONSILLECTOMY AND ADENOIDECTOMY        Social History:        Social History     Socioeconomic History    Marital status: /Civil Union     Spouse name: None    Number of children: None    Years of education: None    Highest education level: None   Occupational History    None   Social Needs    Financial resource strain: None    Food insecurity:     Worry: None     Inability: None    Transportation needs:     Medical: None     Non-medical: None   Tobacco Use    Smoking status: Never Smoker    Smokeless tobacco: Never Used   Substance and Sexual Activity    Alcohol use: Not Currently    Drug use: Never    Sexual activity: Yes     Birth control/protection: None   Lifestyle    Physical activity:     Days per week: None     Minutes per session: None    Stress: None   Relationships    Social connections:     Talks on phone: None     Gets together: None     Attends Evangelical service: None     Active member of club or organization: None     Attends meetings of clubs or organizations: None     Relationship status: None    Intimate partner violence:     Fear of current or ex partner: None     Emotionally abused: None     Physically abused: None     Forced sexual activity: None   Other Topics Concern    None   Social History Narrative    None      Family History:     Family History   Problem Relation Age of Onset    No Known Problems Mother     No Known Problems Father     Diabetes type I Brother     Skin cancer Family       Current Medications:     Current Outpatient Medications   Medication Sig Dispense Refill    ferrous sulfate 325 (65 Fe) mg tablet Take 325 mg by mouth daily with breakfast      glucagon (GLUCAGON EMERGENCY) 1 MG injection Inject 1 mg under the skin once as needed for low blood sugar      insulin aspart (NovoLOG) 100 units/mL injection Use up to 120 units daily via insulin pump  40 mL 0    ONETOUCH DELICA LANCETS FINE MISC Use up to 8 a day and as directed  300 each 3    ONETOUCH VERIO test strip Test up to 8 times a day and as instructed 300 each 3    Prenatal Vit-DSS-Fe Cbn-FA (PRENATAL AD PO) Take by mouth       No current facility-administered medications for this visit  Allergies: Allergies   Allergen Reactions    Other      Other reaction(s): Resp Distress  Other reaction(s): Resp Sympt Mild  Other reaction(s): Resp Distress    Mold Extract  [Trichophyton]      Other reaction(s): Resp Sympt Mild    Molds & Smuts      Other reaction(s): Resp Sympt Mild    Pollen Extract      Other reaction(s): Resp Sympt Mild      Physical Exam:     /70 (BP Location: Left arm, Patient Position: Sitting, Cuff Size: Standard)   Pulse 95   Temp 97 8 °F (36 6 °C) (Tympanic)   Resp 17   Ht 5' 1" (1 549 m)   Wt 65 4 kg (144 lb 3 2 oz)   LMP  (LMP Unknown)   SpO2 98%   Breastfeeding Yes   BMI 27 25 kg/m²     Physical Exam   Constitutional: She is oriented to person, place, and time  She appears well-developed and well-nourished  No distress  HENT:   Head: Normocephalic and atraumatic  Right Ear: External ear normal    Left Ear: External ear normal    Nose: Nose normal    Mouth/Throat: Oropharynx is clear and moist  No oropharyngeal exudate  Eyes: Pupils are equal, round, and reactive to light  Conjunctivae and EOM are normal  Right eye exhibits no discharge  Left eye exhibits no discharge  Neck: Normal range of motion  Neck supple  No thyromegaly present  Cardiovascular: Normal rate, regular rhythm, normal heart sounds and intact distal pulses  No murmur heard  Pulmonary/Chest: Effort normal and breath sounds normal  No respiratory distress  She has no wheezes  Abdominal: Soft  Bowel sounds are normal  She exhibits no distension and no mass  Musculoskeletal: Normal range of motion  She exhibits no edema or tenderness  Lymphadenopathy:     She has no cervical adenopathy  Neurological: She is alert and oriented to person, place, and time  Skin: Skin is warm and dry  Capillary refill takes less than 2 seconds  She is not diaphoretic  Psychiatric: She has a normal mood and affect  Her behavior is normal  Judgment and thought content normal    Nursing note and vitals reviewed      Allergies   Allergen Reactions    Other      Other reaction(s): Resp Distress  Other reaction(s): Resp Sympt Mild  Other reaction(s): Resp Distress    Mold Extract  [Trichophyton]      Other reaction(s): Resp Sympt Mild    Molds & Smuts      Other reaction(s): Resp Sympt Mild    Pollen Extract      Other reaction(s): Resp Sympt Mild     Patient Active Problem List   Diagnosis    Type [facility] mellitus without complication ([facility])    Vitamin D insufficiency    Pre-existing type 1 diabetes mellitus with hyperglycemia during pregnancy in third trimester (HCC)    Hypoglycemia due to type 1 diabetes mellitus (HCC)    Type 1 diabetes mellitus in pregnancy, third trimester    39 weeks gestation of pregnancy    Vacuum-assisted vaginal delivery    Type 3a perineal laceration during delivery    Insulin pump status       Current Outpatient Medications:     ferrous sulfate 325 (65 Fe) mg tablet, Take 325 mg by mouth daily with breakfast, Disp: , Rfl:     glucagon (GLUCAGON EMERGENCY) 1 MG injection, Inject 1 mg under the skin once as needed for low blood sugar, Disp: , Rfl:     insulin aspart (NovoLOG) 100 units/mL injection, Use up to 120 units daily via insulin pump , Disp: 40 mL, Rfl: 0    ONETOUCH DELICA LANCETS FINE MISC, Use up to 8 a day and as directed , Disp: 300 each, Rfl: 3    ONETOUCH VERIO test strip, Test up to 8 times a day and as instructed, Disp: 300 each, Rfl: 3    Prenatal Vit-DSS-Fe Cbn-FA (PRENATAL AD PO), Take by mouth, Disp: , Rfl:   Social History     Socioeconomic History    Marital status: /Civil Union     Spouse name: Not on file    Number of children: Not on file    Years of education: Not on file    Highest education level: Not on file   Occupational History    Not on file   Social Needs    Financial resource strain: Not on file    Food insecurity:     Worry: Not on file     Inability: Not on file    Transportation needs:     Medical: Not on file     Non-medical: Not on file   Tobacco Use    Smoking status: Never Smoker    Smokeless tobacco: Never Used   Substance and Sexual Activity    Alcohol use: Not Currently    Drug use: Never    Sexual activity: Yes     Birth control/protection: None   Lifestyle    Physical activity:     Days per week: Not on file     Minutes per session: Not on file    Stress: Not on file   Relationships    Social connections:     Talks on phone: Not on file     Gets together: Not on file     Attends [employer] service: Not on file     Active member of club or organization: Not on file     Attends meetings of clubs or organizations: Not on file     Relationship status: Not on file    Intimate partner violence:     Fear of current or ex partner: Not on file Emotionally abused: Not on file     Physically abused: Not on file     Forced sexual activity: Not on file   Other Topics Concern    Not on file   Social History Narrative    Not on file     Family History   Problem Relation Age of Onset    No Known Problems Mother     No Known Problems Father     Diabetes type I Brother     Skin cancer Family          Claudell Baars, [address]

## 2020-02-13 ENCOUNTER — OFFICE VISIT (OUTPATIENT)
Dept: FAMILY MEDICINE CLINIC | Facility: CLINIC | Age: 30
End: 2020-02-13
Payer: COMMERCIAL

## 2020-02-13 VITALS
HEART RATE: 72 BPM | BODY MASS INDEX: 27.19 KG/M2 | DIASTOLIC BLOOD PRESSURE: 62 MMHG | HEIGHT: 61 IN | WEIGHT: 144 LBS | SYSTOLIC BLOOD PRESSURE: 90 MMHG

## 2020-02-13 DIAGNOSIS — H10.31 ACUTE BACTERIAL CONJUNCTIVITIS OF RIGHT EYE: Primary | ICD-10-CM

## 2020-02-13 PROCEDURE — 2022F DILAT RTA XM EVC RTNOPTHY: CPT | Performed by: NURSE PRACTITIONER

## 2020-02-13 PROCEDURE — 1111F DSCHRG MED/CURRENT MED MERGE: CPT | Performed by: NURSE PRACTITIONER

## 2020-02-13 PROCEDURE — 99213 OFFICE O/P EST LOW 20 MIN: CPT | Performed by: NURSE PRACTITIONER

## 2020-02-13 PROCEDURE — 1036F TOBACCO NON-USER: CPT | Performed by: NURSE PRACTITIONER

## 2020-02-13 RX ORDER — TOBRAMYCIN 3 MG/ML
2 SOLUTION/ DROPS OPHTHALMIC
Qty: 5 ML | Refills: 0 | Status: SHIPPED | OUTPATIENT
Start: 2020-02-13 | End: 2020-02-18

## 2020-02-13 NOTE — PATIENT INSTRUCTIONS
[facility]   WHAT YOU NEED TO KNOW:   A stye is a lump on the edge or inside of your eyelid caused by an infection  A stye can form on your upper or lower eyelid  It usually goes away in 2 to 4 days  DISCHARGE INSTRUCTIONS:   Medicines:   · Antibiotic medicine: This is given as an ointment to put into your eye  It is used to fight an infection caused by bacteria  Use as directed  · Take your medicine as directed  Contact your healthcare provider if you think your medicine is not helping or if you have side effects  Tell him of her if you are allergic to any medicine  Keep a list of the medicines, vitamins, and herbs you take  Include the amounts, and when and why you take them  Bring the list or the pill bottles to follow-up visits  Carry your medicine list with you in case of an emergency  Follow up with your healthcare provider as directed:  Write down your questions so you remember to ask them during your visits  Self-care:   · Use warm compresses: This will help decrease swelling and pain  Wet a clean washcloth with warm water and place it on your eye for 10 to 15 minutes, 3 to 4 times each day or as directed  · Keep your hands away from your eye: This helps to prevent the spread of the infection to other parts of the eye  Wash your hands often with soap and dry with a clean towel  Do not squeeze the stye  · Do not use eye makeup:  Do not wear eye makeup while you have a stye  Eye makeup may carry bacteria and cause another stye  Throw away eye makeup and brushes used to apply the makeup  Use new eye makeup after the stye has gone away  Do not share eye makeup with others  · Prevent another stye:  Wash your face and clean your eyelashes every day  Remove eye makeup with makeup remover  This helps to completely remove eye makeup without heavy rubbing  Contact your healthcare provider if:   · You have redness and discharge around your eye, and your eye pain is getting worse      · Your vision changes  · The stye has not gone away within 7 days  · The stye comes back within a short period of time after treatment  · You have questions or concerns about your condition or care  © 2017 [address] [employer] Information is for End User's use only and may not be sold, redistributed or otherwise used for commercial purposes  All illustrations and images included in CareNotes® are the copyrighted property of A D A M [employer] Inc  or Valentin Dickinson  The above information is an  only  It is not intended as medical advice for individual conditions or treatments  Talk to your doctor, nurse or pharmacist before following any medical regimen to see if it is safe and effective for you

## 2020-02-13 NOTE — PROGRESS NOTES
Assessment/Plan   Problem List Items Addressed This Visit     None      Visit Diagnoses     Acute bacterial conjunctivitis of right eye    -  Primary    Relevant Medications    tobramycin (TOBREX) 0 3 % SOLN                Chief Complaint   Patient presents with    Conjunctivitis     right eye       Subjective   Patient ID: Katerine Malagon is a 30 y o  female  Vitals:    02/13/20 0926   BP: 90/62   Pulse: 72     Conjunctivitis    The current episode started yesterday (right eye)  The onset was sudden  The problem has been gradually worsening  The problem is moderate  Nothing relieves the symptoms  Nothing aggravates the symptoms  Associated symptoms include eye itching, eye discharge (upper eye lid swollen) and eye redness  Pertinent negatives include no fever, no decreased vision, no double vision, no photophobia, no abdominal pain, no congestion, no ear discharge, no muscle aches and no eye pain  The right eye is affected  The eye pain is not associated with movement  The eyelid exhibits swelling and redness  The following portions of the patient's history were reviewed and updated as appropriate: allergies, current medications, past medical history, past social history, past surgical history and problem list     Review of Systems   Constitutional: Negative  Negative for fever  HENT: Negative  Negative for congestion and ear discharge  Eyes: Positive for discharge (upper eye lid swollen), redness and itching  Negative for double vision, photophobia and pain  Respiratory: Negative  Cardiovascular: Negative  Gastrointestinal: Negative  Negative for abdominal pain  Endocrine: Negative  Genitourinary: Negative  Musculoskeletal: Negative  Skin: Negative  Allergic/Immunologic: Negative  Neurological: Negative  Hematological: Negative  Psychiatric/Behavioral: Negative  Objective     Physical Exam   Constitutional: She is oriented to person, place, and time   She appears well-developed and well-nourished  No distress  HENT:   Head: Normocephalic and atraumatic  Eyes: Pupils are equal, round, and reactive to light  EOM are normal  Right eye exhibits discharge  Right conjunctiva is not injected  Neck: Normal range of motion  Neck supple  Cardiovascular: Normal rate and regular rhythm  Pulmonary/Chest: Effort normal and breath sounds normal    Musculoskeletal: Normal range of motion  She exhibits no edema or tenderness  Neurological: She is alert and oriented to person, place, and time  Skin: Skin is warm and dry  Capillary refill takes less than 2 seconds  She is not diaphoretic  Psychiatric: She has a normal mood and affect  Her behavior is normal  Judgment and thought content normal    Nursing note and vitals reviewed      Allergies   Allergen Reactions    Other      Other reaction(s): Resp Distress  Other reaction(s): Resp Sympt Mild  Other reaction(s): Resp Distress    Mold Extract  [Trichophyton]      Other reaction(s): Resp Sympt Mild    Molds & Smuts      Other reaction(s): Resp Sympt Mild    Pollen Extract      Other reaction(s): Resp Sympt Mild     Patient Active Problem List   Diagnosis    Type 1 diabetes mellitus without complication ([facility])    Vitamin D insufficiency    Pre-existing type 1 diabetes mellitus with hyperglycemia during pregnancy in third trimester ([facility])    Hypoglycemia due to type 1 diabetes mellitus ([facility])    Type 1 diabetes mellitus in pregnancy, third trimester    39 weeks gestation of pregnancy    Vacuum-assisted vaginal delivery    Type 3a perineal laceration during delivery    Insulin pump status       Current Outpatient Medications:     ferrous sulfate 325 (65 Fe) mg tablet, Take 325 mg by mouth daily with breakfast, Disp: , Rfl:     glucagon (GLUCAGON EMERGENCY) 1 MG injection, Inject 1 mg under the skin once as needed for low blood sugar, Disp: , Rfl:     insulin aspart (NovoLOG) 100 units/mL injection, Use up to 120 units daily via insulin pump , Disp: 40 mL, Rfl: 0    ONETOUCH DELICA LANCETS FINE MISC, Use up to 8 a day and as directed , Disp: 300 each, Rfl: 3    ONETOUCH VERIO test strip, Test up to 8 times a day and as instructed, Disp: 300 each, Rfl: 3    Prenatal Vit-DSS-Fe Cbn-FA (PRENATAL AD PO), Take by mouth, Disp: , Rfl:     tobramycin (TOBREX) 0 3 % SOLN, Administer 2 drops to the right eye every 4 (four) hours while awake for 5 days, Disp: 5 mL, Rfl: 0  Social History     Socioeconomic History    Marital status: /Civil Union     Spouse name: Not on file    Number of children: Not on file    Years of education: Not on file    Highest education level: Not on file   Occupational History    Not on file   Social Needs    Financial resource strain: Not on file    Food insecurity:     Worry: Not on file     Inability: Not on file    Transportation needs:     Medical: Not on file     Non-medical: Not on file   Tobacco Use    Smoking status: Never Smoker    Smokeless tobacco: Never Used   Substance and Sexual Activity    Alcohol use: Not Currently    Drug use: Never    Sexual activity: Yes     Birth control/protection: None   Lifestyle    Physical activity:     Days per week: Not on file     Minutes per session: Not on file    Stress: Not on file   Relationships    Social connections:     Talks on phone: Not on file     Gets together: Not on file     Attends [employer]ist service: Not on file     Active member of club or organization: Not on file     Attends meetings of clubs or organizations: Not on file     Relationship status: Not on file    Intimate partner violence:     Fear of current or ex partner: Not on file     Emotionally abused: Not on file     Physically abused: Not on file     Forced sexual activity: Not on file   Other Topics Concern    Not on file   Social History Narrative    Not on file     Family History   Problem Relation Age of Onset    No Known Problems Mother    Bernadine Quintana No Known Problems Father     Diabetes type I Brother     Skin cancer Family

## 2020-03-16 DIAGNOSIS — E10.9 TYPE 1 DIABETES MELLITUS WITHOUT COMPLICATION (HCC): ICD-10-CM

## 2020-03-30 ENCOUNTER — TELEPHONE (OUTPATIENT)
Dept: ENDOCRINOLOGY | Facility: HOSPITAL | Age: 30
End: 2020-03-30

## 2020-03-30 NOTE — TELEPHONE ENCOUNTER
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 She notes that her diabetes is well controlled, but since she has a pre-existing condition she is concerned

## 2020-03-30 NOTE — TELEPHONE ENCOUNTER
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

## 2020-04-16 ENCOUNTER — TELEPHONE (OUTPATIENT)
Dept: ENDOCRINOLOGY | Facility: CLINIC | Age: 30
End: 2020-04-16

## 2020-04-16 ENCOUNTER — TELEPHONE (OUTPATIENT)
Dept: OTHER | Facility: OTHER | Age: 30
End: 2020-04-16

## 2020-05-28 ENCOUNTER — DOCUMENTATION (OUTPATIENT)
Dept: ENDOCRINOLOGY | Facility: HOSPITAL | Age: 30
End: 2020-05-28

## 2020-06-17 ENCOUNTER — ANNUAL EXAM (OUTPATIENT)
Dept: OBGYN CLINIC | Facility: CLINIC | Age: 30
End: 2020-06-17
Payer: COMMERCIAL

## 2020-06-17 VITALS
DIASTOLIC BLOOD PRESSURE: 70 MMHG | TEMPERATURE: 98.1 F | WEIGHT: 144.4 LBS | SYSTOLIC BLOOD PRESSURE: 106 MMHG | HEIGHT: 61 IN | BODY MASS INDEX: 27.26 KG/M2

## 2020-06-17 DIAGNOSIS — Z01.419 WOMEN'S ANNUAL ROUTINE GYNECOLOGICAL EXAMINATION: Primary | ICD-10-CM

## 2020-06-17 PROCEDURE — 99395 PREV VISIT EST AGE 18-39: CPT | Performed by: OBSTETRICS & GYNECOLOGY

## 2020-07-02 DIAGNOSIS — E10.9 TYPE 1 DIABETES MELLITUS WITHOUT COMPLICATION (HCC): ICD-10-CM

## 2020-07-03 ENCOUNTER — APPOINTMENT (OUTPATIENT)
Dept: LAB | Facility: HOSPITAL | Age: 30
End: 2020-07-03
Attending: INTERNAL MEDICINE
Payer: COMMERCIAL

## 2020-07-03 DIAGNOSIS — E10.9 TYPE 1 DIABETES MELLITUS WITHOUT COMPLICATION (HCC): ICD-10-CM

## 2020-07-03 LAB
ALBUMIN SERPL BCP-MCNC: 4 G/DL (ref 3.5–5)
ALP SERPL-CCNC: 160 U/L (ref 46–116)
ALT SERPL W P-5'-P-CCNC: 18 U/L (ref 12–78)
ANION GAP SERPL CALCULATED.3IONS-SCNC: 4 MMOL/L (ref 4–13)
AST SERPL W P-5'-P-CCNC: 16 U/L (ref 5–45)
BASOPHILS # BLD AUTO: 0.08 THOUSANDS/ΜL (ref 0–0.1)
BASOPHILS NFR BLD AUTO: 1 % (ref 0–1)
BILIRUB SERPL-MCNC: 0.5 MG/DL (ref 0.2–1)
BUN SERPL-MCNC: 15 MG/DL (ref 5–25)
CALCIUM SERPL-MCNC: 9.7 MG/DL (ref 8.3–10.1)
CHLORIDE SERPL-SCNC: 106 MMOL/L (ref 100–108)
CHOLEST SERPL-MCNC: 207 MG/DL (ref 50–200)
CO2 SERPL-SCNC: 29 MMOL/L (ref 21–32)
CREAT SERPL-MCNC: 0.76 MG/DL (ref 0.6–1.3)
CREAT UR-MCNC: 120 MG/DL
EOSINOPHIL # BLD AUTO: 0.23 THOUSAND/ΜL (ref 0–0.61)
EOSINOPHIL NFR BLD AUTO: 4 % (ref 0–6)
ERYTHROCYTE [DISTWIDTH] IN BLOOD BY AUTOMATED COUNT: 13.1 % (ref 11.6–15.1)
EST. AVERAGE GLUCOSE BLD GHB EST-MCNC: 137 MG/DL
GFR SERPL CREATININE-BSD FRML MDRD: 106 ML/MIN/1.73SQ M
GLUCOSE P FAST SERPL-MCNC: 148 MG/DL (ref 65–99)
HBA1C MFR BLD: 6.4 %
HCT VFR BLD AUTO: 40.8 % (ref 34.8–46.1)
HDLC SERPL-MCNC: 108 MG/DL
HGB BLD-MCNC: 12.6 G/DL (ref 11.5–15.4)
IMM GRANULOCYTES # BLD AUTO: 0.01 THOUSAND/UL (ref 0–0.2)
IMM GRANULOCYTES NFR BLD AUTO: 0 % (ref 0–2)
LDLC SERPL CALC-MCNC: 91 MG/DL (ref 0–100)
LYMPHOCYTES # BLD AUTO: 2.38 THOUSANDS/ΜL (ref 0.6–4.47)
LYMPHOCYTES NFR BLD AUTO: 39 % (ref 14–44)
MCH RBC QN AUTO: 26.3 PG (ref 26.8–34.3)
MCHC RBC AUTO-ENTMCNC: 30.9 G/DL (ref 31.4–37.4)
MCV RBC AUTO: 85 FL (ref 82–98)
MICROALBUMIN UR-MCNC: 8.3 MG/L (ref 0–20)
MICROALBUMIN/CREAT 24H UR: 7 MG/G CREATININE (ref 0–30)
MONOCYTES # BLD AUTO: 0.45 THOUSAND/ΜL (ref 0.17–1.22)
MONOCYTES NFR BLD AUTO: 7 % (ref 4–12)
NEUTROPHILS # BLD AUTO: 2.9 THOUSANDS/ΜL (ref 1.85–7.62)
NEUTS SEG NFR BLD AUTO: 49 % (ref 43–75)
NRBC BLD AUTO-RTO: 0 /100 WBCS
PLATELET # BLD AUTO: 189 THOUSANDS/UL (ref 149–390)
PMV BLD AUTO: 11.6 FL (ref 8.9–12.7)
POTASSIUM SERPL-SCNC: 4.5 MMOL/L (ref 3.5–5.3)
PROT SERPL-MCNC: 7.6 G/DL (ref 6.4–8.2)
RBC # BLD AUTO: 4.8 MILLION/UL (ref 3.81–5.12)
SODIUM SERPL-SCNC: 139 MMOL/L (ref 136–145)
TRIGL SERPL-MCNC: 40 MG/DL
TSH SERPL DL<=0.05 MIU/L-ACNC: 2.04 UIU/ML (ref 0.36–3.74)
WBC # BLD AUTO: 6.05 THOUSAND/UL (ref 4.31–10.16)

## 2020-07-03 PROCEDURE — 83036 HEMOGLOBIN GLYCOSYLATED A1C: CPT

## 2020-07-03 PROCEDURE — 36415 COLL VENOUS BLD VENIPUNCTURE: CPT

## 2020-07-03 PROCEDURE — 85025 COMPLETE CBC W/AUTO DIFF WBC: CPT

## 2020-07-03 PROCEDURE — 82043 UR ALBUMIN QUANTITATIVE: CPT

## 2020-07-03 PROCEDURE — 80053 COMPREHEN METABOLIC PANEL: CPT

## 2020-07-03 PROCEDURE — 3044F HG A1C LEVEL LT 7.0%: CPT | Performed by: NURSE PRACTITIONER

## 2020-07-03 PROCEDURE — 84443 ASSAY THYROID STIM HORMONE: CPT

## 2020-07-03 PROCEDURE — 3061F NEG MICROALBUMINURIA REV: CPT | Performed by: NURSE PRACTITIONER

## 2020-07-03 PROCEDURE — 82570 ASSAY OF URINE CREATININE: CPT

## 2020-07-03 PROCEDURE — 80061 LIPID PANEL: CPT

## 2020-07-05 ENCOUNTER — TELEPHONE (OUTPATIENT)
Dept: OTHER | Facility: OTHER | Age: 30
End: 2020-07-05

## 2020-07-05 NOTE — TELEPHONE ENCOUNTER
Paged via TC:"[facility] / Patient Pola Lefort [birth year] / Shahana Smith # [phone] / Dr Moe Cannon patient / Patient calling in regards to bw and concerns about breast feeding due to alkaline phosphatase levels"

## 2020-07-06 DIAGNOSIS — R74.8 ELEVATED ALKALINE PHOSPHATASE LEVEL: Primary | ICD-10-CM

## 2020-07-06 NOTE — TELEPHONE ENCOUNTER
Received call from patient  She has concerns because she is breast feeding  She would like to know what you are looking for in regards to the elevation in alkaline phosphatase

## 2020-07-06 NOTE — TELEPHONE ENCOUNTER
Pt said one of her labs came back elevated so she is concerned  She said it can be related to Hepatitis  She is worried because she did have an accidental needle stick 2 weeks ago but it was from a 3year old

## 2020-07-06 NOTE — TELEPHONE ENCOUNTER
Looks like her alkaline phosphatase level is elevated  Her ALT and ALT are normal which is reassuring  We can  Order additional labs such as GGT, vitamin D, PTH, bone specific alkaline phosphatase to further evaluate elevated alkaline phosphatase  Let me know any additional questions or she can discuss tomorrow at her scheduled appointment

## 2020-07-06 NOTE — TELEPHONE ENCOUNTER
Progress Notes by Zac Hoff at 04/04/17 10:47 AM     Author:  Zac Hoff Service:  (none) Author Type:  Admin Staff     Filed:  04/17/17 04:55 PM Encounter Date:  4/3/2017 Status:  Signed     :  Zac Hoff (Admin Staff)            Date Form Received by Disability:[NP1.1T] 4/ 3/ 2017[NP1.1M]   Authorization?[NP1.1T] yes[NP1.1M]   Date sent for auth:    Date auth returned:    Provider[NP1.1T] Sebastian Mead[MT1.1M]   Date form sent to provider:[NP1.1T] 4/14/2017[MT1.1M]  Resent:    Date form returned from provider:[NP1.1T] 4/17/2017[NP1.2M]   Type of form:[NP1.1T] FMLA[NP1.1M]   Company:[NP1.1T] [employer][NP1.1M]   When form complete:[NP1.1T] Release to [facility][NP1.1M]   Charge:[NP1.1T] no[NP1.1M]    Comments[NP1.1T] FMLA IS FOR PATIENT'S MOTHER, KAIDEN RIVERA.  NP[NP1.1M]  RELEASED TO PATIENT MYCHART[NP1.2T] 04/17/2017.  NP[NP1.2M]         Revision History        User Key Date/Time User Provider Type Action    > NP1.2 04/17/17 04:55 PM Zac Hoff Admin Staff Sign     MT1.1 04/14/17 01:22 PM Betty [facility] Staff Sign at close encounter     NP1.1 04/04/17 10:48 AM Zac Hoff Admin Staff Sign at close encounter    M - Manual, T - Template             Pt aware  She would like to get the additional blood work done  I would order it but what is GGT?

## 2020-07-07 ENCOUNTER — APPOINTMENT (OUTPATIENT)
Dept: LAB | Facility: HOSPITAL | Age: 30
End: 2020-07-07
Attending: INTERNAL MEDICINE
Payer: COMMERCIAL

## 2020-07-07 ENCOUNTER — TELEPHONE (OUTPATIENT)
Dept: OBGYN CLINIC | Facility: CLINIC | Age: 30
End: 2020-07-07

## 2020-07-07 ENCOUNTER — TELEMEDICINE (OUTPATIENT)
Dept: ENDOCRINOLOGY | Facility: HOSPITAL | Age: 30
End: 2020-07-07
Payer: COMMERCIAL

## 2020-07-07 DIAGNOSIS — Z96.41 INSULIN PUMP STATUS: ICD-10-CM

## 2020-07-07 DIAGNOSIS — Z96.41 INSULIN PUMP IN PLACE: ICD-10-CM

## 2020-07-07 DIAGNOSIS — E10.9 TYPE 1 DIABETES MELLITUS WITHOUT COMPLICATION (HCC): Primary | ICD-10-CM

## 2020-07-07 DIAGNOSIS — E55.9 VITAMIN D INSUFFICIENCY: ICD-10-CM

## 2020-07-07 DIAGNOSIS — E10.649 HYPOGLYCEMIA DUE TO TYPE 1 DIABETES MELLITUS (HCC): ICD-10-CM

## 2020-07-07 LAB
25(OH)D3 SERPL-MCNC: 19.9 NG/ML (ref 30–100)
ALBUMIN SERPL BCP-MCNC: 4.2 G/DL (ref 3.5–5)
ALP SERPL-CCNC: 151 U/L (ref 46–116)
ALT SERPL W P-5'-P-CCNC: 19 U/L (ref 12–78)
ANION GAP SERPL CALCULATED.3IONS-SCNC: 3 MMOL/L (ref 4–13)
AST SERPL W P-5'-P-CCNC: 17 U/L (ref 5–45)
BILIRUB SERPL-MCNC: 0.76 MG/DL (ref 0.2–1)
BUN SERPL-MCNC: 16 MG/DL (ref 5–25)
CALCIUM SERPL-MCNC: 9.3 MG/DL (ref 8.3–10.1)
CHLORIDE SERPL-SCNC: 105 MMOL/L (ref 100–108)
CO2 SERPL-SCNC: 30 MMOL/L (ref 21–32)
CREAT SERPL-MCNC: 0.75 MG/DL (ref 0.6–1.3)
GFR SERPL CREATININE-BSD FRML MDRD: 108 ML/MIN/1.73SQ M
GGT SERPL-CCNC: 16 U/L (ref 5–85)
GLUCOSE P FAST SERPL-MCNC: 81 MG/DL (ref 65–99)
POTASSIUM SERPL-SCNC: 4.1 MMOL/L (ref 3.5–5.3)
PROT SERPL-MCNC: 7.8 G/DL (ref 6.4–8.2)
PTH-INTACT SERPL-MCNC: 7.3 PG/ML (ref 18.4–80.1)
SODIUM SERPL-SCNC: 138 MMOL/L (ref 136–145)

## 2020-07-07 PROCEDURE — 82977 ASSAY OF GGT: CPT | Performed by: INTERNAL MEDICINE

## 2020-07-07 PROCEDURE — 82306 VITAMIN D 25 HYDROXY: CPT | Performed by: INTERNAL MEDICINE

## 2020-07-07 PROCEDURE — 83970 ASSAY OF PARATHORMONE: CPT | Performed by: INTERNAL MEDICINE

## 2020-07-07 PROCEDURE — 36415 COLL VENOUS BLD VENIPUNCTURE: CPT | Performed by: INTERNAL MEDICINE

## 2020-07-07 PROCEDURE — 95251 CONT GLUC MNTR ANALYSIS I&R: CPT | Performed by: NURSE PRACTITIONER

## 2020-07-07 PROCEDURE — 1036F TOBACCO NON-USER: CPT | Performed by: NURSE PRACTITIONER

## 2020-07-07 PROCEDURE — 2022F DILAT RTA XM EVC RTNOPTHY: CPT | Performed by: NURSE PRACTITIONER

## 2020-07-07 PROCEDURE — 84080 ASSAY ALKALINE PHOSPHATASES: CPT | Performed by: INTERNAL MEDICINE

## 2020-07-07 PROCEDURE — 80053 COMPREHEN METABOLIC PANEL: CPT | Performed by: INTERNAL MEDICINE

## 2020-07-07 PROCEDURE — 99214 OFFICE O/P EST MOD 30 MIN: CPT | Performed by: NURSE PRACTITIONER

## 2020-07-07 NOTE — TELEPHONE ENCOUNTER
Most likely related to recent pregnancy and breast-feeding  ALT AST normal and reassuring  Await follow-up testing  May improve on its own when breast-feeding completed  Defer further follow-up to Endocrine and Internal Medicine

## 2020-07-07 NOTE — TELEPHONE ENCOUNTER
Patient called , 6 months post partum, still breastfeeding  Patient is diabetic  Had blood work from Endocrinology  Her Alkaline phosphatase is elevated  Endo ordered follow up blood work  Patient questions if this could be due to being post partum and breastfeeding  Endo could not answer that question for patient  Advised her to check with OB

## 2020-07-07 NOTE — PATIENT INSTRUCTIONS
Be mindful of diet  Stay active and stay hydrated  Remember to bolus insulin before meals  We made some changes to your basal rates  Please provide a dex com download to the office in 2 weeks for reassessment  We will contact you with the results of your lab work as discussed, including GGT, alk phosphatase-bone specific and vitamin-D level

## 2020-07-07 NOTE — PROGRESS NOTES
Virtual Regular Visit      Problem List Items Addressed This Visit        Endocrine    Type 1 diabetes mellitus without complication ([facility] [address] ) - Primary    Hypoglycemia due to type 1 diabetes mellitus (HCC)       Other    Vitamin D insufficiency    Insulin pump status      Other Visit Diagnoses     Insulin pump in place               Reason for visit is type 1 diabetes on insulin pump therapy  Encounter provider CODY Samaniego    Provider located at [address] [place] [address] [address] [place] [address]      Recent Visits  No visits were found meeting these conditions  Showing recent visits within past 7 days and meeting all other requirements     Today's Visits  Date Type Provider Dept   07/07/20 Telemedicine CODY Samaniego  [facility]   Showing today's visits and meeting all other requirements     Future Appointments  No visits were found meeting these conditions  Showing future appointments within next 150 days and meeting all other requirements        The patient was identified by name and date of birth  Bienvenido Sarabia was informed that this is a telemedicine visit and that the visit is being conducted through [facility] and patient was informed that this is not a secure, HIPAA-complaint platform  She agrees to proceed     My office door was closed  No one else was in the room  She acknowledged consent and understanding of privacy and security of the video platform  The patient has agreed to participate and understands they can discontinue the visit at any time  Patient is aware this is a billable service  Subjective  Bienvenido Sarabia is a 35 y o  female with type 1 diabetes for 12 years  She is on insulin at home and takes Novolog via tandem x2 insulin pump  She denies any polyuria, polydipsia, nocturia and blurry vision    She denies neuropathy, nephropathy and retinopathy  Her most recent hemoglobin A1c from July 3, 2020 is 6 4      Hypoglycemic episodes:  Yes, has been occurring more frequently over the past 2 weeks throughout the day especially overnight between 2:00 a m  and 5:00 a m      The patient's last eye exam was in June 2019  The patient's last foot exam was in March 2019 in our office      Blood Sugar/Glucometer/Pump/CGM review:  Reviewed insulin pump and CGM download from June 27 through July 7, 2020   Average CGM reading was 143 with a standard deviation of 49  She is using an average of 35 02 units per day and 39 % is basal   She is using the basal IQ feature 79 % of the time the past 2 weeks  HPI     Past Medical History:   Diagnosis Date    Anemia     H/O vitamin D deficiency     Pre-existing type 1 diabetes affecting pregnancy, antepartum     age 14    Varicella        Past Surgical History:   Procedure Laterality Date    TONSILLECTOMY AND ADENOIDECTOMY         Current Outpatient Medications   Medication Sig Dispense Refill    glucagon (GLUCAGON EMERGENCY) 1 MG injection Inject 1 mg under the skin once as needed for low blood sugar      insulin aspart (NovoLOG) 100 units/mL injection Use up to 120 units daily via insulin pump  40 mL 0    ONETOUCH DELICA LANCETS FINE MISC Use up to 8 a day and as directed  300 each 3    ONETOUCH VERIO test strip Test up to 8 times a day and as instructed 300 each 3    Prenatal Vit-DSS-Fe Cbn-FA (PRENATAL AD PO) Take by mouth       No current facility-administered medications for this visit  Allergies   Allergen Reactions    Other      Other reaction(s): Resp Distress  Other reaction(s): Resp Sympt Mild  Other reaction(s): Resp Distress    Mold Extract  [Trichophyton]      Other reaction(s): Resp Sympt Mild    Molds & Smuts      Other reaction(s): Resp Sympt Mild    Pollen Extract      Other reaction(s): Resp Sympt Mild       Review of Systems   Constitutional: Negative  Negative for chills, fatigue and fever  HENT: Negative  Negative for trouble swallowing and voice change  Eyes: Negative  Negative for visual disturbance  Respiratory: Negative  Negative for chest tightness and shortness of breath  Cardiovascular: Negative  Negative for chest pain  Gastrointestinal: Negative  Negative for abdominal pain, constipation, diarrhea and vomiting  Endocrine: Negative for cold intolerance, heat intolerance, polydipsia, polyphagia and polyuria  Genitourinary: Negative  Musculoskeletal: Negative  Skin: Negative  Allergic/Immunologic: Negative  Neurological: Negative  Negative for dizziness, syncope, light-headedness and headaches  Hematological: Negative  Psychiatric/Behavioral: Negative  All other systems reviewed and are negative  Video Exam    There were no vitals filed for this visit  Physical Exam     Physical Exam   Constitutional: She is oriented to person, place, and time  She appears well-developed and well-nourished  No distress  HENT:   Head: Normocephalic and atraumatic  Neck: Normal range of motion  Pulmonary/Chest: Effort normal    Musculoskeletal: Normal range of motion  Neurological: She is alert and oriented to person, place, and time  Skin: She is not diaphoretic  Psychiatric: She has a normal mood and affect  Her behavior is normal      Plan:  1  Type 1 diabetes with insulin pump status:  Most recent hemoglobin A1c is 6 4  Review of her dex com reveals that she is relatively controlled with some hypoglycemia overnight  For her hypoglycemia overnight, I have asked her to decrease her basal rate at midnight to 0 600 and 4 some late evening hyperglycemia I have asked her to increase her basal rate to 0 700  She will continue to utilize the dex com continuous glucose monitor and provide a download to the office in 2 weeks for review  Discussed the importance of bolusing her insulin before meals      2   Vitamin-D deficiency:  Check 25 hydroxy vitamin-D level  As a result of this visit, I have not referred the patient for further respiratory evaluation  I spent 30 minutes directly with the patient during this visit      VIRTUAL VISIT DISCLAIMER    Cathryn Nahum acknowledges that she has consented to an online visit or consultation  She understands that the online visit is based solely on information provided by her, and that, in the absence of a face-to-face physical evaluation by the physician, the diagnosis she receives is both limited and provisional in terms of accuracy and completeness  This is not intended to replace a full medical face-to-face evaluation by the physician  Cathryn Sidhu understands and accepts these terms  Established outpatient follow-up [id number]

## 2020-07-09 ENCOUNTER — TELEPHONE (OUTPATIENT)
Dept: OTHER | Facility: OTHER | Age: 30
End: 2020-07-09

## 2020-07-09 ENCOUNTER — TELEPHONE (OUTPATIENT)
Dept: ENDOCRINOLOGY | Facility: CLINIC | Age: 30
End: 2020-07-09

## 2020-07-10 ENCOUNTER — TELEPHONE (OUTPATIENT)
Dept: ENDOCRINOLOGY | Facility: HOSPITAL | Age: 30
End: 2020-07-10

## 2020-07-10 DIAGNOSIS — E55.9 VITAMIN D DEFICIENCY: Primary | ICD-10-CM

## 2020-07-10 LAB — ALP BONE SERPL-MCNC: 49.2 UG/L

## 2020-07-10 RX ORDER — MELATONIN: Start: 2020-07-10 | End: 2020-08-10

## 2020-07-10 NOTE — TELEPHONE ENCOUNTER
I have returned a phone call and reassured pt that her labs are not suggesting any serious condition  There is still one lab that is pending  I told her that I will send this message to Dr Merari Umana and he will call her tomorrow

## 2020-07-10 NOTE — TELEPHONE ENCOUNTER
Received call from marlen  She requests to speak with you ASAP  She is very concerned about her lab results

## 2020-07-10 NOTE — TELEPHONE ENCOUNTER
Patient recieved lab work ordered from her endocrinology doctor  She is curious to see if maybe her Ob would be able to shed a little more ligth as to her results since she is 6 months post partum  Patient would like a call back from her provider to discus her lab work results and the correlation it has to her being 6 months postpartum and her breast feeding

## 2020-07-10 NOTE — TELEPHONE ENCOUNTER
Pt called extremely worried about her lab work that just came back to the point she is having a panic attack  She wanted to speak with Dr Pedro Temple since he ordered them but did not want to wait      Paged Dr Marixa Davidson via [facility]

## 2020-08-07 ENCOUNTER — APPOINTMENT (OUTPATIENT)
Dept: LAB | Facility: HOSPITAL | Age: 30
End: 2020-08-07
Attending: INTERNAL MEDICINE
Payer: COMMERCIAL

## 2020-08-07 DIAGNOSIS — E55.9 VITAMIN D DEFICIENCY: ICD-10-CM

## 2020-08-07 LAB
25(OH)D3 SERPL-MCNC: 20.9 NG/ML (ref 30–100)
ALBUMIN SERPL BCP-MCNC: 3.8 G/DL (ref 3.5–5)
ALP SERPL-CCNC: 178 U/L (ref 46–116)
ALT SERPL W P-5'-P-CCNC: 17 U/L (ref 12–78)
ANION GAP SERPL CALCULATED.3IONS-SCNC: 2 MMOL/L (ref 4–13)
AST SERPL W P-5'-P-CCNC: 10 U/L (ref 5–45)
BILIRUB SERPL-MCNC: 0.34 MG/DL (ref 0.2–1)
BUN SERPL-MCNC: 21 MG/DL (ref 5–25)
CALCIUM SERPL-MCNC: 9.7 MG/DL (ref 8.3–10.1)
CHLORIDE SERPL-SCNC: 107 MMOL/L (ref 100–108)
CO2 SERPL-SCNC: 27 MMOL/L (ref 21–32)
CREAT SERPL-MCNC: 0.87 MG/DL (ref 0.6–1.3)
GFR SERPL CREATININE-BSD FRML MDRD: 90 ML/MIN/1.73SQ M
GLUCOSE P FAST SERPL-MCNC: 227 MG/DL (ref 65–99)
POTASSIUM SERPL-SCNC: 4.5 MMOL/L (ref 3.5–5.3)
PROT SERPL-MCNC: 7.7 G/DL (ref 6.4–8.2)
PTH-INTACT SERPL-MCNC: <6.3 PG/ML (ref 18.4–80.1)
SODIUM SERPL-SCNC: 136 MMOL/L (ref 136–145)

## 2020-08-07 PROCEDURE — 36415 COLL VENOUS BLD VENIPUNCTURE: CPT

## 2020-08-07 PROCEDURE — 82306 VITAMIN D 25 HYDROXY: CPT

## 2020-08-07 PROCEDURE — 80053 COMPREHEN METABOLIC PANEL: CPT

## 2020-08-07 PROCEDURE — 83970 ASSAY OF PARATHORMONE: CPT

## 2020-08-10 DIAGNOSIS — E55.9 VITAMIN D INSUFFICIENCY: Primary | ICD-10-CM

## 2020-08-10 DIAGNOSIS — R74.8 ELEVATED ALKALINE PHOSPHATASE LEVEL: ICD-10-CM

## 2020-08-11 ENCOUNTER — TELEMEDICINE (OUTPATIENT)
Dept: FAMILY MEDICINE CLINIC | Facility: CLINIC | Age: 30
End: 2020-08-11
Payer: COMMERCIAL

## 2020-08-11 VITALS — HEIGHT: 61 IN | WEIGHT: 144 LBS | BODY MASS INDEX: 27.19 KG/M2

## 2020-08-11 DIAGNOSIS — L08.9 INFECTED CYST OF SKIN: Primary | ICD-10-CM

## 2020-08-11 DIAGNOSIS — L72.9 INFECTED CYST OF SKIN: Primary | ICD-10-CM

## 2020-08-11 PROCEDURE — 99213 OFFICE O/P EST LOW 20 MIN: CPT | Performed by: NURSE PRACTITIONER

## 2020-08-11 PROCEDURE — 3725F SCREEN DEPRESSION PERFORMED: CPT | Performed by: NURSE PRACTITIONER

## 2020-08-11 PROCEDURE — 2022F DILAT RTA XM EVC RTNOPTHY: CPT | Performed by: NURSE PRACTITIONER

## 2020-08-11 PROCEDURE — 1036F TOBACCO NON-USER: CPT | Performed by: NURSE PRACTITIONER

## 2020-08-11 NOTE — PROGRESS NOTES
Virtual Regular Visit      Assessment/Plan:    Problem List Items Addressed This Visit     None      Visit Diagnoses     Infected cyst of skin    -  Primary                Reason for visit is   Chief Complaint   Patient presents with    Cyst     right groin-had before     Virtual Regular Visit        Encounter provider CODY Rome    Provider located at [address] [place] [address]  [address] [place] [address]    [facility] [address] [phone]      Recent Visits  No visits were found meeting these conditions  Showing recent visits within past 7 days and meeting all other requirements     Today's Visits  Date Type Provider Dept   08/11/20 Telemedicine CODY Rome [facility]   Showing today's visits and meeting all other requirements     Future Appointments  No visits were found meeting these conditions  Showing future appointments within next 150 days and meeting all other requirements        The patient was identified by name and date of birth  Maite Gonsalves was informed that this is a telemedicine visit and that the visit is being conducted through [facility] and patient was informed that this is a secure, HIPAA-compliant platform  She agrees to proceed     My office door was closed  No one else was in the room  She acknowledged consent and understanding of privacy and security of the video platform  The patient has agreed to participate and understands they can discontinue the visit at any time  Patient is aware this is a billable service  Subjective  Maite Gonsalves is a 35 y o  female        Reports a return of her cyst on her inner upper right groin, size of a pea, is firm and round, moveable  Has been using warm compresses on it at this time  Is currently away on vacation  Without fever or pain  Will call on return from her vacation if no resolution with warm compresses          Past Medical History:   Diagnosis Date    Anemia     H/O vitamin D deficiency     Pre-existing type 1 diabetes affecting pregnancy, antepartum     age 14    Varicella        Past Surgical History:   Procedure Laterality Date    TONSILLECTOMY AND ADENOIDECTOMY         Current Outpatient Medications   Medication Sig Dispense Refill    Cholecalciferol 50 MCG (2000 UT) TABS 1 tab daily      glucagon (GLUCAGON EMERGENCY) 1 MG injection Inject 1 mg under the skin once as needed for low blood sugar      insulin aspart (NovoLOG) 100 units/mL injection Use up to 120 units daily via insulin pump  40 mL 0    ONETOUCH DELICA LANCETS FINE MISC Use up to 8 a day and as directed  300 each 3    ONETOUCH VERIO test strip Test up to 8 times a day and as instructed 300 each 3    Prenatal Vit-DSS-Fe Cbn-FA (PRENATAL AD PO) Take by mouth       No current facility-administered medications for this visit  Allergies   Allergen Reactions    Other      Other reaction(s): Resp Distress  Other reaction(s): Resp Sympt Mild  Other reaction(s): Resp Distress    Mold Extract  [Trichophyton]      Other reaction(s): Resp Sympt Mild    Molds & Smuts      Other reaction(s): Resp Sympt Mild    Pollen Extract      Other reaction(s): Resp Sympt Mild       Review of Systems   Constitutional: Negative  HENT: Negative  Eyes: Negative  Respiratory: Negative  Cardiovascular: Negative  Gastrointestinal: Negative  Endocrine: Negative  Genitourinary: Negative  Musculoskeletal: Negative  Skin:        Lump in groin   Allergic/Immunologic: Negative  Neurological: Negative  Hematological: Negative  Psychiatric/Behavioral: Negative  Video Exam    Vitals:    08/11/20 1223   Weight: 65 3 kg (144 lb)   Height: 5' 1" (1 549 m)       Physical Exam  Vitals signs and nursing note reviewed  Constitutional:       General: She is not in acute distress  Appearance: Normal appearance  She is not ill-appearing  HENT:      Head: Normocephalic and atraumatic     Eyes: General:         Right eye: No discharge  Left eye: No discharge  Conjunctiva/sclera: Conjunctivae normal    Pulmonary:      Effort: Pulmonary effort is normal  No respiratory distress  Breath sounds: Normal breath sounds  Skin:     General: Skin is warm  Neurological:      Mental Status: She is alert and oriented to person, place, and time  Psychiatric:         Mood and Affect: Mood normal          Behavior: Behavior normal          Thought Content: Thought content normal          Judgment: Judgment normal           I spent 10 minutes directly with the patient during this visit      VIRTUAL VISIT DISCLAIMER    Estephania Moraes acknowledges that she has consented to an online visit or consultation  She understands that the online visit is based solely on information provided by her, and that, in the absence of a face-to-face physical evaluation by the physician, the diagnosis she receives is both limited and provisional in terms of accuracy and completeness  This is not intended to replace a full medical face-to-face evaluation by the physician  Estephania Moraes understands and accepts these terms

## 2020-08-16 ENCOUNTER — NURSE TRIAGE (OUTPATIENT)
Dept: OTHER | Facility: OTHER | Age: 30
End: 2020-08-16

## 2020-08-16 ENCOUNTER — TELEPHONE (OUTPATIENT)
Dept: OTHER | Facility: OTHER | Age: 30
End: 2020-08-16

## 2020-08-16 ENCOUNTER — TELEPHONE (OUTPATIENT)
Dept: FAMILY MEDICINE CLINIC | Facility: CLINIC | Age: 30
End: 2020-08-16

## 2020-08-16 DIAGNOSIS — F41.9 ANXIETY: Primary | ICD-10-CM

## 2020-08-16 RX ORDER — LORAZEPAM 0.5 MG/1
0.5 TABLET ORAL EVERY 8 HOURS PRN
Qty: 30 TABLET | Refills: 0 | Status: SHIPPED | OUTPATIENT
Start: 2020-08-16 | End: 2021-07-20

## 2020-08-16 NOTE — TELEPHONE ENCOUNTER
Reason for Disposition   Symptoms interfere with work or school    Answer Assessment - Initial Assessment Questions  1  CONCERN: "What happened that made you call today?"      "I have a few things going on and I feel like I am spiraling  COVID, bloodwork with my endocrin dr, and a lump on my leg"  2  ANXIETY SYMPTOM SCREENING: "Can you describe how you have been feeling?"  (e g , tense, restless, panicky, anxious, keyed up, trouble sleeping, trouble concentrating)      Anxious, trouble sleeping, restless, panicky, and "pacing around"    3  ONSET: "How long have you been feeling this way?"      On and off since I went back to work from Maternity leave at the end of March    4  RECURRENT: "Have you felt this way before?"  If yes: "What happened that time?" "What helped these feelings go away in the past?"       Reports not experiencing anxiety this bad so no previous interventions were necessary  5  RISK OF HARM - SUICIDAL IDEATION:  "Do you ever have thoughts of hurting or killing yourself?"  (e g , yes, no, no but preoccupation with thoughts about death)    - INTENT:  "Do you have thoughts of hurting or killing yourself right NOW?" (e g , yes, no, N/A)    - PLAN: "Do you have a specific plan for how you would do this?" (e g , gun, knife, overdose, no plan, N/A)      Denies    6  RISK OF HARM - HOMICIDAL IDEATION:  "Do you ever have thoughts of hurting or killing someone else?"  (e g , yes, no, no but preoccupation with thoughts about death)    - INTENT:  "Do you have thoughts of hurting or killing someone right NOW?" (e g , yes, no, N/A)    - PLAN: "Do you have a specific plan for how you would do this?" (e g , gun, knife, no plan, N/A)       Denies    7  FUNCTIONAL IMPAIRMENT: "How have things been going for you overall in your life?  Have you had any more difficulties than usual doing your normal daily activities?"  (e g , better, same, worse; self-care, school, work, interactions)      ADLs have been getting worse recently    8  SUPPORT: "Who is with you now?" "Who do you live with?" "Do you have family or friends nearby who you can talk to?"       Daughter with pt currently  Lives with  and daughter  I can talk to my , my mom, and my mother-in-law  9  THERAPIST: "Do you have a counselor or therapist? Name?"      Denies    10  STRESSORS: "Has there been any new stress or recent changes in your life?"        Bloodwork, a cyst, and COVID with a young baby    6  CAFFEINE ABUSE: "Do you drink caffeinated beverages, and how much each day?" (e g , coffee, tea, gisela)        A cup of coffee a day, sometimes 2    12  SUBSTANCE ABUSE: "Do you use any illegal drugs or alcohol?"        Denies    13  OTHER SYMPTOMS: "Do you have any other physical symptoms right now?" (e g , chest pain, palpitations, difficulty breathing, fever)        During an anxiety attack reports palpitations  "I have type 1 diabetes and I notice when I'm anxious, my blood sugar rises"      14  PREGNANCY: "Is there any chance you are pregnant?" "When was your last menstrual period?"        Denies    Protocols used: ANXIETY AND PANIC ATTACK-ADULT-

## 2020-08-16 NOTE — TELEPHONE ENCOUNTER
Reached out to Rice Gottron in regards to patients anxiety  Milly Cervantes had reached out to the patient to discuss further  An appointment was scheduled for tomorrow and patient aware

## 2020-08-16 NOTE — TELEPHONE ENCOUNTER
Regarding: ANXIETY  ----- Message from Jaqui Hudson sent at 8/16/2020  8:24 AM EDT -----  " I am having uncontrollable anxiety "

## 2020-08-16 NOTE — TELEPHONE ENCOUNTER
Carlin Szymanski is extremely anxious, 8 month post partum, realizes she had anxiety heightened in post partum, employed as a nurse, pandemic times increasingly difficult lorri PRN for today  me, has a reoccurring cysts and googled  Now concerned of health  Long discussion  Spouse is home with her  Not sleeping, pacing, not suicidal  Appointment scheduled for tomorrow  Will call in ativan

## 2020-08-17 ENCOUNTER — OFFICE VISIT (OUTPATIENT)
Dept: FAMILY MEDICINE CLINIC | Facility: CLINIC | Age: 30
End: 2020-08-17
Payer: COMMERCIAL

## 2020-08-17 VITALS
WEIGHT: 144 LBS | TEMPERATURE: 98.1 F | DIASTOLIC BLOOD PRESSURE: 80 MMHG | SYSTOLIC BLOOD PRESSURE: 118 MMHG | HEART RATE: 60 BPM | BODY MASS INDEX: 27.19 KG/M2 | HEIGHT: 61 IN

## 2020-08-17 DIAGNOSIS — F41.0 PANIC ATTACKS: Primary | ICD-10-CM

## 2020-08-17 DIAGNOSIS — D36.7: ICD-10-CM

## 2020-08-17 PROCEDURE — 99213 OFFICE O/P EST LOW 20 MIN: CPT | Performed by: NURSE PRACTITIONER

## 2020-08-17 PROCEDURE — 2022F DILAT RTA XM EVC RTNOPTHY: CPT | Performed by: NURSE PRACTITIONER

## 2020-08-17 PROCEDURE — 1036F TOBACCO NON-USER: CPT | Performed by: NURSE PRACTITIONER

## 2020-08-17 PROCEDURE — 3008F BODY MASS INDEX DOCD: CPT | Performed by: NURSE PRACTITIONER

## 2020-08-17 NOTE — PATIENT INSTRUCTIONS
1  Continue all medications  2  Call if you have increasing panic attacks  3   Call if cyst enlarges or reddens call for antibiotic

## 2020-08-17 NOTE — PROGRESS NOTES
Assessment/Plan   Problem List Items Addressed This Visit     None      Visit Diagnoses     Panic attacks    -  Primary    Dermoid cyst of lower extremity, right                    Chief Complaint   Patient presents with    Cyst     inner right thigh       Subjective   Patient ID: Lori Boateng is a 35 y o  female  Vitals:    08/17/20 1648   BP: 118/80   Pulse: 60   Temp: 98 1 °F (36 7 °C)     Tosha is here today for 2 reasons, spoke with her over the weekend in regards to a cyst on her right upper thigh that has reappeared, she has been using warm compresses on it, reports it does not look infected as it did the last time which it occurred when she was 6 months pregnant  She was developing increasing anxiety that she felt she could not control any more , worried that something was wrong with her , admits to realizing that she was having irrational thoughts  She had 2 nights of sleeplessness, worry, and a sense of dread  Sunday when I spoke to her on the phone her spouse said that she was pacing to try and calmed down  Julien Mcfarland admits that since the birth of her baby she has been fighting off increasing anxiety, within the last 8 months, then the pandemic, she changed jobs, and now the cyst, she reports she just felt out of control  I did prescribe a low-dose Ativan for her to get through the panic until she was seen today  She reports she took 1 5 mg Ativan yesterday  Which took the edge off and states that she woke up this morning feeling fine along with the fact that her cyst on her thigh had come to [facility] she popped and now the cyst has subsided in size  She reports that she does not feel she needs to have a daily medication at this time, I do not feel that she will overuse Ativan, we will monitor the effects in the usage over the next 4 weeks  She will call to report if she has any change in the size of the cyst on her thigh and increased panic attacks    At which time we will start with Zoloft low-dose as daily medication to help aid in her anxiety  The following portions of the patient's history were reviewed and updated as appropriate: allergies, current medications, past medical history, past social history, past surgical history and problem list     Review of Systems   Constitutional: Negative  HENT: Negative  Eyes: Negative  Respiratory: Negative  Cardiovascular: Negative  Gastrointestinal: Negative  Endocrine: Negative  Genitourinary: Negative  Musculoskeletal: Negative  Skin:        Cyst on thigh   Allergic/Immunologic: Negative  Neurological: Negative  Hematological: Negative  Psychiatric/Behavioral: Positive for decreased concentration and sleep disturbance  The patient is nervous/anxious  Objective     Physical Exam  Constitutional:       General: She is not in acute distress  Appearance: Normal appearance  She is normal weight  She is not ill-appearing  Eyes:      General:         Right eye: No discharge  Left eye: No discharge  Extraocular Movements: Extraocular movements intact  Conjunctiva/sclera: Conjunctivae normal    Neck:      Musculoskeletal: Normal range of motion  Cardiovascular:      Rate and Rhythm: Normal rate and regular rhythm  Pulmonary:      Effort: Pulmonary effort is normal       Breath sounds: Normal breath sounds  Abdominal:      General: Abdomen is flat  Musculoskeletal: Normal range of motion  Skin:     General: Skin is warm and dry  Comments: There is a small cyst right upper thigh without redness or tenderness   Neurological:      Mental Status: She is alert and oriented to person, place, and time  Psychiatric:         Mood and Affect: Mood normal          Behavior: Behavior normal          Thought Content:  Thought content normal          Judgment: Judgment normal

## 2020-09-04 ENCOUNTER — TELEPHONE (OUTPATIENT)
Dept: ENDOCRINOLOGY | Facility: CLINIC | Age: 30
End: 2020-09-04

## 2020-09-04 ENCOUNTER — TELEPHONE (OUTPATIENT)
Dept: OTHER | Facility: OTHER | Age: 30
End: 2020-09-04

## 2020-09-04 NOTE — TELEPHONE ENCOUNTER
[facility] m/ 7-/ Pt states that she has recently undergone a few tests and that they have started to give her severe anxiety  She was already diagnosed with postpartum anxiety   Would like a call back to discuss her concerns  Contacted via TC

## 2020-09-05 NOTE — TELEPHONE ENCOUNTER
Patient called having severe anxiety about labs that needs to be done next week  Patient mentioned that she has some bump on her head and is worried that it is related to some endocrine problem  I advised pt to call PCP and schedule appointment regarding this bump on head or she can f/u with endocrinology  She has severe postpartum anxiety, I advised her to see psychologist to help her cope with these anxiety attacks

## 2020-09-09 ENCOUNTER — TELEPHONE (OUTPATIENT)
Dept: OTHER | Facility: OTHER | Age: 30
End: 2020-09-09

## 2020-09-09 DIAGNOSIS — E10.9 TYPE 1 DIABETES MELLITUS WITHOUT COMPLICATION (HCC): ICD-10-CM

## 2020-09-09 DIAGNOSIS — E55.9 VITAMIN D INSUFFICIENCY: Primary | ICD-10-CM

## 2020-09-09 NOTE — TELEPHONE ENCOUNTER
I do not believe that would be related to an endocrine problem though she does have labs planned to be done later this week to monitor calcium, PTH, vitamin D  I will add thyroid levels to look into that as well

## 2020-09-09 NOTE — TELEPHONE ENCOUNTER
Pt called back about this   She wants to know if the small bump on her head could be caused by an Endocrine problem and the labs she had done in August

## 2020-09-10 NOTE — TELEPHONE ENCOUNTER
Spoke to patient  Will acquire labs and be in touch with her when results are back  No need to call her back

## 2020-09-22 ENCOUNTER — APPOINTMENT (OUTPATIENT)
Dept: LAB | Facility: HOSPITAL | Age: 30
End: 2020-09-22
Attending: INTERNAL MEDICINE
Payer: COMMERCIAL

## 2020-09-22 DIAGNOSIS — E10.9 TYPE 1 DIABETES MELLITUS WITHOUT COMPLICATION (HCC): ICD-10-CM

## 2020-09-22 DIAGNOSIS — R74.8 ELEVATED ALKALINE PHOSPHATASE LEVEL: ICD-10-CM

## 2020-09-22 DIAGNOSIS — E55.9 VITAMIN D INSUFFICIENCY: ICD-10-CM

## 2020-09-22 LAB
25(OH)D3 SERPL-MCNC: 20 NG/ML (ref 30–100)
ALBUMIN SERPL BCP-MCNC: 4.2 G/DL (ref 3.5–5)
ALP SERPL-CCNC: 136 U/L (ref 46–116)
ALT SERPL W P-5'-P-CCNC: 15 U/L (ref 12–78)
ANION GAP SERPL CALCULATED.3IONS-SCNC: 5 MMOL/L (ref 4–13)
AST SERPL W P-5'-P-CCNC: 14 U/L (ref 5–45)
BILIRUB SERPL-MCNC: 0.62 MG/DL (ref 0.2–1)
BUN SERPL-MCNC: 17 MG/DL (ref 5–25)
CALCIUM SERPL-MCNC: 8.9 MG/DL (ref 8.3–10.1)
CHLORIDE SERPL-SCNC: 104 MMOL/L (ref 100–108)
CO2 SERPL-SCNC: 29 MMOL/L (ref 21–32)
CREAT SERPL-MCNC: 0.85 MG/DL (ref 0.6–1.3)
GFR SERPL CREATININE-BSD FRML MDRD: 93 ML/MIN/1.73SQ M
GLUCOSE P FAST SERPL-MCNC: 112 MG/DL (ref 65–99)
PHOSPHATE SERPL-MCNC: 3.4 MG/DL (ref 2.7–4.5)
POTASSIUM SERPL-SCNC: 4 MMOL/L (ref 3.5–5.3)
PROT SERPL-MCNC: 7.6 G/DL (ref 6.4–8.2)
PTH-INTACT SERPL-MCNC: 45.9 PG/ML (ref 18.4–80.1)
SODIUM SERPL-SCNC: 138 MMOL/L (ref 136–145)
T4 FREE SERPL-MCNC: 0.94 NG/DL (ref 0.76–1.46)
TSH SERPL DL<=0.05 MIU/L-ACNC: 1.57 UIU/ML (ref 0.36–3.74)

## 2020-09-22 PROCEDURE — 84443 ASSAY THYROID STIM HORMONE: CPT

## 2020-09-22 PROCEDURE — 82652 VIT D 1 25-DIHYDROXY: CPT

## 2020-09-22 PROCEDURE — 84100 ASSAY OF PHOSPHORUS: CPT

## 2020-09-22 PROCEDURE — 83970 ASSAY OF PARATHORMONE: CPT

## 2020-09-22 PROCEDURE — 36415 COLL VENOUS BLD VENIPUNCTURE: CPT

## 2020-09-22 PROCEDURE — 84439 ASSAY OF FREE THYROXINE: CPT

## 2020-09-22 PROCEDURE — 82306 VITAMIN D 25 HYDROXY: CPT

## 2020-09-22 PROCEDURE — 80053 COMPREHEN METABOLIC PANEL: CPT

## 2020-09-24 LAB — 1,25(OH)2D3 SERPL-MCNC: 39 PG/ML (ref 19.9–79.3)

## 2020-09-25 DIAGNOSIS — E55.9 VITAMIN D INSUFFICIENCY: ICD-10-CM

## 2020-09-25 DIAGNOSIS — R74.8 ELEVATED ALKALINE PHOSPHATASE LEVEL: ICD-10-CM

## 2020-09-29 ENCOUNTER — TELEPHONE (OUTPATIENT)
Dept: OBGYN CLINIC | Facility: CLINIC | Age: 30
End: 2020-09-29

## 2020-10-08 ENCOUNTER — TELEPHONE (OUTPATIENT)
Dept: OBGYN CLINIC | Facility: CLINIC | Age: 30
End: 2020-10-08

## 2020-10-13 ENCOUNTER — OFFICE VISIT (OUTPATIENT)
Dept: ENDOCRINOLOGY | Facility: HOSPITAL | Age: 30
End: 2020-10-13
Payer: COMMERCIAL

## 2020-10-13 VITALS
WEIGHT: 146 LBS | HEIGHT: 61 IN | TEMPERATURE: 98.2 F | HEART RATE: 96 BPM | SYSTOLIC BLOOD PRESSURE: 110 MMHG | DIASTOLIC BLOOD PRESSURE: 72 MMHG | BODY MASS INDEX: 27.56 KG/M2

## 2020-10-13 DIAGNOSIS — E10.9 TYPE 1 DIABETES MELLITUS WITHOUT COMPLICATION (HCC): Primary | ICD-10-CM

## 2020-10-13 DIAGNOSIS — Z96.41 INSULIN PUMP IN PLACE: ICD-10-CM

## 2020-10-13 DIAGNOSIS — E55.9 VITAMIN D INSUFFICIENCY: ICD-10-CM

## 2020-10-13 DIAGNOSIS — Z96.41 INSULIN PUMP STATUS: ICD-10-CM

## 2020-10-13 LAB — SL AMB POCT HEMOGLOBIN AIC: 6.7 (ref ?–6.5)

## 2020-10-13 PROCEDURE — 3044F HG A1C LEVEL LT 7.0%: CPT | Performed by: INTERNAL MEDICINE

## 2020-10-13 PROCEDURE — 1036F TOBACCO NON-USER: CPT | Performed by: INTERNAL MEDICINE

## 2020-10-13 PROCEDURE — 83036 HEMOGLOBIN GLYCOSYLATED A1C: CPT | Performed by: INTERNAL MEDICINE

## 2020-10-13 PROCEDURE — 99214 OFFICE O/P EST MOD 30 MIN: CPT | Performed by: INTERNAL MEDICINE

## 2020-10-13 RX ORDER — BLOOD SUGAR DIAGNOSTIC
STRIP MISCELLANEOUS
Qty: 100 EACH | Refills: 0 | Status: SHIPPED | OUTPATIENT
Start: 2020-10-13 | End: 2021-07-20 | Stop reason: SDUPTHER

## 2020-10-27 ENCOUNTER — OFFICE VISIT (OUTPATIENT)
Dept: OBGYN CLINIC | Facility: CLINIC | Age: 30
End: 2020-10-27
Payer: COMMERCIAL

## 2020-10-27 VITALS
HEIGHT: 61 IN | DIASTOLIC BLOOD PRESSURE: 70 MMHG | WEIGHT: 149 LBS | BODY MASS INDEX: 28.13 KG/M2 | TEMPERATURE: 98.4 F | SYSTOLIC BLOOD PRESSURE: 106 MMHG

## 2020-10-27 DIAGNOSIS — Z37.9 VACUUM-ASSISTED VAGINAL DELIVERY: ICD-10-CM

## 2020-10-27 DIAGNOSIS — E13.9 DIABETES 1.5, MANAGED AS TYPE 1 (HCC): ICD-10-CM

## 2020-10-27 DIAGNOSIS — F41.9 ANXIETY AND DEPRESSION: ICD-10-CM

## 2020-10-27 DIAGNOSIS — F32.A ANXIETY AND DEPRESSION: ICD-10-CM

## 2020-10-27 DIAGNOSIS — N93.9 ABNORMAL UTERINE BLEEDING (AUB): Primary | ICD-10-CM

## 2020-10-27 DIAGNOSIS — E55.9 VITAMIN D DEFICIENCY: ICD-10-CM

## 2020-10-27 PROCEDURE — 99214 OFFICE O/P EST MOD 30 MIN: CPT | Performed by: OBSTETRICS & GYNECOLOGY

## 2020-10-27 PROCEDURE — 87591 N.GONORRHOEAE DNA AMP PROB: CPT | Performed by: OBSTETRICS & GYNECOLOGY

## 2020-10-27 PROCEDURE — 87491 CHLMYD TRACH DNA AMP PROBE: CPT | Performed by: OBSTETRICS & GYNECOLOGY

## 2020-10-28 ENCOUNTER — LAB (OUTPATIENT)
Dept: LAB | Facility: HOSPITAL | Age: 30
End: 2020-10-28
Attending: OBSTETRICS & GYNECOLOGY
Payer: COMMERCIAL

## 2020-10-28 DIAGNOSIS — N93.9 ABNORMAL UTERINE BLEEDING (AUB): ICD-10-CM

## 2020-10-28 LAB
B-HCG SERPL-ACNC: <2 MIU/ML
C TRACH DNA SPEC QL NAA+PROBE: NEGATIVE
ERYTHROCYTE [DISTWIDTH] IN BLOOD BY AUTOMATED COUNT: 12.8 % (ref 11.6–15.1)
HCT VFR BLD AUTO: 40.5 % (ref 34.8–46.1)
HGB BLD-MCNC: 12.7 G/DL (ref 11.5–15.4)
MCH RBC QN AUTO: 26.5 PG (ref 26.8–34.3)
MCHC RBC AUTO-ENTMCNC: 31.4 G/DL (ref 31.4–37.4)
MCV RBC AUTO: 84 FL (ref 82–98)
N GONORRHOEA DNA SPEC QL NAA+PROBE: NEGATIVE
PLATELET # BLD AUTO: 220 THOUSANDS/UL (ref 149–390)
PMV BLD AUTO: 11.5 FL (ref 8.9–12.7)
RBC # BLD AUTO: 4.8 MILLION/UL (ref 3.81–5.12)
WBC # BLD AUTO: 7.6 THOUSAND/UL (ref 4.31–10.16)

## 2020-10-28 PROCEDURE — 85027 COMPLETE CBC AUTOMATED: CPT

## 2020-10-28 PROCEDURE — 36415 COLL VENOUS BLD VENIPUNCTURE: CPT

## 2020-10-28 PROCEDURE — 84702 CHORIONIC GONADOTROPIN TEST: CPT

## 2020-10-30 ENCOUNTER — TELEPHONE (OUTPATIENT)
Dept: OBGYN CLINIC | Facility: CLINIC | Age: 30
End: 2020-10-30

## 2020-11-03 ENCOUNTER — ULTRASOUND (OUTPATIENT)
Dept: OBGYN CLINIC | Facility: CLINIC | Age: 30
End: 2020-11-03
Payer: COMMERCIAL

## 2020-11-03 DIAGNOSIS — N93.9 ABNORMAL UTERINE BLEEDING (AUB): ICD-10-CM

## 2020-11-03 PROCEDURE — 76830 TRANSVAGINAL US NON-OB: CPT | Performed by: OBSTETRICS & GYNECOLOGY

## 2020-11-11 ENCOUNTER — OFFICE VISIT (OUTPATIENT)
Dept: FAMILY MEDICINE CLINIC | Facility: CLINIC | Age: 30
End: 2020-11-11
Payer: COMMERCIAL

## 2020-11-11 VITALS
TEMPERATURE: 99.1 F | BODY MASS INDEX: 27.38 KG/M2 | HEIGHT: 61 IN | SYSTOLIC BLOOD PRESSURE: 110 MMHG | DIASTOLIC BLOOD PRESSURE: 70 MMHG | WEIGHT: 145 LBS | HEART RATE: 66 BPM

## 2020-11-11 DIAGNOSIS — F41.9 ANXIETY: Primary | ICD-10-CM

## 2020-11-11 DIAGNOSIS — E10.9 TYPE 1 DIABETES MELLITUS WITHOUT COMPLICATION (HCC): ICD-10-CM

## 2020-11-11 PROCEDURE — 3725F SCREEN DEPRESSION PERFORMED: CPT | Performed by: NURSE PRACTITIONER

## 2020-11-11 PROCEDURE — 99213 OFFICE O/P EST LOW 20 MIN: CPT | Performed by: NURSE PRACTITIONER

## 2020-11-17 ENCOUNTER — OFFICE VISIT (OUTPATIENT)
Dept: OBGYN CLINIC | Facility: CLINIC | Age: 30
End: 2020-11-17
Payer: COMMERCIAL

## 2020-11-17 VITALS
DIASTOLIC BLOOD PRESSURE: 70 MMHG | WEIGHT: 143.6 LBS | HEIGHT: 61 IN | TEMPERATURE: 97.3 F | BODY MASS INDEX: 27.11 KG/M2 | SYSTOLIC BLOOD PRESSURE: 130 MMHG

## 2020-11-17 DIAGNOSIS — F32.A ANXIETY AND DEPRESSION: ICD-10-CM

## 2020-11-17 DIAGNOSIS — Z86.39 HX OF INSULIN DEPENDENT DIABETES MELLITUS: ICD-10-CM

## 2020-11-17 DIAGNOSIS — F41.9 ANXIETY AND DEPRESSION: ICD-10-CM

## 2020-11-17 DIAGNOSIS — Z87.59 STATUS POST VACUUM-ASSISTED VAGINAL DELIVERY: ICD-10-CM

## 2020-11-17 PROCEDURE — 99213 OFFICE O/P EST LOW 20 MIN: CPT | Performed by: OBSTETRICS & GYNECOLOGY

## 2020-11-17 PROCEDURE — 1036F TOBACCO NON-USER: CPT | Performed by: OBSTETRICS & GYNECOLOGY

## 2020-11-25 ENCOUNTER — OFFICE VISIT (OUTPATIENT)
Dept: OBGYN CLINIC | Facility: CLINIC | Age: 30
End: 2020-11-25
Payer: COMMERCIAL

## 2020-11-25 ENCOUNTER — HOSPITAL ENCOUNTER (OUTPATIENT)
Dept: RADIOLOGY | Facility: HOSPITAL | Age: 30
Discharge: HOME | End: 2020-11-25
Attending: INTERNAL MEDICINE
Payer: COMMERCIAL

## 2020-11-25 ENCOUNTER — TELEPHONE (OUTPATIENT)
Dept: OBGYN CLINIC | Facility: CLINIC | Age: 30
End: 2020-11-25

## 2020-11-25 VITALS
BODY MASS INDEX: 27.08 KG/M2 | WEIGHT: 143.4 LBS | DIASTOLIC BLOOD PRESSURE: 70 MMHG | HEIGHT: 61 IN | SYSTOLIC BLOOD PRESSURE: 120 MMHG

## 2020-11-25 DIAGNOSIS — E13.9 DIABETES 1.5, MANAGED AS TYPE 1 (HCC): ICD-10-CM

## 2020-11-25 DIAGNOSIS — F32.A ANXIETY AND DEPRESSION: ICD-10-CM

## 2020-11-25 DIAGNOSIS — N60.11 FIBROCYSTIC BREAST CHANGES, RIGHT: Primary | ICD-10-CM

## 2020-11-25 DIAGNOSIS — M99.80: ICD-10-CM

## 2020-11-25 DIAGNOSIS — M99.80: Primary | ICD-10-CM

## 2020-11-25 DIAGNOSIS — F41.9 ANXIETY AND DEPRESSION: ICD-10-CM

## 2020-11-25 DIAGNOSIS — N63.10 LUMP OF RIGHT BREAST: ICD-10-CM

## 2020-11-25 PROCEDURE — 70260 X-RAY EXAM OF SKULL: CPT

## 2020-11-25 PROCEDURE — 99213 OFFICE O/P EST LOW 20 MIN: CPT | Performed by: OBSTETRICS & GYNECOLOGY

## 2020-11-25 PROCEDURE — 3008F BODY MASS INDEX DOCD: CPT | Performed by: OBSTETRICS & GYNECOLOGY

## 2020-11-25 PROCEDURE — 1036F TOBACCO NON-USER: CPT | Performed by: OBSTETRICS & GYNECOLOGY

## 2020-11-30 ENCOUNTER — TELEPHONE (OUTPATIENT)
Dept: ENDOCRINOLOGY | Facility: HOSPITAL | Age: 30
End: 2020-11-30

## 2020-11-30 DIAGNOSIS — M99.80: Primary | ICD-10-CM

## 2020-12-01 ENCOUNTER — SOCIAL WORK (OUTPATIENT)
Dept: BEHAVIORAL/MENTAL HEALTH CLINIC | Facility: CLINIC | Age: 30
End: 2020-12-01
Payer: COMMERCIAL

## 2020-12-01 DIAGNOSIS — F41.8 POSTPARTUM ANXIETY: Primary | ICD-10-CM

## 2020-12-01 PROCEDURE — 90834 PSYTX W PT 45 MINUTES: CPT | Performed by: SOCIAL WORKER

## 2020-12-02 ENCOUNTER — HOSPITAL ENCOUNTER (OUTPATIENT)
Dept: ULTRASOUND IMAGING | Facility: HOSPITAL | Age: 30
Discharge: HOME | End: 2020-12-02
Attending: INTERNAL MEDICINE
Payer: COMMERCIAL

## 2020-12-02 ENCOUNTER — HOSPITAL ENCOUNTER (OUTPATIENT)
Dept: ULTRASOUND IMAGING | Facility: CLINIC | Age: 30
Discharge: HOME | End: 2020-12-02
Payer: COMMERCIAL

## 2020-12-02 VITALS — BODY MASS INDEX: 27 KG/M2 | WEIGHT: 143 LBS | HEIGHT: 61 IN

## 2020-12-02 DIAGNOSIS — M99.80: ICD-10-CM

## 2020-12-02 DIAGNOSIS — N60.11 FIBROCYSTIC BREAST CHANGES, RIGHT: ICD-10-CM

## 2020-12-02 PROCEDURE — 76642 ULTRASOUND BREAST LIMITED: CPT

## 2020-12-02 PROCEDURE — 76536 US EXAM OF HEAD AND NECK: CPT

## 2020-12-12 DIAGNOSIS — E10.9 TYPE 1 DIABETES MELLITUS WITHOUT COMPLICATION (HCC): ICD-10-CM

## 2021-01-04 ENCOUNTER — TELEPHONE (OUTPATIENT)
Dept: ENDOCRINOLOGY | Facility: HOSPITAL | Age: 31
End: 2021-01-04

## 2021-01-04 NOTE — TELEPHONE ENCOUNTER
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

## 2021-01-04 NOTE — TELEPHONE ENCOUNTER
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

## 2021-01-05 ENCOUNTER — TELEMEDICINE (OUTPATIENT)
Dept: BEHAVIORAL/MENTAL HEALTH CLINIC | Facility: CLINIC | Age: 31
End: 2021-01-05
Payer: COMMERCIAL

## 2021-01-05 DIAGNOSIS — F41.8 POSTPARTUM ANXIETY: Primary | ICD-10-CM

## 2021-01-05 PROCEDURE — 90834 PSYTX W PT 45 MINUTES: CPT | Performed by: SOCIAL WORKER

## 2021-01-05 NOTE — PSYCH
Virtual Regular Visit      Assessment/Plan:    Problem List Items Addressed This Visit     None      Visit Diagnoses     Postpartum anxiety    -  Primary               Reason for visit is   Chief Complaint   Patient presents with    Virtual Regular Visit        Encounter provider Clayton Collazo    Provider located at [address] [facility] [address]/ [address]  [address] Karishma S  [phone]      Recent Visits  No visits were found meeting these conditions  Showing recent visits within past 7 days and meeting all other requirements     Today's Visits  Date Type Provider Dept   01/05/21 4601 [facility], [facility]   Showing today's visits and meeting all other requirements     Future Appointments  No visits were found meeting these conditions  Showing future appointments within next 150 days and meeting all other requirements        The patient was identified by name and date of birth  Soniya Vigil was informed that this is a telemedicine visit and that the visit is being conducted through [email or web address] and patient was informed that this is a secure, HIPAA-compliant platform  She agrees to proceed     My office door was closed  No one else was in the room  She acknowledged consent and understanding of privacy and security of the video platform  The patient has agreed to participate and understands they can discontinue the visit at any time  Patient is aware this is a billable service  Subjective  Soniya Vigil is a 35 y o  female was counseled for 45 minutes from 12:00 to 12:45pm    Ultrasound for breast and head were ok - no issues at all - feels comfortable with results and decreasing anxiety all around  Edison Espinoza myself again,"  the concerns triggered her anxiety and confidence to follow up helped a lot    PT for Bettie Eagle going great for torticollis going well - DC soon   Offered covid vaccine at work and wants to see more long term effects - opted out of vaccine then a few people at work got covid and decided that she'd like vaccine now  Explored anxiety related to covid, work environment, risk to others  Thinking about having a second baby but feels waiting due to covid and way this yr has gone  Discussed patient's diabeties, isolation with covid, decision fatigue  Discussed ways to not dwell on fears - using "what if" techniques of CBT which has been helpful - decreasing panic  Continued to encourage patient to practice helpful breathing techniques, yoga and CBT techniques related to thoughts  Coping skills,, anxiety management, mindfulness/being present, and self care reviewed  HPI     Past Medical History:   Diagnosis Date    Anemia     H/O vitamin D deficiency     Pre-existing type 1 diabetes affecting pregnancy, antepartum     age 14    Varicella        Past Surgical History:   Procedure Laterality Date    TONSILLECTOMY AND ADENOIDECTOMY         Current Outpatient Medications   Medication Sig Dispense Refill    Cholecalciferol 100 MCG (4000 UT) TABS 1 tab daily   glucagon (GLUCAGON EMERGENCY) 1 MG injection Inject 1 mg under the skin once as needed for low blood sugar      insulin aspart (NovoLOG) 100 units/mL injection Use up to 120 units daily via insulin pump  40 mL 0    LORazepam (ATIVAN) 0 5 mg tablet Take 1 tablet (0 5 mg total) by mouth every 8 (eight) hours as needed for anxiety (Patient not taking: Reported on 10/13/2020) 30 tablet 0    ONETOUCH DELICA LANCETS FINE MISC Use up to 8 a day and as directed  300 each 3    OneTouch Verio test strip Test up to 8 times a day and as instructed 100 each 0    Prenatal Vit-DSS-Fe Cbn-FA (PRENATAL AD PO) Take by mouth       No current facility-administered medications for this visit           Allergies   Allergen Reactions    Other      Other reaction(s): Resp Distress  Other reaction(s): Resp Sympt Mild  Other reaction(s): Resp Distress    Mold Extract  [Trichophyton]      Other reaction(s): Resp Sympt Mild    Molds & Smuts      Other reaction(s): Resp Sympt Mild    Pollen Extract      Other reaction(s): Resp Sympt Mild       Review of Systems    Video Exam    There were no vitals filed for this visit  Physical Exam Oriented, engaged, calm, full range affect, appropriate speech, denies suicidal/homicidal ideations/psychosis/desire to harm baby, good insight/judgement  Baby walking up and smiling - being held - positive interaction - baby smiling an engaging  I spent 45 minutes directly with the patient during this visit      VIRTUAL VISIT DISCLAIMER    Michael Spencer acknowledges that she has consented to an online visit or consultation  She understands that the online visit is based solely on information provided by her, and that, in the absence of a face-to-face physical evaluation by the physician, the diagnosis she receives is both limited and provisional in terms of accuracy and completeness  This is not intended to replace a full medical face-to-face evaluation by the physician  Michael Spencer understands and accepts these terms

## 2021-01-13 ENCOUNTER — TELEPHONE (OUTPATIENT)
Dept: OBGYN CLINIC | Facility: CLINIC | Age: 31
End: 2021-01-13

## 2021-01-13 NOTE — TELEPHONE ENCOUNTER
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    Get the vaccine!!!

## 2021-01-19 ENCOUNTER — TELEMEDICINE (OUTPATIENT)
Dept: BEHAVIORAL/MENTAL HEALTH CLINIC | Facility: CLINIC | Age: 31
End: 2021-01-19
Payer: COMMERCIAL

## 2021-01-19 DIAGNOSIS — F41.8 POSTPARTUM ANXIETY: Primary | ICD-10-CM

## 2021-01-19 PROCEDURE — 90832 PSYTX W PT 30 MINUTES: CPT | Performed by: SOCIAL WORKER

## 2021-01-19 NOTE — PSYCH
Virtual Regular Visit      Assessment/Plan:    Problem List Items Addressed This Visit     None      Visit Diagnoses     Postpartum anxiety    -  Primary               Reason for visit is   Chief Complaint   Patient presents with    Virtual Regular Visit        Encounter provider Cirilo Ansari    Provider located at [address] [facility] [address]  [address] [place] [address] [address]  [phone]      Recent Visits  No visits were found meeting these conditions  Showing recent visits within past 7 days and meeting all other requirements     Today's Visits  Date Type Provider Dept   01/19/21 [facility], [facility] [place]   Showing today's visits and meeting all other requirements     Future Appointments  No visits were found meeting these conditions  Showing future appointments within next 150 days and meeting all other requirements        The patient was identified by name and date of birth  Scott Mac was informed that this is a telemedicine visit and that the visit is being conducted through [employer] and patient was informed that this is a secure, HIPAA-compliant platform  She agrees to proceed     My office door was closed  No one else was in the room  She acknowledged consent and understanding of privacy and security of the video platform  The patient has agreed to participate and understands they can discontinue the visit at any time  Patient is aware this is a billable service  Subjective  Scott Mac is a 35 y o  female was counseled for 30 minutes from 12:05 to 12:35pm - late start due to patient putting baby down for nap   Lia Gracia turned [age] - walking - meeting developmental milestones - childproofed house  Had 1yr [facility] check up and vaccines  Torticollis improved and completed therapy    Small get together for birthday -  sides of family to keep safe and small  Reviewed covid anxiety, desire to start trying to have another baby  She and Ameya Lung would start trying if covid didn't exist - feels like in a good place - in job for a yr, Kartik Nichols well, her mental health is positive  Discussed covid vaccine as she got it after consulting with providers - had to push aside fears of vaccine and being in childbearing yrs - looked up studies  Will work on pregnancy starting this summer  Feels back to normal self both physically and mentally  Dong some relaxation breatihng, yoga, watching a show alone  Isolating and distancing due to covid but recently decided to do a little more - went shopping alone and  started back in men's hockey league - feels slight more normal     Excited thinking about summer doing more  Ameya Lung will not get vaccine but respects her decision  Recognizing need to prevent spiralling related to medical issues that arise  Explored ways to maintain gains, manage anxiety, utilize CBT skills to prevent additional panic/anxiety  HPI     Past Medical History:   Diagnosis Date    Anemia     H/O vitamin D deficiency     Pre-existing type 1 diabetes affecting pregnancy, antepartum     age 14    Varicella        Past Surgical History:   Procedure Laterality Date    TONSILLECTOMY AND ADENOIDECTOMY         Current Outpatient Medications   Medication Sig Dispense Refill    Cholecalciferol 100 MCG (4000 UT) TABS 1 tab daily   glucagon (GLUCAGON EMERGENCY) 1 MG injection Inject 1 mg under the skin once as needed for low blood sugar      insulin aspart (NovoLOG) 100 units/mL injection Use up to 120 units daily via insulin pump  40 mL 0    LORazepam (ATIVAN) 0 5 mg tablet Take 1 tablet (0 5 mg total) by mouth every 8 (eight) hours as needed for anxiety (Patient not taking: Reported on 10/13/2020) 30 tablet 0    ONETOUCH DELICA LANCETS FINE MISC Use up to 8 a day and as directed   300 each 3    OneTouch Verio test strip Test up to 8 times a day and as instructed 100 each 0    Prenatal Vit-DSS-Fe Cbn-FA (PRENATAL AD PO) Take by mouth       No current facility-administered medications for this visit  Allergies   Allergen Reactions    Other      Other reaction(s): Resp Distress  Other reaction(s): Resp Sympt Mild  Other reaction(s): Resp Distress    Mold Extract  [Trichophyton]      Other reaction(s): Resp Sympt Mild    Molds & Smuts      Other reaction(s): Resp Sympt Mild    Pollen Extract      Other reaction(s): Resp Sympt Mild       Review of Systems    Video Exam    There were no vitals filed for this visit  Physical Exam Oriented, engaged, happy/calm, full range affect, appropriate speech, denies suicidal/homicidal ideations/psychosis/desire to harm baby, good insight/judgement     I spent 30 minutes directly with the patient during this visit      VIRTUAL VISIT [address]ica acknowledges that she has consented to an online visit or consultation  She understands that the online visit is based solely on information provided by her, and that, in the absence of a face-to-face physical evaluation by the physician, the diagnosis she receives is both limited and provisional in terms of accuracy and completeness  This is not intended to replace a full medical face-to-face evaluation by the physician  Dariana Ortega understands and accepts these terms

## 2021-02-12 DIAGNOSIS — E10.9 TYPE 1 DIABETES MELLITUS WITHOUT COMPLICATION (HCC): ICD-10-CM

## 2021-03-03 ENCOUNTER — LAB (OUTPATIENT)
Dept: LAB | Facility: HOSPITAL | Age: 31
End: 2021-03-03
Attending: INTERNAL MEDICINE
Payer: COMMERCIAL

## 2021-03-03 DIAGNOSIS — E55.9 VITAMIN D INSUFFICIENCY: ICD-10-CM

## 2021-03-03 DIAGNOSIS — E10.9 TYPE 1 DIABETES MELLITUS WITHOUT COMPLICATION (HCC): ICD-10-CM

## 2021-03-03 LAB
25(OH)D3 SERPL-MCNC: 20.3 NG/ML (ref 30–100)
ALBUMIN SERPL BCP-MCNC: 3.8 G/DL (ref 3.5–5)
ALP SERPL-CCNC: 68 U/L (ref 46–116)
ALT SERPL W P-5'-P-CCNC: 19 U/L (ref 12–78)
ANION GAP SERPL CALCULATED.3IONS-SCNC: 7 MMOL/L (ref 4–13)
AST SERPL W P-5'-P-CCNC: 15 U/L (ref 5–45)
BILIRUB SERPL-MCNC: 0.4 MG/DL (ref 0.2–1)
BUN SERPL-MCNC: 16 MG/DL (ref 5–25)
CALCIUM SERPL-MCNC: 8.4 MG/DL (ref 8.3–10.1)
CHLORIDE SERPL-SCNC: 102 MMOL/L (ref 100–108)
CHOLEST SERPL-MCNC: 202 MG/DL (ref 50–200)
CO2 SERPL-SCNC: 29 MMOL/L (ref 21–32)
CREAT SERPL-MCNC: 0.75 MG/DL (ref 0.6–1.3)
CREAT UR-MCNC: 121 MG/DL
EST. AVERAGE GLUCOSE BLD GHB EST-MCNC: 146 MG/DL
GFR SERPL CREATININE-BSD FRML MDRD: 107 ML/MIN/1.73SQ M
GLUCOSE P FAST SERPL-MCNC: 151 MG/DL (ref 65–99)
HBA1C MFR BLD: 6.7 %
HDLC SERPL-MCNC: 96 MG/DL
LDLC SERPL CALC-MCNC: 93 MG/DL (ref 0–100)
MICROALBUMIN UR-MCNC: 6.2 MG/L (ref 0–20)
MICROALBUMIN/CREAT 24H UR: 5 MG/G CREATININE (ref 0–30)
POTASSIUM SERPL-SCNC: 4.2 MMOL/L (ref 3.5–5.3)
PROT SERPL-MCNC: 7.4 G/DL (ref 6.4–8.2)
PTH-INTACT SERPL-MCNC: 33.2 PG/ML (ref 18.4–80.1)
SODIUM SERPL-SCNC: 138 MMOL/L (ref 136–145)
TRIGL SERPL-MCNC: 66 MG/DL
TSH SERPL DL<=0.05 MIU/L-ACNC: 2.1 UIU/ML (ref 0.36–3.74)

## 2021-03-03 PROCEDURE — 82570 ASSAY OF URINE CREATININE: CPT

## 2021-03-03 PROCEDURE — 82306 VITAMIN D 25 HYDROXY: CPT

## 2021-03-03 PROCEDURE — 3061F NEG MICROALBUMINURIA REV: CPT | Performed by: INTERNAL MEDICINE

## 2021-03-03 PROCEDURE — 82043 UR ALBUMIN QUANTITATIVE: CPT

## 2021-03-03 PROCEDURE — 3044F HG A1C LEVEL LT 7.0%: CPT | Performed by: INTERNAL MEDICINE

## 2021-03-03 PROCEDURE — 83970 ASSAY OF PARATHORMONE: CPT

## 2021-03-03 PROCEDURE — 80053 COMPREHEN METABOLIC PANEL: CPT

## 2021-03-03 PROCEDURE — 84443 ASSAY THYROID STIM HORMONE: CPT

## 2021-03-03 PROCEDURE — 83036 HEMOGLOBIN GLYCOSYLATED A1C: CPT

## 2021-03-03 PROCEDURE — 80061 LIPID PANEL: CPT

## 2021-03-03 PROCEDURE — 36415 COLL VENOUS BLD VENIPUNCTURE: CPT

## 2021-03-10 ENCOUNTER — DOCUMENTATION (OUTPATIENT)
Dept: ENDOCRINOLOGY | Facility: HOSPITAL | Age: 31
End: 2021-03-10

## 2021-03-15 ENCOUNTER — OFFICE VISIT (OUTPATIENT)
Dept: OBGYN CLINIC | Facility: CLINIC | Age: 31
End: 2021-03-15
Payer: COMMERCIAL

## 2021-03-15 VITALS
SYSTOLIC BLOOD PRESSURE: 132 MMHG | BODY MASS INDEX: 26.96 KG/M2 | WEIGHT: 142.8 LBS | HEIGHT: 61 IN | DIASTOLIC BLOOD PRESSURE: 74 MMHG

## 2021-03-15 DIAGNOSIS — N60.11 FIBROCYSTIC BREAST CHANGES, RIGHT: Primary | ICD-10-CM

## 2021-03-15 PROCEDURE — 99213 OFFICE O/P EST LOW 20 MIN: CPT | Performed by: OBSTETRICS & GYNECOLOGY

## 2021-03-15 NOTE — PROGRESS NOTES
Assessment/Plan:   Normal breast exam, patient reassured  There are no diagnoses linked to this encounter  Subjective:     Patient ID: Margo Peña is a 28 y o  female  HPI:   Tosha is here today thinking that she may have had a skin indentation of the left breast over the past several weeks  She denies any trauma, skin change, nipple discharge, pain or palpable mass  Review of Systems    All negative with the exception of the physical appearance of the left breast     Objective:     Physical Exam    Breasts are symmetric, without skin change, nipple discharge, nodularity or tenderness  There are no supraclavicular or axillary adenopathy  Close attention to where the patient feels there may be an indentation does not disclose any type of permanent scarring or other skin malformation  There is no palpable mass beneath this area  The opposite areolar area was also checked and found to be well within normal limits

## 2021-03-16 ENCOUNTER — OFFICE VISIT (OUTPATIENT)
Dept: ENDOCRINOLOGY | Facility: HOSPITAL | Age: 31
End: 2021-03-16
Payer: COMMERCIAL

## 2021-03-16 VITALS
DIASTOLIC BLOOD PRESSURE: 68 MMHG | BODY MASS INDEX: 27.83 KG/M2 | HEIGHT: 61 IN | SYSTOLIC BLOOD PRESSURE: 96 MMHG | WEIGHT: 147.4 LBS | HEART RATE: 104 BPM

## 2021-03-16 DIAGNOSIS — E55.9 VITAMIN D INSUFFICIENCY: ICD-10-CM

## 2021-03-16 DIAGNOSIS — Z96.41 INSULIN PUMP STATUS: ICD-10-CM

## 2021-03-16 DIAGNOSIS — E10.9 TYPE 1 DIABETES MELLITUS WITHOUT COMPLICATION (HCC): Primary | ICD-10-CM

## 2021-03-16 PROCEDURE — 1036F TOBACCO NON-USER: CPT | Performed by: INTERNAL MEDICINE

## 2021-03-16 PROCEDURE — 99214 OFFICE O/P EST MOD 30 MIN: CPT | Performed by: INTERNAL MEDICINE

## 2021-03-16 PROCEDURE — 3008F BODY MASS INDEX DOCD: CPT | Performed by: INTERNAL MEDICINE

## 2021-03-16 NOTE — PROGRESS NOTES
Connie Denton 28 y o  female MRN: [id number]    Encounter: [id number]      Assessment/Plan     Assessment: This is a 28y o -year-old female with diabetes with hyperglycemia  Plan:  1  Type 1 diabetes:  Her recent Hb A1c was 6 7 with goal of <6 5  Her CGM download from 3/3 to 3/16 shows hyperglycemia in the morning from 7 am and again from 1 pm to 10 pm  She has no episodes of hypo glyemia  She has been in target range of blood glucose 66% of the time  I will increase her basal rate to 0 8 U from 7 am to 4 pm and increase her insulin to carb ratio to 9 5 in the same time frame  Will repeat Hb a1c, CMP in 4 months and will follow up at 4 months  She has plans for future pregnancy in the next few months  She was advised to inform this office prior to future pregnancy plans to facilitate prenatal counseling and insulin dose adjustments  2  Vitamin-D deficiency: Her recent Vit D  levels are at 20 and she has not been taking her supplements  Encouraged compliance and repeat labs in 4 months  Her alk phos levels have normalized  CC: Diabetes    History of Present Illness     HPI:  Connie Denton is a 35y o  year old female with type 1 diabetes for about 12 years  She is on insulin at home and takes Novolog via tandem x2 insulin pump  She denies any polyuria, polydipsia, nocturia and blurry vision  She denies neuropathy, nephropathy and retinopathy  Hypoglycemic episodes: none noticed in her CGM download from 3/3 to 3/16  Blood Sugar/Glucometer/Pump/CGM review: Insulin pump was downloaded  dates of 3/3 through 3/16  CGM average was 164  Target range is set at 70- 180 from 6 am to 10 pm and  from 10 pm to 6 am  Her insulin pump data could not be down loaded due to technical issues       Her basal rate is 0 7U  from 12 am to 7 am, 0 75U  from 7 am to 4 pm, 0 7U from 4 pm to 12 am   Insulin sensitivity is set at 1 U for every 32 above 110 mg/dl of blood glucose and insulin to carb ratio of 1:10  Review of Systems   Constitutional: Negative for appetite change, chills, diaphoresis, fatigue, fever and unexpected weight change  Respiratory: Negative for apnea, cough, choking, chest tightness, shortness of breath, wheezing and stridor  Cardiovascular: Negative for chest pain, palpitations and leg swelling  Gastrointestinal: Negative for abdominal distention, abdominal pain, anal bleeding, blood in stool, constipation, diarrhea, nausea and vomiting  Endocrine: Negative for cold intolerance, heat intolerance, polydipsia and polyphagia  Genitourinary: Negative for decreased urine volume, difficulty urinating, frequency and urgency  Musculoskeletal: Negative for arthralgias, back pain and myalgias  Neurological: Negative for dizziness, light-headedness, numbness and headaches  Psychiatric/Behavioral: Negative for agitation and behavioral problems  Historical Information   Past Medical History:   Diagnosis Date    Anemia     H/O vitamin D deficiency     Pre-existing type 1 diabetes affecting pregnancy, antepartum     age 14    Varicella      Past Surgical History:   Procedure Laterality Date    TONSILLECTOMY AND ADENOIDECTOMY       Social History   Social History     Substance and Sexual Activity   Alcohol Use Not Currently     Social History     Substance and Sexual Activity   Drug Use Never     Social History     Tobacco Use   Smoking Status Never Smoker   Smokeless Tobacco Never Used     Family History:   Family History   Problem Relation Age of Onset    No Known Problems Mother     No Known Problems Father     Diabetes type I Brother     Skin cancer Family        Meds/Allergies   Current Outpatient Medications   Medication Sig Dispense Refill    Cholecalciferol 100 MCG (4000 UT) TABS 1 tab daily        glucagon (GLUCAGON EMERGENCY) 1 MG injection Inject 1 mg under the skin once as needed for low blood sugar      insulin aspart (NovoLOG) 100 units/mL injection USE UP  UNITS DAILY VIA INSULIN PUMP  110 mL 3    LORazepam (ATIVAN) 0 5 mg tablet Take 1 tablet (0 5 mg total) by mouth every 8 (eight) hours as needed for anxiety 30 tablet 0    ONETOUCH DELICA LANCETS FINE MISC Use up to 8 a day and as directed  300 each 3    OneTouch Verio test strip Test up to 8 times a day and as instructed 100 each 0    Prenatal Vit-DSS-Fe Cbn-FA (PRENATAL AD PO) Take by mouth       No current facility-administered medications for this visit  Allergies   Allergen Reactions    Other      Other reaction(s): Resp Distress  Other reaction(s): Resp Sympt Mild  Other reaction(s): Resp Distress    Mold Extract  [Trichophyton]      Other reaction(s): Resp Sympt Mild    Molds & Smuts      Other reaction(s): Resp Sympt Mild    Pollen Extract      Other reaction(s): Resp Sympt Mild       Objective   Vitals: Blood pressure 96/68, pulse 104, height 5' 1" (1 549 m), weight 66 9 kg (147 lb 6 4 oz), last menstrual period 02/21/2021, not currently breastfeeding  Physical Exam  Vitals signs reviewed  Constitutional:       General: She is not in acute distress  Appearance: Normal appearance  She is not ill-appearing, toxic-appearing or diaphoretic  HENT:      Mouth/Throat:      Mouth: Mucous membranes are moist    Cardiovascular:      Rate and Rhythm: Normal rate and regular rhythm  Pulses: Normal pulses  Heart sounds: Normal heart sounds  No murmur  No friction rub  No gallop  Pulmonary:      Effort: Pulmonary effort is normal  No respiratory distress  Breath sounds: Normal breath sounds  No stridor  No wheezing, rhonchi or rales  Chest:      Chest wall: No tenderness  Abdominal:      General: Abdomen is flat  Bowel sounds are normal  There is no distension  Palpations: Abdomen is soft  There is no mass  Tenderness: There is no abdominal tenderness  There is no rebound  Musculoskeletal:         General: No swelling or tenderness  Right lower leg: No edema  Left lower leg: No edema  Skin:     General: Skin is warm and dry  Findings: No lesion or rash  Neurological:      General: No focal deficit present  Mental Status: She is alert  The history was obtained from the review of the chart, patient  Lab Results:   Lab Results   Component Value Date/Time    Hemoglobin A1C 6 7 (H) 03/03/2021 07:33 AM    Hemoglobin A1C 6 7 (A) 10/13/2020 01:09 PM    Hemoglobin A1C 6 4 (H) 07/03/2020 11:29 AM    WBC 7 60 10/28/2020 01:18 PM    WBC 6 05 07/03/2020 11:29 AM    Hemoglobin 12 7 10/28/2020 01:18 PM    Hemoglobin 12 6 07/03/2020 11:29 AM    Hematocrit 40 5 10/28/2020 01:18 PM    Hematocrit 40 8 07/03/2020 11:29 AM    MCV 84 10/28/2020 01:18 PM    MCV 85 07/03/2020 11:29 AM    Platelets 855 12/36/1055 01:18 PM    Platelets 464 83/46/5600 11:29 AM    BUN 16 03/03/2021 07:33 AM    BUN 17 09/22/2020 09:14 AM    BUN 21 08/07/2020 07:07 AM    Potassium 4 2 03/03/2021 07:33 AM    Potassium 4 0 09/22/2020 09:14 AM    Potassium 4 5 08/07/2020 07:07 AM    Chloride 102 03/03/2021 07:33 AM    Chloride 104 09/22/2020 09:14 AM    Chloride 107 08/07/2020 07:07 AM    CO2 29 03/03/2021 07:33 AM    CO2 29 09/22/2020 09:14 AM    CO2 27 08/07/2020 07:07 AM    Creatinine 0 75 03/03/2021 07:33 AM    Creatinine 0 85 09/22/2020 09:14 AM    Creatinine 0 87 08/07/2020 07:07 AM    AST 15 03/03/2021 07:33 AM    AST 14 09/22/2020 09:14 AM    AST 10 08/07/2020 07:07 AM    ALT 19 03/03/2021 07:33 AM    ALT 15 09/22/2020 09:14 AM    ALT 17 08/07/2020 07:07 AM    Albumin 3 8 03/03/2021 07:33 AM    Albumin 4 2 09/22/2020 09:14 AM    Albumin 3 8 08/07/2020 07:07 AM    HDL, Direct 96 03/03/2021 07:33 AM    HDL, Direct 108 07/03/2020 11:29 AM    Triglycerides 66 03/03/2021 07:33 AM    Triglycerides 40 07/03/2020 11:29 AM           Imaging Studies: I have personally reviewed pertinent reports        Portions of the record may have been created with voice recognition software  Occasional wrong word or "sound a like" substitutions may have occurred due to the inherent limitations of voice recognition software  Read the chart carefully and recognize, using context, where substitutions have occurred

## 2021-04-30 ENCOUNTER — DOCUMENTATION (OUTPATIENT)
Dept: ENDOCRINOLOGY | Facility: HOSPITAL | Age: 31
End: 2021-04-30

## 2021-06-02 ENCOUNTER — DOCUMENTATION (OUTPATIENT)
Dept: ENDOCRINOLOGY | Facility: HOSPITAL | Age: 31
End: 2021-06-02

## 2021-06-02 NOTE — PROGRESS NOTES
Order for insulin pump supplies faxed to [facility]  Fax # is [phone]

## 2021-07-14 ENCOUNTER — TELEPHONE (OUTPATIENT)
Dept: ENDOCRINOLOGY | Facility: HOSPITAL | Age: 31
End: 2021-07-14

## 2021-07-14 DIAGNOSIS — E10.9 TYPE 1 DIABETES MELLITUS WITHOUT COMPLICATION (HCC): Primary | ICD-10-CM

## 2021-07-14 NOTE — TELEPHONE ENCOUNTER
Please tell her congratulations! I believe [facility] an MFM department  Also [facility] has MFM presence at [facility] I believe as well which is an option  I can place referral to whichever she would prefer

## 2021-07-14 NOTE — TELEPHONE ENCOUNTER
Spoke to patient   She would like the referral for [facility]/ [facility]

## 2021-07-14 NOTE — TELEPHONE ENCOUNTER
Patient called  She just found out that she is 4 weeks pregnant and will need a referral to   She is now working in [place] and would like to see someone closer to there

## 2021-07-15 ENCOUNTER — TELEPHONE (OUTPATIENT)
Dept: PERINATAL CARE | Facility: CLINIC | Age: 31
End: 2021-07-15

## 2021-07-20 ENCOUNTER — TELEMEDICINE (OUTPATIENT)
Dept: PERINATAL CARE | Facility: CLINIC | Age: 31
End: 2021-07-20
Payer: COMMERCIAL

## 2021-07-20 VITALS — WEIGHT: 147 LBS | BODY MASS INDEX: 27.75 KG/M2 | HEIGHT: 61 IN

## 2021-07-20 DIAGNOSIS — E10.65 PRE-EXISTING TYPE 1 DIABETES MELLITUS WITH HYPERGLYCEMIA DURING PREGNANCY IN FIRST TRIMESTER (HCC): Primary | ICD-10-CM

## 2021-07-20 DIAGNOSIS — O24.012 TYPE 1 DIABETES MELLITUS AFFECTING PREGNANCY IN SECOND TRIMESTER, ANTEPARTUM: ICD-10-CM

## 2021-07-20 DIAGNOSIS — O24.011 PRE-EXISTING TYPE 1 DIABETES MELLITUS WITH HYPERGLYCEMIA DURING PREGNANCY IN FIRST TRIMESTER (HCC): Primary | ICD-10-CM

## 2021-07-20 DIAGNOSIS — Z96.41 INSULIN PUMP IN PLACE: ICD-10-CM

## 2021-07-20 DIAGNOSIS — Z96.41 INSULIN PUMP STATUS: ICD-10-CM

## 2021-07-20 DIAGNOSIS — E55.9 VITAMIN D INSUFFICIENCY: ICD-10-CM

## 2021-07-20 PROBLEM — Z37.9 VACUUM-ASSISTED VAGINAL DELIVERY: Status: RESOLVED | Noted: 2019-12-19 | Resolved: 2021-07-20

## 2021-07-20 PROBLEM — Z3A.39 39 WEEKS GESTATION OF PREGNANCY: Status: RESOLVED | Noted: 2019-12-18 | Resolved: 2021-07-20

## 2021-07-20 PROBLEM — O24.013 PRE-EXISTING TYPE 1 DIABETES MELLITUS WITH HYPERGLYCEMIA DURING PREGNANCY IN THIRD TRIMESTER (HCC): Status: RESOLVED | Noted: 2019-06-21 | Resolved: 2021-07-20

## 2021-07-20 PROBLEM — O24.013 TYPE 1 DIABETES MELLITUS IN PREGNANCY, THIRD TRIMESTER: Status: RESOLVED | Noted: 2019-12-13 | Resolved: 2021-07-20

## 2021-07-20 PROCEDURE — 99215 OFFICE O/P EST HI 40 MIN: CPT | Performed by: NURSE PRACTITIONER

## 2021-07-20 PROCEDURE — 3008F BODY MASS INDEX DOCD: CPT | Performed by: NURSE PRACTITIONER

## 2021-07-20 PROCEDURE — 1036F TOBACCO NON-USER: CPT | Performed by: NURSE PRACTITIONER

## 2021-07-20 RX ORDER — BLOOD SUGAR DIAGNOSTIC
STRIP MISCELLANEOUS
Qty: 200 EACH | Refills: 6 | Status: SHIPPED | OUTPATIENT
Start: 2021-07-20

## 2021-07-20 RX ORDER — LANCETS 30 GAUGE
EACH MISCELLANEOUS
Qty: 200 EACH | Refills: 6 | Status: SHIPPED | OUTPATIENT
Start: 2021-07-20

## 2021-07-20 NOTE — ASSESSMENT & PLAN NOTE
-A1c 6 7%, goal is <6% with minimal hypoglycemia   -Pending baseline A1c; CMP; urine protein/creatinine ratio and TSH with reflex free T4    -Novolog via Tslim insulin pump and Dexcom G6   -Basal settings: MN@ 0 80 units  -Bolus: ISF 32; carb ratio 8; BG target 110; active insulin time 5 hours  -Using TDD 45 to 50 units    -Due to hyperglycemia noted on Dexcom report, current gestation, T1DM and kg, using 67 kg/0 5 units/kg TDD 33 5 units 60% basal 20 units   -New basal settings: MN-3 AM@ 0 80 units/hour; 3 AM-8 AM@ 0 975 units/hour; 8 AM-MN@ 0 825 units/hour    -Bolus settings adjustments: BG target decreased to 100; active insulin time decreased to 4 hours  No changes to carb ratio or ISF at this time   -Will continue basal IQ for now but not recommended during pregnancy   -Download insulin pump for review then at minimum every week  -Start GDM diet 3 meals and 3 snacks combination carbs, protein and fat  -SMBG before meals and 2 hours post start of meal; with hypoglycemia  -Bolus for meals and bedtime snack only  -Avoid over correcting glucose readings post meals and overnight   -Glucose goals fasting 60-90; before meals/overnight ; 1 hour post meal 140 or less and 2 hours post meal 120 or less   -Always have glucose available to treat hypoglycemia, use 15 by 15 rule   -Schedule dietitian appointment   -Keep OB and MFM appointments as scheduled  -Fetal growth ultrasounds per MFM  -Will need fetal echo     -Stay in close contact with diabetes education team    Lab Results   Component Value Date    HGBA1C 6 7 (H) 03/03/2021

## 2021-07-20 NOTE — ASSESSMENT & PLAN NOTE
-Current weight 147 lbs  -Current BMI 27 85   -Recommended weight gain during pregnancy 15 to 25 lbs   -Schedule follow up with dietitian

## 2021-07-20 NOTE — PROGRESS NOTES
Virtual Regular Visit    Verification of patient location:    Patient is currently located in the state of [place]  Patient is currently located in a state in which I am licensed    Assessment/Plan:    Problem List Items Addressed This Visit        Endocrine    Pre-existing type 1 diabetes mellitus with hyperglycemia during pregnancy in first trimester ([facility] [address] ) - Primary     -A1c 6 7%, goal is <6% with minimal hypoglycemia   -Pending baseline A1c; CMP; urine protein/creatinine ratio and TSH with reflex free T4    -Novolog via Tslim insulin pump and Dexcom G6   -Basal settings: MN@ 0 80 units  -Bolus: ISF 32; carb ratio 8; BG target 110; active insulin time 5 hours  -Using TDD 45 to 50 units    -Due to hyperglycemia noted on Dexcom report, current gestation, T1DM and kg, using 67 kg/0 5 units/kg TDD 33 5 units 60% basal 20 units   -New basal settings: MN-3 AM@ 0 80 units/hour; 3 AM-8 AM@ 0 975 units/hour; 8 AM-MN@ 0 825 units/hour    -Bolus settings adjustments: BG target decreased to 100; active insulin time decreased to 4 hours  No changes to carb ratio or ISF at this time   -Will continue basal IQ for now but not recommended during pregnancy   -Download insulin pump for review then at minimum every week  -Start GDM diet 3 meals and 3 snacks combination carbs, protein and fat  -SMBG before meals and 2 hours post start of meal; with hypoglycemia  -Bolus for meals and bedtime snack only  -Avoid over correcting glucose readings post meals and overnight   -Glucose goals fasting 60-90; before meals/overnight ; 1 hour post meal 140 or less and 2 hours post meal 120 or less   -Always have glucose available to treat hypoglycemia, use 15 by 15 rule   -Schedule dietitian appointment   -Keep OB and MFM appointments as scheduled  -Fetal growth ultrasounds per MFM  -Will need fetal echo     -Stay in close contact with diabetes education team    Lab Results   Component Value Date    HGBA1C 6 7 (H) 03/03/2021 Relevant Medications    OneTouch Verio test strip    OneTouch Delica Lancets 36M MISC    Other Relevant Orders    Mychart glucose flowsheet    TSH, 3rd generation with Free T4 reflex    Protein / creatinine ratio, urine       Other    Vitamin D insufficiency     -Pending vitamin D level   -On prenatal vitamin   -Recommended adding D3 2000 iu capsule daily  Relevant Medications    OneTouch Verio test strip    OneTouch Delica Lancets 68J MISC    Other Relevant Orders    Mychart glucose flowsheet    TSH, 3rd generation with Free T4 reflex    Protein / creatinine ratio, urine    Insulin pump status    Relevant Medications    OneTouch Verio test strip    OneTouch Delica Lancets 21R MISC    Other Relevant Orders    Mychart glucose flowsheet    TSH, 3rd generation with Free T4 reflex    Protein / creatinine ratio, urine    BMI 27 0-27 9,adult     -Current weight 147 lbs  -Current BMI 27 85   -Recommended weight gain during pregnancy 15 to 25 lbs   -Schedule follow up with dietitian              Relevant Medications    OneTouch Verio test strip    OneTouch Delica Lancets 94I MISC    Other Relevant Orders    Mychart glucose flowsheet    TSH, 3rd generation with Free T4 reflex    Protein / creatinine ratio, urine      Other Visit Diagnoses     Insulin pump in place        Type 1 diabetes mellitus affecting pregnancy in second trimester, antepartum                   Reason for visit is   Chief Complaint   Patient presents with    Virtual Regular Visit    Diabetes Type 1        Encounter provider Yonny No    Provider located at [address] [address] [phone]      Recent Visits  Date Type Provider Dept   07/15/21 Telephone Abdirashid Mccollum [address] [place] [address]  recent visits within past 7 days and meeting all other requirements  Today's Visits  Date Type Provider Dept   07/20/21 [id number] [facility], [address] [place] [address]  Center   Showing today's visits and meeting all other requirements  Future Appointments  No visits were found meeting these conditions  Showing future appointments within next 150 days and meeting all other requirements       The patient was identified by name and date of birth  Jaycee Ye was informed that this is a telemedicine visit and that the visit is being conducted through [address] Now and patient was informed that this is a secure, HIPAA-compliant platform  She agrees to proceed  My office door was closed  No one else was in the room  She acknowledged consent and understanding of privacy and security of the video platform  The patient has agreed to participate and understands they can discontinue the visit at any time  Patient is aware this is a billable service  Subjective  Jaycee Ye is a 28 y o  female  early pregnancy T1DM diagnosed age 14  On Novolog via Tslim insulin pump with G6  Chantell Rupert in [facility]  LMP 6/10/21  Endocrinologist Dr Lino Sanchez  RN works M,W,Th,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  Has a 20 month daughter who weighed 8 lbs 11 oz no  hypoglycemia  Damon Smith is her   Eating 3 meals and 2 to 3 snacks a day, currently 45-45-45 to 60 grams of carbs plus 15 gram snacks including protein with each meal/snack  Using Dexcom G6 glucose readings but will start completing finger sticks during pregnancy  Reports glucose reading less than 60 on Dexcom report false reading due to new sensor  Will try to download insulin pump at home or go to Dr Joe Fortune office for a download           Past Medical History:   Diagnosis Date    Anemia     H/O vitamin D deficiency     Pre-existing type 1 diabetes affecting pregnancy, antepartum     age 15    Vacuum-assisted vaginal delivery 2019    Varicella        Past Surgical History:   Procedure Laterality Date    DENTAL IMPLANT  2018    TONSILLECTOMY AND ADENOIDECTOMY         Current Outpatient Medications   Medication Sig Dispense Refill    glucagon (GLUCAGON EMERGENCY) 1 MG injection Inject 1 mg under the skin once as needed for low blood sugar      insulin aspart (NovoLOG) 100 units/mL injection USE UP  UNITS DAILY VIA INSULIN PUMP  110 mL 3    OneTouch Verio test strip Test up to 8 times a day and as instructed  Pregnancy; T1DM  200 each 6    Prenatal Vit-DSS-Fe Cbn-FA (PRENATAL AD PO) Take by mouth      URINE GLUCOSE-KETONES TEST VI Test      Cholecalciferol 100 MCG (4000 UT) TABS 1 tab daily  (Patient not taking: Reported on 7/20/2021)      OneTouch Delica Lancets 84G MISC Use up to 8 a day  Pregnancy, T1DM  200 each 6     No current facility-administered medications for this visit  Allergies   Allergen Reactions    Other      Other reaction(s): Resp Distress  Other reaction(s): Resp Sympt Mild  Other reaction(s): Resp Distress    Mold Extract  [Trichophyton]      Other reaction(s): Resp Sympt Mild    Molds & Smuts      Other reaction(s): Resp Sympt Mild    Pollen Extract      Other reaction(s): Resp Sympt Mild       Review of Systems   Constitutional: Positive for fatigue  Negative for fever  HENT: Negative for congestion, sore throat and trouble swallowing  Eyes: Negative for visual disturbance  Last eye exam 10/2020  Respiratory: Negative for cough and shortness of breath  Cardiovascular: Negative for chest pain and palpitations  Gastrointestinal: Negative for constipation, nausea and vomiting  Endocrine: Negative for polydipsia, polyphagia and polyuria  Genitourinary: Negative for difficulty urinating and vaginal bleeding  Neurological: Negative for headaches  Psychiatric/Behavioral: Negative for sleep disturbance  Video Exam  Refer to attached file, pending Tslim insulin pump download for review     Vitals:    07/20/21 0857   Weight: 66 7 kg (147 lb)   Height: 5' 1" (1 549 m)       Physical Exam   It was my intent to perform this visit via video technology but the patient was not able to do a video connection so the visit was completed via audio telephone only  I spent 60 minutes with patient today in which greater than 50% of the time was spent in counseling/coordination of care regarding reviewing chart; plan of care  VIRTUAL VISIT DISCLAIMER      Moise Bolanos verbally agrees to participate in [employer]  Pt is aware that [employer] could be limited without vital signs or the ability to perform a full hands-on physical Marva Lin understands she or the provider may request at any time to terminate the video visit and request the patient to seek care or treatment in person

## 2021-07-21 ENCOUNTER — TELEPHONE (OUTPATIENT)
Dept: PERINATAL CARE | Facility: OTHER | Age: 31
End: 2021-07-21

## 2021-07-21 NOTE — TELEPHONE ENCOUNTER
Left message regarding scheduling patient with Juany (insulin pump t1dm) for 60 min per AVS on 8/18 @ 8:30am [Activity counseling provided] : activity [None] : None [Good understanding] : Patient has a good understanding of lifestyle changes and the steps needed to achieve self management goals

## 2021-07-24 ENCOUNTER — APPOINTMENT (OUTPATIENT)
Dept: LAB | Facility: HOSPITAL | Age: 31
End: 2021-07-24
Payer: COMMERCIAL

## 2021-07-24 DIAGNOSIS — E55.9 VITAMIN D INSUFFICIENCY: ICD-10-CM

## 2021-07-24 DIAGNOSIS — E10.65 PRE-EXISTING TYPE 1 DIABETES MELLITUS WITH HYPERGLYCEMIA DURING PREGNANCY IN FIRST TRIMESTER (HCC): ICD-10-CM

## 2021-07-24 DIAGNOSIS — O24.011 PRE-EXISTING TYPE 1 DIABETES MELLITUS WITH HYPERGLYCEMIA DURING PREGNANCY IN FIRST TRIMESTER (HCC): ICD-10-CM

## 2021-07-24 DIAGNOSIS — E10.9 TYPE 1 DIABETES MELLITUS WITHOUT COMPLICATION (HCC): ICD-10-CM

## 2021-07-24 DIAGNOSIS — Z96.41 INSULIN PUMP STATUS: ICD-10-CM

## 2021-07-24 LAB
25(OH)D3 SERPL-MCNC: 19.6 NG/ML (ref 30–100)
ALBUMIN SERPL BCP-MCNC: 4 G/DL (ref 3.5–5)
ALP SERPL-CCNC: 52 U/L (ref 46–116)
ALT SERPL W P-5'-P-CCNC: 17 U/L (ref 12–78)
ANION GAP SERPL CALCULATED.3IONS-SCNC: 7 MMOL/L (ref 4–13)
AST SERPL W P-5'-P-CCNC: 15 U/L (ref 5–45)
BILIRUB SERPL-MCNC: 0.62 MG/DL (ref 0.2–1)
BUN SERPL-MCNC: 12 MG/DL (ref 5–25)
CALCIUM SERPL-MCNC: 8.6 MG/DL (ref 8.3–10.1)
CHLORIDE SERPL-SCNC: 101 MMOL/L (ref 100–108)
CO2 SERPL-SCNC: 26 MMOL/L (ref 21–32)
CREAT SERPL-MCNC: 0.72 MG/DL (ref 0.6–1.3)
CREAT UR-MCNC: 210 MG/DL
EST. AVERAGE GLUCOSE BLD GHB EST-MCNC: 146 MG/DL
GFR SERPL CREATININE-BSD FRML MDRD: 113 ML/MIN/1.73SQ M
GLUCOSE P FAST SERPL-MCNC: 174 MG/DL (ref 65–99)
HBA1C MFR BLD: 6.7 %
POTASSIUM SERPL-SCNC: 4 MMOL/L (ref 3.5–5.3)
PROT SERPL-MCNC: 7.2 G/DL (ref 6.4–8.2)
PROT UR-MCNC: 18 MG/DL
PROT/CREAT UR: 0.09 MG/G{CREAT} (ref 0–0.1)
SODIUM SERPL-SCNC: 134 MMOL/L (ref 136–145)
TSH SERPL DL<=0.05 MIU/L-ACNC: 2.18 UIU/ML (ref 0.36–3.74)

## 2021-07-24 PROCEDURE — 84156 ASSAY OF PROTEIN URINE: CPT | Performed by: NURSE PRACTITIONER

## 2021-07-24 PROCEDURE — 83036 HEMOGLOBIN GLYCOSYLATED A1C: CPT

## 2021-07-24 PROCEDURE — 80053 COMPREHEN METABOLIC PANEL: CPT

## 2021-07-24 PROCEDURE — 3044F HG A1C LEVEL LT 7.0%: CPT | Performed by: NURSE PRACTITIONER

## 2021-07-24 PROCEDURE — 36415 COLL VENOUS BLD VENIPUNCTURE: CPT

## 2021-07-24 PROCEDURE — 82306 VITAMIN D 25 HYDROXY: CPT

## 2021-07-24 PROCEDURE — 82570 ASSAY OF URINE CREATININE: CPT | Performed by: NURSE PRACTITIONER

## 2021-07-24 PROCEDURE — 3061F NEG MICROALBUMINURIA REV: CPT | Performed by: NURSE PRACTITIONER

## 2021-07-24 PROCEDURE — 84443 ASSAY THYROID STIM HORMONE: CPT

## 2021-07-27 ENCOUNTER — DOCUMENTATION (OUTPATIENT)
Dept: PERINATAL CARE | Facility: CLINIC | Age: 31
End: 2021-07-27

## 2021-07-27 ENCOUNTER — TELEMEDICINE (OUTPATIENT)
Dept: PERINATAL CARE | Facility: CLINIC | Age: 31
End: 2021-07-27
Payer: COMMERCIAL

## 2021-07-27 DIAGNOSIS — Z96.41 INSULIN PUMP STATUS: ICD-10-CM

## 2021-07-27 DIAGNOSIS — Z3A.01 LESS THAN 8 WEEKS GESTATION OF PREGNANCY: ICD-10-CM

## 2021-07-27 DIAGNOSIS — O24.011 PRE-EXISTING TYPE 1 DIABETES MELLITUS WITH HYPERGLYCEMIA DURING PREGNANCY IN FIRST TRIMESTER (HCC): Primary | ICD-10-CM

## 2021-07-27 DIAGNOSIS — E10.65 PRE-EXISTING TYPE 1 DIABETES MELLITUS WITH HYPERGLYCEMIA DURING PREGNANCY IN FIRST TRIMESTER (HCC): Primary | ICD-10-CM

## 2021-07-27 PROCEDURE — G0108 DIAB MANAGE TRN  PER INDIV: HCPCS

## 2021-07-27 NOTE — PROGRESS NOTES
Virtual Regular Visit    Verification of patient location:    Patient is located in the following state in which I hold an active license [place]      Assessment/Plan:    Problem List Items Addressed This Visit     None               Reason for visit is   Chief Complaint   Patient presents with    Diabetes Type 1    Patient Education    Virtual Regular Visit        Encounter provider Olga Fitzpatrick    Provider located at [address] [place] [id number] [phone]      Recent Visits  Date Type Provider Dept   21 [id number] [facility], [facility] recent visits within past 7 days and meeting all other requirements  Today's Visits  Date Type Provider Dept   21 [address]   Showing today's visits and meeting all other requirements  Future Appointments  No visits were found meeting these conditions  Showing future appointments within next 150 days and meeting all other requirements       The patient was identified by name and date of birth  Staci Pérez was informed that this is a telemedicine visit and that the visit is being conducted through [address] [place] [address] Road Now and patient was informed that this is a secure, HIPAA-compliant platform  She agrees to proceed     My office door was closed  No one else was in the room  She acknowledged consent and understanding of privacy and security of the video platform  The patient has agreed to participate and understands they can discontinue the visit at any time  Patient is aware this is a billable service  Subjective  Staci Pérez is a 28 y o  female  early pregnancy T1DM diagnosed age 14  On Novolog via Tslim insulin pump with G6  Dorrine July in [place]  LMP 6/10/21  Endocrinologist Dr Will Lion  RN works M,W,Th,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   Has a 20 month daughter who weighed 8 lbs 11 oz no  hypoglycemia  Isabella Roa is her   Eating 3 meals and 2 to 3 snacks a day, currently 45-45-45 to 60 grams of carbs plus 15 gram snacks including protein with each meal/snack  Using Dexcom G6 glucose readings but will start completing finger sticks during pregnancy  HPI     Past Medical History:   Diagnosis Date    Anemia     H/O vitamin D deficiency     Pre-existing type 1 diabetes affecting pregnancy, antepartum     age 14    Vacuum-assisted vaginal delivery 2019    Varicella        Past Surgical History:   Procedure Laterality Date    DENTAL IMPLANT  2018    TONSILLECTOMY AND ADENOIDECTOMY         Current Outpatient Medications   Medication Sig Dispense Refill    Cholecalciferol 100 MCG (4000 UT) TABS 1 tab daily  (Patient not taking: Reported on 2021)      glucagon (GLUCAGON EMERGENCY) 1 MG injection Inject 1 mg under the skin once as needed for low blood sugar      insulin aspart (NovoLOG) 100 units/mL injection USE UP  UNITS DAILY VIA INSULIN PUMP  110 mL 3    OneTouch Delica Lancets 60U MISC Use up to 8 a day  Pregnancy, T1DM  200 each 6    OneTouch Verio test strip Test up to 8 times a day and as instructed  Pregnancy; T1DM  200 each 6    Prenatal Vit-DSS-Fe Cbn-FA (PRENATAL AD PO) Take by mouth      URINE GLUCOSE-KETONES TEST VI Test       No current facility-administered medications for this visit  Allergies   Allergen Reactions    Other      Other reaction(s): Resp Distress  Other reaction(s): Resp Sympt Mild  Other reaction(s): Resp Distress    Mold Extract  [Trichophyton]      Other reaction(s): Resp Sympt Mild    Molds & Smuts      Other reaction(s): Resp Sympt Mild    Pollen Extract      Other reaction(s): Resp Sympt Mild       Review of Systems    Video Exam    There were no vitals filed for this visit      Physical Exam     I spent 60 minutes with patient today in which greater than 50% of the time was spent in counseling/coordination of care regarding preexisiting type 1 diabetes currently pregnant    VIRTUAL VISIT DISCLAIMER      Terry Hudson verbally agrees to participate in [employer]  Pt is aware that [employer] could be limited without vital signs or the ability to perform a full hands-on physical Louis Drought understands she or the provider may request at any time to terminate the video visit and request the patient to seek care or treatment in person  Thank you for referring your patient to [facility] Maternal Fetal Medicine Diabetes Education Program      Terry Hudson is a  28 y o  female who presents today for Initial visit (class 1)  Patient is at Unknown gestation, LMP: 6/10/2021    Reviewed and updated the following from patients medical record: PMH, Problem List, Allergies, and Current Medications        PMH/PSH:  Past Medical History:   Diagnosis Date    Anemia     H/O vitamin D deficiency     Pre-existing type 1 diabetes affecting pregnancy, antepartum     age 14    Vacuum-assisted vaginal delivery 12/12/2019    Varicella      Past Surgical History:   Procedure Laterality Date    DENTAL IMPLANT  2018    TONSILLECTOMY AND ADENOIDECTOMY         Labs:  No components found for: [employer] [facility]  Lab Results   Component Value Date     07/24/2021 7/24/21 HGA1c 6 7% goal <6 with minimal hypoglycemia  Noted CMP, TSH normal for pregnancy  Medications:  Novolog  Prenatal vitamin, glucagon, cholecalciferol     Nuchal Transluency scheduled for 9/7/21    Anthropometrics:  Ht Readings from Last 3 Encounters:   07/20/21 5' 1" (1 549 m)   03/16/21 5' 1" (1 549 m)   03/15/21 5' 1" (1 549 m)     Wt Readings from Last 3 Encounters:   07/20/21 66 7 kg (147 lb)   03/16/21 66 9 kg (147 lb 6 4 oz)   03/15/21 64 8 kg (142 lb 12 8 oz)     Pre-gravid weight: 147 pounds  Pre-gravid BMI: 27 78   Weight gain recommendations: BMI (25-29 9) 15-25 lbs    Blood Glucose Monitoring:   Glucose Meter: OneTouch Christy Rosario  Instructed on testing blood sugars: 4 x per day (Fasting, 2 hour after start of each meal)  Patient recently received insulin pump adjustments from A CODY Crawford communicated via [employer]  Patient reported having starting these changes and communicating with A C[facility] Crawford via [employer]  Patient to download insulin pump on Wednesday night and also requested SMBG report  Instruction for reporting blood sugar results weekly via:   Phone: [phone]   Fax: [phone]   My Chart (Message, or Glucose Flowsheet)    Goal Blood Sugar Ranges:    Fastin-90 mg/dL   1 hour after the start of each meal: 135 mg/dL or <   2 hours after start of each meal: 120 mg/dL or <      Diet Assessment:  Do you follow any special diet presently? :Yes patient is familiar with carbohydrate counting  Remembered that protein should be paired with carbohydrate in all meals and snacks  Provided suggestions for meals and snacks using food preferences  Do you have any food allergies or intolerances? None reported with this pregnancy  Patient did experience nausea with protein food aversions with her previous pregnancy  Food/Beverage Likes/Dislikes:no  Do you have any Temple or ethnic dietary practices? no  Do you receive WIC or [employer] [place]? no  How many meals/snacks do you eat daily? Wakes at 6 AM, Breakfast: 7 AM, midmorning snack 9-10 am, Lunch: 12:30-12:45 am, midafternoon snack 3 PM, dinner 6-6:30 PM, bedtime snack 10 PM  Occupation: RN   FT/PT: FT   Shift: - 7:30 am to 5:30 PM [employer] in [facility]  How often do you eat out or get take out each week? Trying not to eat out often  Plans to bring left overs to work or sandwich because during her previous pregnancy when dining out blood sugars were higher  What time was your last meal and what did you have to eat/drink?  Ate breakfast: chobani yogurt and Larabar with water       Meal Plan (daily calorie and protein needs):  Calories: 1800 calorie (CHO: 56-55-14-36-24-08) (PRO:2-1-3-1-3-2)  Protein: 65 gms    Diet Instruction:  1  Patient was provided with a meal plan including 3 meals and 3 snacks  2  Discussed appropriate amounts of CHO, PRO, and Fat at each meal and snack  3  Reviewed CHO exchange list, and portion sizes for both CHO and PRO via food models  4  Instruction on how to read a food label  5  Provided suggested meal/snack options to increase nutrition and maintain consistent meal and snack intakes  6  Instructed on how to keep a 3-day food diary to be brought to follow- up appointment  7  Encouraged  patient to eat every 2 0-3 5 hours while awake  8  Encouraged patient to go no longer than 8-10 hours fasting overnight until first meal of the day  Physical Activity:  Discussed benefits of physical activity to optimize blood glucose control, encouraged activity at patient is physically able  Always consult a physician prior to starting an exercise program  Recommend 20-30 minutes daily  Do you exercise? Fairly active with work and her 20 month daughter  Nutrition Diagnosis Statement:  Nutrition Diagnosis: Altered nutrition related lab values (glucose)  Related to: GDM  As evidenced by: history of type 1 diabetes and pump downloads; A1c    Goals:  1  Fasting BG 60-90 mg/dL  2  2 hr PP Blood Glucose <120 mg/dL (1 hr PP <135)  3  GDM Diet     Monitoring and Evaluation  1  Adherence to GDM diet  2  Blood Glucose Monitoring   3   Weight/ Body Composition      Patient Stated Goal: "I will eat 3 meals and 3 snacks each day, including protein at each"    Diabetes Self Management Support Plan outside of ongoing care: Spouse/Family    Learner/s Present:Learners Present: Patient   Educational Materials Provided: Diabetes in Pregnancy Booklet and Meal Plan 1800 calories, hypoglycemia guidelines  Teaching methods used: Teaching Methods MFM: Listened to Instruction, Patient taught- back information  and Label Reading  Patients Response to Education Information Provided: Voices Understanding, Was able to teach back information provided, Provided practical ways to integrate information learned into daily routine and Expressed enthusiasm of ability to perform treatment plan at home  Readiness to Change: Action (Actively implementing change)  Barriers to Learning/Change: No Barriers  Expected Compliance: excellent    Date to report blood sugars: Wednesday, 7/28  Class 2 (date): Schedule in 4 weeks  Noted Follow up appointment with [address] [place] [address] scheduled on 8/18  Begin Time: 1:00 PM  End Time: 2:00 PM    It was a pleasure working with them today  Please feel free to call with any questions or concerns      [place] RD, LDN, CDE  Diabetes Educator  [facility]  Diabetes in Pregnancy Program  [address] [employer], [address] [place] [address],[address]  [place], [address] [place] [address]

## 2021-07-27 NOTE — PROGRESS NOTES
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 Yes type 1    Pregnancy History:  How many total pregnancies have you had? 2  How many children do you have? 1  Have you had a baby weighing larger than 9 lbs? no  Are you having swelling or fluid retention? no  Have you been placed on bedrest? no    Physical Activity:  Do you exercise? Busy with work and taking care of 20 month old daughter      Nutrition Questionnaire: How many meals do you eat daily? 3  List times of meals: Breakfast: 7 am/ Lunch: 12:30-12:45/ Dinner: 6:30   How many snacks do you eat daily? 3  List times of snacks: AM Snack: 9-10 am/ PM Snack: 3 pm/ Bedtime Snack: 10 pm  What type of diet are you following at home? Other carobhydrate counting for insulin pump  Do you have special or ethnic dietary preferences? no  Do you have any food allergies or intolerances? no  When was your last meal? breakfast  List what you ate and the amounts: chobani yogurt and [place] with water    Learning preferences: How do you learn best? Slides/ Power point  How do you rate your health? Good  Who is your primary support person?  Spouse  How do you cope with stress? talk  Do you have any cultural or Jewish beliefs we should be aware of? no  How do you feel the diabetes diagnosis will affect the rest of your pregnancy?did it before with no issues for baby  Do you receive WIC or food stamps? no

## 2021-08-18 ENCOUNTER — TELEMEDICINE (OUTPATIENT)
Dept: PERINATAL CARE | Facility: CLINIC | Age: 31
End: 2021-08-18
Payer: COMMERCIAL

## 2021-08-18 DIAGNOSIS — E10.65 PRE-EXISTING TYPE 1 DIABETES MELLITUS WITH HYPERGLYCEMIA DURING PREGNANCY IN FIRST TRIMESTER (HCC): Primary | ICD-10-CM

## 2021-08-18 DIAGNOSIS — Z96.41 INSULIN PUMP STATUS: ICD-10-CM

## 2021-08-18 DIAGNOSIS — E55.9 VITAMIN D DEFICIENCY: ICD-10-CM

## 2021-08-18 DIAGNOSIS — O24.011 PRE-EXISTING TYPE 1 DIABETES MELLITUS WITH HYPERGLYCEMIA DURING PREGNANCY IN FIRST TRIMESTER (HCC): Primary | ICD-10-CM

## 2021-08-18 PROCEDURE — 1036F TOBACCO NON-USER: CPT | Performed by: NURSE PRACTITIONER

## 2021-08-18 PROCEDURE — 99215 OFFICE O/P EST HI 40 MIN: CPT | Performed by: NURSE PRACTITIONER

## 2021-08-18 RX ORDER — AMOXICILLIN 500 MG/1
CAPSULE ORAL
COMMUNITY
Start: 2021-08-16 | End: 2021-10-27 | Stop reason: ALTCHOICE

## 2021-08-18 NOTE — ASSESSMENT & PLAN NOTE
-Current weight 147 lbs  -Current BMI 27 78   -Recommended weight gain during pregnancy 15 to 25 lbs  -Seen by dietitian   -Continue GDM diet

## 2021-08-18 NOTE — ASSESSMENT & PLAN NOTE
-A1c 6 7% not at goal for pre-existing diabetes, goal is <6% with minimal hypoglycemia   -CMP, TSH and spot urine protein/creatinine ratio within normal   -On Novolog via T slim insulin pump and Dexcom G6  Reports currently issue with Dexcom and not correlating with finger sticks  Plans to request a new transmitter and change current sensor   -Encouraged to SMBG fasting, before meals and for bedtime for bolus insulin vs using CGM  -Change infusion sites every 2 days   -Current basal settings:  MN@ 0 925 units/hour;  3 AM@ 0 997 units/hour(due to decrease by 5% secondary to hypoglycemia);  9 AM@ 1 00 units/hour;  12 PM@ 0 925 units/hour   -Current bolus settings:  Carb ratio 8; correction factor 32; BG target 100 unit out of first trimester for safety reasons   -Download insulin pump on Monday, 8/23/21 or sooner with concerns   -Always have glucose available for hypoglycemia, use 15 by 15 rule  -Keep appointment as scheduled with MFM    -Pending records from [facility] office   -Continue follow up with OB and MFM as recommended   -Continue close contact with diabetes team    Lab Results   Component Value Date    HGBA1C 6 7 (H) 07/24/2021

## 2021-08-18 NOTE — PATIENT INSTRUCTIONS
-A1c 6 7% not at goal for pre-existing diabetes, goal is <6% with minimal hypoglycemia   -CMP, TSH and spot urine protein/creatinine ratio within normal   -On Novolog via T slim insulin pump and Dexcom G6  Reports currently issue with Dexcom and not correlating with finger sticks  Plans to request a new transmitter and change current sensor   -Encouraged to SMBG fasting, before meals and for bedtime for bolus insulin vs using CGM  -Change infusion sites every 2 days   -Current basal settings:  MN@ 0 925 units/hour;  3 AM@ 0 997 units/hour(due to decrease by 5% secondary to hypoglycemia);  9 AM@ 1 00 units/hour;  12 PM@ 0 925 units/hour   -Current bolus settings:  Carb ratio 8; correction factor 32; BG target 100 unit out of first trimester for safety reasons   -Download insulin pump on Monday, 8/23/21 or sooner with concerns   -Always have glucose available for hypoglycemia, use 15 by 15 rule  -Keep appointment as scheduled with MFM    -Pending records from [facility] office   -Continue follow up with OB and MFM as recommended   -Continue close contact with diabetes team

## 2021-08-18 NOTE — PROGRESS NOTES
Virtual Regular Visit    Verification of patient location:[place] on vacation  Patient is located in the following state in which I do not have an active license [place]  No charge for visit, insulin pump download reviewed and discussed with patient  No adjustments made to insulin pump settings at this time  Assessment/Plan:    Problem List Items Addressed This Visit        Endocrine    Pre-existing type 1 diabetes mellitus with hyperglycemia during pregnancy in first trimester ([facility] [address] ) - Primary     -A1c 6 7% not at goal for pre-existing diabetes, goal is <6% with minimal hypoglycemia   -CMP, TSH and spot urine protein/creatinine ratio within normal   -On Novolog via T slim insulin pump and Dexcom G6  Reports currently issue with Dexcom and not correlating with finger sticks  Plans to request a new transmitter and change current sensor   -Encouraged to SMBG fasting, before meals and for bedtime for bolus insulin vs using CGM  -Change infusion sites every 2 days   -Current basal settings:  MN@ 0 925 units/hour;  3 AM@ 0 997 units/hour(due to decrease by 5% secondary to hypoglycemia);  9 AM@ 1 00 units/hour;  12 PM@ 0 925 units/hour   -Current bolus settings:  Carb ratio 8; correction factor 32; BG target 100 unit out of first trimester for safety reasons   -Download insulin pump on Monday, 8/23/21 or sooner with concerns   -Always have glucose available for hypoglycemia, use 15 by 15 rule  -Keep appointment as scheduled with MFM  -Pending records from [facility] office   -Continue follow up with OB and MFM as recommended   -Continue close contact with diabetes team    Lab Results   Component Value Date    HGBA1C 6 7 (H) 07/24/2021               Other    Vitamin D deficiency     -D level 19 6, started OTC D3 2000 iu capsule last visit and prenatal vitamin has 2000 iu   -Be consistent with D supplements  Insulin pump status    BMI 27 0-27 9,adult     -Current weight 147 lbs    -Current BMI 27 78   -Recommended weight gain during pregnancy 15 to 25 lbs  -Seen by dietitian   -Continue GDM diet  Reason for visit is   Chief Complaint   Patient presents with    Virtual Regular Visit    Diabetes Type 1        Encounter provider Balta Bearden, 10 [facility]    Provider located at [facility] [address] [address] [phone]      Recent Visits  No visits were found meeting these conditions  Showing recent visits within past 7 days and meeting all other requirements  Today's Visits  Date Type Provider Dept   21 Telemedicine Balta Bearden, [facility] today's visits and meeting all other requirements  Future Appointments  No visits were found meeting these conditions  Showing future appointments within next 150 days and meeting all other requirements       The patient was identified by name and date of birth  Samantha Naqvi was informed that this is a telemedicine visit and that the visit is being conducted through [address] Now and patient was informed that this is a secure, HIPAA-compliant platform  She agrees to proceed  My office door was closed  No one else was in the room  She acknowledged consent and understanding of privacy and security of the video platform  The patient has agreed to participate and understands they can discontinue the visit at any time  Patient is aware this is a billable service  Subjective  Samantha Naqvi is a 28 y o  female  [place] 3/17/21 per OB T1DM on Novolog via T slim insulin pump with Dexcom G6  Eating 3 meals and 3 snacks a day  Does better on work days following a routine, does have difficulty with dinner time   Currently on vacation and reports new Dexcom transmitter and sensor started but finger sticks are not correlating with sensor, CGM is reading high when in reality Whit has a low glucose reading, one example CGM reported 150 or 160 and due to s/s of hypoglycemia SMBG reading 61  She is dealing with a UTI and started on Amoxicillin that may impact glucose readings as well  Notices infusion site not as effective by day 3 and will start changing infusion site every 2 days per recommendation  Had OB appointment with follow up scheduled and OB will be sending records to [facility]  Has ultrasound scheduled with [facility] on 9/7/21  Past Medical History:   Diagnosis Date    Anemia     H/O vitamin D deficiency     Pre-existing type 1 diabetes affecting pregnancy, antepartum     age 14    Vacuum-assisted vaginal delivery 12/12/2019    Varicella        Past Surgical History:   Procedure Laterality Date    DENTAL IMPLANT  2018    TONSILLECTOMY AND ADENOIDECTOMY         Current Outpatient Medications   Medication Sig Dispense Refill    amoxicillin (AMOXIL) 500 mg capsule       Cholecalciferol 100 MCG (4000 UT) TABS 1 tab daily  (Patient not taking: Reported on 7/20/2021)      glucagon (GLUCAGON EMERGENCY) 1 MG injection Inject 1 mg under the skin once as needed for low blood sugar      insulin aspart (NovoLOG) 100 units/mL injection USE UP  UNITS DAILY VIA INSULIN PUMP  110 mL 3    OneTouch Delica Lancets 80L MISC Use up to 8 a day  Pregnancy, T1DM  200 each 6    OneTouch Verio test strip Test up to 8 times a day and as instructed  Pregnancy; T1DM  200 each 6    Prenatal Vit-DSS-Fe Cbn-FA (PRENATAL AD PO) Take by mouth      URINE GLUCOSE-KETONES TEST VI Test       No current facility-administered medications for this visit  Allergies   Allergen Reactions    Other      Other reaction(s): Resp Distress  Other reaction(s): Resp Sympt Mild  Other reaction(s): Resp Distress    Mold Extract  [Trichophyton]      Other reaction(s): Resp Sympt Mild    Molds & Smuts      Other reaction(s): Resp Sympt Mild    Pollen Extract      Other reaction(s): Resp Sympt Mild       Review of Systems   Constitutional: Negative for fever  Eyes: Negative for visual disturbance  Respiratory: Negative for cough and shortness of breath  Cardiovascular: Negative for chest pain and palpitations  Gastrointestinal: Positive for nausea  Negative for constipation and vomiting  Endocrine: Negative for polydipsia, polyphagia and polyuria  Genitourinary: Negative for difficulty urinating and vaginal bleeding  Neurological: Negative for headaches  Video Exam  Refer to 2 attached downloads  Insulin pump adjustments completed on 8/12/21 and today no adjustments made  There were no vitals filed for this visit  Physical Exam  HENT:      Head: Normocephalic  Nose: Nose normal    Eyes:      Conjunctiva/sclera: Conjunctivae normal    Pulmonary:      Effort: Pulmonary effort is normal    Musculoskeletal:      Cervical back: Normal range of motion  Neurological:      Mental Status: She is alert and oriented to person, place, and time  Psychiatric:         Mood and Affect: Mood normal          Behavior: Behavior normal          Thought Content: Thought content normal          Judgment: Judgment normal           I spent 40 minutes directly with the patient during this visit  VIRTUAL VISIT DISCLAIMER      Amadeo Winter verbally agrees to participate in [employer]  Pt is aware that [employer] could be limited without vital signs or the ability to perform a full hands-on physical Fabrizio Humphreys understands she or the provider may request at any time to terminate the video visit and request the patient to seek care or treatment in person

## 2021-08-18 NOTE — ASSESSMENT & PLAN NOTE
-D level 19 6, started OTC D3 2000 iu capsule last visit and prenatal vitamin has 2000 iu   -Be consistent with D supplements

## 2021-08-20 DIAGNOSIS — E10.9 TYPE 1 DIABETES MELLITUS WITHOUT COMPLICATION (HCC): ICD-10-CM

## 2021-08-25 ENCOUNTER — DOCUMENTATION (OUTPATIENT)
Dept: PERINATAL CARE | Facility: CLINIC | Age: 31
End: 2021-08-25

## 2021-08-25 DIAGNOSIS — E10.65 PRE-EXISTING TYPE 1 DIABETES MELLITUS WITH HYPERGLYCEMIA DURING PREGNANCY IN FIRST TRIMESTER (HCC): Primary | ICD-10-CM

## 2021-08-25 DIAGNOSIS — O24.011 PRE-EXISTING TYPE 1 DIABETES MELLITUS WITH HYPERGLYCEMIA DURING PREGNANCY IN FIRST TRIMESTER (HCC): Primary | ICD-10-CM

## 2021-08-25 DIAGNOSIS — Z46.81 INSULIN PUMP TITRATION: ICD-10-CM

## 2021-08-25 NOTE — PROGRESS NOTES
Joe Chaudhari is a 28 y o  female  on a/an T-slim insulin pump with Dexcom G6  Estimated Date of Delivery: None noted  Pending MFM visit  Currently: Unknown  OBGYN: Jeniffer Vaughan in [place], [place]  Encounter Diagnosis     ICD-10-CM    1  Pre-existing type 1 diabetes mellitus with hyperglycemia during pregnancy in first trimester ([facility] [address] )  O24 011     E10 65    2  Insulin pump titration  Z46 81         Assessment/Plan: [employer] message sent to patient below  Due to hypo and hyperglycemia, adjust basal settings as follows, be mindful a new time added:  MN-3 AM@ 0 875 units/hour;  3 AM-9 AM@ 0 997 units/hour;  9 AM-10 PM@ 1 10 units/hour;  10 PM-MN@ 0 925 units/hour  Please refrain from correcting glucose at bedtime because I am trying avoid glucose readings less than 60 overnight so midnight basal decreased  And due to hyperglycemia during the day especially in the evening basal rates increased during the day  Please let me know if you start having issues with glucose readings less 60  It will take about 3 days to notice a change in glucose readings which will take you into the weekend  So if during the day you notice issue with hypoglycemia, decrease basal rate by 5% which will be 1 10 to 1 045 units/hour  Of course if you have any issues let me know and if you have to decrease basal rate let me know  Next time maybe you can decrease BG target to 90  Download next Wednesday or sooner with issues  Please send in total basal setting  Basal rate: MN@ 0 925 units/hour; 3 AM@ 0 997; 9 AM@ 1 05; 12 PM@ 0 925  Insulin to carb ratio:8  Insulin sensitivity factor:32  BG target:100  Type of insulin:Nolovog  Self-monitoring blood glucose: before meals, 2 hours post meal and with hypoglycemia  CGM: yes Dexcom G6  Glucose ranges: refer to attached file; glucose readings less than 60 overnight and hyperglycemia during the day     Patient recently had UTI and on Amoxicillin that could cause wide variability in glucose readings  Diet:1800 calorie(CHO: 31-84-13-87-85-65) (PRO:2-1-3-1-3-2) Protein: 65 gms GDM diet with 3 meals and 3 snacks including recommended combination of carb, protein and fat per meal/snack  Discussed with patient in case of malfunctioning of the pump to use basal and bolus therapy as backup which is prescribed to the patient  Also notified patient to call office with any issues  7/21/21 dietitian appointment completed  Pending 8/31/21 follow up dietitian appointment  Pending 9/7/21 MFM appointment  7/24/21 A1c 6 7% not at goal; UPC ratio 0 09 normal; CMP within normal except for glucose; TSH normal; vitamin D low and on supplements  Late entry 2nd attached file from 8/12/21 insulin pump adjustments and not added to 8/18/21 office note

## 2021-08-25 NOTE — ASSESSMENT & PLAN NOTE
-Due to hypo and hyperglycemia, basal insulin pump settings adjusted:  MN-3 AM@ 0 875 units/hour;  3 AM-9 AM@ 0 997 units/hour;  9 AM-10 PM@ 1 10 units/hour;  10 PM-MN@ 0 925 units/hour      Lab Results   Component Value Date    HGBA1C 6 7 (H) 07/24/2021

## 2021-08-31 ENCOUNTER — TELEMEDICINE (OUTPATIENT)
Dept: PERINATAL CARE | Facility: CLINIC | Age: 31
End: 2021-08-31
Payer: COMMERCIAL

## 2021-08-31 ENCOUNTER — PATIENT MESSAGE (OUTPATIENT)
Dept: PERINATAL CARE | Facility: CLINIC | Age: 31
End: 2021-08-31

## 2021-08-31 DIAGNOSIS — E10.65 PRE-EXISTING TYPE 1 DIABETES MELLITUS WITH HYPERGLYCEMIA DURING PREGNANCY IN FIRST TRIMESTER (HCC): Primary | ICD-10-CM

## 2021-08-31 DIAGNOSIS — Z3A.11 11 WEEKS GESTATION OF PREGNANCY: ICD-10-CM

## 2021-08-31 DIAGNOSIS — O24.011 PRE-EXISTING TYPE 1 DIABETES MELLITUS WITH HYPERGLYCEMIA DURING PREGNANCY IN FIRST TRIMESTER (HCC): Primary | ICD-10-CM

## 2021-08-31 PROCEDURE — G0108 DIAB MANAGE TRN  PER INDIV: HCPCS | Performed by: DIETITIAN, REGISTERED

## 2021-08-31 NOTE — PROGRESS NOTES
Virtual Regular Visit    Verification of patient location: [place], [place]    Patient is located in the following state in which I hold an active license [place]      Assessment/Plan:    Problem List Items Addressed This Visit     None               Reason for visit is   Chief Complaint   Patient presents with    Virtual Regular Visit        Encounter provider Veronika Martinez    Provider located at [address] [place] [address]  [phone]      Recent Visits  No visits were found meeting these conditions  Showing recent visits within past 7 days and meeting all other requirements  Future Appointments  No visits were found meeting these conditions  Showing future appointments within next 150 days and meeting all other requirements       The patient was identified by name and date of birth  Berenice Damon was informed that this is a telemedicine visit and that the visit is being conducted through [address] [place] [address] Now and patient was informed that this is a secure, HIPAA-compliant platform  She agrees to proceed     My office door was closed  No one else was in the room  She acknowledged consent and understanding of privacy and security of the video platform  The patient has agreed to participate and understands they can discontinue the visit at any time  Patient is aware this is a billable service  Subjective  Berenice Damon is a 28 y o  female pregnant patient         HPI     Past Medical History:   Diagnosis Date    Anemia     H/O vitamin D deficiency     Pre-existing type 1 diabetes affecting pregnancy, antepartum     age 14    Vacuum-assisted vaginal delivery 12/12/2019    Varicella        Past Surgical History:   Procedure Laterality Date    DENTAL IMPLANT  2018    TONSILLECTOMY AND ADENOIDECTOMY         Current Outpatient Medications   Medication Sig Dispense Refill    amoxicillin (AMOXIL) 500 mg capsule       Cholecalciferol 100 MCG (4000 UT) TABS 1 tab daily  (Patient not taking: Reported on 7/20/2021)      glucagon (GLUCAGON EMERGENCY) 1 MG injection Inject 1 mg under the skin once as needed for low blood sugar      insulin aspart (NovoLOG) 100 units/mL injection USE UP  UNITS DAILY VIA INSULIN PUMP  110 mL 1    OneTouch Delica Lancets 51V MISC Use up to 8 a day  Pregnancy, T1DM  200 each 6    OneTouch Verio test strip Test up to 8 times a day and as instructed  Pregnancy; T1DM  200 each 6    Prenatal Vit-DSS-Fe Cbn-FA (PRENATAL AD PO) Take by mouth      URINE GLUCOSE-KETONES TEST VI Test       No current facility-administered medications for this visit  Allergies   Allergen Reactions    Other      Other reaction(s): Resp Distress  Other reaction(s): Resp Sympt Mild  Other reaction(s): Resp Distress    Mold Extract  [Trichophyton]      Other reaction(s): Resp Sympt Mild    Molds & Smuts      Other reaction(s): Resp Sympt Mild    Pollen Extract      Other reaction(s): Resp Sympt Mild       Review of Systems    Video Exam    There were no vitals filed for this visit  Physical Exam --not perfomred    I spent 30 minutes directly with the patient during this visit  [address] [place] [address] Drive verbally agrees to participate in [employer]  Pt is aware that [employer] could be limited without vital signs or the ability to perform a full hands-on physical Crespohumera Deshpande understands she or the provider may request at any time to terminate the video visit and request the patient to seek care or treatment in person  Thank you for referring your patient to [facility] Maternal Fetal Medicine Diabetes in Pregnancy Program      Emily Espinoza is a  28 y o  female who presents today for Class 2    Patient is at Unknown gestation, Estimated Date of Delivery: 3/17/21 per patient    Reviewed and updated the following from patients medical record: PMH, Problem List, Allergies, and Current Medications  Visit Diagnosis:  Pre-existing type 1 DM with hyperglycemia in pregnancy    Additional Pregnancy Complications:  Overweight prior to the pregnancy    Labs:      Lab Results   Component Value Date    GLUF 174 (H) 2021 GfY9s-3 7%  Stable from 3/3/21 HbA1c of 6 7%  to be redone every 6-7 weeks  Medications:  T-Slim insulin pump with Novolog    Anthropometrics:  Ht Readings from Last 3 Encounters:   21 5' 1" (1 549 m)   21 5' 1" (1 549 m)   03/15/21 5' 1" (1 549 m)     Wt Readings from Last 3 Encounters:   21 66 7 kg (147 lb)   21 66 9 kg (147 lb 6 4 oz)   03/15/21 64 8 kg (142 lb 12 8 oz)     Pre-gravid weight:  147 pounds  Pre-gravid BMI: 27 8  Weight Change: Not found  Weight gain recommendations: BMI (25-29 9) 15-25 lbs    Recent Ultra Sound Results:  First ultrasound schedule for 21    Blood Glucose Monitoring:  Reinforcement of blood glucose goals and reporting guidelines  Glucose Meter: OneTouch Verio Flex  Testing blood sugars: 4 x per day (Fasting, 2 hour after start of each meal)  Method of reporting blood sugars:Also, has DexCom G6 & submitting downloads weekly To CODY Valdez    Report blood sugar results weekly via:  Phone: [phone]   OR  My Chart (Message with image attachment, or Glucose Flowsheet)    Goal Blood Sugar Ranges:   Fastin-90 mg/dL  1 hour after the start of each meal: 140 mg/dL   2 hours after start of each meal: 120 mg/dL     Meal Plan:  Calories: 1800 calorie (CHO: 15-51-34-05-77-33) (PRO:2-1-3-1-3-2)    Additional Nutrition concerns: Recently developed food aversions  Now, sensitive to scent & sight of food  Unable to tolerate grilled chicken  Can eat beef, pork , salmon, peanut butter & occasionally turkey  Had tried Take-out foods for 1 week as arrives home from work at 5:30 PM when  also arrives home  Take-out food increased her BG much more   Started to activity  Sick day Guidelines:   Patient advised that sickness will raise blood sugar and need to continue medication regimen as directed  If blood sugar is > 160 mg/dL twice in one day call doctor  Instructed on what to do when unable to consume normal meal plan  Hypoglycemia & Treatment Guidelines:  Reviewed what hypoglycemia is, signs and symptoms, and how to treat via the 15:15 rule  Breastfeeding Guidelines:  Continue GDM meal plan plus additional 350-500 calories daily  Stay hydrated by drinking 8-10 (8 oz ) fluids daily  Plans to breastfeed with this pregnancy too  Dining Out & Travel Guidelines:  Patient advised to be prepared with extra diabetes supplies, medications, and snacks, as well as sticking to the same time schedule and portions eaten at home for meals and snacks  Maternal-Fetal Testing:    Ultrasounds- growth scans every 4 weeks  NST- twice weekly starting at 32nd week GA   MARTINEZ- weekly starting at 32 weeks GA    Patient Stated Goal: "I will eat 3 meals and 3 snacks each day, including protein at each"  Goal Assessment: On track    Diabetes Self Management Support Plan outside of ongoing care: Spouse/Family    Learner/s Present:Learners Present: Patient   Barriers to Learning/Change: No Barriers  Expected Compliance: very good    Date to report blood sugars: weekly  Follow up date: 6 weeks    Begin Time: 11 AM  End Time: 11:45 AM    It was a pleasure working with them today  Please feel free to call with any questions or concerns      Cristóbal Martinez  Diabetes Educator  [facility] Maternal Fetal Medicine  Diabetes in Pregnancy Program  [facility], [address] [place] [address],[address]  [address]

## 2021-09-01 ENCOUNTER — TELEPHONE (OUTPATIENT)
Dept: PERINATAL CARE | Facility: OTHER | Age: 31
End: 2021-09-01

## 2021-09-01 NOTE — TELEPHONE ENCOUNTER
Lvm regarding scheduling patient for a 30 minute f/u for nutrition  (Tuesdays work best)  Waiting on patient to call MFM office to schedule

## 2021-09-02 ENCOUNTER — TELEPHONE (OUTPATIENT)
Dept: PERINATAL CARE | Facility: CLINIC | Age: 31
End: 2021-09-02

## 2021-09-02 NOTE — TELEPHONE ENCOUNTER
Phone call to patient and left M to confirm M NT US appt :  [facility] sent you an important message via [facility]  Message contains a video link by [employer] that explains genetic testing verus screening and information on how to check your insurance coverage for specialty genetic labs  We encourage you to review this information prior to your C/ Andrea Caballero appointment  After viewing video if you have any questions please call the nurse line @ # [phone]  We look forward to seeing you soon at [facility]

## 2021-09-07 ENCOUNTER — ROUTINE PRENATAL (OUTPATIENT)
Dept: PERINATAL CARE | Facility: CLINIC | Age: 31
End: 2021-09-07
Payer: COMMERCIAL

## 2021-09-07 VITALS
HEIGHT: 61 IN | SYSTOLIC BLOOD PRESSURE: 136 MMHG | HEART RATE: 95 BPM | WEIGHT: 162.2 LBS | BODY MASS INDEX: 30.62 KG/M2 | DIASTOLIC BLOOD PRESSURE: 76 MMHG

## 2021-09-07 DIAGNOSIS — Z3A.12 12 WEEKS GESTATION OF PREGNANCY: ICD-10-CM

## 2021-09-07 DIAGNOSIS — E10.65 PRE-EXISTING TYPE 1 DIABETES MELLITUS WITH HYPERGLYCEMIA DURING PREGNANCY IN FIRST TRIMESTER (HCC): Primary | ICD-10-CM

## 2021-09-07 DIAGNOSIS — Z36.82 ENCOUNTER FOR NUCHAL TRANSLUCENCY TESTING: ICD-10-CM

## 2021-09-07 DIAGNOSIS — O24.011 PRE-EXISTING TYPE 1 DIABETES MELLITUS WITH HYPERGLYCEMIA DURING PREGNANCY IN FIRST TRIMESTER (HCC): Primary | ICD-10-CM

## 2021-09-07 PROCEDURE — 3075F SYST BP GE 130 - 139MM HG: CPT | Performed by: OBSTETRICS & GYNECOLOGY

## 2021-09-07 PROCEDURE — 99212 OFFICE O/P EST SF 10 MIN: CPT | Performed by: OBSTETRICS & GYNECOLOGY

## 2021-09-07 PROCEDURE — 3008F BODY MASS INDEX DOCD: CPT | Performed by: OBSTETRICS & GYNECOLOGY

## 2021-09-07 PROCEDURE — 76813 OB US NUCHAL MEAS 1 GEST: CPT | Performed by: OBSTETRICS & GYNECOLOGY

## 2021-09-07 PROCEDURE — 76801 OB US < 14 WKS SINGLE FETUS: CPT | Performed by: OBSTETRICS & GYNECOLOGY

## 2021-09-07 PROCEDURE — 3078F DIAST BP <80 MM HG: CPT | Performed by: OBSTETRICS & GYNECOLOGY

## 2021-09-07 NOTE — PROGRESS NOTES
CONSULT NOTE    Evi Wmchad, [address]  [address]  Carmella Kramer,  [address]     Thank you for referring your Magui Alcazar for a Maternal-Fetal Medicine Consultation:  Below is my consultation  Thank you very much for your kind referral of  Magui Alcazar for first-trimester ultrasound evaluation and MFM evaluation  Leighton Agee is pregnant with her 2nd pregnancy  She is well known to us from her 1st pregnancy which resulted in a full-term vaginal delivery without complications     She presents for the indication of a history of type 1 diabetes  Her prenatal course so far has been unremarkable  Leighton Agee has no complaints  She denies vaginal bleeding  She has a history of childhood asthma not currently requiring medication  She currently takes prenatal vitamins and is on an insulin pump  She has no known drug allergies  Substance use history is unremarkable  Family history is significant for a small percentage of Ashkenazi Spiritism ancestry and diabetes  Leighton Agee was diagnosed at age 14 with type 1 diabetes, managed by Endocrinology in the past as well as the [facility] diabetes in pregnancy program   She is currently receiving evaluation and management within the Diabetes and Pregnancy Program in the [facility]  Leighton Agee uses an insulin pump  A hemoglobin A1c obtained in the 1st trimester was 6 7%  A TSH level at that time was normal at 2 1 uIU/mL  Leighton Agee denies any end-organ effects of diabetes, including with respect to retinopathy, nephropathy, gastroparesis, or peripheral nephropathy  Her most recent ophthalmology evaluation was about 11 months ago  Leighton Agee otherwise has an unremarkable past medical history  Her past surgical history is significant only for a tonsillectomy  Her only other daily medication is a prenatal vitamin  She has no known drug allergy  Leighton Agee denies tobacco, alcohol, or illicit drug use during the pregnancy    Her brother also has type 1 diabetes  The family medical history is otherwise negative with respect to first-degree relatives with , hypertension, venous thromboembolism, or preeclampsia  The family genetic history is negative with respect to genetic abnormalities, birth defects, or mental retardation  Today's ultrasound findings and suggested follow-up were discussed in detail with  Tosha  She will return for MFM evaluation in four weeks for late first-trimester assessment of fetal anatomy and, possibly, the first-trimester component at the Sequential Screen which was briefly discussed today  We discussed that type 1 diabetes is associated with an increased risk for fetal structural abnormalities  Early anatomy assessment will be performed by MFM at about 16 weeks gestation, to be followed by level 2 ultrasound evaluation at 20 weeks gestation, and fetal echocardiography at 22 to 24 weeks gestation  We discussed an increased risk for fetal growth abnormalities in association with type 1 diabetes  Serial fetal growth scans are recommended during the second half of the pregnancy  Non stress testing is recommended for additional pregnancy surveillance beginning at 32 weeks gestation, sooner if otherwise clinically indicated  An end organ evaluation is recommended with respect to her history of type 1 diabetes, including ophthalmology evaluation, 24 hour urine study to assess renal function, and EKG  Tosha should continue to maintain contact with the Diabetes and Pregnancy program in the [address] [place] [address] at least once a week for review of her blood glucose values and adjustment of insulin pump settings   Hemoglobin A1c levels are recommended every 6 to 8 weeks  I recommended that she initiate prophylaxis this week with 162 mg of aspirin a day, as her history of diabetes is associated with an increased risk for preeclampsia  Low-dose aspirin therapy will reduce this risk   Continuation of daily low dose aspirin therapy is recommended until 36 weeks  We discussed the options for genetic screening, including but not limited to first trimester screening, second trimester screening, combined first and second trimester screening, noninvasive prenatal testing (NIPT) for patients at high risk and diagnostic screening through the use of CVS and amniocentesis  We discussed the risks and benefits of each approach including the sensitivities and false positive rates as well as the difference between a screening test and a diagnostic test   At the conclusion of our discussion the patient elected noninvasive prenatal testing utilizing the QNatal Advanced cell-free fetal DNA test through [employer]  The patient was given a requisition to have this blood work drawn through [employer] and the results should be available in approximately 7-10 days  We discussed the availability of Universal carrier screening  I encouraged her to further consider this in conjunction with her routine prenatal care the  We discussed follow-up in detail and I recommend an early anatomy ultrasound be scheduled for 16weeks gestation  Thank you very much for allowing us to participate in the care of this very nice patient  Should you have any questions, please do not hesitate to contact our office  Please note medical decision making complexity:  Moderate  Portions of the record may have been created with voice recognition software  Occasional wrong word or "sound a like" substitutions may have occurred due to the inherent limitations of voice recognition software  Read the chart carefully and recognize, using context, where substitutions have occurred  Diogenes Grijavla MD  Attending Physician, Liya

## 2021-09-07 NOTE — PROGRESS NOTES
Patient chose to use [facility] and was given lab slip for Noninvasive Prenatal Screen [facility] Qnatal Advanced  Blood sample is drawn at [facility] and online appointment scheduling and lab locations can be found @ www  [employer]     Qnatal  card given, patient instructed how to check her out- of -pocket responsibility @ [facility]  Patient made aware if Qnatal  unable to give an estimate she will need to contact [facility] office prior to blood draw  Insurance may require prior authorization, if test drawn without prior authorization pateint will be responsible for full cost of test   All [employer] products require prior authorization @ this time, [facility] office will initiate the authorization, this may take 7-14 days  Patient aware that  is provided by third party and is only an estimate of cost not a guarantee  For definitive cost, patient encouraged to call her insurance provider- insurance phone # located on the back of her ID card  Patient verbalized understanding of all instructions and no questions at this time

## 2021-09-07 NOTE — LETTER
September 7, 2021     Ameya Holbrook, [address]  [address]  [address] [address]    Patient: Mahesh Bryant   YOB: 1990   Date of Visit: 9/7/2021       Dear Dr Eddie Nguyen: Thank you for referring Mahesh Bryant to me for evaluation  Below are my notes for this consultation  If you have questions, please do not hesitate to call me  I look forward to following your patient along with you  Sincerely,        Marc Alicea MD        CC: Daryle Blander, DO Brutus Single, MD  9/7/2021 11:26 AM  Sign when Signing Visit  CONSULT NOTE    Ameya Wetzel, [address]  [address]  [facility],  [address]     Thank you for referring your Mahesh Bryant for a Maternal-Fetal Medicine Consultation:  Below is my consultation  Thank you very much for your kind referral of  Mahesh Bryant for first-trimester ultrasound evaluation and MFM evaluation  Dayanara John is pregnant with her 2nd pregnancy  She is well known to us from her 1st pregnancy which resulted in a full-term vaginal delivery without complications     She presents for the indication of a history of type 1 diabetes  Her prenatal course so far has been unremarkable  Dayanara John has no complaints  She denies vaginal bleeding  She has a history of childhood asthma not currently requiring medication  She currently takes prenatal vitamins and is on an insulin pump  She has no known drug allergies  Substance use history is unremarkable  Family history is significant for a small percentage of Ashkenazi Taoism ancestry and diabetes  Daynaara John was diagnosed at age 14 with type 1 diabetes, managed by Endocrinology in the past as well as the [facility] diabetes in pregnancy program   She is currently receiving evaluation and management within the Diabetes and Pregnancy Program in the [facility]  Dayanara John uses an insulin pump  A hemoglobin A1c obtained in the 1st trimester was 6 7%    A TSH level at that time was normal at 2 1 uIU/mL  Tosha denies any end-organ effects of diabetes, including with respect to retinopathy, nephropathy, gastroparesis, or peripheral nephropathy  Her most recent ophthalmology evaluation was about 11 months ago  Tosha otherwise has an unremarkable past medical history  Her past surgical history is significant only for a tonsillectomy  Her only other daily medication is a prenatal vitamin  She has no known drug allergy  Tosha denies tobacco, alcohol, or illicit drug use during the pregnancy  Her brother also has type 1 diabetes  The family medical history is otherwise negative with respect to first-degree relatives with , hypertension, venous thromboembolism, or preeclampsia  The family genetic history is negative with respect to genetic abnormalities, birth defects, or mental retardation  Today's ultrasound findings and suggested follow-up were discussed in detail with  Tosha  She will return for MFM evaluation in four weeks for late first-trimester assessment of fetal anatomy and, possibly, the first-trimester component at the Sequential Screen which was briefly discussed today  We discussed that type 1 diabetes is associated with an increased risk for fetal structural abnormalities  Early anatomy assessment will be performed by MFM at about 16 weeks gestation, to be followed by level 2 ultrasound evaluation at 20 weeks gestation, and fetal echocardiography at 22 to 24 weeks gestation  We discussed an increased risk for fetal growth abnormalities in association with type 1 diabetes  Serial fetal growth scans are recommended during the second half of the pregnancy  Non stress testing is recommended for additional pregnancy surveillance beginning at 32 weeks gestation, sooner if otherwise clinically indicated    An end organ evaluation is recommended with respect to her history of type 1 diabetes, including ophthalmology evaluation, 24 hour urine study to assess renal function, and EKG  Marie Barone should continue to maintain contact with the Diabetes and Pregnancy program in the [address] [place] [address] at least once a week for review of her blood glucose values and adjustment of insulin pump settings   Hemoglobin A1c levels are recommended every 6 to 8 weeks  I recommended that she initiate prophylaxis this week with 162 mg of aspirin a day, as her history of diabetes is associated with an increased risk for preeclampsia  Low-dose aspirin therapy will reduce this risk  Continuation of daily low dose aspirin therapy is recommended until 36 weeks  We discussed the options for genetic screening, including but not limited to first trimester screening, second trimester screening, combined first and second trimester screening, noninvasive prenatal testing (NIPT) for patients at high risk and diagnostic screening through the use of CVS and amniocentesis  We discussed the risks and benefits of each approach including the sensitivities and false positive rates as well as the difference between a screening test and a diagnostic test   At the conclusion of our discussion the patient elected noninvasive prenatal testing utilizing the QNatal Advanced cell-free fetal DNA test through [employer]  The patient was given a requisition to have this blood work drawn through [employer] and the results should be available in approximately 7-10 days  We discussed the availability of Universal carrier screening  I encouraged her to further consider this in conjunction with her routine prenatal care the  We discussed follow-up in detail and I recommend an early anatomy ultrasound be scheduled for 16weeks gestation  Thank you very much for allowing us to participate in the care of this very nice patient  Should you have any questions, please do not hesitate to contact our office  Please note medical decision making complexity:  Moderate      Portions of the record may have been created with voice recognition software  Occasional wrong word or "sound a like" substitutions may have occurred due to the inherent limitations of voice recognition software  Read the chart carefully and recognize, using context, where substitutions have occurred  Diogenes Garcia MD  Attending Physician, Liya

## 2021-09-08 ENCOUNTER — DOCUMENTATION (OUTPATIENT)
Dept: ENDOCRINOLOGY | Facility: HOSPITAL | Age: 31
End: 2021-09-08

## 2021-09-10 ENCOUNTER — TELEPHONE (OUTPATIENT)
Dept: PERINATAL CARE | Facility: CLINIC | Age: 31
End: 2021-09-10

## 2021-09-10 NOTE — TELEPHONE ENCOUNTER
Spoke with pt and informed her that she doesn't need prior authorization for her Qnatal testing, as per Zaire Tristan our [facility]   However instructed pt to call her insurance company and  to see what she what is responsible for, if any  Pt was receptive and declines any questions at this time

## 2021-09-14 ENCOUNTER — TELEPHONE (OUTPATIENT)
Dept: PERINATAL CARE | Facility: CLINIC | Age: 31
End: 2021-09-14

## 2021-09-14 NOTE — TELEPHONE ENCOUNTER
Called patient to advise change of location for upcoming scheduled appointment  [facility]'s Maternal Fetal Medicine location at [facility] has relocated to the [facility] at [address], [address] [address]  Informed patient they will be able to see all updates to appointment in [facility] [place] [facility]  Patient instructed to call M office @ #[phone] if any questions or concerns regarding the appointment       LEFT VOICEMAIL FOR PT WITH INFORMATION ABOVE

## 2021-09-22 ENCOUNTER — TELEPHONE (OUTPATIENT)
Dept: PERINATAL CARE | Facility: CLINIC | Age: 31
End: 2021-09-22

## 2021-09-22 NOTE — TELEPHONE ENCOUNTER
Left voicemail as a friendly reminder to report blood sugars, last reported : 09/17/2021  Last t-slim download is from 09/17/2021  Encouraged patient to read CODY Metcalf[employer] message sent 09/17/2021 at 8:58 am with pump recommendations  So far patient has not read that message     Continue to report blood sugars weekly via t-slim insulin pump download

## 2021-09-29 ENCOUNTER — DOCUMENTATION (OUTPATIENT)
Dept: PERINATAL CARE | Facility: CLINIC | Age: 31
End: 2021-09-29

## 2021-09-29 DIAGNOSIS — E10.65 PRE-EXISTING TYPE 1 DIABETES MELLITUS WITH HYPERGLYCEMIA DURING PREGNANCY IN SECOND TRIMESTER (HCC): Primary | ICD-10-CM

## 2021-09-29 DIAGNOSIS — Z46.81 INSULIN PUMP TITRATION: ICD-10-CM

## 2021-09-29 DIAGNOSIS — O24.012 PRE-EXISTING TYPE 1 DIABETES MELLITUS WITH HYPERGLYCEMIA DURING PREGNANCY IN SECOND TRIMESTER (HCC): Primary | ICD-10-CM

## 2021-09-29 DIAGNOSIS — Z3A.15 15 WEEKS GESTATION OF PREGNANCY: ICD-10-CM

## 2021-09-29 NOTE — PROGRESS NOTES
Moise Bolanos is a 28 y o  female on a/an T-slim insulin pump  Estimated Date of Delivery: 3/17/22   Currently: 15w6d  Encounter Diagnosis     ICD-10-CM    1  Pre-existing type 1 diabetes mellitus with hyperglycemia during pregnancy in second trimester ([facility] [address] )  O24 012     E10 65    2  Insulin pump titration  Z46 81    3  15 weeks gestation of pregnancy  Z3A 15       Multiple adjustments made via [employer] messages last 2 dated 9/17/21 and 9/27/21; refer to attached files  Dayanara Benedict is very compliant with maintaining close contact with diabetes education team and downloading insulin pump every 1 to 2 weeks  Due to hypoglycemia and hyperglycemia basal settings adjusted as follows:  MN@ 0 80 units/hour;   3 AM@ 0 957 units/hour;  7 AM@ 1 20 units/hour;  12 PM@ 1 25 units/hour;  4 PM@ 1 20 units/hour;  10 PM@ 1 00 units/hour  9/29/21 Reported issue with infusion site and changed site with glucose readings coming down plus now negative ketones  Pending 10/5/21 insulin pump download for further recommendations  7/24/21 A1c 6 7% not at goal; needs repeat A1c   9/7/21 US fetal growth appeared normal and MARTINEZ normal   Pending US 10/5/21 16 weeks gestation and 10/29/21 Level II  Will need fetal echo

## 2021-09-30 ENCOUNTER — TELEPHONE (OUTPATIENT)
Dept: PERINATAL CARE | Facility: CLINIC | Age: 31
End: 2021-09-30

## 2021-09-30 NOTE — TELEPHONE ENCOUNTER
Called patient to schedule follow up appointment:  FETAL ECHO MFM - scheduled for 11/16/21  8:00  [place]    Left voicemail request patient to call back to schedule at [phone]

## 2021-10-05 ENCOUNTER — LAB (OUTPATIENT)
Dept: LAB | Facility: HOSPITAL | Age: 31
End: 2021-10-05
Payer: COMMERCIAL

## 2021-10-05 ENCOUNTER — PATIENT MESSAGE (OUTPATIENT)
Dept: PERINATAL CARE | Facility: CLINIC | Age: 31
End: 2021-10-05

## 2021-10-05 ENCOUNTER — OFFICE VISIT (OUTPATIENT)
Dept: PERINATAL CARE | Facility: CLINIC | Age: 31
End: 2021-10-05
Payer: COMMERCIAL

## 2021-10-05 VITALS
HEIGHT: 61 IN | DIASTOLIC BLOOD PRESSURE: 65 MMHG | BODY MASS INDEX: 31 KG/M2 | HEART RATE: 85 BPM | SYSTOLIC BLOOD PRESSURE: 119 MMHG | WEIGHT: 164.2 LBS

## 2021-10-05 DIAGNOSIS — Z3A.15 15 WEEKS GESTATION OF PREGNANCY: ICD-10-CM

## 2021-10-05 DIAGNOSIS — Z3A.16 16 WEEKS GESTATION OF PREGNANCY: Primary | ICD-10-CM

## 2021-10-05 DIAGNOSIS — O24.012 PRE-EXISTING TYPE 1 DIABETES MELLITUS WITH HYPERGLYCEMIA DURING PREGNANCY IN SECOND TRIMESTER (HCC): ICD-10-CM

## 2021-10-05 DIAGNOSIS — E10.65 PRE-EXISTING TYPE 1 DIABETES MELLITUS WITH HYPERGLYCEMIA DURING PREGNANCY IN SECOND TRIMESTER (HCC): ICD-10-CM

## 2021-10-05 DIAGNOSIS — Z36.89 ENCOUNTER FOR FETAL ANATOMIC SURVEY: ICD-10-CM

## 2021-10-05 DIAGNOSIS — Z46.81 INSULIN PUMP TITRATION: ICD-10-CM

## 2021-10-05 LAB
ALBUMIN SERPL BCP-MCNC: 3.1 G/DL (ref 3.5–5)
ALP SERPL-CCNC: 51 U/L (ref 46–116)
ALT SERPL W P-5'-P-CCNC: 15 U/L (ref 12–78)
ANION GAP SERPL CALCULATED.3IONS-SCNC: 3 MMOL/L (ref 4–13)
AST SERPL W P-5'-P-CCNC: 14 U/L (ref 5–45)
BILIRUB SERPL-MCNC: 0.28 MG/DL (ref 0.2–1)
BUN SERPL-MCNC: 9 MG/DL (ref 5–25)
CALCIUM ALBUM COR SERPL-MCNC: 9.5 MG/DL (ref 8.3–10.1)
CALCIUM SERPL-MCNC: 8.8 MG/DL (ref 8.3–10.1)
CHLORIDE SERPL-SCNC: 103 MMOL/L (ref 100–108)
CO2 SERPL-SCNC: 28 MMOL/L (ref 21–32)
CREAT SERPL-MCNC: 0.54 MG/DL (ref 0.6–1.3)
CREAT UR-MCNC: 86 MG/DL
EST. AVERAGE GLUCOSE BLD GHB EST-MCNC: 108 MG/DL
GFR SERPL CREATININE-BSD FRML MDRD: 127 ML/MIN/1.73SQ M
GLUCOSE P FAST SERPL-MCNC: 114 MG/DL (ref 65–99)
HBA1C MFR BLD: 5.4 %
POTASSIUM SERPL-SCNC: 3.5 MMOL/L (ref 3.5–5.3)
PROT SERPL-MCNC: 7.3 G/DL (ref 6.4–8.2)
PROT UR-MCNC: 13 MG/DL
PROT/CREAT UR: 0.15 MG/G{CREAT} (ref 0–0.1)
SODIUM SERPL-SCNC: 134 MMOL/L (ref 136–145)

## 2021-10-05 PROCEDURE — 84156 ASSAY OF PROTEIN URINE: CPT | Performed by: NURSE PRACTITIONER

## 2021-10-05 PROCEDURE — 3044F HG A1C LEVEL LT 7.0%: CPT | Performed by: NURSE PRACTITIONER

## 2021-10-05 PROCEDURE — 80053 COMPREHEN METABOLIC PANEL: CPT

## 2021-10-05 PROCEDURE — 76805 OB US >/= 14 WKS SNGL FETUS: CPT | Performed by: OBSTETRICS & GYNECOLOGY

## 2021-10-05 PROCEDURE — 99214 OFFICE O/P EST MOD 30 MIN: CPT | Performed by: OBSTETRICS & GYNECOLOGY

## 2021-10-05 PROCEDURE — 36415 COLL VENOUS BLD VENIPUNCTURE: CPT

## 2021-10-05 PROCEDURE — 3061F NEG MICROALBUMINURIA REV: CPT | Performed by: NURSE PRACTITIONER

## 2021-10-05 PROCEDURE — 83036 HEMOGLOBIN GLYCOSYLATED A1C: CPT

## 2021-10-05 PROCEDURE — 82570 ASSAY OF URINE CREATININE: CPT | Performed by: NURSE PRACTITIONER

## 2021-10-05 RX ORDER — ASPIRIN 81 MG/1
81 TABLET, CHEWABLE ORAL DAILY
COMMUNITY

## 2021-10-18 ENCOUNTER — DOCUMENTATION (OUTPATIENT)
Dept: PERINATAL CARE | Facility: CLINIC | Age: 31
End: 2021-10-18

## 2021-10-18 DIAGNOSIS — O24.012 PRE-EXISTING TYPE 1 DIABETES MELLITUS WITH HYPERGLYCEMIA DURING PREGNANCY IN SECOND TRIMESTER (HCC): Primary | ICD-10-CM

## 2021-10-18 DIAGNOSIS — E10.649 HYPOGLYCEMIA DUE TO TYPE 1 DIABETES MELLITUS (HCC): ICD-10-CM

## 2021-10-18 DIAGNOSIS — E10.65 PRE-EXISTING TYPE 1 DIABETES MELLITUS WITH HYPERGLYCEMIA DURING PREGNANCY IN SECOND TRIMESTER (HCC): Primary | ICD-10-CM

## 2021-10-18 DIAGNOSIS — Z46.81 INSULIN PUMP TITRATION: ICD-10-CM

## 2021-10-18 DIAGNOSIS — Z3A.18 18 WEEKS GESTATION OF PREGNANCY: ICD-10-CM

## 2021-10-19 ENCOUNTER — TELEMEDICINE (OUTPATIENT)
Dept: PERINATAL CARE | Facility: CLINIC | Age: 31
End: 2021-10-19
Payer: COMMERCIAL

## 2021-10-19 DIAGNOSIS — O24.012 PRE-EXISTING TYPE 1 DIABETES MELLITUS WITH HYPERGLYCEMIA DURING PREGNANCY IN SECOND TRIMESTER (HCC): Primary | ICD-10-CM

## 2021-10-19 DIAGNOSIS — Z96.41 INSULIN PUMP STATUS: ICD-10-CM

## 2021-10-19 DIAGNOSIS — E10.65 PRE-EXISTING TYPE 1 DIABETES MELLITUS WITH HYPERGLYCEMIA DURING PREGNANCY IN SECOND TRIMESTER (HCC): Primary | ICD-10-CM

## 2021-10-19 DIAGNOSIS — Z3A.18 18 WEEKS GESTATION OF PREGNANCY: ICD-10-CM

## 2021-10-19 PROCEDURE — G0108 DIAB MANAGE TRN  PER INDIV: HCPCS

## 2021-10-20 ENCOUNTER — TELEPHONE (OUTPATIENT)
Dept: PERINATAL CARE | Facility: CLINIC | Age: 31
End: 2021-10-20

## 2021-10-27 ENCOUNTER — OFFICE VISIT (OUTPATIENT)
Dept: FAMILY MEDICINE CLINIC | Facility: CLINIC | Age: 31
End: 2021-10-27
Payer: COMMERCIAL

## 2021-10-27 VITALS
WEIGHT: 167.2 LBS | DIASTOLIC BLOOD PRESSURE: 70 MMHG | RESPIRATION RATE: 18 BRPM | HEART RATE: 82 BPM | OXYGEN SATURATION: 99 % | HEIGHT: 63 IN | BODY MASS INDEX: 29.62 KG/M2 | SYSTOLIC BLOOD PRESSURE: 104 MMHG | TEMPERATURE: 98.5 F

## 2021-10-27 DIAGNOSIS — Z00.00 ANNUAL PHYSICAL EXAM: Primary | ICD-10-CM

## 2021-10-27 DIAGNOSIS — E10.9 TYPE 1 DIABETES MELLITUS WITHOUT COMPLICATION (HCC): ICD-10-CM

## 2021-10-27 PROCEDURE — 3078F DIAST BP <80 MM HG: CPT | Performed by: NURSE PRACTITIONER

## 2021-10-27 PROCEDURE — 99395 PREV VISIT EST AGE 18-39: CPT | Performed by: NURSE PRACTITIONER

## 2021-10-27 PROCEDURE — 1036F TOBACCO NON-USER: CPT | Performed by: NURSE PRACTITIONER

## 2021-10-27 PROCEDURE — 3725F SCREEN DEPRESSION PERFORMED: CPT | Performed by: NURSE PRACTITIONER

## 2021-10-27 PROCEDURE — 3074F SYST BP LT 130 MM HG: CPT | Performed by: NURSE PRACTITIONER

## 2021-10-29 ENCOUNTER — ROUTINE PRENATAL (OUTPATIENT)
Dept: PERINATAL CARE | Facility: OTHER | Age: 31
End: 2021-10-29
Payer: COMMERCIAL

## 2021-10-29 VITALS
BODY MASS INDEX: 29.77 KG/M2 | WEIGHT: 168 LBS | SYSTOLIC BLOOD PRESSURE: 113 MMHG | HEIGHT: 63 IN | DIASTOLIC BLOOD PRESSURE: 72 MMHG | HEART RATE: 100 BPM

## 2021-10-29 DIAGNOSIS — Z3A.20 20 WEEKS GESTATION OF PREGNANCY: ICD-10-CM

## 2021-10-29 DIAGNOSIS — O24.012 PRE-EXISTING TYPE 1 DIABETES MELLITUS WITH HYPERGLYCEMIA DURING PREGNANCY IN SECOND TRIMESTER (HCC): Primary | ICD-10-CM

## 2021-10-29 DIAGNOSIS — E10.65 PRE-EXISTING TYPE 1 DIABETES MELLITUS WITH HYPERGLYCEMIA DURING PREGNANCY IN SECOND TRIMESTER (HCC): Primary | ICD-10-CM

## 2021-10-29 PROCEDURE — 99214 OFFICE O/P EST MOD 30 MIN: CPT | Performed by: OBSTETRICS & GYNECOLOGY

## 2021-10-29 PROCEDURE — 76805 OB US >/= 14 WKS SNGL FETUS: CPT | Performed by: OBSTETRICS & GYNECOLOGY

## 2021-10-29 PROCEDURE — 3008F BODY MASS INDEX DOCD: CPT | Performed by: NURSE PRACTITIONER

## 2021-10-29 PROCEDURE — 76817 TRANSVAGINAL US OBSTETRIC: CPT | Performed by: OBSTETRICS & GYNECOLOGY

## 2021-11-01 PROBLEM — Z3A.20 20 WEEKS GESTATION OF PREGNANCY: Status: ACTIVE | Noted: 2021-09-29

## 2021-11-16 ENCOUNTER — ROUTINE PRENATAL (OUTPATIENT)
Dept: PERINATAL CARE | Facility: OTHER | Age: 31
End: 2021-11-16
Payer: COMMERCIAL

## 2021-11-16 VITALS
DIASTOLIC BLOOD PRESSURE: 76 MMHG | HEART RATE: 110 BPM | HEIGHT: 63 IN | BODY MASS INDEX: 30.09 KG/M2 | SYSTOLIC BLOOD PRESSURE: 135 MMHG | WEIGHT: 169.8 LBS

## 2021-11-16 DIAGNOSIS — Z3A.22 22 WEEKS GESTATION OF PREGNANCY: ICD-10-CM

## 2021-11-16 DIAGNOSIS — O35.8XX0 VENTRICULAR SEPTAL DEFECT (VSD) OF FETUS IN SINGLETON PREGNANCY, ANTEPARTUM: Primary | ICD-10-CM

## 2021-11-16 PROBLEM — O35.BXX0 VENTRICULAR SEPTAL DEFECT (VSD) OF FETUS IN SINGLETON PREGNANCY, ANTEPARTUM: Status: ACTIVE | Noted: 2021-11-16

## 2021-11-16 PROCEDURE — 76825 ECHO EXAM OF FETAL HEART: CPT | Performed by: OBSTETRICS & GYNECOLOGY

## 2021-11-16 PROCEDURE — 99214 OFFICE O/P EST MOD 30 MIN: CPT | Performed by: OBSTETRICS & GYNECOLOGY

## 2021-11-16 PROCEDURE — 93325 DOPPLER ECHO COLOR FLOW MAPG: CPT | Performed by: OBSTETRICS & GYNECOLOGY

## 2021-11-16 PROCEDURE — 76827 ECHO EXAM OF FETAL HEART: CPT | Performed by: OBSTETRICS & GYNECOLOGY

## 2021-11-16 PROCEDURE — 3008F BODY MASS INDEX DOCD: CPT | Performed by: FAMILY MEDICINE

## 2021-11-17 ENCOUNTER — DOCUMENTATION (OUTPATIENT)
Dept: PERINATAL CARE | Facility: CLINIC | Age: 31
End: 2021-11-17

## 2021-11-17 DIAGNOSIS — Z46.81 INSULIN PUMP TITRATION: ICD-10-CM

## 2021-11-17 DIAGNOSIS — Z3A.22 22 WEEKS GESTATION OF PREGNANCY: ICD-10-CM

## 2021-11-17 DIAGNOSIS — O24.012 PRE-EXISTING TYPE 1 DIABETES MELLITUS WITH HYPERGLYCEMIA DURING PREGNANCY IN SECOND TRIMESTER (HCC): Primary | ICD-10-CM

## 2021-11-17 DIAGNOSIS — E10.65 PRE-EXISTING TYPE 1 DIABETES MELLITUS WITH HYPERGLYCEMIA DURING PREGNANCY IN SECOND TRIMESTER (HCC): Primary | ICD-10-CM

## 2021-11-22 ENCOUNTER — TELEPHONE (OUTPATIENT)
Dept: PERINATAL CARE | Facility: CLINIC | Age: 31
End: 2021-11-22

## 2021-11-23 ENCOUNTER — TELEPHONE (OUTPATIENT)
Dept: PERINATAL CARE | Facility: CLINIC | Age: 31
End: 2021-11-23

## 2021-11-29 ENCOUNTER — TELEMEDICINE (OUTPATIENT)
Dept: PERINATAL CARE | Facility: OTHER | Age: 31
End: 2021-11-29
Payer: COMMERCIAL

## 2021-11-29 ENCOUNTER — TELEMEDICINE (OUTPATIENT)
Dept: FAMILY MEDICINE CLINIC | Facility: CLINIC | Age: 31
End: 2021-11-29
Payer: COMMERCIAL

## 2021-11-29 DIAGNOSIS — O98.512 COVID-19 AFFECTING PREGNANCY IN SECOND TRIMESTER: Primary | ICD-10-CM

## 2021-11-29 DIAGNOSIS — U07.1 COVID-19 AFFECTING PREGNANCY IN SECOND TRIMESTER: Primary | ICD-10-CM

## 2021-11-29 DIAGNOSIS — E10.65 PRE-EXISTING TYPE 1 DIABETES MELLITUS WITH HYPERGLYCEMIA DURING PREGNANCY IN SECOND TRIMESTER (HCC): ICD-10-CM

## 2021-11-29 DIAGNOSIS — Z3A.24 24 WEEKS GESTATION OF PREGNANCY: ICD-10-CM

## 2021-11-29 DIAGNOSIS — O24.012 PRE-EXISTING TYPE 1 DIABETES MELLITUS WITH HYPERGLYCEMIA DURING PREGNANCY IN SECOND TRIMESTER (HCC): ICD-10-CM

## 2021-11-29 LAB
CFDNA.FET/CFDNA.TOTAL SFR FETUS: NORMAL %
CFDNA.FET/CFDNA.TOTAL SFR FETUS: NORMAL %
CITATION REF LAB TEST: NORMAL
FET 13+18+21+X+Y ANEUP PLAS.CFDNA: NEGATIVE
FET CHR 21 TS PLAS.CFDNA QL: NEGATIVE
FET CHR 21 TS PLAS.CFDNA QL: NEGATIVE
FET MS X RISK WBC.DNA+CFDNA QL: NOT DETECTED
FET SEX PLAS.CFDNA DOSAGE CFDNA: NORMAL
FET TS 13 RISK PLAS.CFDNA QL: NEGATIVE
FET X + Y ANEUP RISK PLAS.CFDNA SEQ-IMP: NOT DETECTED
GESTATION: NORMAL
GESTATIONAL AGE > 9:: YES
LAB DIRECTOR NAME PROVIDER: NORMAL
LABORATORY COMMENT REPORT: NORMAL
LIMITATIONS OF THE TEST: NORMAL
NEGATIVE PREDICTIVE VALUE: NORMAL
PERFORMANCE CHARACTERISTICS: NORMAL
POSITIVE PREDICTIVE VALUE: NORMAL
REF LAB TEST METHOD: NORMAL
SL AMB NOTE:: NORMAL
TEST PERFORMANCE INFO SPEC: NORMAL

## 2021-11-29 PROCEDURE — 1036F TOBACCO NON-USER: CPT | Performed by: FAMILY MEDICINE

## 2021-11-29 PROCEDURE — 99213 OFFICE O/P EST LOW 20 MIN: CPT | Performed by: OBSTETRICS & GYNECOLOGY

## 2021-11-29 PROCEDURE — 99214 OFFICE O/P EST MOD 30 MIN: CPT | Performed by: FAMILY MEDICINE

## 2021-11-29 RX ORDER — ONDANSETRON 2 MG/ML
4 INJECTION INTRAMUSCULAR; INTRAVENOUS ONCE AS NEEDED
Status: CANCELLED | OUTPATIENT
Start: 2021-11-29

## 2021-11-29 RX ORDER — ACETAMINOPHEN 325 MG/1
650 TABLET ORAL ONCE AS NEEDED
Status: CANCELLED | OUTPATIENT
Start: 2021-11-29

## 2021-11-29 RX ORDER — SODIUM CHLORIDE 9 MG/ML
20 INJECTION, SOLUTION INTRAVENOUS ONCE
Status: CANCELLED | OUTPATIENT
Start: 2021-11-29

## 2021-11-29 RX ORDER — ALBUTEROL SULFATE 90 UG/1
3 AEROSOL, METERED RESPIRATORY (INHALATION) ONCE AS NEEDED
Status: CANCELLED | OUTPATIENT
Start: 2021-11-29

## 2021-11-30 ENCOUNTER — TELEPHONE (OUTPATIENT)
Dept: INFUSION CENTER | Facility: HOSPITAL | Age: 31
End: 2021-11-30

## 2021-11-30 ENCOUNTER — TELEPHONE (OUTPATIENT)
Dept: FAMILY MEDICINE CLINIC | Facility: CLINIC | Age: 31
End: 2021-11-30

## 2021-11-30 ENCOUNTER — HOSPITAL ENCOUNTER (OUTPATIENT)
Dept: INFUSION CENTER | Facility: HOSPITAL | Age: 31
Discharge: HOME | End: 2021-11-30
Payer: COMMERCIAL

## 2021-11-30 ENCOUNTER — HOSPITAL ENCOUNTER (OUTPATIENT)
Dept: INFUSION CENTER | Facility: HOSPITAL | Age: 31
Discharge: HOME | End: 2021-11-30

## 2021-11-30 VITALS
TEMPERATURE: 98 F | SYSTOLIC BLOOD PRESSURE: 110 MMHG | HEART RATE: 94 BPM | OXYGEN SATURATION: 99 % | DIASTOLIC BLOOD PRESSURE: 70 MMHG | RESPIRATION RATE: 18 BRPM

## 2021-11-30 DIAGNOSIS — U07.1 COVID-19 AFFECTING PREGNANCY IN SECOND TRIMESTER: ICD-10-CM

## 2021-11-30 DIAGNOSIS — Z3A.24 24 WEEKS GESTATION OF PREGNANCY: Primary | ICD-10-CM

## 2021-11-30 DIAGNOSIS — O98.512 COVID-19 AFFECTING PREGNANCY IN SECOND TRIMESTER: ICD-10-CM

## 2021-11-30 PROCEDURE — M0245 HB BAMLAN AND ETESEV INF ADMIN: HCPCS | Performed by: FAMILY MEDICINE

## 2021-11-30 RX ORDER — ACETAMINOPHEN 325 MG/1
650 TABLET ORAL ONCE AS NEEDED
Status: DISCONTINUED | OUTPATIENT
Start: 2021-11-30 | End: 2021-12-03 | Stop reason: HOSPADM

## 2021-11-30 RX ORDER — SODIUM CHLORIDE 9 MG/ML
20 INJECTION, SOLUTION INTRAVENOUS ONCE
Status: COMPLETED | OUTPATIENT
Start: 2021-11-30 | End: 2021-11-30

## 2021-11-30 RX ORDER — ALBUTEROL SULFATE 90 UG/1
3 AEROSOL, METERED RESPIRATORY (INHALATION) ONCE AS NEEDED
Status: CANCELLED | OUTPATIENT
Start: 2021-11-30

## 2021-11-30 RX ORDER — ALBUTEROL SULFATE 90 UG/1
3 AEROSOL, METERED RESPIRATORY (INHALATION) ONCE AS NEEDED
Status: DISCONTINUED | OUTPATIENT
Start: 2021-11-30 | End: 2021-12-03 | Stop reason: HOSPADM

## 2021-11-30 RX ORDER — ONDANSETRON 2 MG/ML
4 INJECTION INTRAMUSCULAR; INTRAVENOUS ONCE AS NEEDED
Status: DISCONTINUED | OUTPATIENT
Start: 2021-11-30 | End: 2021-12-03 | Stop reason: HOSPADM

## 2021-11-30 RX ORDER — ACETAMINOPHEN 325 MG/1
650 TABLET ORAL ONCE AS NEEDED
Status: CANCELLED | OUTPATIENT
Start: 2021-11-30

## 2021-11-30 RX ORDER — ONDANSETRON 2 MG/ML
4 INJECTION INTRAMUSCULAR; INTRAVENOUS ONCE AS NEEDED
Status: CANCELLED | OUTPATIENT
Start: 2021-11-30

## 2021-11-30 RX ORDER — SODIUM CHLORIDE 9 MG/ML
20 INJECTION, SOLUTION INTRAVENOUS ONCE
Status: CANCELLED | OUTPATIENT
Start: 2021-11-30

## 2021-11-30 RX ADMIN — SODIUM CHLORIDE 2100 MG COMBINED: 9 INJECTION, SOLUTION INTRAVENOUS at 18:31

## 2021-11-30 RX ADMIN — SODIUM CHLORIDE 20 ML/HR: 0.9 INJECTION, SOLUTION INTRAVENOUS at 18:05

## 2021-12-01 ENCOUNTER — DOCUMENTATION (OUTPATIENT)
Dept: PERINATAL CARE | Facility: CLINIC | Age: 31
End: 2021-12-01

## 2021-12-01 ENCOUNTER — TELEMEDICINE (OUTPATIENT)
Dept: FAMILY MEDICINE CLINIC | Facility: CLINIC | Age: 31
End: 2021-12-01
Payer: COMMERCIAL

## 2021-12-01 ENCOUNTER — HOSPITAL ENCOUNTER (OUTPATIENT)
Dept: INFUSION CENTER | Facility: HOSPITAL | Age: 31
Discharge: HOME | End: 2021-12-01

## 2021-12-01 VITALS — DIASTOLIC BLOOD PRESSURE: 67 MMHG | HEART RATE: 99 BPM | SYSTOLIC BLOOD PRESSURE: 112 MMHG

## 2021-12-01 DIAGNOSIS — E10.65 PRE-EXISTING TYPE 1 DIABETES MELLITUS WITH HYPERGLYCEMIA DURING PREGNANCY IN SECOND TRIMESTER (HCC): Primary | ICD-10-CM

## 2021-12-01 DIAGNOSIS — O24.012 PRE-EXISTING TYPE 1 DIABETES MELLITUS WITH HYPERGLYCEMIA DURING PREGNANCY IN SECOND TRIMESTER (HCC): Primary | ICD-10-CM

## 2021-12-01 DIAGNOSIS — Z46.81 INSULIN PUMP TITRATION: ICD-10-CM

## 2021-12-01 DIAGNOSIS — U07.1 COVID-19: Primary | ICD-10-CM

## 2021-12-01 DIAGNOSIS — Z3A.24 24 WEEKS GESTATION OF PREGNANCY: ICD-10-CM

## 2021-12-01 PROCEDURE — 99213 OFFICE O/P EST LOW 20 MIN: CPT | Performed by: NURSE PRACTITIONER

## 2021-12-01 PROCEDURE — 3074F SYST BP LT 130 MM HG: CPT | Performed by: NURSE PRACTITIONER

## 2021-12-01 PROCEDURE — 3078F DIAST BP <80 MM HG: CPT | Performed by: NURSE PRACTITIONER

## 2021-12-06 ENCOUNTER — TELEMEDICINE (OUTPATIENT)
Dept: FAMILY MEDICINE CLINIC | Facility: CLINIC | Age: 31
End: 2021-12-06
Payer: COMMERCIAL

## 2021-12-06 VITALS
OXYGEN SATURATION: 98 % | WEIGHT: 172 LBS | HEART RATE: 105 BPM | TEMPERATURE: 98.6 F | HEIGHT: 62 IN | BODY MASS INDEX: 31.65 KG/M2

## 2021-12-06 DIAGNOSIS — U07.1 COVID-19: Primary | ICD-10-CM

## 2021-12-06 PROCEDURE — 3725F SCREEN DEPRESSION PERFORMED: CPT | Performed by: NURSE PRACTITIONER

## 2021-12-06 PROCEDURE — 99213 OFFICE O/P EST LOW 20 MIN: CPT | Performed by: NURSE PRACTITIONER

## 2021-12-07 ENCOUNTER — ULTRASOUND (OUTPATIENT)
Dept: PERINATAL CARE | Facility: CLINIC | Age: 31
End: 2021-12-07
Payer: COMMERCIAL

## 2021-12-07 VITALS
SYSTOLIC BLOOD PRESSURE: 123 MMHG | HEIGHT: 62 IN | WEIGHT: 172.2 LBS | BODY MASS INDEX: 31.69 KG/M2 | DIASTOLIC BLOOD PRESSURE: 60 MMHG | HEART RATE: 101 BPM

## 2021-12-07 DIAGNOSIS — E10.65 PRE-EXISTING TYPE 1 DIABETES MELLITUS WITH HYPERGLYCEMIA DURING PREGNANCY IN SECOND TRIMESTER (HCC): Primary | ICD-10-CM

## 2021-12-07 DIAGNOSIS — Z36.89 ENCOUNTER FOR ULTRASOUND TO CHECK FETAL GROWTH: ICD-10-CM

## 2021-12-07 DIAGNOSIS — O24.012 PRE-EXISTING TYPE 1 DIABETES MELLITUS WITH HYPERGLYCEMIA DURING PREGNANCY IN SECOND TRIMESTER (HCC): Primary | ICD-10-CM

## 2021-12-07 DIAGNOSIS — O35.8XX0 VENTRICULAR SEPTAL DEFECT (VSD) OF FETUS IN SINGLETON PREGNANCY, ANTEPARTUM: ICD-10-CM

## 2021-12-07 PROCEDURE — 76816 OB US FOLLOW-UP PER FETUS: CPT | Performed by: OBSTETRICS & GYNECOLOGY

## 2021-12-07 PROCEDURE — 99214 OFFICE O/P EST MOD 30 MIN: CPT | Performed by: OBSTETRICS & GYNECOLOGY

## 2021-12-09 ENCOUNTER — TELEPHONE (OUTPATIENT)
Dept: PERINATAL CARE | Facility: CLINIC | Age: 31
End: 2021-12-09

## 2021-12-14 ENCOUNTER — ROUTINE PRENATAL (OUTPATIENT)
Dept: PEDIATRIC CARDIOLOGY | Facility: CLINIC | Age: 31
End: 2021-12-14
Payer: COMMERCIAL

## 2021-12-14 VITALS
DIASTOLIC BLOOD PRESSURE: 79 MMHG | WEIGHT: 174.6 LBS | BODY MASS INDEX: 32.13 KG/M2 | HEIGHT: 62 IN | HEART RATE: 109 BPM | SYSTOLIC BLOOD PRESSURE: 115 MMHG

## 2021-12-14 DIAGNOSIS — O35.8XX0 VENTRICULAR SEPTAL DEFECT (VSD) OF FETUS IN SINGLETON PREGNANCY, ANTEPARTUM: Primary | ICD-10-CM

## 2021-12-14 PROCEDURE — 76825 ECHO EXAM OF FETAL HEART: CPT | Performed by: PEDIATRICS

## 2021-12-14 PROCEDURE — 99204 OFFICE O/P NEW MOD 45 MIN: CPT | Performed by: PEDIATRICS

## 2021-12-14 PROCEDURE — 1036F TOBACCO NON-USER: CPT | Performed by: PEDIATRICS

## 2021-12-14 PROCEDURE — 76827 ECHO EXAM OF FETAL HEART: CPT | Performed by: PEDIATRICS

## 2021-12-14 PROCEDURE — 76820 UMBILICAL ARTERY ECHO: CPT | Performed by: PEDIATRICS

## 2021-12-14 PROCEDURE — 3008F BODY MASS INDEX DOCD: CPT | Performed by: PEDIATRICS

## 2021-12-14 PROCEDURE — 93325 DOPPLER ECHO COLOR FLOW MAPG: CPT | Performed by: PEDIATRICS

## 2021-12-15 ENCOUNTER — TELEPHONE (OUTPATIENT)
Dept: FAMILY MEDICINE CLINIC | Facility: CLINIC | Age: 31
End: 2021-12-15

## 2021-12-22 ENCOUNTER — DOCUMENTATION (OUTPATIENT)
Dept: PERINATAL CARE | Facility: CLINIC | Age: 31
End: 2021-12-22

## 2021-12-22 DIAGNOSIS — Z46.81 INSULIN PUMP TITRATION: ICD-10-CM

## 2021-12-22 DIAGNOSIS — E10.65 PRE-EXISTING TYPE 1 DIABETES MELLITUS WITH HYPERGLYCEMIA DURING PREGNANCY IN SECOND TRIMESTER (HCC): Primary | ICD-10-CM

## 2021-12-22 DIAGNOSIS — Z3A.27 27 WEEKS GESTATION OF PREGNANCY: ICD-10-CM

## 2021-12-22 DIAGNOSIS — O24.012 PRE-EXISTING TYPE 1 DIABETES MELLITUS WITH HYPERGLYCEMIA DURING PREGNANCY IN SECOND TRIMESTER (HCC): Primary | ICD-10-CM

## 2022-01-06 ENCOUNTER — TELEPHONE (OUTPATIENT)
Dept: PERINATAL CARE | Facility: CLINIC | Age: 32
End: 2022-01-06

## 2022-01-06 ENCOUNTER — DOCUMENTATION (OUTPATIENT)
Dept: PERINATAL CARE | Facility: CLINIC | Age: 32
End: 2022-01-06

## 2022-01-06 NOTE — PROGRESS NOTES
Christina Casanova is a 28 y o  female on a/an T-slim insulin pump and Dexcom G6  Estimated Date of Delivery: 3/17/22   Currently: 30w0d  OBGYN: Kevin  Type 1 DM third trimester   Insulin pump adjustments made via [employer] messages and 2 insulin pump downloads attached to this note  Due to hyperglycemia, adjust insulin pump as follows:  MN from 0 88    to 1 00  units/hour;  3 AM @ 1 00 units/hour;  7 AM@ 1 50 units/hour;  12 PM @ 1 65 units/hour;  6 PM from 1 90   to 2 15 units/hour;  10 PM from 1 58   to 1 80 units/hour  Total Basal: 36 4    Increase carb ratio at 7 AM to 6 in hopes to avoid mid-afternoon low  Aim to bolus only 4 times a day and avoid correction bolus  If issues with hypoglycemia; decrease basal rates by 5%  Download insulin pump every Wednesday  Diet:   Calories: 1800 calorie (CHO: 40-03-78-23-14-18) (PRO:2-1-3-1-3-2)    SMBG:   Before meals; 2 hours post meal and with hypoglycemia  Downloads T slim insulin pump every 1 to 2 weeks for review  Glucose meter:  [employer] Verio  Dexcom G6 : [address]:  10/5/21 5 4% better than goal; 7/24/21 A1c 6 7%  Repeat A1c  Ultrasounds:  9/7/21 US fetal growth appeared normal and MARTINEZ normal   10/5/21 US refer to report  10/29/21 Level II ultrasound fetal growth normal and MARTINEZ normal   11/16/21 fetal echo completed  12/07/2021 Normal growth and MARTINEZ    EFW: 27 % AC: 26 % HC: 16 %  12/14/2021: Fetal ECHO completed  Next ultrasound 01/11/2022    Fetal surveillance:  Starting at 32 weeks gestation NST twice a week and MARTINEZ weekly  Will complete at her OBGYN office "right down the road from where I work"

## 2022-01-06 NOTE — TELEPHONE ENCOUNTER
Fax'd records release to [facility], pt request records sent to [facility]  Copy of release in chart

## 2022-01-11 ENCOUNTER — OFFICE VISIT (OUTPATIENT)
Dept: FAMILY MEDICINE CLINIC | Facility: CLINIC | Age: 32
End: 2022-01-11
Payer: COMMERCIAL

## 2022-01-11 ENCOUNTER — ULTRASOUND (OUTPATIENT)
Dept: PERINATAL CARE | Facility: CLINIC | Age: 32
End: 2022-01-11
Payer: COMMERCIAL

## 2022-01-11 VITALS
SYSTOLIC BLOOD PRESSURE: 128 MMHG | DIASTOLIC BLOOD PRESSURE: 82 MMHG | OXYGEN SATURATION: 100 % | HEART RATE: 100 BPM | BODY MASS INDEX: 32.97 KG/M2 | HEIGHT: 62 IN | TEMPERATURE: 98.9 F | WEIGHT: 179.2 LBS | RESPIRATION RATE: 16 BRPM

## 2022-01-11 VITALS
HEART RATE: 100 BPM | BODY MASS INDEX: 32.83 KG/M2 | DIASTOLIC BLOOD PRESSURE: 67 MMHG | HEIGHT: 62 IN | SYSTOLIC BLOOD PRESSURE: 110 MMHG | WEIGHT: 178.4 LBS

## 2022-01-11 DIAGNOSIS — O24.012 PRE-EXISTING TYPE 1 DIABETES MELLITUS WITH HYPERGLYCEMIA DURING PREGNANCY IN SECOND TRIMESTER (HCC): ICD-10-CM

## 2022-01-11 DIAGNOSIS — E10.65 PRE-EXISTING TYPE 1 DIABETES MELLITUS WITH HYPERGLYCEMIA DURING PREGNANCY IN SECOND TRIMESTER (HCC): ICD-10-CM

## 2022-01-11 DIAGNOSIS — Z3A.30 30 WEEKS GESTATION OF PREGNANCY: ICD-10-CM

## 2022-01-11 DIAGNOSIS — J45.40 MODERATE PERSISTENT ASTHMA WITHOUT COMPLICATION: Primary | ICD-10-CM

## 2022-01-11 DIAGNOSIS — J45.40 MODERATE PERSISTENT ASTHMA WITHOUT COMPLICATION: ICD-10-CM

## 2022-01-11 PROCEDURE — 99213 OFFICE O/P EST LOW 20 MIN: CPT | Performed by: NURSE PRACTITIONER

## 2022-01-11 PROCEDURE — 76816 OB US FOLLOW-UP PER FETUS: CPT | Performed by: OBSTETRICS & GYNECOLOGY

## 2022-01-11 PROCEDURE — 3079F DIAST BP 80-89 MM HG: CPT | Performed by: NURSE PRACTITIONER

## 2022-01-11 PROCEDURE — 99214 OFFICE O/P EST MOD 30 MIN: CPT | Performed by: OBSTETRICS & GYNECOLOGY

## 2022-01-11 PROCEDURE — 3008F BODY MASS INDEX DOCD: CPT | Performed by: NURSE PRACTITIONER

## 2022-01-11 PROCEDURE — 3725F SCREEN DEPRESSION PERFORMED: CPT | Performed by: NURSE PRACTITIONER

## 2022-01-11 PROCEDURE — 3074F SYST BP LT 130 MM HG: CPT | Performed by: NURSE PRACTITIONER

## 2022-01-11 RX ORDER — ALBUTEROL SULFATE 90 UG/1
AEROSOL, METERED RESPIRATORY (INHALATION)
COMMUNITY
Start: 2022-01-06

## 2022-01-11 RX ORDER — BUDESONIDE 180 UG/1
2 AEROSOL, POWDER RESPIRATORY (INHALATION) 2 TIMES DAILY
Qty: 1 EACH | Refills: 1 | Status: SHIPPED | OUTPATIENT
Start: 2022-01-11 | End: 2022-01-11 | Stop reason: SDUPTHER

## 2022-01-11 RX ORDER — BUDESONIDE 180 UG/1
2 AEROSOL, POWDER RESPIRATORY (INHALATION) 2 TIMES DAILY
Qty: 1 EACH | Refills: 1 | Status: SHIPPED | OUTPATIENT
Start: 2022-01-11

## 2022-01-11 NOTE — PROGRESS NOTES
Assessment/Plan   Problem List Items Addressed This Visit     None      Visit Diagnoses     Moderate persistent asthma without complication    -  Primary    Relevant Medications    budesonide (Pulmicort Flexhaler) 180 MCG/ACT inhaler                Chief Complaint   Patient presents with    Asthma       Subjective   Patient ID: Maite Gonsalves is a 28 y o  female  Vitals:    01/11/22 1426   BP: 128/82   Pulse: 100   Resp: 16   Temp: 98 9 °F (37 2 °C)   SpO2: 100%     Pregnant - she saw her maternal fetal high risk today and encourages ICS inhaler  Pulmicort inhaler is choice during pregnancy  Will monitor blood sugars and effects of inhaler      Asthma  She complains of chest tightness and wheezing  There is no cough, difficulty breathing, frequent throat clearing, hemoptysis, hoarse voice, shortness of breath or sputum production  Primary symptoms comments: Patient had covid in November- has had frequent cold symptoms and wheezing increasing-using albuterol frequently   This is a new problem  The current episode started 1 to 4 weeks ago  The problem occurs 2 to 4 times per day  The problem has been gradually improving  Associated symptoms include nasal congestion ("pregnancy related"), postnasal drip and rhinorrhea  Pertinent negatives include no appetite change, chest pain, dyspnea on exertion, ear congestion, ear pain, fever, headaches, heartburn, malaise/fatigue, myalgias, sneezing, sore throat, trouble swallowing or weight loss  Her symptoms are aggravated by nothing  Her symptoms are alleviated by beta-agonist  She reports moderate improvement on treatment  There are no known risk factors for lung disease  Her past medical history is significant for asthma         The following portions of the patient's history were reviewed and updated as appropriate: allergies, current medications, past medical history, past social history, past surgical history and problem list     Review of Systems Constitutional: Negative  Negative for appetite change, fever, malaise/fatigue and weight loss  HENT: Positive for postnasal drip and rhinorrhea  Negative for ear pain, hoarse voice, sneezing, sore throat and trouble swallowing  Eyes: Negative  Respiratory: Positive for wheezing  Negative for cough, hemoptysis, sputum production and shortness of breath  Cardiovascular: Negative  Negative for chest pain and dyspnea on exertion  Gastrointestinal: Negative  Negative for heartburn  Endocrine: Negative  Genitourinary: Negative  Musculoskeletal: Negative  Negative for myalgias  Skin: Negative  Allergic/Immunologic: Negative  Neurological: Negative  Negative for headaches  Hematological: Negative  Psychiatric/Behavioral: Negative  Objective     Physical Exam  Vitals and nursing note reviewed  Constitutional:       Appearance: She is not ill-appearing  HENT:      Head: Normocephalic and atraumatic  Nose: Nose normal  No congestion  Eyes:      General:         Right eye: No discharge  Left eye: No discharge  Extraocular Movements: Extraocular movements intact  Conjunctiva/sclera: Conjunctivae normal    Cardiovascular:      Rate and Rhythm: Normal rate and regular rhythm  Pulses: Normal pulses  Heart sounds: Normal heart sounds  No murmur heard  Pulmonary:      Effort: Pulmonary effort is normal  No respiratory distress  Breath sounds: Normal breath sounds  Musculoskeletal:         General: Normal range of motion  Skin:     General: Skin is dry  Neurological:      Mental Status: She is alert and oriented to person, place, and time  Psychiatric:         Mood and Affect: Mood normal          Behavior: Behavior normal          Thought Content:  Thought content normal          Judgment: Judgment normal        Allergies   Allergen Reactions    Other      Other reaction(s): Resp Distress  Other reaction(s): Resp Sympt Mild  Other reaction(s): Resp Distress    Mold Extract  [Trichophyton]      Other reaction(s): Resp Sympt Mild    Molds & Smuts      Other reaction(s): Resp Sympt Mild    Pollen Extract      Other reaction(s): Resp Sympt Mild

## 2022-01-11 NOTE — PROGRESS NOTES
The patient was seen today for an ultrasound  Please see ultrasound report (located under Ob Procedures) for additional details  Thank you very much for allowing us to participate in the care of this very nice patient  Should you have any questions, please do not hesitate to contact me  Diogenes Lua MD [address] [facility]  Attending Physician, Liya

## 2022-01-11 NOTE — PATIENT INSTRUCTIONS
Asthma   WHAT YOU NEED TO KNOW:   Asthma is a lung disease that makes breathing difficult  Chronic inflammation and reactions to triggers narrow the airways in the lungs  Asthma can become life-threatening if it is not managed  DISCHARGE INSTRUCTIONS:   Call your local emergency number (911 in the [address]) if:   · You have severe shortness of breath  · The skin around your neck and ribs pulls in with each breath  · Your peak flow numbers are in the red zone of your AAP  Return to the emergency department if:   · You have shortness of breath, even after you take your short-term medicine as directed  · Your lips or nails turn blue or gray  Call your doctor or asthma specialist if:   · You run out of medicine before your next refill is due  · Your symptoms get worse  · You need to take more medicine than usual to control your symptoms  · You have questions or concerns about your condition or care  Medicines:   · Medicines  may be used to decrease inflammation, open airways, and make it easier to breathe  Medicines may be inhaled, taken as a pill, or injected  Short-term medicines relieve your symptoms quickly  Long-term medicines are used to prevent future asthma attacks  Other medicines may be needed if your regular medicines are not able to prevent attacks  You may also need medicine to help control your allergies  Ask your healthcare provider for more information about any medicine you are given and how to take it safely  · Take your medicine as directed  Contact your healthcare provider if you think your medicine is not helping or if you have side effects  Tell him of her if you are allergic to any medicine  Keep a list of the medicines, vitamins, and herbs you take  Include the amounts, and when and why you take them  Bring the list or the pill bottles to follow-up visits  Carry your medicine list with you in case of an emergency      Manage your symptoms and prevent future attacks: · Follow your Asthma Action Plan (AAP)  This is a written plan that you and your healthcare provider create  It explains which medicine you need and when to change doses if necessary  It also explains how you can monitor symptoms and use a peak flow meter  The meter measures how well your lungs are working  · Manage other health conditions , such as allergies, acid reflux, and sleep apnea  · Identify and avoid triggers  These may include pets, dust mites, mold, and cockroaches  · Do not smoke or be around others who smoke  Nicotine and other chemicals in cigarettes and cigars can cause lung damage  Ask your healthcare provider for information if you currently smoke and need help to quit  E-cigarettes or smokeless tobacco still contain nicotine  Talk to your healthcare provider before you use these products  · Ask about the flu vaccine  The flu can make your asthma worse  You may need a yearly flu shot  Follow up with your healthcare provider as directed: You will need to return to make sure your medicine is working and your symptoms are controlled  You may be referred to an asthma or allergy specialist  Stephen Fontenot may be asked to keep a record of your peak flow values and bring it with you to your appointments  Write down your questions so you remember to ask them during your visits  © Copyright [employer] 2021 Information is for End User's use only and may not be sold, redistributed or otherwise used for commercial purposes  All illustrations and images included in CareNotes® are the copyrighted property of [employer]  or Heather Wyatt  The above information is an  only  It is not intended as medical advice for individual conditions or treatments  Talk to your doctor, nurse or pharmacist before following any medical regimen to see if it is safe and effective for you

## 2022-02-03 DIAGNOSIS — E10.9 TYPE 1 DIABETES MELLITUS WITHOUT COMPLICATION (HCC): ICD-10-CM

## 2022-02-08 ENCOUNTER — ULTRASOUND (OUTPATIENT)
Dept: PERINATAL CARE | Facility: CLINIC | Age: 32
End: 2022-02-08
Payer: COMMERCIAL

## 2022-02-08 VITALS
BODY MASS INDEX: 33.79 KG/M2 | SYSTOLIC BLOOD PRESSURE: 129 MMHG | HEIGHT: 62 IN | WEIGHT: 183.6 LBS | DIASTOLIC BLOOD PRESSURE: 59 MMHG | HEART RATE: 104 BPM

## 2022-02-08 DIAGNOSIS — Z3A.34 34 WEEKS GESTATION OF PREGNANCY: ICD-10-CM

## 2022-02-08 DIAGNOSIS — E10.65 PRE-EXISTING TYPE 1 DIABETES MELLITUS WITH HYPERGLYCEMIA DURING PREGNANCY IN THIRD TRIMESTER (HCC): Primary | ICD-10-CM

## 2022-02-08 DIAGNOSIS — O24.013 PRE-EXISTING TYPE 1 DIABETES MELLITUS WITH HYPERGLYCEMIA DURING PREGNANCY IN THIRD TRIMESTER (HCC): Primary | ICD-10-CM

## 2022-02-08 PROCEDURE — 76816 OB US FOLLOW-UP PER FETUS: CPT | Performed by: OBSTETRICS & GYNECOLOGY

## 2022-02-08 PROCEDURE — 3008F BODY MASS INDEX DOCD: CPT | Performed by: OBSTETRICS & GYNECOLOGY

## 2022-02-08 PROCEDURE — 99213 OFFICE O/P EST LOW 20 MIN: CPT | Performed by: OBSTETRICS & GYNECOLOGY

## 2022-02-08 PROCEDURE — 3074F SYST BP LT 130 MM HG: CPT | Performed by: OBSTETRICS & GYNECOLOGY

## 2022-02-08 PROCEDURE — 3078F DIAST BP <80 MM HG: CPT | Performed by: OBSTETRICS & GYNECOLOGY

## 2022-02-08 NOTE — PROGRESS NOTES
On exam today, the patient appears well, in no acute distress, and denies any complaints  Her abdomen is non-tender  There has been appropriate interval fetal growth  Good fetal movement and tone are seen  The amniotic fluid volume appears normal   The patient was informed of today's findings and all of her questions were answered  The limitations of ultrasound were reviewed   labor precautions and fetal kick counts were reviewed  We discussed the current status of the patient's diabetes  I reviewed her most recent blood glucose logs  She continues to maintain contact with our diabetes in pregnancy program   She is overall doing well at controlling her blood sugars  We discussed continuing to maintain close contact with reporting her blood sugars weekly so that we can optimize her glucose control for optimal pregnancy health  We discussed follow-up in detail and I recommend the patient return as clinically indicated  She is currently undergoing twice weekly  surveillance locally  Patient had questions regarding optimal management of her diabetes during pregnancy  I discussed with her that there are 2 different strategies including continuing her insulin pump with basal rates during pregnancy with frequent blood glucose testing every hour  We also discussed that many institutions have an insulin drip protocol which can also be utilized during pregnancy  Ideally achieving euglycemia during labor and delivery will help to mitigate risks for  hypoglycemia  Thank you very much for allowing us to participate in the care of this very nice patient  Should you have any questions, please do not hesitate to contact our office  Please note I spent 5 minutes reviewing records prior to the visit, 10  minutes in patient contact including counseling and coordination of care, and 5 minutes in charting in the electronic medical record    Total time spent for the encounter is 20 minutes  Portions of the record may have been created with voice recognition software  Occasional wrong word or "sound a like" substitutions may have occurred due to the inherent limitations of voice recognition software  Read the chart carefully and recognize, using context, where substitutions have occurred

## 2022-02-09 ENCOUNTER — PATIENT MESSAGE (OUTPATIENT)
Dept: PERINATAL CARE | Facility: CLINIC | Age: 32
End: 2022-02-09

## 2022-03-04 ENCOUNTER — TELEPHONE (OUTPATIENT)
Dept: PERINATAL CARE | Facility: CLINIC | Age: 32
End: 2022-03-04

## 2022-03-04 NOTE — TELEPHONE ENCOUNTER
Left voicemail encouraging patient to read her [employer] messages and respond  Dexcom showing hyperglycemia  Asked patient to call our office or reply via [employer]  Asked patient to let us know if she delivered too

## 2022-03-18 ENCOUNTER — TELEPHONE (OUTPATIENT)
Dept: OTHER | Facility: OTHER | Age: 32
End: 2022-03-18

## 2022-03-21 ENCOUNTER — TELEPHONE (OUTPATIENT)
Dept: ENDOCRINOLOGY | Facility: HOSPITAL | Age: 32
End: 2022-03-21

## 2022-03-21 DIAGNOSIS — E10.9 TYPE 1 DIABETES MELLITUS WITHOUT COMPLICATION (HCC): Primary | ICD-10-CM

## 2022-03-21 DIAGNOSIS — E55.9 VITAMIN D INSUFFICIENCY: ICD-10-CM

## 2022-03-21 NOTE — TELEPHONE ENCOUNTER
Pt is scheduled for 3/30/22 appt  She was following with  when pregnant  She has since delivered  Do you want to order labs before that appt  She has not had labs done since 10/2021   She will have done at [address] [place] [address]

## 2022-03-29 ENCOUNTER — LAB (OUTPATIENT)
Dept: LAB | Facility: HOSPITAL | Age: 32
End: 2022-03-29
Attending: INTERNAL MEDICINE
Payer: COMMERCIAL

## 2022-03-29 LAB
25(OH)D3 SERPL-MCNC: 20.5 NG/ML (ref 30–100)
ALBUMIN SERPL BCP-MCNC: 3.5 G/DL (ref 3.5–5)
ALP SERPL-CCNC: 85 U/L (ref 46–116)
ALT SERPL W P-5'-P-CCNC: 40 U/L (ref 12–78)
ANION GAP SERPL CALCULATED.3IONS-SCNC: 2 MMOL/L (ref 4–13)
AST SERPL W P-5'-P-CCNC: 35 U/L (ref 5–45)
BASOPHILS # BLD AUTO: 0.08 THOUSANDS/ΜL (ref 0–0.1)
BASOPHILS NFR BLD AUTO: 1 % (ref 0–1)
BILIRUB SERPL-MCNC: 0.53 MG/DL (ref 0.2–1)
BUN SERPL-MCNC: 19 MG/DL (ref 5–25)
CALCIUM SERPL-MCNC: 10.4 MG/DL (ref 8.3–10.1)
CHLORIDE SERPL-SCNC: 111 MMOL/L (ref 100–108)
CHOLEST SERPL-MCNC: 250 MG/DL
CO2 SERPL-SCNC: 27 MMOL/L (ref 21–32)
CREAT SERPL-MCNC: 0.84 MG/DL (ref 0.6–1.3)
CREAT UR-MCNC: 128 MG/DL
EOSINOPHIL # BLD AUTO: 0.43 THOUSAND/ΜL (ref 0–0.61)
EOSINOPHIL NFR BLD AUTO: 6 % (ref 0–6)
ERYTHROCYTE [DISTWIDTH] IN BLOOD BY AUTOMATED COUNT: 12.5 % (ref 11.6–15.1)
EST. AVERAGE GLUCOSE BLD GHB EST-MCNC: 120 MG/DL
GFR SERPL CREATININE-BSD FRML MDRD: 92 ML/MIN/1.73SQ M
GLUCOSE P FAST SERPL-MCNC: 109 MG/DL (ref 65–99)
HBA1C MFR BLD: 5.8 %
HCT VFR BLD AUTO: 44.2 % (ref 34.8–46.1)
HDLC SERPL-MCNC: 114 MG/DL
HGB BLD-MCNC: 13.5 G/DL (ref 11.5–15.4)
IMM GRANULOCYTES # BLD AUTO: 0.03 THOUSAND/UL (ref 0–0.2)
IMM GRANULOCYTES NFR BLD AUTO: 0 % (ref 0–2)
LDLC SERPL CALC-MCNC: 125 MG/DL (ref 0–100)
LYMPHOCYTES # BLD AUTO: 2.32 THOUSANDS/ΜL (ref 0.6–4.47)
LYMPHOCYTES NFR BLD AUTO: 31 % (ref 14–44)
MCH RBC QN AUTO: 26.3 PG (ref 26.8–34.3)
MCHC RBC AUTO-ENTMCNC: 30.5 G/DL (ref 31.4–37.4)
MCV RBC AUTO: 86 FL (ref 82–98)
MICROALBUMIN UR-MCNC: 11.8 MG/L (ref 0–20)
MICROALBUMIN/CREAT 24H UR: 9 MG/G CREATININE (ref 0–30)
MONOCYTES # BLD AUTO: 0.52 THOUSAND/ΜL (ref 0.17–1.22)
MONOCYTES NFR BLD AUTO: 7 % (ref 4–12)
NEUTROPHILS # BLD AUTO: 4.06 THOUSANDS/ΜL (ref 1.85–7.62)
NEUTS SEG NFR BLD AUTO: 55 % (ref 43–75)
NRBC BLD AUTO-RTO: 0 /100 WBCS
PLATELET # BLD AUTO: 236 THOUSANDS/UL (ref 149–390)
PMV BLD AUTO: 11.1 FL (ref 8.9–12.7)
POTASSIUM SERPL-SCNC: 4.5 MMOL/L (ref 3.5–5.3)
PROT SERPL-MCNC: 7.2 G/DL (ref 6.4–8.2)
RBC # BLD AUTO: 5.13 MILLION/UL (ref 3.81–5.12)
SODIUM SERPL-SCNC: 140 MMOL/L (ref 136–145)
TRIGL SERPL-MCNC: 54 MG/DL
TSH SERPL DL<=0.05 MIU/L-ACNC: 0.66 UIU/ML (ref 0.36–3.74)
WBC # BLD AUTO: 7.44 THOUSAND/UL (ref 4.31–10.16)

## 2022-03-29 PROCEDURE — 3061F NEG MICROALBUMINURIA REV: CPT | Performed by: NURSE PRACTITIONER

## 2022-03-29 PROCEDURE — 80053 COMPREHEN METABOLIC PANEL: CPT

## 2022-03-29 PROCEDURE — 82306 VITAMIN D 25 HYDROXY: CPT

## 2022-03-29 PROCEDURE — 82570 ASSAY OF URINE CREATININE: CPT

## 2022-03-29 PROCEDURE — 83036 HEMOGLOBIN GLYCOSYLATED A1C: CPT

## 2022-03-29 PROCEDURE — 82043 UR ALBUMIN QUANTITATIVE: CPT

## 2022-03-29 PROCEDURE — 3044F HG A1C LEVEL LT 7.0%: CPT | Performed by: NURSE PRACTITIONER

## 2022-03-29 PROCEDURE — 36415 COLL VENOUS BLD VENIPUNCTURE: CPT

## 2022-03-29 PROCEDURE — 85025 COMPLETE CBC W/AUTO DIFF WBC: CPT

## 2022-03-29 PROCEDURE — 84443 ASSAY THYROID STIM HORMONE: CPT

## 2022-03-29 PROCEDURE — 80061 LIPID PANEL: CPT

## 2022-03-30 ENCOUNTER — OFFICE VISIT (OUTPATIENT)
Dept: ENDOCRINOLOGY | Facility: HOSPITAL | Age: 32
End: 2022-03-30
Payer: COMMERCIAL

## 2022-03-30 VITALS
DIASTOLIC BLOOD PRESSURE: 82 MMHG | BODY MASS INDEX: 30.73 KG/M2 | WEIGHT: 167 LBS | SYSTOLIC BLOOD PRESSURE: 114 MMHG | HEART RATE: 101 BPM | OXYGEN SATURATION: 97 % | HEIGHT: 62 IN

## 2022-03-30 DIAGNOSIS — E10.649 HYPOGLYCEMIA DUE TO TYPE 1 DIABETES MELLITUS (HCC): ICD-10-CM

## 2022-03-30 DIAGNOSIS — Z96.41 INSULIN PUMP STATUS: ICD-10-CM

## 2022-03-30 DIAGNOSIS — Z96.41 INSULIN PUMP IN PLACE: ICD-10-CM

## 2022-03-30 DIAGNOSIS — E55.9 VITAMIN D DEFICIENCY: ICD-10-CM

## 2022-03-30 DIAGNOSIS — E10.9 TYPE 1 DIABETES MELLITUS WITHOUT COMPLICATION (HCC): Primary | ICD-10-CM

## 2022-03-30 PROCEDURE — 3074F SYST BP LT 130 MM HG: CPT | Performed by: NURSE PRACTITIONER

## 2022-03-30 PROCEDURE — 1036F TOBACCO NON-USER: CPT | Performed by: NURSE PRACTITIONER

## 2022-03-30 PROCEDURE — 3008F BODY MASS INDEX DOCD: CPT | Performed by: NURSE PRACTITIONER

## 2022-03-30 PROCEDURE — 3079F DIAST BP 80-89 MM HG: CPT | Performed by: NURSE PRACTITIONER

## 2022-03-30 PROCEDURE — 99214 OFFICE O/P EST MOD 30 MIN: CPT | Performed by: NURSE PRACTITIONER

## 2022-03-30 PROCEDURE — 95251 CONT GLUC MNTR ANALYSIS I&R: CPT | Performed by: NURSE PRACTITIONER

## 2022-03-30 NOTE — PROGRESS NOTES
Jayda Oneill 33 y o  female MRN: [id number]    Encounter: [id number]      Assessment/Plan     Assessment: This is a 33y o -year-old female with type 1 diabetes on insulin pump therapy  Plan:  1  Type 1 diabetes:  Her most recent hemoglobin A1c is 5 8  Download of her Dexcom was reviewed with her at the time of the visit  She is experiencing hypoglycemia throughout the day but especially overnight  For this, I have adjusted her basal rate at midnight to 0 9 at 10:00 p m  to 0 9  I have also adjusted her insulin to carbohydrate ratio to 1:10 a m  Ermias Latif She will continue to utilize the Dexcom continuous glucose monitor and send a download to the office in 1 week for further review and adjustment, if necessary  Check hemoglobin A1c and comprehensive metabolic panel prior to next visit  2  Vitamin-D deficiency:  Her most recent 25 hydroxy vitamin-D level is low at 20 5  Given her relative hyperglycemia on recent lab work I have asked her to start supplementing with a 1000 units daily of vitamin-D 3     3   Hypercalcemia:  Her most recent calcium was 10 4 with an albumin of 3 5 (10 8 corrected)  Will recheck with a CMP in the next few weeks with a PTH and phosphorus  CC:  Type 1 Diabetes follow-up    History of Present Illness     HPI:  35y o  year old female with type 1 diabetes for about 12 years  She is on insulin at home and takes Novolog via tandem x2 insulin pump  She denies any polyuria, polydipsia, nocturia and blurry vision   She denies neuropathy, nephropathy and retinopathy        Hypoglycemic episodes: none noticed in her CGM download from 3/3 to 3/16       Blood Sugar/Glucometer/Pump/CGM review:  Tandem insulin pump and Dexcom continuous glucose monitor were downloaded at the time of the visit  Her report from March 16 through March 29, 2022 shows an average glucose of 105 with standard deviation of 41  She is 66% in target range with 18% low and 7% very low    The remainder is 9% high   She is having frequent hypoglycemia throughout the day but especially overnight      Her basal rate is 1 200 at midnight and 1 00 at 10:00 p m  Insulin sensitivity is set at 1 U for every 32 above 110 mg/dl of blood glucose and insulin to carb ratio of 1:9       Review of Systems   Constitutional: Negative  Negative for chills, fatigue and fever  HENT: Negative  Negative for trouble swallowing and voice change  Eyes: Negative  Negative for photophobia, pain, discharge, redness, itching and visual disturbance  Respiratory: Negative  Negative for chest tightness and shortness of breath  Cardiovascular: Negative  Negative for chest pain  Gastrointestinal: Negative  Negative for abdominal pain, constipation, diarrhea and vomiting  Endocrine: Negative for cold intolerance, heat intolerance, polydipsia, polyphagia and polyuria  Genitourinary: Negative  Musculoskeletal: Negative  Skin: Negative  Allergic/Immunologic: Negative  Neurological: Negative  Negative for dizziness, syncope, light-headedness and headaches  Hematological: Negative  Psychiatric/Behavioral: Negative  All other systems reviewed and are negative        Historical Information   Past Medical History:   Diagnosis Date    Anemia     H/O vitamin D deficiency     Pre-existing type 1 diabetes affecting pregnancy, antepartum     age 14    Vacuum-assisted vaginal delivery 12/12/2019    Varicella      Past Surgical History:   Procedure Laterality Date    DENTAL IMPLANT  2018    TONSILLECTOMY AND ADENOIDECTOMY       Social History   Social History     Substance and Sexual Activity   Alcohol Use Not Currently     Social History     Substance and Sexual Activity   Drug Use Never     Social History     Tobacco Use   Smoking Status Never Smoker   Smokeless Tobacco Never Used     Family History:   Family History   Problem Relation Age of Onset    No Known Problems Mother     No Known Problems Father    [facility] Diabetes type I Brother     Skin cancer Family        Meds/Allergies   Current Outpatient Medications   Medication Sig Dispense Refill    insulin aspart (NovoLOG) 100 units/mL injection USE UP  UNITS DAILY VIA INSULIN PUMP  110 mL 0    Prenatal Vit-DSS-Fe Cbn-FA (PRENATAL AD PO) Take by mouth      albuterol (PROVENTIL HFA,VENTOLIN HFA) 90 mcg/act inhaler  (Patient not taking: Reported on 3/30/2022 )      aspirin 81 mg chewable tablet Chew 81 mg daily (Patient not taking: Reported on 3/30/2022 )      budesonide (Pulmicort Flexhaler) 180 MCG/ACT inhaler Inhale 2 puffs 2 (two) times a day Rinse mouth after use  (Patient not taking: Reported on 3/30/2022 ) 1 each 1    Cholecalciferol 100 MCG (4000 UT) TABS 1 tab daily  (Patient not taking: Reported on 3/30/2022 )      glucagon 1 MG injection Inject 1 mg under the skin once as needed for low blood sugar 1 kit 1    OneTouch Delica Lancets 09M MISC Use up to 8 a day  Pregnancy, T1DM  200 each 6    OneTouch Verio test strip Test up to 8 times a day and as instructed  Pregnancy; T1DM  200 each 6    URINE GLUCOSE-KETONES TEST VI Test       No current facility-administered medications for this visit  Allergies   Allergen Reactions    Other      Other reaction(s): Resp Distress  Other reaction(s): Resp Sympt Mild  Other reaction(s): Resp Distress    Mold Extract  [Trichophyton]      Other reaction(s): Resp Sympt Mild    Molds & Smuts      Other reaction(s): Resp Sympt Mild    Pollen Extract      Other reaction(s): Resp Sympt Mild       Objective   Vitals: Last menstrual period 06/10/2021, not currently breastfeeding  Physical Exam  Vitals reviewed  Constitutional:       Appearance: She is well-developed  HENT:      Head: Normocephalic and atraumatic  Eyes:      Conjunctiva/sclera: Conjunctivae normal       Pupils: Pupils are equal, round, and reactive to light  Cardiovascular:      Rate and Rhythm: Normal rate and regular rhythm        Heart sounds: Normal heart sounds  Pulmonary:      Effort: Pulmonary effort is normal       Breath sounds: Normal breath sounds  Abdominal:      General: Bowel sounds are normal       Palpations: Abdomen is soft  Musculoskeletal:         General: Normal range of motion  Cervical back: Normal range of motion and neck supple  Skin:     General: Skin is warm and dry  Neurological:      Mental Status: She is alert and oriented to person, place, and time  Psychiatric:         Behavior: Behavior normal          Thought Content: Thought content normal          Judgment: Judgment normal        Lab Results:   Lab Results   Component Value Date/Time    Hemoglobin A1C 5 8 (H) 03/29/2022 11:05 AM    Hemoglobin A1C 5 4 10/05/2021 09:48 AM    Hemoglobin A1C 6 7 (H) 07/24/2021 07:32 AM    WBC 7 44 03/29/2022 11:05 AM    Hemoglobin 13 5 03/29/2022 11:05 AM    Hematocrit 44 2 03/29/2022 11:05 AM    MCV 86 03/29/2022 11:05 AM    Platelets 222 20/85/9873 11:05 AM    BUN 19 03/29/2022 11:05 AM    BUN 9 10/05/2021 09:48 AM    BUN 12 07/24/2021 07:32 AM    Potassium 4 5 03/29/2022 11:05 AM    Potassium 3 5 10/05/2021 09:48 AM    Potassium 4 0 07/24/2021 07:32 AM    Chloride 111 (H) 03/29/2022 11:05 AM    Chloride 103 10/05/2021 09:48 AM    Chloride 101 07/24/2021 07:32 AM    CO2 27 03/29/2022 11:05 AM    CO2 28 10/05/2021 09:48 AM    CO2 26 07/24/2021 07:32 AM    Creatinine 0 84 03/29/2022 11:05 AM    Creatinine 0 54 (L) 10/05/2021 09:48 AM    Creatinine 0 72 07/24/2021 07:32 AM    AST 35 03/29/2022 11:05 AM    AST 14 10/05/2021 09:48 AM    AST 15 07/24/2021 07:32 AM    ALT 40 03/29/2022 11:05 AM    ALT 15 10/05/2021 09:48 AM    ALT 17 07/24/2021 07:32 AM    Albumin 3 5 03/29/2022 11:05 AM    Albumin 3 1 (L) 10/05/2021 09:48 AM    Albumin 4 0 07/24/2021 07:32 AM    HDL, Direct 114 03/29/2022 11:05 AM    Triglycerides 54 03/29/2022 11:05 AM       Portions of the record may have been created with voice recognition software  Occasional wrong word or "sound a like" substitutions may have occurred due to the inherent limitations of voice recognition software  Read the chart carefully and recognize, using context, where substitutions have occurred

## 2022-03-30 NOTE — PATIENT INSTRUCTIONS
Be mindful of diet      Stay active and stay hydrated      We made some changes to your basal rates and I:C ratios      Please provide a dex com download to the office in 1 week for reassessment  Recheck calcium lab work in 4-6 weeks to reassess      We will contact you with the results of your lab work  Start replacement with Vitamin D3 - 1000 units daily

## 2022-04-06 ENCOUNTER — TELEPHONE (OUTPATIENT)
Dept: FAMILY MEDICINE CLINIC | Facility: CLINIC | Age: 32
End: 2022-04-06

## 2022-04-06 NOTE — TELEPHONE ENCOUNTER
Patient called she has been having issues on and off with wheezing and congestion  Said she has been dealing with this since the birth of her son 3 months ago  She took a covid test and it was neg  She isnt sure if its allergies or what she should do  You had no openings so I put her in with a virtual with Dr Landaverde tomorrow  In the meantime she wants to know if albuterol and pulmicort inhalers are okay to take while breastfeeding      Please advise

## 2022-04-06 NOTE — TELEPHONE ENCOUNTER
Yes both of the inhalers are safe with breast feeding, if you would call her and let her know, thank you

## 2022-04-07 ENCOUNTER — TELEMEDICINE (OUTPATIENT)
Dept: FAMILY MEDICINE CLINIC | Facility: CLINIC | Age: 32
End: 2022-04-07
Payer: COMMERCIAL

## 2022-04-07 VITALS — HEIGHT: 62 IN | TEMPERATURE: 99.1 F | BODY MASS INDEX: 29.63 KG/M2 | WEIGHT: 161 LBS

## 2022-04-07 DIAGNOSIS — R06.2 WHEEZE: Primary | ICD-10-CM

## 2022-04-07 DIAGNOSIS — J30.9 ALLERGIC RHINITIS, UNSPECIFIED SEASONALITY, UNSPECIFIED TRIGGER: ICD-10-CM

## 2022-04-07 PROCEDURE — 3008F BODY MASS INDEX DOCD: CPT | Performed by: FAMILY MEDICINE

## 2022-04-07 PROCEDURE — 3725F SCREEN DEPRESSION PERFORMED: CPT | Performed by: FAMILY MEDICINE

## 2022-04-07 PROCEDURE — 1036F TOBACCO NON-USER: CPT | Performed by: FAMILY MEDICINE

## 2022-04-07 PROCEDURE — 99213 OFFICE O/P EST LOW 20 MIN: CPT | Performed by: FAMILY MEDICINE

## 2022-04-07 NOTE — PROGRESS NOTES
Assessment/Plan:     Diagnoses and all orders for this visit:    Wheeze    Allergic rhinitis, unspecified seasonality, unspecified trigger      patient will continue current medications   Can add cleared  Patient is currently breast-feeding in these reviewed for safety   She can follow up as needed      Subjective:     Chief Complaint   Patient presents with    Cold Like Symptoms     Itchy eyes, itchy throat, wheezing on and off, and post nasal drip        Patient ID: Renee Almonte is a 33 y o  female  Patient presents today for cough congestion itchy eyes itchy throat and wheezing   This started last fall   She was placed on Pulmicort and albuterol which helped significantly   She was able to stop this but her symptoms have just recently started to come back      The following portions of the patient's history were reviewed and updated as appropriate: allergies, current medications, past family history, past medical history, past social history, past surgical history and problem list     Review of Systems   Constitutional: Negative  HENT: Positive for congestion  Eyes: Negative  Respiratory: Positive for cough, shortness of breath and wheezing  Cardiovascular: Negative  Gastrointestinal: Negative  Endocrine: Negative  Genitourinary: Negative  Musculoskeletal: Negative  Skin: Negative  Allergic/Immunologic: Negative  Neurological: Negative  Hematological: Negative  Psychiatric/Behavioral: Negative  All other systems reviewed and are negative  Objective:    Vitals:    04/07/22 0802   Temp: 99 1 °F (37 3 °C)   TempSrc: Tympanic   Weight: 73 kg (161 lb)   Height: 5' 2" (1 575 m)          Physical Exam  Vitals and nursing note reviewed  Constitutional:       Appearance: She is well-developed  HENT:      Head: Normocephalic and atraumatic        Right Ear: External ear normal       Left Ear: External ear normal    Eyes:      Conjunctiva/sclera: Conjunctivae normal    Pulmonary:      Effort: Pulmonary effort is normal    Musculoskeletal:         General: Normal range of motion  Cervical back: Normal range of motion  Skin:     General: Skin is warm and dry  Neurological:      Mental Status: She is alert and oriented to person, place, and time  Deep Tendon Reflexes: Reflexes are normal and symmetric  Psychiatric:         Behavior: Behavior normal          Thought Content:  Thought content normal          Judgment: Judgment normal

## 2022-04-08 ENCOUNTER — TELEPHONE (OUTPATIENT)
Dept: ENDOCRINOLOGY | Facility: HOSPITAL | Age: 32
End: 2022-04-08

## 2022-04-08 NOTE — TELEPHONE ENCOUNTER
Reviewed Cathie's tandem and Dexcom download from March 22 through April 4, 2022  She was having some issues with her pump and had a replacement  Her blood sugars are running a little bit higher since making adjustments at her last office visit  This appears to be at meals  Suggest that we adjust her basal rate to 0 8 at midnight and 10:00 p m  to help with some of the low blood sugars but then readjust her insulin to carbohydrate ratio back to 1 9 at midnight and 10:00 p m  to help with the postprandial hyperglycemia  Please ask her to perform another download in 1 week for further review and adjustment, if necessary

## 2022-05-06 ENCOUNTER — LAB (OUTPATIENT)
Dept: LAB | Facility: HOSPITAL | Age: 32
End: 2022-05-06
Payer: COMMERCIAL

## 2022-05-06 DIAGNOSIS — Z96.41 INSULIN PUMP IN PLACE: ICD-10-CM

## 2022-05-06 DIAGNOSIS — E10.649 HYPOGLYCEMIA DUE TO TYPE 1 DIABETES MELLITUS (HCC): ICD-10-CM

## 2022-05-06 DIAGNOSIS — E10.9 TYPE 1 DIABETES MELLITUS WITHOUT COMPLICATION (HCC): ICD-10-CM

## 2022-05-06 DIAGNOSIS — Z96.41 INSULIN PUMP STATUS: ICD-10-CM

## 2022-05-06 DIAGNOSIS — E55.9 VITAMIN D DEFICIENCY: ICD-10-CM

## 2022-05-06 LAB
ALBUMIN SERPL BCP-MCNC: 3.7 G/DL (ref 3.5–5)
ALP SERPL-CCNC: 91 U/L (ref 46–116)
ALT SERPL W P-5'-P-CCNC: 67 U/L (ref 12–78)
ANION GAP SERPL CALCULATED.3IONS-SCNC: 2 MMOL/L (ref 4–13)
AST SERPL W P-5'-P-CCNC: 40 U/L (ref 5–45)
BILIRUB SERPL-MCNC: 0.48 MG/DL (ref 0.2–1)
BUN SERPL-MCNC: 16 MG/DL (ref 5–25)
CALCIUM SERPL-MCNC: 9.9 MG/DL (ref 8.3–10.1)
CHLORIDE SERPL-SCNC: 102 MMOL/L (ref 100–108)
CO2 SERPL-SCNC: 31 MMOL/L (ref 21–32)
CREAT SERPL-MCNC: 0.88 MG/DL (ref 0.6–1.3)
GFR SERPL CREATININE-BSD FRML MDRD: 87 ML/MIN/1.73SQ M
GLUCOSE P FAST SERPL-MCNC: 140 MG/DL (ref 65–99)
PHOSPHATE SERPL-MCNC: 3.1 MG/DL (ref 2.7–4.5)
POTASSIUM SERPL-SCNC: 4.3 MMOL/L (ref 3.5–5.3)
PROT SERPL-MCNC: 7.7 G/DL (ref 6.4–8.2)
PTH-INTACT SERPL-MCNC: <6.3 PG/ML (ref 18.4–80.1)
SODIUM SERPL-SCNC: 135 MMOL/L (ref 136–145)
T4 FREE SERPL-MCNC: 0.98 NG/DL (ref 0.76–1.46)
TSH SERPL DL<=0.05 MIU/L-ACNC: 0.77 UIU/ML (ref 0.45–4.5)

## 2022-05-06 PROCEDURE — 84439 ASSAY OF FREE THYROXINE: CPT

## 2022-05-06 PROCEDURE — 84100 ASSAY OF PHOSPHORUS: CPT

## 2022-05-06 PROCEDURE — 36415 COLL VENOUS BLD VENIPUNCTURE: CPT

## 2022-05-06 PROCEDURE — 80053 COMPREHEN METABOLIC PANEL: CPT

## 2022-05-06 PROCEDURE — 84443 ASSAY THYROID STIM HORMONE: CPT

## 2022-05-06 PROCEDURE — 83970 ASSAY OF PARATHORMONE: CPT

## 2022-05-14 LAB
LEFT EYE DIABETIC RETINOPATHY: NORMAL
RIGHT EYE DIABETIC RETINOPATHY: NORMAL

## 2022-05-14 PROCEDURE — 2023F DILAT RTA XM W/O RTNOPTHY: CPT | Performed by: FAMILY MEDICINE

## 2022-07-11 ENCOUNTER — LAB (OUTPATIENT)
Dept: LAB | Facility: HOSPITAL | Age: 32
End: 2022-07-11
Payer: COMMERCIAL

## 2022-07-11 DIAGNOSIS — Z96.41 INSULIN PUMP STATUS: ICD-10-CM

## 2022-07-11 DIAGNOSIS — E55.9 VITAMIN D DEFICIENCY: ICD-10-CM

## 2022-07-11 DIAGNOSIS — Z96.41 INSULIN PUMP IN PLACE: ICD-10-CM

## 2022-07-11 DIAGNOSIS — E10.649 HYPOGLYCEMIA DUE TO TYPE 1 DIABETES MELLITUS (HCC): ICD-10-CM

## 2022-07-11 DIAGNOSIS — E10.9 TYPE 1 DIABETES MELLITUS WITHOUT COMPLICATION (HCC): ICD-10-CM

## 2022-07-11 LAB
ALBUMIN SERPL BCP-MCNC: 3.8 G/DL (ref 3.5–5)
ALP SERPL-CCNC: 103 U/L (ref 46–116)
ALT SERPL W P-5'-P-CCNC: 23 U/L (ref 12–78)
ANION GAP SERPL CALCULATED.3IONS-SCNC: 1 MMOL/L (ref 4–13)
AST SERPL W P-5'-P-CCNC: 17 U/L (ref 5–45)
BASOPHILS # BLD AUTO: 0.05 THOUSANDS/ΜL (ref 0–0.1)
BASOPHILS NFR BLD AUTO: 1 % (ref 0–1)
BILIRUB SERPL-MCNC: 0.83 MG/DL (ref 0.2–1)
BUN SERPL-MCNC: 18 MG/DL (ref 5–25)
CALCIUM SERPL-MCNC: 11 MG/DL (ref 8.3–10.1)
CHLORIDE SERPL-SCNC: 107 MMOL/L (ref 100–108)
CHOLEST SERPL-MCNC: 193 MG/DL
CO2 SERPL-SCNC: 31 MMOL/L (ref 21–32)
CREAT SERPL-MCNC: 0.84 MG/DL (ref 0.6–1.3)
EOSINOPHIL # BLD AUTO: 0.29 THOUSAND/ΜL (ref 0–0.61)
EOSINOPHIL NFR BLD AUTO: 5 % (ref 0–6)
ERYTHROCYTE [DISTWIDTH] IN BLOOD BY AUTOMATED COUNT: 12.3 % (ref 11.6–15.1)
EST. AVERAGE GLUCOSE BLD GHB EST-MCNC: 126 MG/DL
GFR SERPL CREATININE-BSD FRML MDRD: 92 ML/MIN/1.73SQ M
GLUCOSE P FAST SERPL-MCNC: 128 MG/DL (ref 65–99)
HBA1C MFR BLD: 6 %
HCT VFR BLD AUTO: 41.4 % (ref 34.8–46.1)
HDLC SERPL-MCNC: 102 MG/DL
HGB BLD-MCNC: 12.8 G/DL (ref 11.5–15.4)
IMM GRANULOCYTES # BLD AUTO: 0.01 THOUSAND/UL (ref 0–0.2)
IMM GRANULOCYTES NFR BLD AUTO: 0 % (ref 0–2)
LDLC SERPL CALC-MCNC: 80 MG/DL (ref 0–100)
LYMPHOCYTES # BLD AUTO: 2.51 THOUSANDS/ΜL (ref 0.6–4.47)
LYMPHOCYTES NFR BLD AUTO: 39 % (ref 14–44)
MCH RBC QN AUTO: 26.8 PG (ref 26.8–34.3)
MCHC RBC AUTO-ENTMCNC: 30.9 G/DL (ref 31.4–37.4)
MCV RBC AUTO: 87 FL (ref 82–98)
MONOCYTES # BLD AUTO: 0.45 THOUSAND/ΜL (ref 0.17–1.22)
MONOCYTES NFR BLD AUTO: 7 % (ref 4–12)
NEUTROPHILS # BLD AUTO: 3.16 THOUSANDS/ΜL (ref 1.85–7.62)
NEUTS SEG NFR BLD AUTO: 48 % (ref 43–75)
NONHDLC SERPL-MCNC: 91 MG/DL
NRBC BLD AUTO-RTO: 0 /100 WBCS
PHOSPHATE SERPL-MCNC: 3.3 MG/DL (ref 2.7–4.5)
PLATELET # BLD AUTO: 222 THOUSANDS/UL (ref 149–390)
PMV BLD AUTO: 11.4 FL (ref 8.9–12.7)
POTASSIUM SERPL-SCNC: 4.3 MMOL/L (ref 3.5–5.3)
PROT SERPL-MCNC: 7.8 G/DL (ref 6.4–8.2)
PTH-INTACT SERPL-MCNC: <6.3 PG/ML (ref 18.4–80.1)
RBC # BLD AUTO: 4.77 MILLION/UL (ref 3.81–5.12)
SODIUM SERPL-SCNC: 139 MMOL/L (ref 136–145)
TRIGL SERPL-MCNC: 56 MG/DL
TSH SERPL DL<=0.05 MIU/L-ACNC: 1.04 UIU/ML (ref 0.45–4.5)
WBC # BLD AUTO: 6.47 THOUSAND/UL (ref 4.31–10.16)

## 2022-07-11 PROCEDURE — 83036 HEMOGLOBIN GLYCOSYLATED A1C: CPT

## 2022-07-11 PROCEDURE — 84100 ASSAY OF PHOSPHORUS: CPT

## 2022-07-11 PROCEDURE — 36415 COLL VENOUS BLD VENIPUNCTURE: CPT

## 2022-07-11 PROCEDURE — 84443 ASSAY THYROID STIM HORMONE: CPT

## 2022-07-11 PROCEDURE — 85025 COMPLETE CBC W/AUTO DIFF WBC: CPT

## 2022-07-11 PROCEDURE — 83970 ASSAY OF PARATHORMONE: CPT

## 2022-07-11 PROCEDURE — 80061 LIPID PANEL: CPT

## 2022-07-11 PROCEDURE — 80053 COMPREHEN METABOLIC PANEL: CPT

## 2022-07-12 ENCOUNTER — TELEPHONE (OUTPATIENT)
Dept: ENDOCRINOLOGY | Facility: HOSPITAL | Age: 32
End: 2022-07-12

## 2022-07-12 ENCOUNTER — APPOINTMENT (OUTPATIENT)
Dept: LAB | Facility: HOSPITAL | Age: 32
End: 2022-07-12
Attending: INTERNAL MEDICINE
Payer: COMMERCIAL

## 2022-07-12 DIAGNOSIS — E83.52 HYPERCALCEMIA: ICD-10-CM

## 2022-07-12 DIAGNOSIS — E83.52 HYPERCALCEMIA: Primary | ICD-10-CM

## 2022-07-12 LAB — 25(OH)D3 SERPL-MCNC: 26.7 NG/ML (ref 30–100)

## 2022-07-12 PROCEDURE — 82306 VITAMIN D 25 HYDROXY: CPT

## 2022-07-12 PROCEDURE — 82397 CHEMILUMINESCENT ASSAY: CPT

## 2022-07-12 PROCEDURE — 84590 ASSAY OF VITAMIN A: CPT

## 2022-07-12 PROCEDURE — 84165 PROTEIN E-PHORESIS SERUM: CPT | Performed by: PATHOLOGY

## 2022-07-12 PROCEDURE — 36415 COLL VENOUS BLD VENIPUNCTURE: CPT

## 2022-07-12 PROCEDURE — 82164 ANGIOTENSIN I ENZYME TEST: CPT

## 2022-07-12 PROCEDURE — 84165 PROTEIN E-PHORESIS SERUM: CPT

## 2022-07-12 PROCEDURE — 82652 VIT D 1 25-DIHYDROXY: CPT

## 2022-07-12 NOTE — TELEPHONE ENCOUNTER
Patient was rescheduled from today to September for [facility]  She would like someone to review her blood work and let her know how it looks since she has to wait, side note she also is a [employer] employee    Thanks

## 2022-07-12 NOTE — PROGRESS NOTES
I spoke with the patient directly regarding her blood work  Her hemoglobin A1c is 6% which is quite good for type 1 diabetes  Unfortunately, her calcium level is high at 11 with a normal albumin of 3 8 and a normal alkaline phosphatase  Her parathyroid hormone level is less than 6 3 and her phosphorus is normal   Her thyroid level is normal     She reports that she is breast-feeding and her parathyroid hormone level has decreased in the past with breastfeeding  She was eating extra Tums for indigestion early in June  She does take a prenatal vitamin daily and vitamin-D 4000 units daily  She also is eating either yogurt or cereal with milk in the morning every day along with a cheese stick and then eats cheese at least 2 more times a day  She had been eating a lot of calcium when her calcium was abnormal several months ago and decrease the calcium in her diet and the calcium normalized but has been eating more calcium again recently  It is possible that this extra calcium and Tums could have falsely raised her calcium level  The parathyroid hormone level is adequately responding to hypercalcemia by decreasing  I am now concerned because previously with breastfeeding, her calcium was normal with a low parathyroid hormone level and elevated alkaline phosphatase but this time her calcium is quite high  For completeness, I would like to rule out secondary causes of hypercalcemia including elevated vitamin-D levels and sarcoidosis along with multiple myeloma, elevated vitamin-A level and malignancies  To that end I have asked her to get a 25 hydroxyvitamin D level, 1, 25 dihydroxy vitamin-D level, angiotensin-converting enzyme level, serum protein electrophoresis, PTH related peptide level, and vitamin-A level  I have also asked her to repeat her CMP in about a month after she decreases some of her calcium intake

## 2022-07-13 LAB — ACE SERPL-CCNC: 50 U/L (ref 14–82)

## 2022-07-14 LAB
1,25(OH)2D3 SERPL-MCNC: 63 PG/ML (ref 24.8–81.5)
ALBUMIN SERPL ELPH-MCNC: 4.54 G/DL (ref 3.5–5)
ALBUMIN SERPL ELPH-MCNC: 58.9 % (ref 52–65)
ALPHA1 GLOB SERPL ELPH-MCNC: 0.32 G/DL (ref 0.1–0.4)
ALPHA1 GLOB SERPL ELPH-MCNC: 4.2 % (ref 2.5–5)
ALPHA2 GLOB SERPL ELPH-MCNC: 0.72 G/DL (ref 0.4–1.2)
ALPHA2 GLOB SERPL ELPH-MCNC: 9.4 % (ref 7–13)
BETA GLOB ABNORMAL SERPL ELPH-MCNC: 0.48 G/DL (ref 0.4–0.8)
BETA1 GLOB SERPL ELPH-MCNC: 6.2 % (ref 5–13)
BETA2 GLOB SERPL ELPH-MCNC: 4.6 % (ref 2–8)
BETA2+GAMMA GLOB SERPL ELPH-MCNC: 0.35 G/DL (ref 0.2–0.5)
GAMMA GLOB ABNORMAL SERPL ELPH-MCNC: 1.29 G/DL (ref 0.5–1.6)
GAMMA GLOB SERPL ELPH-MCNC: 16.7 % (ref 12–22)
IGG/ALB SER: 1.43 {RATIO} (ref 1.1–1.8)
PROT PATTERN SERPL ELPH-IMP: NORMAL
PROT SERPL-MCNC: 7.7 G/DL (ref 6.4–8.2)

## 2022-07-17 LAB — PTH RELATED PROT SERPL-SCNC: <2 PMOL/L

## 2022-07-19 DIAGNOSIS — E10.9 TYPE 1 DIABETES MELLITUS WITHOUT COMPLICATION (HCC): ICD-10-CM

## 2022-07-21 LAB — VIT A SERPL-MCNC: 35.1 UG/DL (ref 18.9–57.3)

## 2022-08-11 ENCOUNTER — DOCUMENTATION (OUTPATIENT)
Dept: ENDOCRINOLOGY | Facility: HOSPITAL | Age: 32
End: 2022-08-11

## 2022-08-16 ENCOUNTER — APPOINTMENT (OUTPATIENT)
Dept: LAB | Facility: HOSPITAL | Age: 32
End: 2022-08-16
Payer: COMMERCIAL

## 2022-08-16 LAB
ALBUMIN SERPL BCP-MCNC: 3.9 G/DL (ref 3.5–5)
ALP SERPL-CCNC: 118 U/L (ref 46–116)
ALT SERPL W P-5'-P-CCNC: 19 U/L (ref 12–78)
ANION GAP SERPL CALCULATED.3IONS-SCNC: 5 MMOL/L (ref 4–13)
AST SERPL W P-5'-P-CCNC: 19 U/L (ref 5–45)
BILIRUB SERPL-MCNC: 0.53 MG/DL (ref 0.2–1)
BUN SERPL-MCNC: 12 MG/DL (ref 5–25)
CALCIUM SERPL-MCNC: 10.2 MG/DL (ref 8.3–10.1)
CHLORIDE SERPL-SCNC: 106 MMOL/L (ref 96–108)
CO2 SERPL-SCNC: 28 MMOL/L (ref 21–32)
CREAT SERPL-MCNC: 0.86 MG/DL (ref 0.6–1.3)
GFR SERPL CREATININE-BSD FRML MDRD: 90 ML/MIN/1.73SQ M
GLUCOSE P FAST SERPL-MCNC: 90 MG/DL (ref 65–99)
POTASSIUM SERPL-SCNC: 4.1 MMOL/L (ref 3.5–5.3)
PROT SERPL-MCNC: 7.7 G/DL (ref 6.4–8.4)
SODIUM SERPL-SCNC: 139 MMOL/L (ref 135–147)

## 2022-08-16 PROCEDURE — 36415 COLL VENOUS BLD VENIPUNCTURE: CPT

## 2022-08-16 PROCEDURE — 80053 COMPREHEN METABOLIC PANEL: CPT

## 2022-08-17 ENCOUNTER — TELEPHONE (OUTPATIENT)
Dept: ENDOCRINOLOGY | Facility: HOSPITAL | Age: 32
End: 2022-08-17

## 2022-08-17 NOTE — TELEPHONE ENCOUNTER
The patient called and stated that she would like you to review her blood work results that are in her chart

## 2022-08-18 DIAGNOSIS — E10.9 TYPE 1 DIABETES MELLITUS WITHOUT COMPLICATION (HCC): Primary | ICD-10-CM

## 2022-08-18 DIAGNOSIS — E83.52 HYPERCALCEMIA: Primary | ICD-10-CM

## 2022-08-18 NOTE — TELEPHONE ENCOUNTER
Reviewed Haylee's comprehensive metabolic panel  Her calcium has decreased from 11 to 10 2  Due to be seen on September 13, 2022  I would like her to get another comprehensive metabolic panel just prior to the visit just to keep tabs to talk about the most up-to-date information at the visit

## 2022-09-06 ENCOUNTER — OFFICE VISIT (OUTPATIENT)
Dept: FAMILY MEDICINE CLINIC | Facility: CLINIC | Age: 32
End: 2022-09-06
Payer: COMMERCIAL

## 2022-09-06 VITALS
OXYGEN SATURATION: 98 % | SYSTOLIC BLOOD PRESSURE: 102 MMHG | WEIGHT: 160 LBS | DIASTOLIC BLOOD PRESSURE: 68 MMHG | RESPIRATION RATE: 18 BRPM | HEIGHT: 62 IN | BODY MASS INDEX: 29.44 KG/M2 | HEART RATE: 107 BPM | TEMPERATURE: 99.2 F

## 2022-09-06 DIAGNOSIS — J01.90 ACUTE SINUSITIS, RECURRENCE NOT SPECIFIED, UNSPECIFIED LOCATION: Primary | ICD-10-CM

## 2022-09-06 PROCEDURE — 99213 OFFICE O/P EST LOW 20 MIN: CPT | Performed by: NURSE PRACTITIONER

## 2022-09-06 PROCEDURE — 3078F DIAST BP <80 MM HG: CPT | Performed by: NURSE PRACTITIONER

## 2022-09-06 PROCEDURE — 3074F SYST BP LT 130 MM HG: CPT | Performed by: NURSE PRACTITIONER

## 2022-09-06 RX ORDER — AMOXICILLIN AND CLAVULANATE POTASSIUM 875; 125 MG/1; MG/1
1 TABLET, FILM COATED ORAL EVERY 12 HOURS SCHEDULED
Qty: 14 TABLET | Refills: 0 | Status: SHIPPED | OUTPATIENT
Start: 2022-09-06 | End: 2022-09-13

## 2022-09-06 NOTE — PROGRESS NOTES
Assessment/Plan:    No problem-specific Assessment & Plan notes found for this encounter  Problem List Items Addressed This Visit    None     Visit Diagnoses     Acute sinusitis, recurrence not specified, unspecified location    -  Primary    Relevant Medications    amoxicillin-clavulanate (AUGMENTIN) 875-125 mg per tablet            Subjective:      Patient ID: Zora Trimble is a 33 y o  female  Patient is here today for illness  Patient has nasal congestion, sinus headaches, runny nose and postnasal drip  Symptoms first started about 2 weeks ago  Patient has taken multiple covid tests which were all negative and last test was yesterday  Patient is breastfeeding  No fever or chills  Patient has sinus pressure in nose and forehead  Patient also had jaw pain this morning but resolved now  Patient blew green/white nasal secretions from nose this morning  Patient did have sore throat initially but that is resolved now  The following portions of the patient's history were reviewed and updated as appropriate: allergies, current medications, past family history, past medical history, past social history, past surgical history and problem list     Review of Systems   Constitutional: Negative  Negative for chills, fatigue and fever  HENT: Positive for congestion, rhinorrhea, sinus pressure and sinus pain  Negative for ear discharge, ear pain and sore throat  Eyes: Negative  Negative for pain, discharge and visual disturbance  Respiratory: Negative  Negative for cough, chest tightness, shortness of breath and wheezing  Cardiovascular: Negative  Negative for chest pain, palpitations and leg swelling  Gastrointestinal: Negative  Negative for abdominal pain, constipation, diarrhea, nausea and vomiting  Endocrine: Negative  Negative for polydipsia and polyuria  Genitourinary: Negative  Negative for difficulty urinating, dysuria and hematuria  Musculoskeletal: Negative    Negative for arthralgias and myalgias  Skin: Negative  Negative for rash and wound  Allergic/Immunologic: Positive for environmental allergies  Neurological: Negative  Negative for dizziness, seizures, syncope, light-headedness and headaches  Hematological: Negative  Does not bruise/bleed easily  Psychiatric/Behavioral: Negative  Negative for dysphoric mood  The patient is not nervous/anxious  Objective:      /68 (BP Location: Left arm, Patient Position: Sitting, Cuff Size: Standard)   Pulse (!) 107   Temp 99 2 °F (37 3 °C) (Tympanic)   Resp 18   Ht 5' 2" (1 575 m)   Wt 72 6 kg (160 lb)   SpO2 98%   Breastfeeding Yes   BMI 29 26 kg/m²          Physical Exam  Vitals reviewed  Constitutional:       General: She is not in acute distress  Appearance: Normal appearance  HENT:      Head: Normocephalic and atraumatic  Right Ear: Tympanic membrane, ear canal and external ear normal  There is no impacted cerumen  Left Ear: Tympanic membrane, ear canal and external ear normal  There is no impacted cerumen  Nose: Congestion and rhinorrhea present  Right Turbinates: Enlarged, swollen and pale  Left Turbinates: Enlarged, swollen and pale  Right Sinus: Maxillary sinus tenderness and frontal sinus tenderness present  Left Sinus: Maxillary sinus tenderness and frontal sinus tenderness present  Mouth/Throat:      Mouth: Mucous membranes are moist       Pharynx: Oropharynx is clear  No oropharyngeal exudate or posterior oropharyngeal erythema  Eyes:      Extraocular Movements: Extraocular movements intact  Conjunctiva/sclera: Conjunctivae normal    Cardiovascular:      Rate and Rhythm: Normal rate and regular rhythm  Pulses: Normal pulses  Heart sounds: Normal heart sounds  No murmur heard  Pulmonary:      Effort: Pulmonary effort is normal  No respiratory distress  Breath sounds: Normal breath sounds  No wheezing     Abdominal: General: Bowel sounds are normal       Palpations: Abdomen is soft  Tenderness: There is no abdominal tenderness  Musculoskeletal:         General: Normal range of motion  Cervical back: Normal range of motion and neck supple  Right lower leg: No edema  Left lower leg: No edema  Lymphadenopathy:      Cervical: No cervical adenopathy  Skin:     General: Skin is warm and dry  Capillary Refill: Capillary refill takes less than 2 seconds  Findings: No lesion or rash  Neurological:      General: No focal deficit present  Mental Status: She is alert and oriented to person, place, and time  Psychiatric:         Mood and Affect: Mood normal          Behavior: Behavior normal          Thought Content:  Thought content normal          Judgment: Judgment normal

## 2022-09-06 NOTE — PATIENT INSTRUCTIONS
May take over the counter decongestants or cold medicine as needed  May take Tylenol or NSAID's (Ibuprofen) for sinus headache  May use saline nasal spray to moisten nasal passages  Take Augmentin antibiotic as directed  Sinusitis   WHAT YOU NEED TO KNOW:   Sinusitis is inflammation or infection of your sinuses  Sinusitis is most often caused by a virus  Acute sinusitis may last up to 12 weeks  Chronic sinusitis lasts longer than 12 weeks  Recurrent sinusitis means you have 4 or more infections in 1 year  DISCHARGE INSTRUCTIONS:   Return to the emergency department if:   You have trouble breathing or wheezing that is getting worse  You have a stiff neck, a fever, or a bad headache  You cannot open your eye  Your eyeball bulges out or you cannot move your eye  You are more sleepy than normal, or you notice changes in your ability to think, move, or talk  You have swelling of your forehead or scalp  Call your doctor if:   You have vision changes, such as double vision  Your eye and eyelid are red, swollen, and painful  Your symptoms do not improve or go away after 10 days  You have nausea and are vomiting  Your nose is bleeding  You have questions or concerns about your condition or care  Medicines: Your symptoms may go away on their own  Your healthcare provider may recommend watchful waiting for up to 10 days before starting antibiotics  You may need any of the following:  Acetaminophen  decreases pain and fever  It is available without a doctor's order  Ask how much to take and how often to take it  Follow directions  Read the labels of all other medicines you are using to see if they also contain acetaminophen, or ask your doctor or pharmacist  Acetaminophen can cause liver damage if not taken correctly  Do not use more than 4 grams (4,000 milligrams) total of acetaminophen in one day  NSAIDs , such as ibuprofen, help decrease swelling, pain, and fever   This medicine is available with or without a doctor's order  NSAIDs can cause stomach bleeding or kidney problems in certain people  If you take blood thinner medicine, always ask your healthcare provider if NSAIDs are safe for you  Always read the medicine label and follow directions  Nasal steroid sprays  may help decrease inflammation in your nose and sinuses  Decongestants  help reduce swelling and drain mucus in the nose and sinuses  They may help you breathe easier  Antihistamines  help dry mucus in the nose and relieve sneezing  Antibiotics  help treat or prevent a bacterial infection  Take your medicine as directed  Contact your healthcare provider if you think your medicine is not helping or if you have side effects  Tell him or her if you are allergic to any medicine  Keep a list of the medicines, vitamins, and herbs you take  Include the amounts, and when and why you take them  Bring the list or the pill bottles to follow-up visits  Carry your medicine list with you in case of an emergency  Self-care:   Rinse your sinuses as directed  Use a sinus rinse device to rinse your nasal passages with a saline (salt water) solution or distilled water  Do not use tap water  This will help thin the mucus in your nose and rinse away pollen and dirt  It will also help reduce swelling so you can breathe normally  Use a humidifier  to increase air moisture in your home  This may make it easier for you to breathe and help decrease your cough  Sleep with your head elevated  Place an extra pillow under your head before you go to sleep to help your sinuses drain  Drink liquids as directed  Ask your healthcare provider how much liquid to drink each day and which liquids are best for you  Liquids will thin the mucus in your nose and help it drain  Avoid drinks that contain alcohol or caffeine  Do not smoke, and avoid secondhand smoke    Nicotine and other chemicals in cigarettes and cigars can make your symptoms worse  Ask your healthcare provider for information if you currently smoke and need help to quit  E-cigarettes or smokeless tobacco still contain nicotine  Talk to your healthcare provider before you use these products  Prevent the spread of germs:   Wash your hands often with soap and water  Wash your hands after you use the bathroom, change a child's diaper, or sneeze  Wash your hands before you prepare or eat food  Stay away from people who are sick  Some germs spread easily and quickly through contact  Follow up with your doctor as directed: You may be referred to an ear, nose, and throat specialist  Write down your questions so you remember to ask them during your visits  © Copyright [employer] 2022 Information is for End User's use only and may not be sold, redistributed or otherwise used for commercial purposes  All illustrations and images included in CareNotes® are the copyrighted property of [employer]  or Heather Shukla   The above information is an  only  It is not intended as medical advice for individual conditions or treatments  Talk to your doctor, nurse or pharmacist before following any medical regimen to see if it is safe and effective for you

## 2022-09-13 ENCOUNTER — OFFICE VISIT (OUTPATIENT)
Dept: ENDOCRINOLOGY | Facility: HOSPITAL | Age: 32
End: 2022-09-13
Payer: COMMERCIAL

## 2022-09-13 ENCOUNTER — LAB (OUTPATIENT)
Dept: LAB | Facility: HOSPITAL | Age: 32
End: 2022-09-13
Payer: COMMERCIAL

## 2022-09-13 VITALS
BODY MASS INDEX: 29.3 KG/M2 | DIASTOLIC BLOOD PRESSURE: 70 MMHG | WEIGHT: 159.2 LBS | SYSTOLIC BLOOD PRESSURE: 104 MMHG | HEIGHT: 62 IN | HEART RATE: 96 BPM

## 2022-09-13 DIAGNOSIS — E83.52 HYPERCALCEMIA: ICD-10-CM

## 2022-09-13 DIAGNOSIS — E55.9 VITAMIN D DEFICIENCY: ICD-10-CM

## 2022-09-13 DIAGNOSIS — E10.649 HYPOGLYCEMIA DUE TO TYPE 1 DIABETES MELLITUS (HCC): ICD-10-CM

## 2022-09-13 DIAGNOSIS — Z96.41 INSULIN PUMP IN PLACE: ICD-10-CM

## 2022-09-13 DIAGNOSIS — Z96.41 INSULIN PUMP STATUS: ICD-10-CM

## 2022-09-13 DIAGNOSIS — E10.9 TYPE 1 DIABETES MELLITUS WITHOUT COMPLICATION (HCC): Primary | ICD-10-CM

## 2022-09-13 LAB
ALBUMIN SERPL BCP-MCNC: 3.6 G/DL (ref 3.5–5)
ALP SERPL-CCNC: 129 U/L (ref 46–116)
ALT SERPL W P-5'-P-CCNC: 17 U/L (ref 12–78)
ANION GAP SERPL CALCULATED.3IONS-SCNC: 2 MMOL/L (ref 4–13)
AST SERPL W P-5'-P-CCNC: 14 U/L (ref 5–45)
BILIRUB SERPL-MCNC: 0.5 MG/DL (ref 0.2–1)
BUN SERPL-MCNC: 12 MG/DL (ref 5–25)
CALCIUM SERPL-MCNC: 9.1 MG/DL (ref 8.3–10.1)
CHLORIDE SERPL-SCNC: 106 MMOL/L (ref 96–108)
CO2 SERPL-SCNC: 30 MMOL/L (ref 21–32)
CREAT SERPL-MCNC: 0.84 MG/DL (ref 0.6–1.3)
GFR SERPL CREATININE-BSD FRML MDRD: 92 ML/MIN/1.73SQ M
GLUCOSE SERPL-MCNC: 76 MG/DL (ref 65–140)
POTASSIUM SERPL-SCNC: 4 MMOL/L (ref 3.5–5.3)
PROT SERPL-MCNC: 7.6 G/DL (ref 6.4–8.4)
SODIUM SERPL-SCNC: 138 MMOL/L (ref 135–147)

## 2022-09-13 PROCEDURE — 36415 COLL VENOUS BLD VENIPUNCTURE: CPT

## 2022-09-13 PROCEDURE — 95251 CONT GLUC MNTR ANALYSIS I&R: CPT | Performed by: NURSE PRACTITIONER

## 2022-09-13 PROCEDURE — 99214 OFFICE O/P EST MOD 30 MIN: CPT | Performed by: NURSE PRACTITIONER

## 2022-09-13 PROCEDURE — 80053 COMPREHEN METABOLIC PANEL: CPT

## 2022-09-13 NOTE — PATIENT INSTRUCTIONS
Be mindful of diet  Stay active and stay hydrated  We made some changes to your basal rates and I:C ratios  Please provide a dex com download to the office in 1-2 week for reassessment  We will contact you with the results of your lab work  Continue replacement with Vitamin D3 - 1000 units daily

## 2022-09-13 NOTE — PROGRESS NOTES
Estephania Moraes 33 y o  female MRN: [id number]    Encounter: [id number]      Assessment/Plan     Assessment: This is a 33y o -year-old female with type 1 diabetes on insulin pump therapy  Plan:  1  Type 1 diabetes:  Her most recent hemoglobin A1c is 5 8  Download of her Dexcom was reviewed with her at the time of the visit  She is experiencing hypoglycemia overnight and in the early afternoon  For this I have adjusted her midnight insulin to carbohydrate ratio 0 8 and 7:00 a m  to 1 2  I also created another insulin to carbohydrate ratio of 1:8  She will continue to utilize the Dexcom continuous glucose monitor and send a download to the office in 1 week for further review and adjustment, if necessary  Check hemoglobin A1c and comprehensive metabolic panel prior to next visit        2  Vitamin-D deficiency:  Her most recent 25 hydroxy vitamin-D level is slightly low at 26 7  Given her relative hyperglycemia on recent lab work I have asked her to continue supplementing with a 1000 units daily of vitamin-D 3      3  Hypercalcemia:  Her most recent calcium was 10 2 with an albumin of 3 9  Awaiting results of new CMP drawn this morning  We will contact her with the results  CC:  Type 1 Diabetes follow-up    History of Present Illness     HPI:  33y o  year old female with type 1 diabetes for about 12 years  She is on insulin at home and takes Novolog via tandem x2 insulin pump  She denies any polyuria, polydipsia, nocturia and blurry vision  Her most recent hemoglobin A1c from July 11, 2022 is 6 0   She denies neuropathy, nephropathy and retinopathy        Hypoglycemic episodes:  Some lower blood sugars overnight and in the early afternoon at times        Blood Sugar/Glucometer/Pump/CGM review:  Tandem insulin pump and Dexcom continuous glucose monitor were downloaded at the time of the visit    Review of her Dexcom download from August 31 through September 13, 2022 shows an average glucose of 150 with a standard deviation of 51  She is 66% in target range with 4% low  The remainder is 30% high  She is having some additional sign of low blood sugar overnight and in the early afternoon  Her basal rate is 1 200 at midnight and 1 00 at 10:00 p m  Insulin sensitivity is set at 1 U for every 32 above 110 mg/dl of blood glucose and insulin to carb ratio of 1:9      Review of Systems   Constitutional: Negative  Negative for chills, fatigue and fever  HENT: Negative  Negative for trouble swallowing and voice change  Eyes: Negative  Negative for visual disturbance  Respiratory: Negative  Negative for chest tightness and shortness of breath  Cardiovascular: Negative  Negative for chest pain  Gastrointestinal: Negative  Negative for abdominal pain, constipation, diarrhea and vomiting  Endocrine: Negative for cold intolerance, heat intolerance, polydipsia, polyphagia and polyuria  Genitourinary: Negative  Musculoskeletal: Negative  Skin: Negative  Allergic/Immunologic: Negative  Neurological: Negative  Negative for dizziness, syncope, light-headedness and headaches  Hematological: Negative  Psychiatric/Behavioral: Negative  All other systems reviewed and are negative        Historical Information   Past Medical History:   Diagnosis Date    Anemia     H/O vitamin D deficiency     Pre-existing type 1 diabetes affecting pregnancy, antepartum     age 14    Vacuum-assisted vaginal delivery 12/12/2019    Varicella      Past Surgical History:   Procedure Laterality Date    DENTAL IMPLANT  2018    TONSILLECTOMY AND ADENOIDECTOMY       Social History   Social History     Substance and Sexual Activity   Alcohol Use Not Currently     Social History     Substance and Sexual Activity   Drug Use Never     Social History     Tobacco Use   Smoking Status Never Smoker   Smokeless Tobacco Never Used     Family History:   Family History   Problem Relation Age of Onset    No Known Problems Mother     No Known Problems Father     Diabetes type I Brother     Skin cancer Family        Meds/Allergies   Current Outpatient Medications   Medication Sig Dispense Refill    albuterol (PROVENTIL HFA,VENTOLIN HFA) 90 mcg/act inhaler        glucagon 1 MG injection Inject 1 mg under the skin once as needed for low blood sugar 1 kit 1    insulin aspart (NovoLOG) 100 units/mL injection USE UP  UNITS DAILY VIA INSULIN PUMP  110 mL 3    OneTouch Delica Lancets 97N MISC Use up to 8 a day  Pregnancy, T1DM  200 each 6    OneTouch Verio test strip Test up to 8 times a day and as instructed  Pregnancy; T1DM  200 each 6    Prenatal Vit-DSS-Fe Cbn-FA (PRENATAL AD PO) Take by mouth      URINE GLUCOSE-KETONES TEST VI Test      amoxicillin-clavulanate (AUGMENTIN) 875-125 mg per tablet Take 1 tablet by mouth every 12 (twelve) hours for 7 days (Patient not taking: Reported on 9/13/2022) 14 tablet 0    budesonide (Pulmicort Flexhaler) 180 MCG/ACT inhaler Inhale 2 puffs 2 (two) times a day Rinse mouth after use  (Patient not taking: Reported on 9/13/2022) 1 each 1     No current facility-administered medications for this visit  Allergies   Allergen Reactions    Other      Other reaction(s): Resp Distress  Other reaction(s): Resp Sympt Mild  Other reaction(s): Resp Distress    Mold Extract  [Trichophyton]      Other reaction(s): Resp Sympt Mild    Molds & Smuts      Other reaction(s): Resp Sympt Mild    Pollen Extract      Other reaction(s): Resp Sympt Mild       Objective   Vitals: Blood pressure 104/70, pulse 96, height 5' 2" (1 575 m), weight 72 2 kg (159 lb 3 2 oz), currently breastfeeding  Physical Exam  Vitals reviewed  Constitutional:       Appearance: She is well-developed  HENT:      Head: Normocephalic and atraumatic  Eyes:      Conjunctiva/sclera: Conjunctivae normal       Pupils: Pupils are equal, round, and reactive to light     Cardiovascular:      Rate and Rhythm: Normal rate and regular rhythm  Heart sounds: Normal heart sounds  Pulmonary:      Effort: Pulmonary effort is normal       Breath sounds: Normal breath sounds  Abdominal:      General: Bowel sounds are normal       Palpations: Abdomen is soft  Musculoskeletal:         General: Normal range of motion  Cervical back: Normal range of motion and neck supple  Skin:     General: Skin is warm and dry  Neurological:      Mental Status: She is alert and oriented to person, place, and time  Psychiatric:         Behavior: Behavior normal          Thought Content:  Thought content normal          Judgment: Judgment normal        Lab Results:   Lab Results   Component Value Date/Time    Hemoglobin A1C 6 0 (H) 07/11/2022 07:15 AM    Hemoglobin A1C 5 8 (H) 03/29/2022 11:05 AM    Hemoglobin A1C 5 4 10/05/2021 09:48 AM    WBC 6 47 07/11/2022 07:15 AM    WBC 7 44 03/29/2022 11:05 AM    Hemoglobin 12 8 07/11/2022 07:15 AM    Hemoglobin 13 5 03/29/2022 11:05 AM    Hematocrit 41 4 07/11/2022 07:15 AM    Hematocrit 44 2 03/29/2022 11:05 AM    MCV 87 07/11/2022 07:15 AM    MCV 86 03/29/2022 11:05 AM    Platelets 027 87/56/1258 07:15 AM    Platelets 260 95/01/8346 11:05 AM    BUN 12 08/16/2022 10:18 AM    BUN 18 07/11/2022 07:15 AM    BUN 16 05/06/2022 10:34 AM    Potassium 4 1 08/16/2022 10:18 AM    Potassium 4 3 07/11/2022 07:15 AM    Potassium 4 3 05/06/2022 10:34 AM    Chloride 106 08/16/2022 10:18 AM    Chloride 107 07/11/2022 07:15 AM    Chloride 102 05/06/2022 10:34 AM    CO2 28 08/16/2022 10:18 AM    CO2 31 07/11/2022 07:15 AM    CO2 31 05/06/2022 10:34 AM    Creatinine 0 86 08/16/2022 10:18 AM    Creatinine 0 84 07/11/2022 07:15 AM    Creatinine 0 88 05/06/2022 10:34 AM    AST 19 08/16/2022 10:18 AM    AST 17 07/11/2022 07:15 AM    AST 40 05/06/2022 10:34 AM    ALT 19 08/16/2022 10:18 AM    ALT 23 07/11/2022 07:15 AM    ALT 67 05/06/2022 10:34 AM    Albumin 3 9 08/16/2022 10:18 AM    Albumin 3 8 07/11/2022 07:15 AM Albumin 3 7 05/06/2022 10:34 AM    HDL, Direct 102 07/11/2022 07:15 AM    HDL, Direct 114 03/29/2022 11:05 AM    Triglycerides 56 07/11/2022 07:15 AM    Triglycerides 54 03/29/2022 11:05 AM     Portions of the record may have been created with voice recognition software  Occasional wrong word or "sound a like" substitutions may have occurred due to the inherent limitations of voice recognition software  Read the chart carefully and recognize, using context, where substitutions have occurred

## 2022-11-15 ENCOUNTER — LAB (OUTPATIENT)
Dept: LAB | Facility: HOSPITAL | Age: 32
End: 2022-11-15

## 2022-11-15 DIAGNOSIS — Z96.41 INSULIN PUMP STATUS: ICD-10-CM

## 2022-11-15 DIAGNOSIS — E55.9 VITAMIN D DEFICIENCY: ICD-10-CM

## 2022-11-15 DIAGNOSIS — E10.9 TYPE 1 DIABETES MELLITUS WITHOUT COMPLICATION (HCC): ICD-10-CM

## 2022-11-15 DIAGNOSIS — Z96.41 INSULIN PUMP IN PLACE: ICD-10-CM

## 2022-11-15 DIAGNOSIS — E10.649 HYPOGLYCEMIA DUE TO TYPE 1 DIABETES MELLITUS (HCC): ICD-10-CM

## 2022-11-15 LAB
ALBUMIN SERPL BCP-MCNC: 4.2 G/DL (ref 3.5–5)
ALP SERPL-CCNC: 143 U/L (ref 46–116)
ALT SERPL W P-5'-P-CCNC: 17 U/L (ref 12–78)
ANION GAP SERPL CALCULATED.3IONS-SCNC: 4 MMOL/L (ref 4–13)
AST SERPL W P-5'-P-CCNC: 16 U/L (ref 5–45)
BASOPHILS # BLD AUTO: 0.08 THOUSANDS/ÂΜL (ref 0–0.1)
BASOPHILS NFR BLD AUTO: 1 % (ref 0–1)
BILIRUB SERPL-MCNC: 0.58 MG/DL (ref 0.2–1)
BUN SERPL-MCNC: 11 MG/DL (ref 5–25)
CALCIUM SERPL-MCNC: 9.3 MG/DL (ref 8.3–10.1)
CHLORIDE SERPL-SCNC: 103 MMOL/L (ref 96–108)
CHOLEST SERPL-MCNC: 182 MG/DL
CO2 SERPL-SCNC: 28 MMOL/L (ref 21–32)
CREAT SERPL-MCNC: 0.82 MG/DL (ref 0.6–1.3)
EOSINOPHIL # BLD AUTO: 0.17 THOUSAND/ÂΜL (ref 0–0.61)
EOSINOPHIL NFR BLD AUTO: 3 % (ref 0–6)
ERYTHROCYTE [DISTWIDTH] IN BLOOD BY AUTOMATED COUNT: 13 % (ref 11.6–15.1)
GFR SERPL CREATININE-BSD FRML MDRD: 95 ML/MIN/1.73SQ M
GLUCOSE P FAST SERPL-MCNC: 93 MG/DL (ref 65–99)
HCT VFR BLD AUTO: 42.2 % (ref 34.8–46.1)
HDLC SERPL-MCNC: 96 MG/DL
HGB BLD-MCNC: 12.9 G/DL (ref 11.5–15.4)
IMM GRANULOCYTES # BLD AUTO: 0.02 THOUSAND/UL (ref 0–0.2)
IMM GRANULOCYTES NFR BLD AUTO: 0 % (ref 0–2)
LDLC SERPL CALC-MCNC: 75 MG/DL (ref 0–100)
LYMPHOCYTES # BLD AUTO: 1.71 THOUSANDS/ÂΜL (ref 0.6–4.47)
LYMPHOCYTES NFR BLD AUTO: 27 % (ref 14–44)
MCH RBC QN AUTO: 26.4 PG (ref 26.8–34.3)
MCHC RBC AUTO-ENTMCNC: 30.6 G/DL (ref 31.4–37.4)
MCV RBC AUTO: 86 FL (ref 82–98)
MONOCYTES # BLD AUTO: 0.45 THOUSAND/ÂΜL (ref 0.17–1.22)
MONOCYTES NFR BLD AUTO: 7 % (ref 4–12)
NEUTROPHILS # BLD AUTO: 4 THOUSANDS/ÂΜL (ref 1.85–7.62)
NEUTS SEG NFR BLD AUTO: 62 % (ref 43–75)
NONHDLC SERPL-MCNC: 86 MG/DL
NRBC BLD AUTO-RTO: 0 /100 WBCS
PLATELET # BLD AUTO: 247 THOUSANDS/UL (ref 149–390)
PMV BLD AUTO: 11 FL (ref 8.9–12.7)
POTASSIUM SERPL-SCNC: 3.8 MMOL/L (ref 3.5–5.3)
PROT SERPL-MCNC: 7.7 G/DL (ref 6.4–8.4)
RBC # BLD AUTO: 4.89 MILLION/UL (ref 3.81–5.12)
SODIUM SERPL-SCNC: 135 MMOL/L (ref 135–147)
TRIGL SERPL-MCNC: 56 MG/DL
TSH SERPL DL<=0.05 MIU/L-ACNC: 1.75 UIU/ML (ref 0.45–4.5)
WBC # BLD AUTO: 6.43 THOUSAND/UL (ref 4.31–10.16)

## 2022-11-16 LAB
EST. AVERAGE GLUCOSE BLD GHB EST-MCNC: 123 MG/DL
HBA1C MFR BLD: 5.9 %

## 2022-11-16 NOTE — RESULT ENCOUNTER NOTE
Please call the patient regarding result  Hemoglobin A1c is well controlled at 5 9  Is she is experiencing any consistent hypoglycemia? Calcium level is normal on comprehensive metabolic panel  Cholesterol levels look okay    TSH is normal

## 2022-11-29 ENCOUNTER — DOCUMENTATION (OUTPATIENT)
Dept: ENDOCRINOLOGY | Facility: HOSPITAL | Age: 32
End: 2022-11-29

## 2022-11-30 ENCOUNTER — OFFICE VISIT (OUTPATIENT)
Dept: FAMILY MEDICINE CLINIC | Facility: CLINIC | Age: 32
End: 2022-11-30

## 2022-11-30 VITALS
HEIGHT: 63 IN | WEIGHT: 159.8 LBS | HEART RATE: 91 BPM | RESPIRATION RATE: 16 BRPM | BODY MASS INDEX: 28.31 KG/M2 | SYSTOLIC BLOOD PRESSURE: 114 MMHG | TEMPERATURE: 99 F | OXYGEN SATURATION: 98 % | DIASTOLIC BLOOD PRESSURE: 70 MMHG

## 2022-11-30 DIAGNOSIS — Z00.00 ANNUAL PHYSICAL EXAM: Primary | ICD-10-CM

## 2022-11-30 DIAGNOSIS — M25.512 ACUTE PAIN OF LEFT SHOULDER: ICD-10-CM

## 2022-11-30 NOTE — PATIENT INSTRUCTIONS

## 2022-11-30 NOTE — PROGRESS NOTES
[facility]    NAME: Dania Michaels  AGE: 33 y o  SEX: female  : 1990     DATE: 2022     Assessment and Plan:     Problem List Items Addressed This Visit    None  Visit Diagnoses     Annual physical exam    -  Primary          Immunizations and preventive care screenings were discussed with patient today  Appropriate education was printed on patient's after visit summary  Counseling:  Alcohol/drug use: discussed moderation in alcohol intake, the recommendations for healthy alcohol use, and avoidance of illicit drug use  Dental Health: discussed importance of regular tooth brushing, flossing, and dental visits  Injury prevention: discussed safety/seat belts, safety helmets, smoke detectors, carbon dioxide detectors, and smoking near bedding or upholstery  Sexual health: discussed sexually transmitted diseases, partner selection, use of condoms, avoidance of unintended pregnancy, and contraceptive alternatives  · Exercise: the importance of regular exercise/physical activity was discussed  Recommend exercise 3-5 times per week for at least 30 minutes  BMI Counseling: Body mass index is 28 58 kg/m²  The BMI is above normal  Nutrition recommendations include encouraging healthy choices of fruits and vegetables, decreasing fast food intake, increasing intake of lean protein and reducing intake of saturated and trans fat  Exercise recommendations include moderate physical activity 150 minutes/week  Rationale for BMI follow-up plan is due to patient being overweight or obese  Depression Screening and Follow-up Plan: Patient was screened for depression during today's encounter  They screened negative with a PHQ-2 score of 0  Return in 6 months (on 2023)       Chief Complaint:     Chief Complaint   Patient presents with   • Physical Exam   • Shoulder Pain     Left sided should pain since September  Comes and goes  No injury known but does  a heavy car seat  Denies numbness and tingling in arm  Pain is reproducible  History of Present Illness:     Adult Annual Physical   Patient here for a comprehensive physical exam  The patient reports problems - left shoulder pain  Diet and Physical Activity  · Diet/Nutrition: well balanced diet and consuming 3-5 servings of fruits/vegetables daily  · Exercise: walking and 3-4 times a week on average  Depression Screening  PHQ-2/9 Depression Screening    Little interest or pleasure in doing things: 0 - not at all  Feeling down, depressed, or hopeless: 0 - not at all  PHQ-2 Score: 0  PHQ-2 Interpretation: Negative depression screen       General Health  · Sleep: sleeps well and gets 7-8 hours of sleep on average  · Hearing: normal - bilateral   · Vision: no vision problems and most recent eye exam <1 year ago  · Dental: regular dental visits, brushes teeth twice daily and does not brush teeth regularly  /GYN Health  · Last menstrual period: none  · Contraceptive method: none  · History of STDs?: no      Review of Systems:     Review of Systems   Constitutional: Negative  HENT: Negative  Eyes: Negative  Respiratory: Negative  Cardiovascular: Negative  Gastrointestinal: Negative  Endocrine: Negative  Genitourinary: Negative  Musculoskeletal: Positive for arthralgias  Skin: Negative  Allergic/Immunologic: Negative  Neurological: Negative  Hematological: Negative  Psychiatric/Behavioral: Negative         Past Medical History:     Past Medical History:   Diagnosis Date   • Anemia    • H/O vitamin D deficiency    • Pre-existing type 1 diabetes affecting pregnancy, antepartum     age 14   • Vacuum-assisted vaginal delivery 12/12/2019   • Varicella       Past Surgical History:     Past Surgical History:   Procedure Laterality Date   • DENTAL IMPLANT  2018   • TONSILLECTOMY AND ADENOIDECTOMY Social History:     Social History     Socioeconomic History   • Marital status: /Civil Union     Spouse name: None   • Number of children: 1   • Years of education: None   • Highest education level: None   Occupational History   • None   Tobacco Use   • Smoking status: Never   • Smokeless tobacco: Never   Vaping Use   • Vaping Use: Never used   Substance and Sexual Activity   • Alcohol use: Yes     Comment: social   • Drug use: Never   • Sexual activity: Yes     Partners: Male     Birth control/protection: None   Other Topics Concern   • None   Social History Narrative   • None     Social Determinants of Health     Financial Resource Strain: Not on file   Food Insecurity: Not on file   Transportation Needs: Not on file   Physical Activity: Not on file   Stress: Not on file   Social Connections: Not on file   Intimate Partner Violence: Not At Risk   • Fear of Current or Ex-Partner: No   • Emotionally Abused: No   • Physically Abused: No   • Sexually Abused: No   Housing Stability: Not on file      Family History:     Family History   Problem Relation Age of Onset   • No Known Problems Mother    • No Known Problems Father    • Diabetes type I Brother    • Skin cancer Family       Current Medications:     Current Outpatient Medications   Medication Sig Dispense Refill   • albuterol (PROVENTIL HFA,VENTOLIN HFA) 90 mcg/act inhaler       • glucagon 1 MG injection Inject 1 mg under the skin once as needed for low blood sugar 1 kit 1   • insulin aspart (NovoLOG) 100 units/mL injection USE UP  UNITS DAILY VIA INSULIN PUMP  110 mL 3   • OneTouch Delica Lancets 77L MISC Use up to 8 a day  Pregnancy, T1DM  200 each 6   • OneTouch Verio test strip Test up to 8 times a day and as instructed  Pregnancy; T1DM  200 each 6     No current facility-administered medications for this visit  Allergies:      Allergies   Allergen Reactions   • Other      Other reaction(s): Resp Distress  Other reaction(s): Resp Sympt Mild  Other reaction(s): Resp Distress   • Mold Extract  [Trichophyton]      Other reaction(s): Resp Sympt Mild   • Molds & Smuts      Other reaction(s): Resp Sympt Mild   • Pollen Extract      Other reaction(s): Resp Sympt Mild      Physical Exam:     /70 (BP Location: Right arm, Patient Position: Sitting, Cuff Size: Standard)   Pulse 91   Temp 99 °F (37 2 °C) (Tympanic)   Resp 16   Ht 5' 2 7" (1 593 m)   Wt 72 5 kg (159 lb 12 8 oz)   LMP  (LMP Unknown)   SpO2 98%   Breastfeeding Yes   BMI 28 58 kg/m²     Physical Exam  Vitals and nursing note reviewed  Constitutional:       Appearance: Normal appearance  She is not ill-appearing  HENT:      Head: Normocephalic and atraumatic  Right Ear: Tympanic membrane, ear canal and external ear normal  There is no impacted cerumen  Left Ear: Tympanic membrane, ear canal and external ear normal  There is no impacted cerumen  Nose: Nose normal  No congestion  Mouth/Throat:      Mouth: Mucous membranes are moist       Pharynx: Oropharynx is clear  No oropharyngeal exudate or posterior oropharyngeal erythema  Eyes:      General:         Right eye: No discharge  Left eye: No discharge  Extraocular Movements: Extraocular movements intact  Conjunctiva/sclera: Conjunctivae normal       Pupils: Pupils are equal, round, and reactive to light  Cardiovascular:      Rate and Rhythm: Normal rate and regular rhythm  Pulses: Normal pulses  Dorsalis pedis pulses are 2+ on the right side and 2+ on the left side  Posterior tibial pulses are 2+ on the right side and 2+ on the left side  Heart sounds: Normal heart sounds  No murmur heard  Pulmonary:      Effort: Pulmonary effort is normal       Breath sounds: Normal breath sounds  Abdominal:      General: Bowel sounds are normal       Palpations: Abdomen is soft  Tenderness: There is no abdominal tenderness   There is no right CVA tenderness or left CVA tenderness  Musculoskeletal:         General: Tenderness present  Right shoulder: Normal       Left shoulder: Tenderness and crepitus present  Decreased range of motion  Decreased strength  Cervical back: Normal range of motion and neck supple  No rigidity or tenderness  Feet:      Right foot:      Skin integrity: No ulcer, skin breakdown, erythema, warmth, callus or dry skin  Left foot:      Skin integrity: No ulcer, skin breakdown, erythema, warmth, callus or dry skin  Lymphadenopathy:      Cervical: No cervical adenopathy  Skin:     General: Skin is warm and dry  Capillary Refill: Capillary refill takes less than 2 seconds  Findings: No lesion or rash  Neurological:      Mental Status: She is alert and oriented to person, place, and time  Psychiatric:         Mood and Affect: Mood normal          Behavior: Behavior normal          Thought Content: Thought content normal          Judgment: Judgment normal       Diabetic Foot Exam    Patient's shoes and socks removed  Right Foot/Ankle   Right Foot Inspection  Skin Exam: skin normal and skin intact  No dry skin, no warmth, no callus, no erythema, no maceration, no abnormal color, no pre-ulcer, no ulcer and no callus  Toe Exam: ROM and strength within normal limits  Sensory   Monofilament testing: intact    Vascular  Capillary refills: < 3 seconds  The right DP pulse is 2+  The right PT pulse is 2+  Right Toe  - Comprehensive Exam  Ecchymosis: none  Arch: normal  Hammertoes: absent  Claw Toes: absent  Swelling: none   Tenderness: none         Left Foot/Ankle  Left Foot Inspection  Skin Exam: skin normal and skin intact  No dry skin, no warmth, no erythema, no maceration, normal color, no pre-ulcer, no ulcer and no callus  Toe Exam: ROM and strength within normal limits  Sensory   Monofilament testing: intact    Vascular  Capillary refills: < 3 seconds  The left DP pulse is 2+  The left PT pulse is 2+  Left Toe  - Comprehensive Exam  Ecchymosis: none  Arch: normal  Hammertoes: absent  Claw toes: absent  Swelling: none   Tenderness: none             Claudell Bakarla, [address]

## 2022-12-12 ENCOUNTER — NURSE TRIAGE (OUTPATIENT)
Dept: OTHER | Facility: OTHER | Age: 32
End: 2022-12-12

## 2022-12-13 ENCOUNTER — OFFICE VISIT (OUTPATIENT)
Dept: FAMILY MEDICINE CLINIC | Facility: CLINIC | Age: 32
End: 2022-12-13

## 2022-12-13 VITALS
RESPIRATION RATE: 12 BRPM | SYSTOLIC BLOOD PRESSURE: 122 MMHG | TEMPERATURE: 98.5 F | BODY MASS INDEX: 28.03 KG/M2 | OXYGEN SATURATION: 98 % | WEIGHT: 158.2 LBS | HEART RATE: 100 BPM | DIASTOLIC BLOOD PRESSURE: 78 MMHG | HEIGHT: 63 IN

## 2022-12-13 DIAGNOSIS — R05.9 COUGH, UNSPECIFIED TYPE: ICD-10-CM

## 2022-12-13 DIAGNOSIS — R50.9 LOW GRADE FEVER: ICD-10-CM

## 2022-12-13 DIAGNOSIS — R68.89 FLU-LIKE SYMPTOMS: Primary | ICD-10-CM

## 2022-12-13 NOTE — TELEPHONE ENCOUNTER
Reason for Disposition  • Cough with cold symptoms (e g , runny nose, postnasal drip, throat clearing)    Answer Assessment - Initial Assessment Questions  1  ONSET: "When did the cough begin?"       This past weekend    2  SEVERITY: "How bad is the cough today?"       Mild to moderate     3  SPUTUM: "Describe the color of your sputum" (none, dry cough; clear, white, yellow, green)      Yes, green/brown     4  HEMOPTYSIS: "Are you coughing up any blood?" If so ask: "How much?" (flecks, streaks, tablespoons, etc )      Denies    5  DIFFICULTY BREATHING: "Are you having difficulty breathing?" If Yes, ask: "How bad is it?" (e g , mild, moderate, severe)     - MILD: No SOB at rest, mild SOB with walking, speaks normally in sentences, can lay down, no retractions, pulse < 100      - MODERATE: SOB at rest, SOB with minimal exertion and prefers to sit, cannot lie down flat, speaks in phrases, mild retractions, audible wheezing, pulse 100-120      - SEVERE: Very SOB at rest, speaks in single words, struggling to breathe, sitting hunched forward, retractions, pulse > 120      Denies    6  FEVER: "Do you have a fever?" If Yes, ask: "What is your temperature, how was it measured, and when did it start?"      Yes, 100 8 tympanic     7  CARDIAC HISTORY: "Do you have any history of heart disease?" (e g , heart attack, congestive heart failure)       Denies    8  LUNG HISTORY: "Do you have any history of lung disease?"  (e g , pulmonary embolus, asthma, emphysema)     Denies    9  PE RISK FACTORS: "Do you have a history of blood clots?" (or: recent major surgery, recent prolonged travel, bedridden)     Denies    10  OTHER SYMPTOMS: "Do you have any other symptoms?" (e g , runny nose, wheezing, chest pain)        Sore throat, post nasal drip, runny nose     11  PREGNANCY: "Is there any chance you are pregnant?" "When was your last menstrual period?"        Denies    12   TRAVEL: "Have you traveled out of the country in the last month?" (e g , travel history, exposures)        Denies    Pulse Ox 99%    covid test negative times 2    Protocols used: COUGH - ACUTE PRODUCTIVE-ADULT-AH

## 2022-12-13 NOTE — TELEPHONE ENCOUNTER
Regarding: runny nose,cough,congestion,fever 100 8/thinks it could be pneumonia  ----- Message from Mukul Acosta sent at 12/12/2022  8:00 PM EST -----  Also coughing up discolored mucus    ----- Message from Mukul Acosta sent at 12/12/2022  7:59 PM EST -----  Pt called "I started either Thursday or Friday with a runny nose,cough and congestion and then today at work I started with chills so when I got home I took my temp and it was 100 8   I do have diabetes and with it starting either Thursday or Friday I'm concerned it may be pneumonia "

## 2022-12-13 NOTE — PROGRESS NOTES
NAME: Torsten Matthews is a 33 y o  female  : 1990    MRN: [id number]  DATE: 2022  TIME: 11:04 AM    Assessment and Plan   Diagnoses and all orders for this visit:    Flu-like symptoms  -     Covid/Flu- Office Collect    Cough, unspecified type  -     Covid/Flu- Office Collect    Low grade fever  -     Covid/Flu- Office Collect      Supportive care, good hydration  Will call patient with results patient states also has MyChart  Patient to call with any questions or concerns  Chief Complaint     Chief Complaint   Patient presents with   • Sick Visit     cough, congestion, mucus, sore throat, had low grade fever 100 8, covid test negative          History of Present Illness       HPI  27-year-old female here today for sick appointment  Patient started feeling sick proximately 5 days ago  Started with nasal congestion and sore throat  Max of 100 8 last evening  Fever resolved without taking any acetaminophen or ibuprofen  Patient still having productive cough  Denies any dyspnea or chest pain or abdominal pain  Patient did have a GI bug last week however that has resolved  Patient had Sandeep 1 year ago  Patient with prior history of childhood asthma and post-COVID was having some asthma symptom recurrence  Patient denies any asthma symptoms or wheezing at this time  Patient states starting to feel better over wanted to be evaluated  2 children and  also having viral symptoms    Review of Systems   Review of Systems  As per HPI  All other systems negative    Current Medications       Current Outpatient Medications:   •  albuterol (PROVENTIL HFA,VENTOLIN HFA) 90 mcg/act inhaler,  , Disp: , Rfl:   •  glucagon 1 MG injection, Inject 1 mg under the skin once as needed for low blood sugar, Disp: 1 kit, Rfl: 1  •  insulin aspart (NovoLOG) 100 units/mL injection, USE UP  UNITS DAILY VIA INSULIN PUMP , Disp: 110 mL, Rfl: 3  •  OneTouch Delica Lancets 12P MISC, Use up to 8 a day  Pregnancy, T1DM , Disp: 200 each, Rfl: 6  •  OneTouch Verio test strip, Test up to 8 times a day and as instructed  Pregnancy; T1DM , Disp: 200 each, Rfl: 6    Current Allergies     Allergies as of 12/13/2022 - Reviewed 12/13/2022   Allergen Reaction Noted   • Other  08/05/2005   • Mold extract  [[email or web address]]  03/16/2006   • Molds & smuts  03/01/2016   • Pollen extract  03/01/2016            The following portions of the patient's history were reviewed and updated as appropriate: allergies, current medications, past family history, past medical history, past social history, past surgical history and problem list      Past Medical History:   Diagnosis Date   • Anemia    • H/O vitamin D deficiency    • Pre-existing type 1 diabetes affecting pregnancy, antepartum     age 14   • Vacuum-assisted vaginal delivery 12/12/2019   • Varicella        Past Surgical History:   Procedure Laterality Date   • DENTAL IMPLANT  2018   • TONSILLECTOMY AND ADENOIDECTOMY         Family History   Problem Relation Age of Onset   • No Known Problems Mother    • No Known Problems Father    • Diabetes type I Brother    • Skin cancer Family          Medications have been verified  Objective   /78 (BP Location: Left arm, Patient Position: Sitting, Cuff Size: Standard)   Pulse 100   Temp 98 5 °F (36 9 °C)   Resp 12   Ht 5' 2 5" (1 588 m)   Wt 71 8 kg (158 lb 3 2 oz)   LMP  (LMP Unknown)   SpO2 98%   BMI 28 47 kg/m²        Physical Exam     Physical Exam  Vitals and nursing note reviewed  Constitutional:       General: She is not in acute distress  Appearance: She is not ill-appearing, toxic-appearing or diaphoretic  HENT:      Head: Normocephalic  Mouth/Throat:      Mouth: Mucous membranes are moist       Pharynx: Oropharynx is clear  Eyes:      General: No scleral icterus  Conjunctiva/sclera: Conjunctivae normal    Cardiovascular:      Rate and Rhythm: Regular rhythm  Tachycardia present  Heart sounds: No murmur heard  Pulmonary:      Effort: Pulmonary effort is normal  No respiratory distress  Breath sounds: Normal breath sounds  No stridor  No wheezing, rhonchi or rales  Abdominal:      General: Bowel sounds are normal       Palpations: Abdomen is soft  Tenderness: There is no abdominal tenderness  Musculoskeletal:      Cervical back: Neck supple  Right lower leg: No edema  Left lower leg: No edema  Lymphadenopathy:      Cervical: No cervical adenopathy  Skin:     General: Skin is warm and dry  Neurological:      General: No focal deficit present  Mental Status: She is alert and oriented to person, place, and time  Motor: No weakness     Psychiatric:         Mood and Affect: Mood normal

## 2022-12-14 LAB
FLUAV RNA RESP QL NAA+PROBE: NEGATIVE
FLUBV RNA RESP QL NAA+PROBE: NEGATIVE
SARS-COV-2 RNA RESP QL NAA+PROBE: NEGATIVE

## 2022-12-20 ENCOUNTER — OFFICE VISIT (OUTPATIENT)
Dept: ENDOCRINOLOGY | Facility: HOSPITAL | Age: 32
End: 2022-12-20

## 2022-12-20 ENCOUNTER — OFFICE VISIT (OUTPATIENT)
Dept: OBGYN CLINIC | Facility: CLINIC | Age: 32
End: 2022-12-20

## 2022-12-20 ENCOUNTER — APPOINTMENT (OUTPATIENT)
Dept: RADIOLOGY | Facility: CLINIC | Age: 32
End: 2022-12-20

## 2022-12-20 VITALS
WEIGHT: 156.6 LBS | SYSTOLIC BLOOD PRESSURE: 110 MMHG | HEIGHT: 63 IN | BODY MASS INDEX: 27.75 KG/M2 | DIASTOLIC BLOOD PRESSURE: 66 MMHG | HEART RATE: 86 BPM

## 2022-12-20 VITALS
DIASTOLIC BLOOD PRESSURE: 78 MMHG | SYSTOLIC BLOOD PRESSURE: 118 MMHG | BODY MASS INDEX: 27.82 KG/M2 | HEIGHT: 63 IN | WEIGHT: 157 LBS

## 2022-12-20 DIAGNOSIS — E10.649 HYPOGLYCEMIA DUE TO TYPE 1 DIABETES MELLITUS (HCC): ICD-10-CM

## 2022-12-20 DIAGNOSIS — Z96.41 INSULIN PUMP IN PLACE: ICD-10-CM

## 2022-12-20 DIAGNOSIS — M25.512 ACUTE PAIN OF LEFT SHOULDER: ICD-10-CM

## 2022-12-20 DIAGNOSIS — S29.011A STRAIN OF LEFT PECTORALIS MUSCLE, INITIAL ENCOUNTER: Primary | ICD-10-CM

## 2022-12-20 DIAGNOSIS — E55.9 VITAMIN D INSUFFICIENCY: ICD-10-CM

## 2022-12-20 DIAGNOSIS — E83.52 HYPERCALCEMIA: ICD-10-CM

## 2022-12-20 DIAGNOSIS — E55.9 VITAMIN D DEFICIENCY: ICD-10-CM

## 2022-12-20 DIAGNOSIS — E10.9 TYPE 1 DIABETES MELLITUS WITHOUT COMPLICATION (HCC): Primary | ICD-10-CM

## 2022-12-20 DIAGNOSIS — M25.512 LEFT SHOULDER PAIN, UNSPECIFIED CHRONICITY: ICD-10-CM

## 2022-12-20 DIAGNOSIS — Z96.41 INSULIN PUMP STATUS: ICD-10-CM

## 2022-12-20 NOTE — LETTER
December 20, 2022     Talya Sanchez, [address] [place] [address] [address] [place] [address]    Patient: Iraida Espinoza   YOB: 1990   Date of Visit: 12/20/2022       Dear Dr Ryenaldo Fisher: Thank you for referring Iraida Espinoza to me for evaluation  Below are my notes for this consultation  If you have questions, please do not hesitate to call me  I look forward to following your patient along with you           Sincerely,        Guillermina Gitelman, MD        CC: No Recipients

## 2022-12-20 NOTE — PATIENT INSTRUCTIONS
Be mindful of diet  Stay active and stay hydrated  We made some changes to your I:C ratios at 7 AM and 9 AM      Please provide a dex com download to the office in 1-2 week for reassessment  We will contact you with the results of your lab work  Continue replacement with Vitamin D3 - 1000 units daily

## 2022-12-20 NOTE — ASSESSMENT & PLAN NOTE
Findings more consistent with left upper pectoralis muscle strain  Imaging and prognosis reviewed with patient  She has normal shoulder exam  Primary pain patient experiences is in pectoralis muscle or possible costochondritis  Continue to avoid activities which reproduce pain  Continue with heat on area, gentle stretching  Also advised to  some OTC voltaren gel  If she continues to experience pain in area she can call for further evaluation or we would refer to physical therapy  All patient's questions were answered to her satisfaction  This note is created using dictation transcription  It may contain typographical errors, grammatical errors, improperly dictated words, background noise and other errors

## 2022-12-20 NOTE — PROGRESS NOTES
Assessment:     1  Strain of left pectoralis muscle, initial encounter    2  Acute pain of left shoulder        Plan:     Problem List Items Addressed This Visit        Musculoskeletal and Integument    Strain of left pectoralis muscle - Primary     Findings more consistent with left upper pectoralis muscle strain  Imaging and prognosis reviewed with patient  She has normal shoulder exam  Primary pain patient experiences is in pectoralis muscle or possible costochondritis  Continue to avoid activities which reproduce pain  Continue with heat on area, gentle stretching  Also advised to  some OTC voltaren gel  If she continues to experience pain in area she can call for further evaluation or we would refer to physical therapy  All patient's questions were answered to her satisfaction  This note is created using dictation transcription  It may contain typographical errors, grammatical errors, improperly dictated words, background noise and other errors  Relevant Orders    XR shoulder 2+ vw left   Other Visit Diagnoses     Acute pain of left shoulder              Subjective:     Patient ID: Leena Goldstein is a 33 y o  female  Chief Complaint:  33 yr old female in for evaluation of left shoulder pain  Patient is RN for [employer]  Referred by family medicine  She has been having on and off left shoulder pain since September  Denies any injury or trauma  She states she use to lift heavy car seat with left arm  Pain primarily with movement at or above shoulder level or reaching  She has pain if she shrugs her shoulders  She will get pain at times sleeping on shoulder at night  Pain is of short duration  Pain is more anterior based in upper chest wall  Heat makes area feel better  She denies any neck pain, radiating pain down arm, numbness or tingling  Information on patient's intake form was reviewed      Allergy:  Allergies   Allergen Reactions   • Other      Other reaction(s): Resp Distress  Other reaction(s): Resp Sympt Mild  Other reaction(s): Resp Distress   • Mold Extract  [Trichophyton]      Other reaction(s): Resp Sympt Mild   • Molds & Smuts      Other reaction(s): Resp Sympt Mild   • Pollen Extract      Other reaction(s): Resp Sympt Mild     Medications:  all current active meds have been reviewed  Past Medical History:  Past Medical History:   Diagnosis Date   • Anemia    • H/O vitamin D deficiency    • Pre-existing type 1 diabetes affecting pregnancy, antepartum     age 14   • Vacuum-assisted vaginal delivery 12/12/2019   • Varicella      Past Surgical History:  Past Surgical History:   Procedure Laterality Date   • DENTAL IMPLANT  2018   • TONSILLECTOMY AND ADENOIDECTOMY       Family History:  Family History   Problem Relation Age of Onset   • No Known Problems Mother    • No Known Problems Father    • Diabetes type I Brother    • Skin cancer Family      Social History:  Social History     Substance and Sexual Activity   Alcohol Use Yes    Comment: social     Social History     Substance and Sexual Activity   Drug Use Never     Social History     Tobacco Use   Smoking Status Never   Smokeless Tobacco Never     Review of Systems   Constitutional: Negative for chills and fever  HENT: Negative for ear pain and sore throat  Eyes: Negative for pain and visual disturbance  Respiratory: Negative for cough and shortness of breath  Cardiovascular: Negative for chest pain and palpitations  Gastrointestinal: Negative for abdominal pain and vomiting  Genitourinary: Negative for dysuria and hematuria  Musculoskeletal: Positive for arthralgias (Left anterior chest wall)  Negative for back pain, joint swelling and neck pain  Skin: Negative for color change and rash  Neurological: Negative for seizures and syncope  Psychiatric/Behavioral: Negative  All other systems reviewed and are negative          Objective:  BP Readings from Last 1 Encounters:   12/20/22 118/78      Wt Readings from Last 1 Encounters:   12/20/22 71 2 kg (157 lb)      BMI:   Estimated body mass index is 28 26 kg/m² as calculated from the following:    Height as of this encounter: 5' 2 5" (1 588 m)  Weight as of this encounter: 71 2 kg (157 lb)  BSA:   Estimated body surface area is 1 73 meters squared as calculated from the following:    Height as of this encounter: 5' 2 5" (1 588 m)  Weight as of this encounter: 71 2 kg (157 lb)  Physical Exam  Vitals and nursing note reviewed  Constitutional:       Appearance: Normal appearance  She is well-developed  HENT:      Head: Normocephalic and atraumatic  Right Ear: External ear normal       Left Ear: External ear normal    Eyes:      Extraocular Movements: Extraocular movements intact  Conjunctiva/sclera: Conjunctivae normal       Pupils: Pupils are equal, round, and reactive to light  Pulmonary:      Effort: Pulmonary effort is normal    Musculoskeletal:         General: Tenderness (left shoulder arthralgia) present  Cervical back: Neck supple  Skin:     General: Skin is warm and dry  Neurological:      Mental Status: She is alert and oriented to person, place, and time  Deep Tendon Reflexes: Reflexes are normal and symmetric  Psychiatric:         Mood and Affect: Mood normal          Behavior: Behavior normal        Left Shoulder Exam     Tenderness   The patient is experiencing no tenderness  Range of Motion   The patient has normal left shoulder ROM      Muscle Strength   Abduction: 5/5   Internal rotation: 5/5   External rotation: 5/5   Biceps: 5/5     Tests   Hummel test: negative  Cross arm: negative  Impingement: negative  Drop arm: negative    Other   Erythema: absent  Scars: absent  Sensation: normal  Pulse: present     Comments:  No tenderness over sternoclavicular joint  Some discomfort over upper pectoralis muscle insertion with arm adduction at shoulder level            I have personally reviewed pertinent films in PACS and my interpretation is xr left shoulder demonstrates well maintained joints spaces, no fracture or dislocation        Scribe Attestation    I,:  Allyson Phillips am acting as a scribe while in the presence of the attending physician :       I,:  Phan Muse MD personally performed the services described in this documentation    as scribed in my presence :

## 2022-12-20 NOTE — PROGRESS NOTES
Yaritza Hogan 33 y o  female MRN: [id number]    Encounter: [id number]      Assessment/Plan     Assessment: This is a 33y o -year-old female with type 1 diabetes on insulin pump therapy    Plan:  1  Type 1 diabetes:  Her most recent hemoglobin A1c is 5 9   Download of her Dexcom was reviewed with her at the time of the visit  [facility] is experiencing hypoglycemia later morning and in the early afternoon  For this I have adjusted her 7 AM insulin to carbohydrate ratio to 1: 8 5 and 9:00 a m  to 1: 9 5   I also created another insulin to carbohydrate ratio of 1:8   She will continue to utilize the Dexcom continuous glucose monitor and send a download to the office in 1 week for further review and adjustment, if necessary   Check hemoglobin A1c and comprehensive metabolic panel prior to next visit        2  Vitamin-D deficiency: I have asked her to continue supplementing with a 1000 units daily of vitamin-D 3      3   Hypercalcemia:  Her most recent calcium was 9 3 with an albumin of 4 2  CC: Type 1 Diabetes follow up    History of Present Illness     HPI:  33y o  year old female with type 1 diabetes for about 12 years  She is on insulin at home and takes Novolog via tandem x2 insulin pump  She denies any polyuria, polydipsia, nocturia and blurry vision  Her most recent hemoglobin A1c from November 15, 2022 is 5 9   She denies neuropathy, nephropathy and retinopathy        Hypoglycemic episodes:  Some lower blood sugars overnight and in the early afternoon at times        Blood Sugar/Glucometer/Pump/CGM review:  Tandem insulin pump and Dexcom continuous glucose monitor were downloaded at the time of the visit    Review of her Dexcom download from December 7 through December 20, 2022 shows an average glucose of 155 with a standard deviation of 55   She is 64% in target range with 3% low with 33% high   She is having some additional sign of low blood sugar in the later morning and early afternoon      Her basal rate is 1 200 at midnight and 1 00 at 10:00 p m  Insulin sensitivity is set at 1 U for every 32 above 110 mg/dl of blood glucose and insulin to carb ratio of 1:9      Review of Systems   Constitutional: Negative  Negative for chills, fatigue and fever  HENT: Negative  Negative for trouble swallowing and voice change  Eyes: Negative  Negative for photophobia, pain, discharge, redness, itching and visual disturbance  Respiratory: Negative  Negative for chest tightness and shortness of breath  Cardiovascular: Negative  Negative for chest pain  Gastrointestinal: Negative  Negative for abdominal pain, constipation, diarrhea and vomiting  Endocrine: Negative for cold intolerance, heat intolerance, polydipsia, polyphagia and polyuria  Genitourinary: Negative  Musculoskeletal: Negative  Skin: Negative  Allergic/Immunologic: Negative  Neurological: Negative  Negative for dizziness, syncope, light-headedness and headaches  Hematological: Negative  Psychiatric/Behavioral: Negative  All other systems reviewed and are negative        Historical Information   Past Medical History:   Diagnosis Date   • Anemia    • H/O vitamin D deficiency    • Pre-existing type 1 diabetes affecting pregnancy, antepartum     age 14   • Vacuum-assisted vaginal delivery 12/12/2019   • Varicella      Past Surgical History:   Procedure Laterality Date   • DENTAL IMPLANT  2018   • TONSILLECTOMY AND ADENOIDECTOMY       Social History   Social History     Substance and Sexual Activity   Alcohol Use Yes    Comment: social     Social History     Substance and Sexual Activity   Drug Use Never     Social History     Tobacco Use   Smoking Status Never   Smokeless Tobacco Never     Family History:   Family History   Problem Relation Age of Onset   • No Known Problems Mother    • No Known Problems Father    • Diabetes type I Brother    • Skin cancer Family        Meds/Allergies   Current Outpatient Medications Medication Sig Dispense Refill   • albuterol (PROVENTIL HFA,VENTOLIN HFA) 90 mcg/act inhaler       • glucagon 1 MG injection Inject 1 mg under the skin once as needed for low blood sugar 1 kit 1   • insulin aspart (NovoLOG) 100 units/mL injection USE UP  UNITS DAILY VIA INSULIN PUMP  110 mL 3   • OneTouch Delica Lancets 11K MISC Use up to 8 a day  Pregnancy, T1DM  200 each 6   • OneTouch Verio test strip Test up to 8 times a day and as instructed  Pregnancy; T1DM  200 each 6     No current facility-administered medications for this visit  Allergies   Allergen Reactions   • Other      Other reaction(s): Resp Distress  Other reaction(s): Resp Sympt Mild  Other reaction(s): Resp Distress   • Mold Extract  [Trichophyton]      Other reaction(s): Resp Sympt Mild   • Molds & Smuts      Other reaction(s): Resp Sympt Mild   • Pollen Extract      Other reaction(s): Resp Sympt Mild       Objective   Vitals: currently breastfeeding  Physical Exam  Vitals reviewed  Constitutional:       Appearance: She is well-developed  HENT:      Head: Normocephalic and atraumatic  Eyes:      Conjunctiva/sclera: Conjunctivae normal       Pupils: Pupils are equal, round, and reactive to light  Cardiovascular:      Rate and Rhythm: Normal rate and regular rhythm  Heart sounds: Normal heart sounds  Pulmonary:      Effort: Pulmonary effort is normal       Breath sounds: Normal breath sounds  Abdominal:      General: Bowel sounds are normal       Palpations: Abdomen is soft  Musculoskeletal:         General: Normal range of motion  Cervical back: Normal range of motion and neck supple  Skin:     General: Skin is warm and dry  Neurological:      Mental Status: She is alert and oriented to person, place, and time  Psychiatric:         Behavior: Behavior normal          Thought Content:  Thought content normal          Judgment: Judgment normal        Lab Results:   Lab Results   Component Value Date/Time Hemoglobin A1C 5 9 (H) 11/15/2022 09:56 AM    Hemoglobin A1C 6 0 (H) 07/11/2022 07:15 AM    Hemoglobin A1C 5 8 (H) 03/29/2022 11:05 AM    WBC 6 43 11/15/2022 09:56 AM    WBC 6 47 07/11/2022 07:15 AM    WBC 7 44 03/29/2022 11:05 AM    Hemoglobin 12 9 11/15/2022 09:56 AM    Hemoglobin 12 8 07/11/2022 07:15 AM    Hemoglobin 13 5 03/29/2022 11:05 AM    Hematocrit 42 2 11/15/2022 09:56 AM    Hematocrit 41 4 07/11/2022 07:15 AM    Hematocrit 44 2 03/29/2022 11:05 AM    MCV 86 11/15/2022 09:56 AM    MCV 87 07/11/2022 07:15 AM    MCV 86 03/29/2022 11:05 AM    Platelets 862 70/89/3177 09:56 AM    Platelets 592 46/39/3058 07:15 AM    Platelets 503 06/68/0288 11:05 AM    BUN 11 11/15/2022 09:56 AM    BUN 12 09/13/2022 09:31 AM    BUN 12 08/16/2022 10:18 AM    Potassium 3 8 11/15/2022 09:56 AM    Potassium 4 0 09/13/2022 09:31 AM    Potassium 4 1 08/16/2022 10:18 AM    Chloride 103 11/15/2022 09:56 AM    Chloride 106 09/13/2022 09:31 AM    Chloride 106 08/16/2022 10:18 AM    CO2 28 11/15/2022 09:56 AM    CO2 30 09/13/2022 09:31 AM    CO2 28 08/16/2022 10:18 AM    Creatinine 0 82 11/15/2022 09:56 AM    Creatinine 0 84 09/13/2022 09:31 AM    Creatinine 0 86 08/16/2022 10:18 AM    AST 16 11/15/2022 09:56 AM    AST 14 09/13/2022 09:31 AM    AST 19 08/16/2022 10:18 AM    ALT 17 11/15/2022 09:56 AM    ALT 17 09/13/2022 09:31 AM    ALT 19 08/16/2022 10:18 AM    Albumin 4 2 11/15/2022 09:56 AM    Albumin 3 6 09/13/2022 09:31 AM    Albumin 3 9 08/16/2022 10:18 AM    HDL, Direct 96 11/15/2022 09:56 AM    HDL, Direct 102 07/11/2022 07:15 AM    HDL, Direct 114 03/29/2022 11:05 AM    Triglycerides 56 11/15/2022 09:56 AM    Triglycerides 56 07/11/2022 07:15 AM    Triglycerides 54 03/29/2022 11:05 AM     Portions of the record may have been created with voice recognition software  Occasional wrong word or "sound a like" substitutions may have occurred due to the inherent limitations of voice recognition software   Read the chart carefully and recognize, using context, where substitutions have occurred

## 2023-01-16 DIAGNOSIS — O24.013 PRE-EXISTING TYPE 1 DIABETES MELLITUS WITH HYPERGLYCEMIA DURING PREGNANCY IN THIRD TRIMESTER (HCC): Primary | ICD-10-CM

## 2023-01-16 DIAGNOSIS — E10.65 PRE-EXISTING TYPE 1 DIABETES MELLITUS WITH HYPERGLYCEMIA DURING PREGNANCY IN THIRD TRIMESTER (HCC): Primary | ICD-10-CM

## 2023-03-19 ENCOUNTER — NURSE TRIAGE (OUTPATIENT)
Dept: OTHER | Facility: OTHER | Age: 33
End: 2023-03-19

## 2023-03-19 DIAGNOSIS — H10.9 CONJUNCTIVITIS, UNSPECIFIED CONJUNCTIVITIS TYPE, UNSPECIFIED LATERALITY: Primary | ICD-10-CM

## 2023-03-19 RX ORDER — OFLOXACIN 3 MG/ML
1 SOLUTION/ DROPS OPHTHALMIC 4 TIMES DAILY
Qty: 5 ML | Refills: 0 | Status: SHIPPED | OUTPATIENT
Start: 2023-03-19

## 2023-03-19 NOTE — TELEPHONE ENCOUNTER
Regarding: Possible Conjunctivitis  ----- Message from Tenzin Morejon sent at 3/19/2023 12:44 PM EDT -----  "I'm pretty sure I caught conjunctivitis from my kids  I am having reoccurring eye discharge and my eyes are puffy and have some redness   I was wondering if I could be prescribed some eye drops "

## 2023-03-19 NOTE — TELEPHONE ENCOUNTER
Patient children recently treated for pink eye  Patient states this morning she started with similar symptoms: yellow discharge and redness in right eye  Patient denies pain, swelling and changes to vision  Patient’s children were treated with ofloxacin, patient requesting script at this time for this medication  TC sent to on call provider  On call per on call he will be calling in script to pharmacy  Patient made aware  Reason for Disposition  • [1] Eye with yellow/green discharge or eyelashes stick together AND [2] NO PCP standing order to call in antibiotic eye drops    Answer Assessment - Initial Assessment Questions  1  EYE DISCHARGE: "Is the discharge in one or both eyes?" "What color is it?" "How much is there?" "When did the discharge start?"      Yellow discharge    2  REDNESS OF SCLERA: "Is the redness in one or both eyes?" "When did the redness start?"       Pink in the right eye    3  EYELIDS: "Are the eyelids red or swollen?" If Yes, ask: "How much?"       Denies eyelid swelling    4  VISION: "Is there any difficulty seeing clearly?"       Denies    5  PAIN: "Is there any pain? If Yes, ask: "How bad is it?" (Scale 1-10; or mild, moderate, severe)     - MILD (1-3): doesn't interfere with normal activities      - MODERATE (4-7): interferes with normal activities or awakens from sleep     - SEVERE (8-10): excruciating pain, unable to do any normal activities        Denies    6  CONTACT LENS: "Do you wear contacts?"     Denies    7  OTHER SYMPTOMS: "Do you have any other symptoms?" (e g , fever, runny nose, cough)      Denies    8   PREGNANCY: "Is there any chance you are pregnant?" "When was your last menstrual period?"      Denies    This morning    Protocols used: EYE - PUS OR DISCHARGE-ADULT-AH

## 2023-03-27 NOTE — PROGRESS NOTES
New Patient Consult Note      Chief Complaint   Patient presents with   • Diabetes Type 1      Referring Provider  Darleen Holland, Silvino  [address]  [address]  [place],  [address]  [address]     History of Present Illness:   Jermaine Hernandez is a 29 y o  female with a history of type 1 diabetes since age 16 currently on Insulin pump with Tandem t slim- Dexcom closed loop with well controlled diabetes without any micro/macrovascula complications who presents today for diabetes management  Patient previously seen by Disha Joya with last visit 12/22, this is a transfer of care visit, all previous notes, labs, chart reviewed for visit  Last visit:- Patient was experiencing some morning hypoglycemia and therefore had her CR adjusted to be more liberal      Currently patient reports feeling well, denies any issues with the pump  Denies any overt periods of hypo or hyperglycemia needing medical attention  Patient denies hx of DKA or hypoglycemic hospitalization  Patient does report having adenoma virus few weeks ago and noticing since then BG have been elevated fasting and also during the day  Reports giving herself bolus 10-15mins before she eats and also using correction factor despite which BG elevated  Denies feeling ill anymore    Control since diagnosis:-    Latest Reference Range & Units 03/03/21 07:33 07/24/21 07:32 10/05/21 09:48 03/29/22 11:05 07/11/22 07:15 11/15/22 09:56   Hemoglobin A1C Normal 3 8-5 6%; PreDiabetic 5 7-6 4%; Diabetic >=6 5%; Glycemic control for adults with diabetes <7 0% % 6 7 (H) 6 7 (H) 5 4 5 8 (H) 6 0 (H) 5 9 (H)   (H): Data is abnormally high    Medications tried thus far:- MDI before, now on insulin pump     Current regime  Mn-7am:- 0 8u hr, CR 1:9, ISF 1:31 with goal 110  7am-9am:-  1 2u hr, CR 1:8 5, ISF 1:31 with goal 110  9am-MN:-  1 2u hr, CR 1:9, ISF 1:31 with goal 110    Basal 45%   Bolus 30%  Control IQ Bolus 16%    Bolus   ISF:- 1:32 for goal 110  CR:- 7AM-9AM:- 8 5, 9AM-? 9 5 and ? 8 0    BG control:-   Tosha Ward   Device used- Dexcom G6    Indication   Type 1 Diabetes    More than 72 hours of data was reviewed  Report to be scanned to chart  Date Range: march 15- march 28 2023    Analysis of data:   Average Glucose: 176  SD : 70  Time in Target Range: 53%  Time Above Range: 30% 14%  Time Below Range:2%/<1%    Interpretation of data:   Primarily having PPH with each meals with highest in Lunch and dinner  Also some hyperglycemia noted overnight  Hypoglycemic episodes: Very few  Hyperglycemia symptoms:- no polyuria or polydipsia,no chest pain, dyspnea or TIAs,no numbness, tingling or pain in extremities  Diet- does not have a set meal pattern d/t having 2 children and working  Activity- Is going to slowly start doing elliptical    Weight- Stable for now, but would like to lose some after pregnancy    Opthamology: 05/22; no retinopathy, no macular edema  Hx of hyperlipidemia- none  Hx of hypertension- none  Last Lipid:- 11/22, LDL <100  Last urine microalbumin:- 03/22- normal   Last HbA1C 11/22:- 5 9%    Patient also has history of incidentally noted hypercalcemia on labs done for CMP monitoring during pregnancy and breast feeding  Being followed along d/t non persistent hypercalcemia  Patient reports diet was high in calcium as she was breast feeding and also was taking 2000Iu VitD daily then  However d/t elevated calcium stopped taking VitD  Highest calcium level 11in 2022  Last CMP 12/22 was 9 8  No PTH on file  No taking any calcium supplement  No Hx of kidney stones or fractures, abdominal pain, polyuria, polydipsia, mood changes- psychosis    VitD deficiency:- patient NOT currently taking any supplementation  Last VitD level 11/22 was still low at 26 7    Social Hx:- Does not smoke, drink alcohol or use drugs   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    Patient Active Problem List   Diagnosis   • Pre-existing type 1 diabetes mellitus with hyperglycemia during pregnancy in third trimester ([facility] [address] )   • Vitamin D deficiency   • Insulin pump titration   • Hypoglycemia due to type 1 diabetes mellitus (HCC)   • Insulin pump status   • BMI 31 0-31 9,adult   • 34 weeks gestation of pregnancy   • Ventricular septal defect (VSD) of fetus in [facility]arris pregnancy, antepartum   • COVID-19 affecting pregnancy in second trimester   • Hypercalcemia   • Strain of left pectoralis muscle      Past Medical History:   Diagnosis Date   • Anemia    • H/O vitamin D deficiency    • Pre-existing type 1 diabetes affecting pregnancy, antepartum     age 14   • Vacuum-assisted vaginal delivery 12/12/2019   • Varicella       Past Surgical History:   Procedure Laterality Date   • DENTAL IMPLANT  2018   • TONSILLECTOMY AND ADENOIDECTOMY        Family History   Problem Relation Age of Onset   • No Known Problems Mother    • No Known Problems Father    • Diabetes type I Brother    • Skin cancer Family      Social History     Tobacco Use   • Smoking status: Never   • Smokeless tobacco: Never   Substance Use Topics   • Alcohol use: Yes     Comment: social     Allergies   Allergen Reactions   • Other Cough and Sneezing     Cat dander     • Mold Extract  [Trichophyton]      Other reaction(s): Resp Sympt Mild   • Molds & Smuts      Other reaction(s): Resp Sympt Mild   • Pollen Extract      Other reaction(s): Resp Sympt Mild         Current Outpatient Medications:   •  glucagon 1 MG injection, Inject 1 mg under the skin once as needed for low blood sugar, Disp: 1 kit, Rfl: 1  •  OneTouch Delica Lancets 18P MISC, Use up to 8 a day  Pregnancy, T1DM , Disp: 200 each, Rfl: 6  •  OneTouch Verio test strip, Test up to 8 times a day and as instructed   Pregnancy; T1DM , Disp: 200 each, Rfl: 6  •  albuterol (PROVENTIL HFA,VENTOLIN HFA) 90 mcg/act inhaler,   (Patient not taking: Reported on 3/28/2023), Disp: , Rfl:   •  insulin aspart (NovoLOG) 100 units/mL injection, 120 Units by Subcutaneous Insulin Pump route in the "morning for 1 dose Use UPTO 120u daily via insulin pump, Disp: 110 mL, Rfl: 3  •  ofloxacin (OCUFLOX) 0 3 % ophthalmic solution, Apply 1 drop to eye 4 (four) times a day (Patient not taking: Reported on 3/28/2023), Disp: 5 mL, Rfl: 0  Review of Systems   Constitutional: Negative for diaphoresis, fatigue and unexpected weight change  Eyes: Negative for photophobia and visual disturbance  Gastrointestinal: Negative for constipation, diarrhea and vomiting  Endocrine: Negative for polydipsia and polyuria  Skin: Negative  Physical Exam:  Body mass index is 28 98 kg/m²  /70   Pulse (!) 115   Ht 5' 2 5\" (1 588 m)   Wt 73 kg (161 lb)   BMI 28 98 kg/m²    Wt Readings from Last 3 Encounters:   03/28/23 73 kg (161 lb)   12/20/22 71 2 kg (157 lb)   12/20/22 71 kg (156 lb 9 6 oz)       Physical Exam  Constitutional:       Appearance: Normal appearance  She is normal weight  Cardiovascular:      Rate and Rhythm: Normal rate and regular rhythm  Pulses: Normal pulses  no weak pulses          Dorsalis pedis pulses are 2+ on the right side and 2+ on the left side  Pulmonary:      Effort: Pulmonary effort is normal    Abdominal:      General: Abdomen is flat  Palpations: Abdomen is soft  Musculoskeletal:      Cervical back: Normal range of motion and neck supple  Feet:      Right foot:      Skin integrity: No ulcer, skin breakdown, erythema, warmth, callus or dry skin  Left foot:      Skin integrity: No ulcer, skin breakdown, erythema, warmth, callus or dry skin  Skin:     Capillary Refill: Capillary refill takes less than 2 seconds  Neurological:      General: No focal deficit present  Mental Status: She is alert and oriented to person, place, and time  Psychiatric:         Mood and Affect: Mood normal        Patient's shoes and socks removed  Right Foot/Ankle   Right Foot Inspection  Skin Exam: skin normal and skin intact   No dry skin, no warmth, no callus, no erythema, " no maceration, no abnormal color, no pre-ulcer, no ulcer and no callus  Toe Exam: ROM and strength within normal limits  Sensory   Vibration: intact  Monofilament testing: intact    Vascular  Capillary refills: < 3 seconds  The right DP pulse is 2+  Left Foot/Ankle  Left Foot Inspection  Skin Exam: skin normal and skin intact  No dry skin, no warmth, no erythema, no maceration, normal color, no pre-ulcer, no ulcer and no callus  Toe Exam: ROM and strength within normal limits  Sensory   Vibration: intact  Monofilament testing: intact    Vascular  Capillary refills: < 3 seconds  The left DP pulse is 2+  Assign Risk Category  No deformity present  No loss of protective sensation  No weak pulses  Risk: 0      Labs:    Latest Reference Range & Units 03/03/21 07:33 07/24/21 07:32 10/05/21 09:48 03/29/22 11:05 07/11/22 07:15 11/15/22 09:56   Hemoglobin A1C Normal 3 8-5 6%; PreDiabetic 5 7-6 4%;  Diabetic >=6 5%; Glycemic control for adults with diabetes <7 0% % 6 7 (H) 6 7 (H) 5 4 5 8 (H) 6 0 (H) 5 9 (H)   (H): Data is abnormally high     Latest Reference Range & Units 10/05/21 09:48 03/29/22 11:05   EXT Creatinine Urine mg/dL 86 0 128 0   MICROALBUMIN/CREATININE RATIO 0 - 30 mg/g creatinine  9   MICROALBUM ,U,RANDOM 0 0 - 20 0 mg/L  11 8   Protein Urine Random mg/dL 13    Prot/Creat Ratio, Ur 0 00 - 0 10  0 15 (H)    (H): Data is abnormally high     Latest Reference Range & Units 11/15/22 09:56   Cholesterol See Comment mg/dL 182   Triglycerides See Comment mg/dL 56   HDL >=50 mg/dL 96   Non-HDL Cholesterol mg/dl 86   LDL Calculated 0 - 100 mg/dL 75      Latest Reference Range & Units 07/11/22 07:15 11/15/22 09:56   TSH 3RD GENERATON 0 450 - 4 500 uIU/mL 1 040 1 750      08/23/18 00:00 08/26/19 11:10 05/14/22 00:00   Right Eye Diabetic Retinopathy None (E) None (E) None (E)   (E): External lab result     08/23/18 00:00 08/26/19 11:10 05/14/22 00:00   Left Eye Diabetic Retinopathy None (E) None (E) None (E)   (E): External lab result    Range & Units 7/12/22  3:52 PM 3/29/22 11:05 AM 7/24/21  7:32 AM 3/3/21  7:33 AM 9/22/20  9:14 AM 8/7/20  7:07 AM 7/7/20 12:13 PM    Vit D, 25-Hydroxy 30 0 - 100 0 ng/mL 26 7 Low   20 5 Low   19 6 Low   20 3 Low   20 0 Low   20 9 Low   19 9 Low       Latest Reference Range & Units 03/29/22 11:05 05/06/22 10:34 07/11/22 07:15 08/16/22 10:18 09/13/22 09:31 11/15/22 09:56   Calcium 8 3 - 10 1 mg/dL 10 4 (H) 9 9 11 0 (H) 10 2 (H) 9 1 9 3   (H): Data is abnormally high    Impression:  1  Pre-existing type 1 diabetes mellitus with hyperglycemia during pregnancy in third trimester ([address] )    2  Vitamin D deficiency    3  Hypercalcemia           Plan:    [facility] seen [facility] for diabetes type 1  Diagnoses and all orders for this visit:    Pre-existing type 1 diabetes mellitus with hyperglycemia during pregnancy in third trimester ([facility] [address] )  -     Microalbumin / creatinine urine ratio; Future  -     Hemoglobin A1C; Future  -     Comprehensive metabolic panel; Future  -     Hemoglobin A1C; Future    Vitamin D deficiency  -     Vitamin D 25 hydroxy Lab Collect; Future    Hypercalcemia        Patient is a 27yF With PMHx of T1DM since age 16 with no complications, on insulin pump with other Pmhx of vitD deficiency and incidentally noted hypercalcemia presents today for DM follow up    1) t1DM:- We do not have recent Hba1C, last level 11/22 was 5 9%, will repeat now  Pump download shows TIR 53% with hyperglycemia 44% which are primarily PP  Discussed this could be d/t delayed mealtime bolus and also d/t need for adjusting her CR with meals  Low BG only 3% time and therefore not a major concern  For now would recommend being more proactive with giving mealtime bolus BEFORE she eats and also giving herself correction bolus as well (very few), we will also increase her CR with Bk mildly and increase CR with ln/Dn moderately     CR Bk 1:7 5, CR LN/Dn 1:7 5 and also mildly increase basal during the day    Mn-7am:- 0 8u hr, CR 1:9, ISF 1:31 with goal 110  7am-9am:-  1 2u hr, CR 1:7 5, ISF 1:31 with goal 110  9am-MN:-  1 25u hr, CR 1:7 5, ISF 1:31 with goal 110    Screening:-   Retinopathy- 05/22, reminded needs repeat screening soon  Nephropathy- overdue, 03/22, will order now  Lipide levels- uptodate 11/22, LDL <100, repeat 11/23    2) VitD deficiency:- Will order repeat now, currently not on supplementation    3) Hypercalcemia:- incidentally noted with highest calcium level 11 on 07/22, discussed possible d/t PTHrP elevation seen during pregnancy with some persistant effects during breast feeding period as well  No PTHrP levels checked then  Last CMP 12/22 was normal 9 8, No symptoms of fracture, stones  Will repeat CMP now and if elevated will also check PTH/PTHrP for further workup    RTC in 3 months    Discussed with the patient and all questioned fully answered  She will call me if any problems arise  I have spent a total time of 40 minutes on 03/28/23 in caring for this patient including Diagnostic results, Prognosis, Risks and benefits of tx options, Instructions for management, Patient and family education, Documenting in the medical record and Reviewing / ordering tests, medicine, procedures        Counseled patient on diagnostic results, prognosis, risk and benefit of treatment options, instruction for management, importance of treatment compliance, Risk  factor reduction and impressions      Barry Talbot MD

## 2023-03-28 ENCOUNTER — APPOINTMENT (OUTPATIENT)
Dept: LAB | Facility: HOSPITAL | Age: 33
End: 2023-03-28

## 2023-03-28 ENCOUNTER — OFFICE VISIT (OUTPATIENT)
Dept: ENDOCRINOLOGY | Facility: HOSPITAL | Age: 33
End: 2023-03-28

## 2023-03-28 VITALS
HEART RATE: 115 BPM | DIASTOLIC BLOOD PRESSURE: 70 MMHG | SYSTOLIC BLOOD PRESSURE: 120 MMHG | BODY MASS INDEX: 28.53 KG/M2 | HEIGHT: 63 IN | WEIGHT: 161 LBS

## 2023-03-28 DIAGNOSIS — Z96.41 INSULIN PUMP IN PLACE: ICD-10-CM

## 2023-03-28 DIAGNOSIS — E55.9 VITAMIN D DEFICIENCY: Primary | ICD-10-CM

## 2023-03-28 DIAGNOSIS — E10.649 HYPOGLYCEMIA DUE TO TYPE 1 DIABETES MELLITUS (HCC): ICD-10-CM

## 2023-03-28 DIAGNOSIS — E10.9 TYPE 1 DIABETES MELLITUS WITHOUT COMPLICATION (HCC): ICD-10-CM

## 2023-03-28 DIAGNOSIS — E55.9 VITAMIN D DEFICIENCY: ICD-10-CM

## 2023-03-28 DIAGNOSIS — E10.65 PRE-EXISTING TYPE 1 DIABETES MELLITUS WITH HYPERGLYCEMIA DURING PREGNANCY IN THIRD TRIMESTER (HCC): ICD-10-CM

## 2023-03-28 DIAGNOSIS — E83.52 HYPERCALCEMIA: ICD-10-CM

## 2023-03-28 DIAGNOSIS — E10.65 PRE-EXISTING TYPE 1 DIABETES MELLITUS WITH HYPERGLYCEMIA DURING PREGNANCY IN THIRD TRIMESTER (HCC): Primary | ICD-10-CM

## 2023-03-28 DIAGNOSIS — O24.013 PRE-EXISTING TYPE 1 DIABETES MELLITUS WITH HYPERGLYCEMIA DURING PREGNANCY IN THIRD TRIMESTER (HCC): Primary | ICD-10-CM

## 2023-03-28 DIAGNOSIS — Z96.41 INSULIN PUMP STATUS: ICD-10-CM

## 2023-03-28 DIAGNOSIS — O24.013 PRE-EXISTING TYPE 1 DIABETES MELLITUS WITH HYPERGLYCEMIA DURING PREGNANCY IN THIRD TRIMESTER (HCC): ICD-10-CM

## 2023-03-28 DIAGNOSIS — E55.9 VITAMIN D INSUFFICIENCY: ICD-10-CM

## 2023-03-28 LAB
25(OH)D3 SERPL-MCNC: 18.7 NG/ML (ref 30–100)
BASOPHILS # BLD AUTO: 0.06 THOUSANDS/ÂΜL (ref 0–0.1)
BASOPHILS NFR BLD AUTO: 1 % (ref 0–1)
CREAT UR-MCNC: 150 MG/DL
EOSINOPHIL # BLD AUTO: 0.22 THOUSAND/ÂΜL (ref 0–0.61)
EOSINOPHIL NFR BLD AUTO: 3 % (ref 0–6)
ERYTHROCYTE [DISTWIDTH] IN BLOOD BY AUTOMATED COUNT: 12.7 % (ref 11.6–15.1)
HCT VFR BLD AUTO: 41 % (ref 34.8–46.1)
HGB BLD-MCNC: 12.7 G/DL (ref 11.5–15.4)
IMM GRANULOCYTES # BLD AUTO: 0.03 THOUSAND/UL (ref 0–0.2)
IMM GRANULOCYTES NFR BLD AUTO: 0 % (ref 0–2)
LYMPHOCYTES # BLD AUTO: 1.83 THOUSANDS/ÂΜL (ref 0.6–4.47)
LYMPHOCYTES NFR BLD AUTO: 24 % (ref 14–44)
MCH RBC QN AUTO: 25.7 PG (ref 26.8–34.3)
MCHC RBC AUTO-ENTMCNC: 31 G/DL (ref 31.4–37.4)
MCV RBC AUTO: 83 FL (ref 82–98)
MICROALBUMIN UR-MCNC: <5 MG/L (ref 0–20)
MICROALBUMIN/CREAT 24H UR: <3 MG/G CREATININE (ref 0–30)
MONOCYTES # BLD AUTO: 0.55 THOUSAND/ÂΜL (ref 0.17–1.22)
MONOCYTES NFR BLD AUTO: 7 % (ref 4–12)
NEUTROPHILS # BLD AUTO: 5.07 THOUSANDS/ÂΜL (ref 1.85–7.62)
NEUTS SEG NFR BLD AUTO: 65 % (ref 43–75)
NRBC BLD AUTO-RTO: 0 /100 WBCS
PLATELET # BLD AUTO: 279 THOUSANDS/UL (ref 149–390)
PMV BLD AUTO: 11.5 FL (ref 8.9–12.7)
RBC # BLD AUTO: 4.94 MILLION/UL (ref 3.81–5.12)
WBC # BLD AUTO: 7.76 THOUSAND/UL (ref 4.31–10.16)

## 2023-03-28 RX ORDER — MELATONIN
2000 DAILY
Qty: 100 TABLET | Refills: 1 | Status: SHIPPED | OUTPATIENT
Start: 2023-03-28

## 2023-03-29 LAB
ALBUMIN SERPL BCP-MCNC: 4 G/DL (ref 3.5–5)
ALP SERPL-CCNC: 103 U/L (ref 46–116)
ALT SERPL W P-5'-P-CCNC: 14 U/L (ref 12–78)
ANION GAP SERPL CALCULATED.3IONS-SCNC: 3 MMOL/L (ref 4–13)
AST SERPL W P-5'-P-CCNC: 16 U/L (ref 5–45)
BILIRUB SERPL-MCNC: 0.51 MG/DL (ref 0.2–1)
BUN SERPL-MCNC: 17 MG/DL (ref 5–25)
CALCIUM SERPL-MCNC: 8.4 MG/DL (ref 8.3–10.1)
CHLORIDE SERPL-SCNC: 104 MMOL/L (ref 96–108)
CO2 SERPL-SCNC: 28 MMOL/L (ref 21–32)
CREAT SERPL-MCNC: 0.79 MG/DL (ref 0.6–1.3)
GFR SERPL CREATININE-BSD FRML MDRD: 99 ML/MIN/1.73SQ M
GLUCOSE P FAST SERPL-MCNC: 116 MG/DL (ref 65–99)
POTASSIUM SERPL-SCNC: 4.1 MMOL/L (ref 3.5–5.3)
PROT SERPL-MCNC: 7.6 G/DL (ref 6.4–8.4)
SODIUM SERPL-SCNC: 135 MMOL/L (ref 135–147)

## 2023-03-30 LAB
EST. AVERAGE GLUCOSE BLD GHB EST-MCNC: 134 MG/DL
HBA1C MFR BLD: 6.3 %

## 2023-04-28 ENCOUNTER — VBI (OUTPATIENT)
Dept: ADMINISTRATIVE | Facility: OTHER | Age: 33
End: 2023-04-28

## 2023-05-16 ENCOUNTER — APPOINTMENT (OUTPATIENT)
Dept: RADIOLOGY | Facility: CLINIC | Age: 33
End: 2023-05-16

## 2023-05-16 ENCOUNTER — OFFICE VISIT (OUTPATIENT)
Dept: OBGYN CLINIC | Facility: CLINIC | Age: 33
End: 2023-05-16

## 2023-05-16 VITALS
BODY MASS INDEX: 28.35 KG/M2 | SYSTOLIC BLOOD PRESSURE: 108 MMHG | HEIGHT: 63 IN | DIASTOLIC BLOOD PRESSURE: 80 MMHG | WEIGHT: 160 LBS

## 2023-05-16 DIAGNOSIS — S16.1XXA CERVICAL MYOFASCIAL STRAIN, INITIAL ENCOUNTER: Primary | ICD-10-CM

## 2023-05-16 DIAGNOSIS — M25.512 LEFT SHOULDER PAIN, UNSPECIFIED CHRONICITY: ICD-10-CM

## 2023-05-16 PROBLEM — S29.011A STRAIN OF LEFT PECTORALIS MUSCLE: Status: RESOLVED | Noted: 2022-12-20 | Resolved: 2023-05-16

## 2023-05-16 NOTE — ASSESSMENT & PLAN NOTE
Findings consistent with left-sided cervical strain  Findings and treatment options were discussed with the patient  X-rays were reviewed with her that revealed reversal of curve consistent with spasm  Recommend formal physical therapy to treat this condition  Discussed importance of her doing stretches on her own as well  She is to continue NSAIDs and heat as needed for pain  Discussed that if she does not feel improvement in the next 2 to 3 months, she is to return for follow-up  Consider MRI at that point  All patient's questions were answered to her satisfaction  This note is created using dictation transcription  It may contain typographical errors, grammatical errors, improperly dictated words, background noise and other errors

## 2023-05-16 NOTE — PROGRESS NOTES
Assessment:     1  Cervical myofascial strain, initial encounter        Plan:     Problem List Items Addressed This Visit        Musculoskeletal and Integument    Cervical myofascial strain - Primary     Findings consistent with left-sided cervical strain  Findings and treatment options were discussed with the patient  X-rays were reviewed with her that revealed reversal of curve consistent with spasm  Recommend formal physical therapy to treat this condition  Discussed importance of her doing stretches on her own as well  She is to continue NSAIDs and heat as needed for pain  Discussed that if she does not feel improvement in the next 2 to 3 months, she is to return for follow-up  Consider MRI at that point  All patient's questions were answered to her satisfaction  This note is created using dictation transcription  It may contain typographical errors, grammatical errors, improperly dictated words, background noise and other errors  Relevant Orders    XR spine cervical complete 4 or 5 vw non injury    Ambulatory Referral to Physical Therapy       Subjective:     Patient ID: Aiden Walls is a 29 y o  female  Chief Complaint:  29 yr old female following up for left-sided cervical spine pain  She was last seen in December 2022 for similar pain  She was diagnosed with a left pectoralis strain  No longer has pain over the left chest wall  She states that she has continued to have pain intermittently since then  It is not constant in nature  It is localized over the left side of the cervical spine  She denies any pain, numbness or tingling rating down the upper extremities  No pain in the left shoulder  The pain occurs when reaching overhead and when shrugging her shoulders  She also feels pain and pulling with cervical motion  She does not have any pain when lifting her children  She gets relief from Motrin and heat    She also had a massage several weeks ago that gave relief as well   No recent treatment  Allergy:  Allergies   Allergen Reactions   • Other Cough and Sneezing     Cat dander     • Mold Extract  [Trichophyton]      Other reaction(s): Resp Sympt Mild   • Molds & Smuts      Other reaction(s): Resp Sympt Mild   • Pollen Extract      Other reaction(s): Resp Sympt Mild     Medications:  all current active meds have been reviewed  Past Medical History:  Past Medical History:   Diagnosis Date   • Anemia    • H/O vitamin D deficiency    • Pre-existing type 1 diabetes affecting pregnancy, antepartum     age 14   • Strain of left pectoralis muscle 12/20/2022   • Vacuum-assisted vaginal delivery 12/12/2019   • Varicella      Past Surgical History:  Past Surgical History:   Procedure Laterality Date   • DENTAL IMPLANT  2018   • TONSILLECTOMY AND ADENOIDECTOMY       Family History:  Family History   Problem Relation Age of Onset   • No Known Problems Mother    • No Known Problems Father    • Diabetes type I Brother    • Skin cancer Family      Social History:  Social History     Substance and Sexual Activity   Alcohol Use Yes    Comment: social     Social History     Substance and Sexual Activity   Drug Use Never     Social History     Tobacco Use   Smoking Status Never   Smokeless Tobacco Never     Review of Systems   Constitutional: Negative for chills and fever  HENT: Negative for ear pain and sore throat  Eyes: Negative for pain and visual disturbance  Respiratory: Negative for cough and shortness of breath  Cardiovascular: Negative for chest pain and palpitations  Gastrointestinal: Negative for abdominal pain and vomiting  Endocrine: Negative  Genitourinary: Negative for dysuria and hematuria  Musculoskeletal: Positive for neck pain  Negative for arthralgias, back pain and joint swelling  Skin: Negative for color change and rash  Allergic/Immunologic: Negative  Neurological: Negative for seizures and syncope  Psychiatric/Behavioral: Negative      All "other systems reviewed and are negative  Objective:  BP Readings from Last 1 Encounters:   05/16/23 108/80      Wt Readings from Last 1 Encounters:   05/16/23 72 6 kg (160 lb)      BMI:   Estimated body mass index is 28 8 kg/m² as calculated from the following:    Height as of this encounter: 5' 2 5\" (1 588 m)  Weight as of this encounter: 72 6 kg (160 lb)  BSA:   Estimated body surface area is 1 75 meters squared as calculated from the following:    Height as of this encounter: 5' 2 5\" (1 588 m)  Weight as of this encounter: 72 6 kg (160 lb)  Physical Exam  Vitals and nursing note reviewed  Constitutional:       Appearance: Normal appearance  She is well-developed  HENT:      Head: Normocephalic and atraumatic  Right Ear: External ear normal       Left Ear: External ear normal    Eyes:      Extraocular Movements: Extraocular movements intact  Conjunctiva/sclera: Conjunctivae normal       Pupils: Pupils are equal, round, and reactive to light  Pulmonary:      Effort: Pulmonary effort is normal    Musculoskeletal:      Cervical back: Neck supple  Skin:     General: Skin is warm and dry  Neurological:      Mental Status: She is alert and oriented to person, place, and time  Deep Tendon Reflexes: Reflexes are normal and symmetric  Psychiatric:         Mood and Affect: Mood normal          Behavior: Behavior normal        Back Exam     Tenderness   The patient is experiencing tenderness in the cervical (left paraspinals)  Range of Motion   Extension:  70 (pain) abnormal   Flexion: normal   Lateral bend right: abnormal   Lateral bend left: normal   Rotation right: abnormal Back rotation right: pain  Rotation left: normal     Muscle Strength   The patient has normal back strength  Other   Sensation: normal  Erythema: no back redness  Scars: absent      Left Shoulder Exam     Tenderness   The patient is experiencing no tenderness       Range of Motion   The patient has " normal left shoulder ROM  Muscle Strength   Abduction: 5/5   Internal rotation: 5/5   External rotation: 5/5   Biceps: 5/5     Tests   Hummel test: negative  Cross arm: negative  Impingement: negative  Drop arm: negative    Other   Erythema: absent  Scars: absent  Sensation: normal  Pulse: present     Comments:  No tenderness over sternoclavicular joint  Some discomfort over upper pectoralis muscle insertion with arm adduction at shoulder level            I have personally reviewed pertinent films in PACS and my interpretation is X-rays of the cervical spine reveal reversal of lordotic curve consistent with spasm       Scribe Attestation    I,:  Dotty Moss PA-C am acting as a scribe while in the presence of the attending physician :       I,:  Caryle Spark, MD personally performed the services described in this documentation    as scribed in my presence :

## 2023-05-20 LAB
LEFT EYE DIABETIC RETINOPATHY: NORMAL
RIGHT EYE DIABETIC RETINOPATHY: NORMAL

## 2023-05-20 PROCEDURE — 2023F DILAT RTA XM W/O RTNOPTHY: CPT | Performed by: ORTHOPAEDIC SURGERY

## 2023-06-10 ENCOUNTER — NURSE TRIAGE (OUTPATIENT)
Dept: OTHER | Facility: OTHER | Age: 33
End: 2023-06-10

## 2023-06-10 DIAGNOSIS — O24.013 PRE-EXISTING TYPE 1 DIABETES MELLITUS WITH HYPERGLYCEMIA DURING PREGNANCY IN THIRD TRIMESTER (HCC): ICD-10-CM

## 2023-06-10 DIAGNOSIS — E10.65 PRE-EXISTING TYPE 1 DIABETES MELLITUS WITH HYPERGLYCEMIA DURING PREGNANCY IN THIRD TRIMESTER (HCC): ICD-10-CM

## 2023-06-11 ENCOUNTER — NURSE TRIAGE (OUTPATIENT)
Dept: OTHER | Facility: OTHER | Age: 33
End: 2023-06-11

## 2023-06-11 DIAGNOSIS — O24.013 PRE-EXISTING TYPE 1 DIABETES MELLITUS WITH HYPERGLYCEMIA DURING PREGNANCY IN THIRD TRIMESTER (HCC): ICD-10-CM

## 2023-06-11 DIAGNOSIS — E10.65 PRE-EXISTING TYPE 1 DIABETES MELLITUS WITH HYPERGLYCEMIA DURING PREGNANCY IN THIRD TRIMESTER (HCC): Primary | ICD-10-CM

## 2023-06-11 DIAGNOSIS — O24.013 PRE-EXISTING TYPE 1 DIABETES MELLITUS WITH HYPERGLYCEMIA DURING PREGNANCY IN THIRD TRIMESTER (HCC): Primary | ICD-10-CM

## 2023-06-11 DIAGNOSIS — E10.65 PRE-EXISTING TYPE 1 DIABETES MELLITUS WITH HYPERGLYCEMIA DURING PREGNANCY IN THIRD TRIMESTER (HCC): ICD-10-CM

## 2023-06-11 RX ORDER — INSULIN DETEMIR 100 [IU]/ML
15 INJECTION, SOLUTION SUBCUTANEOUS
Qty: 5 ML | Refills: 0 | Status: SHIPPED | OUTPATIENT
Start: 2023-06-11 | End: 2023-06-12 | Stop reason: SDUPTHER

## 2023-06-11 RX ORDER — PEN NEEDLE, DIABETIC 30 GX5/16"
NEEDLE, DISPOSABLE MISCELLANEOUS DAILY
Qty: 30 EACH | Refills: 0 | Status: SHIPPED | OUTPATIENT
Start: 2023-06-11 | End: 2023-06-11

## 2023-06-11 RX ORDER — PEN NEEDLE, DIABETIC 30 GX5/16"
NEEDLE, DISPOSABLE MISCELLANEOUS DAILY
Qty: 30 EACH | Refills: 0 | Status: SHIPPED | OUTPATIENT
Start: 2023-06-11

## 2023-06-11 RX ORDER — INSULIN DETEMIR 100 [IU]/ML
15 INJECTION, SOLUTION SUBCUTANEOUS
Qty: 5 ML | Refills: 0 | Status: SHIPPED | OUTPATIENT
Start: 2023-06-11 | End: 2023-06-11

## 2023-06-11 NOTE — TELEPHONE ENCOUNTER
"Regarding: Out of insulin  ----- Message from Ferdinand Frankel sent at 6/11/2023 10:42 AM EDT -----  \"I had pump malfunction yesterday and I don't have any long acting insulin on hand    Trying to get an emergency insulin supply until my order comes in \"    "

## 2023-06-11 NOTE — TELEPHONE ENCOUNTER
"Regarding: Insulin/ Concern  ----- Message from Alyssa Fair sent at 6/10/2023  8:06 PM EDT -----  \"I am out of my insulin cartridge  I've been to different pharmacies and haven't been able to get it   I need long acting insulin to be sent to my local pharmacy, if possible\"    "

## 2023-06-11 NOTE — TELEPHONE ENCOUNTER
"  Reason for Disposition  • [1] Caller has URGENT medicine question about med that PCP or specialist prescribed AND [2] triager unable to answer question    Answer Assessment - Initial Assessment Questions  1  NAME of MEDICATION: \"What medicine are you calling about? \"      Novolog     2  QUESTION: Naida Form is your question? \" (e g , medication refill, side effect)     Can not find at any pharmacy, needs long acting insulin until mail order comes in    3  PRESCRIBING HCP: \"Who prescribed it? \" Reason: if prescribed by specialist, call should be referred to that group  Endo    4  SYMPTOMS: \"Do you have any symptoms? \"  No    Protocols used: MEDICATION QUESTION CALL-ADULT-      Patient requesting if a long acting insulin can be ordered for bed time  Paged on call provider  Provider stated she will send levemir to pharmacy  Call office on Monday to follow up  Informed patient and she verbalized understanding     "

## 2023-06-11 NOTE — TELEPHONE ENCOUNTER
"  Reason for Disposition  • [1] Caller requesting a prescription renewal (no refills left), no triage required, AND [2] triager able to renew prescription per department policy    Answer Assessment - Initial Assessment Questions  1  DRUG NAME: \"What medicine do you need to have refilled? \"      Insulin   2  REFILLS REMAINING: \"How many refills are remaining? \" (Note: The label on the medicine or pill bottle will show how many refills are remaining  If there are no refills remaining, then a renewal may be needed )    n/a  3  EXPIRATION DATE: Seba Jarod is the expiration date? \" (Note: The label states when the prescription will , and thus can no longer be refilled )    n/a  4  PRESCRIBING HCP: \"Who prescribed it? \" Reason: If prescribed by specialist, call should be referred to that group  Endo  5  SYMPTOMS: \"Do you have any symptoms? \"    Denies    Protocols used: MEDICATION REFILL AND RENEWAL CALL-ADULT-    "

## 2023-06-12 ENCOUNTER — TELEPHONE (OUTPATIENT)
Dept: ENDOCRINOLOGY | Facility: HOSPITAL | Age: 33
End: 2023-06-12

## 2023-06-12 DIAGNOSIS — E10.65 PRE-EXISTING TYPE 1 DIABETES MELLITUS WITH HYPERGLYCEMIA DURING PREGNANCY IN THIRD TRIMESTER (HCC): ICD-10-CM

## 2023-06-12 DIAGNOSIS — O24.013 PRE-EXISTING TYPE 1 DIABETES MELLITUS WITH HYPERGLYCEMIA DURING PREGNANCY IN THIRD TRIMESTER (HCC): ICD-10-CM

## 2023-06-12 RX ORDER — INSULIN DETEMIR 100 [IU]/ML
15 INJECTION, SOLUTION SUBCUTANEOUS AS NEEDED
Qty: 3 ML | Refills: 0 | Status: SHIPPED | OUTPATIENT
Start: 2023-06-12 | End: 2023-06-19 | Stop reason: SDUPTHER

## 2023-06-12 NOTE — TELEPHONE ENCOUNTER
The patient called this afternoon to make us aware that she was seen in the ER over the weekend because she ran out of insulin cartridges  She did call the on call and they prescribed Levemir and she stated that if it is ok with you she would like to have that in her chart as back up

## 2023-06-13 RX ORDER — INSULIN DETEMIR 100 [IU]/ML
15 INJECTION, SOLUTION SUBCUTANEOUS
Qty: 15 ML | Refills: 1 | Status: SHIPPED | OUTPATIENT
Start: 2023-06-13

## 2023-06-19 DIAGNOSIS — E10.65 PRE-EXISTING TYPE 1 DIABETES MELLITUS WITH HYPERGLYCEMIA DURING PREGNANCY IN THIRD TRIMESTER (HCC): ICD-10-CM

## 2023-06-19 DIAGNOSIS — O24.013 PRE-EXISTING TYPE 1 DIABETES MELLITUS WITH HYPERGLYCEMIA DURING PREGNANCY IN THIRD TRIMESTER (HCC): ICD-10-CM

## 2023-06-19 RX ORDER — INSULIN DETEMIR 100 [IU]/ML
15 INJECTION, SOLUTION SUBCUTANEOUS AS NEEDED
Qty: 3 ML | Refills: 0 | Status: SHIPPED | OUTPATIENT
Start: 2023-06-19

## 2023-08-18 DIAGNOSIS — S16.1XXD CERVICAL MYOFASCIAL STRAIN, SUBSEQUENT ENCOUNTER: Primary | ICD-10-CM

## 2023-09-07 DIAGNOSIS — O24.013 PRE-EXISTING TYPE 1 DIABETES MELLITUS WITH HYPERGLYCEMIA DURING PREGNANCY IN THIRD TRIMESTER (HCC): Primary | ICD-10-CM

## 2023-09-07 DIAGNOSIS — E10.65 PRE-EXISTING TYPE 1 DIABETES MELLITUS WITH HYPERGLYCEMIA DURING PREGNANCY IN THIRD TRIMESTER (HCC): Primary | ICD-10-CM

## 2023-09-12 ENCOUNTER — OFFICE VISIT (OUTPATIENT)
Dept: ENDOCRINOLOGY | Facility: HOSPITAL | Age: 33
End: 2023-09-12
Payer: COMMERCIAL

## 2023-09-12 ENCOUNTER — APPOINTMENT (OUTPATIENT)
Dept: LAB | Facility: HOSPITAL | Age: 33
End: 2023-09-12
Payer: COMMERCIAL

## 2023-09-12 VITALS
WEIGHT: 160.4 LBS | DIASTOLIC BLOOD PRESSURE: 76 MMHG | HEIGHT: 63 IN | BODY MASS INDEX: 28.42 KG/M2 | HEART RATE: 113 BPM | SYSTOLIC BLOOD PRESSURE: 120 MMHG | OXYGEN SATURATION: 98 %

## 2023-09-12 DIAGNOSIS — E10.65 PRE-EXISTING TYPE 1 DIABETES MELLITUS WITH HYPERGLYCEMIA DURING PREGNANCY IN THIRD TRIMESTER (HCC): ICD-10-CM

## 2023-09-12 DIAGNOSIS — E55.9 VITAMIN D DEFICIENCY: ICD-10-CM

## 2023-09-12 DIAGNOSIS — O24.013 PRE-EXISTING TYPE 1 DIABETES MELLITUS WITH HYPERGLYCEMIA DURING PREGNANCY IN THIRD TRIMESTER (HCC): Primary | ICD-10-CM

## 2023-09-12 DIAGNOSIS — E10.65 PRE-EXISTING TYPE 1 DIABETES MELLITUS WITH HYPERGLYCEMIA DURING PREGNANCY IN THIRD TRIMESTER (HCC): Primary | ICD-10-CM

## 2023-09-12 DIAGNOSIS — Z96.41 INSULIN PUMP STATUS: ICD-10-CM

## 2023-09-12 DIAGNOSIS — O24.013 PRE-EXISTING TYPE 1 DIABETES MELLITUS WITH HYPERGLYCEMIA DURING PREGNANCY IN THIRD TRIMESTER (HCC): ICD-10-CM

## 2023-09-12 DIAGNOSIS — E55.9 VITAMIN D DEFICIENCY: Primary | ICD-10-CM

## 2023-09-12 DIAGNOSIS — E10.9 TYPE 1 DIABETES MELLITUS WITHOUT COMPLICATION (HCC): ICD-10-CM

## 2023-09-12 LAB
25(OH)D3 SERPL-MCNC: 23.4 NG/ML (ref 30–100)
ALBUMIN SERPL BCP-MCNC: 4.5 G/DL (ref 3.5–5)
ALP SERPL-CCNC: 53 U/L (ref 34–104)
ALT SERPL W P-5'-P-CCNC: 9 U/L (ref 7–52)
ANION GAP SERPL CALCULATED.3IONS-SCNC: 5 MMOL/L
AST SERPL W P-5'-P-CCNC: 19 U/L (ref 13–39)
BILIRUB SERPL-MCNC: 0.66 MG/DL (ref 0.2–1)
BUN SERPL-MCNC: 17 MG/DL (ref 5–25)
CALCIUM SERPL-MCNC: 8.8 MG/DL (ref 8.4–10.2)
CHLORIDE SERPL-SCNC: 100 MMOL/L (ref 96–108)
CHOLEST SERPL-MCNC: 200 MG/DL
CO2 SERPL-SCNC: 30 MMOL/L (ref 21–32)
CREAT SERPL-MCNC: 0.86 MG/DL (ref 0.6–1.3)
CREAT UR-MCNC: 185.6 MG/DL
EST. AVERAGE GLUCOSE BLD GHB EST-MCNC: 146 MG/DL
GFR SERPL CREATININE-BSD FRML MDRD: 89 ML/MIN/1.73SQ M
GLUCOSE P FAST SERPL-MCNC: 129 MG/DL (ref 65–99)
HBA1C MFR BLD: 6.7 %
HDLC SERPL-MCNC: 84 MG/DL
LDLC SERPL CALC-MCNC: 103 MG/DL (ref 0–100)
MICROALBUMIN UR-MCNC: <7 MG/L
MICROALBUMIN/CREAT 24H UR: <4 MG/G CREATININE (ref 0–30)
NONHDLC SERPL-MCNC: 116 MG/DL
POTASSIUM SERPL-SCNC: 4.1 MMOL/L (ref 3.5–5.3)
PROT SERPL-MCNC: 7.5 G/DL (ref 6.4–8.4)
SODIUM SERPL-SCNC: 135 MMOL/L (ref 135–147)
TRIGL SERPL-MCNC: 67 MG/DL

## 2023-09-12 PROCEDURE — 82043 UR ALBUMIN QUANTITATIVE: CPT

## 2023-09-12 PROCEDURE — 95251 CONT GLUC MNTR ANALYSIS I&R: CPT | Performed by: STUDENT IN AN ORGANIZED HEALTH CARE EDUCATION/TRAINING PROGRAM

## 2023-09-12 PROCEDURE — 36415 COLL VENOUS BLD VENIPUNCTURE: CPT

## 2023-09-12 PROCEDURE — 80053 COMPREHEN METABOLIC PANEL: CPT

## 2023-09-12 PROCEDURE — 82570 ASSAY OF URINE CREATININE: CPT

## 2023-09-12 PROCEDURE — 99214 OFFICE O/P EST MOD 30 MIN: CPT | Performed by: STUDENT IN AN ORGANIZED HEALTH CARE EDUCATION/TRAINING PROGRAM

## 2023-09-12 PROCEDURE — 83036 HEMOGLOBIN GLYCOSYLATED A1C: CPT

## 2023-09-12 PROCEDURE — 80061 LIPID PANEL: CPT

## 2023-09-12 PROCEDURE — 82306 VITAMIN D 25 HYDROXY: CPT

## 2023-09-12 RX ORDER — INSULIN ASPART 100 [IU]/ML
1-10 INJECTION, SOLUTION INTRAVENOUS; SUBCUTANEOUS AS NEEDED
Qty: 3 ML | Refills: 0 | Status: SHIPPED | OUTPATIENT
Start: 2023-09-12

## 2023-09-12 RX ORDER — ERGOCALCIFEROL 1.25 MG/1
50000 CAPSULE ORAL WEEKLY
Qty: 12 CAPSULE | Refills: 1 | Status: SHIPPED | OUTPATIENT
Start: 2023-09-12

## 2023-09-12 RX ORDER — INSULIN GLARGINE 100 [IU]/ML
26 INJECTION, SOLUTION SUBCUTANEOUS AS NEEDED
Qty: 3 ML | Refills: 0 | Status: SHIPPED | OUTPATIENT
Start: 2023-09-12

## 2023-09-12 NOTE — PROGRESS NOTES
Follow Up Progress note       Chief Complaint   Patient presents with   • Diabetes Type 1     History of Present Illness:   Whit Mayer is a 29 y.o. female with a history of type 1 diabetes since age 16 currently on Insulin pump with Tandem t slim- Dexcom closed loop with well controlled diabetes without any micro/macrovascula complications who presents today for diabetes management. Labs ordered for visit but still pending. Done today. Reports she has been snacking often during the day because of 2 kids and unable to always give herself bolus and her BG have been high because of this. Reports had better schedule before but lately hasnt been able to do this. But does feel her BG goes high even after she eats a protein bar for breakfast.     Control since diagnosis:-   Medications tried thus far:- MDI before, now on insulin pump. Current regime  Mn-7am:- 0.8u.hr, CR 1:9, ISF 1:31 with goal 110  7am-9am:-  1.2u.hr, CR 1:7.5, ISF 1:31 with goal 110  9am-MN:-  1.25u.hr, CR 1:7.5, ISF 1:31 with goal 110    Basal % 50% 24.66u  Bolus % 24% 11.98u  Control IQ Bolus % 17% 8.29u  Correction bolus 9% 4.61u  Control Iq 83%     BG control:-   [facility]   Device used- Dexcom G6    Indication   Type 1 Diabetes    More than 72 hours of data was reviewed. Report to be scanned to chart. Date Range: Aug 29-Sept 11  - Unable to upload dexcom data since she hasnt uploaded it to [facility] Amrk of data:   Time in Target Range: 38%  (set at pregnancy settings)   Time Above Range: 61% >140 range (30% 14%)  Time Below Range: <1% <60.      Several periods of hyperglycemia during the day, does have some PPH but mild but generally having High BG since 2 am until 6pm.   Setting on pump target ranges pregnancy ranges    Hypoglycemic episodes: rare  Hyperglycemia symptoms:- no polyuria or polydipsia,no chest pain, dyspnea or TIAs,no numbness, tingling or pain in extremities  Diet- does not have a set meal pattern d/t having 2 children and working  Activity- stays active with 2 kids and work  Weight- Stable     Opthamology: 05/22; no retinopathy, no macular edema  Hx of hyperlipidemia- none  Hx of hypertension- none  Last Lipid:- 11/22, LDL <100, repeat pending  Last urine microalbumin:- 03/22- normal, repeat pending  Last HbA1C 11/22:- 5.9%, repeat pending    Patient also has history of incidentally noted hypercalcemia possible d/t PTHrP effect done for CMP monitoring during pregnancy and breast feeding. No Hx of kidney stones or fractures, abdominal pain, polyuria, polydipsia, mood changes- psychosis. Self improved post partum. VitD deficiency:- patient NOT currently taking any supplementation. Last VitD level 11/22 was still low at 26.7, repeat pending     Social Hx:- Does not smoke, drink alcohol or use drugs.  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    Patient Active Problem List   Diagnosis   • Pre-existing type 1 diabetes mellitus with hyperglycemia during pregnancy in third trimester ([address])   • Vitamin D deficiency   • Insulin pump titration   • Hypoglycemia due to type 1 diabetes mellitus (HCC)   • Insulin pump status   • BMI 31.0-31.9,adult   • 34 weeks gestation of pregnancy   • Ventricular septal defect (VSD) of fetus in [facility] pregnancy, antepartum   • COVID-19 affecting pregnancy in second trimester   • Hypercalcemia   • Cervical myofascial strain      Past Medical History:   Diagnosis Date   • Anemia    • H/O vitamin D deficiency    • Pre-existing type 1 diabetes affecting pregnancy, antepartum     age 14   • Strain of left pectoralis muscle 12/20/2022   • Vacuum-assisted vaginal delivery 12/12/2019   • Varicella       Past Surgical History:   Procedure Laterality Date   • DENTAL IMPLANT  2018   • TONSILLECTOMY AND ADENOIDECTOMY        Family History   Problem Relation Age of Onset   • No Known Problems Mother    • No Known Problems Father    • Diabetes type I Brother    • Skin cancer Family Social History     Tobacco Use   • Smoking status: Never   • Smokeless tobacco: Never   Substance Use Topics   • Alcohol use: Yes     Comment: social     Allergies   Allergen Reactions   • Other Cough and Sneezing     Cat dander     • Mold Extract  [Trichophyton]      Other reaction(s): Resp Sympt Mild   • Molds & Smuts      Other reaction(s): Resp Sympt Mild   • Pollen Extract      Other reaction(s): Resp Sympt Mild         Current Outpatient Medications:   •  cholecalciferol (VITAMIN D3) 1,000 units tablet, Take 2 tablets (2,000 Units total) by mouth daily, Disp: 100 tablet, Rfl: 1  •  glucagon 1 MG injection, Inject 1 mg under the skin once as needed for low blood sugar, Disp: 1 kit, Rfl: 1  •  insulin aspart (NovoLOG FlexPen) 100 UNIT/ML injection pen, Inject 1-10 Units under the skin if needed for high blood sugar (Pump failure), Disp: 3 mL, Rfl: 0  •  insulin aspart (NovoLOG) 100 units/mL injection, 120 Units by Subcutaneous Insulin Pump route in the morning Use UPTO 120u daily via insulin pump, Disp: 45 mL, Rfl: 1  •  insulin detemir (Levemir FlexTouch) 100 Units/mL injection pen, Inject 15 Units under the skin if needed (pump failure), Disp: 3 mL, Rfl: 0  •  Insulin Glargine Solostar (Lantus SoloStar) 100 UNIT/ML SOPN, Inject 0.26 mL (26 Units total) under the skin if needed (pump failure), Disp: 3 mL, Rfl: 0  •  Insulin Pen Needle (Pen Needles 3/16") 31G X 5 MM MISC, Use in the morning, Disp: 30 each, Rfl: 0  •  OneTouch Delica Lancets 15C MISC, Use up to 8 a day. Pregnancy, T1DM., Disp: 200 each, Rfl: 6  •  OneTouch Verio test strip, Test up to 8 times a day and as instructed.  Pregnancy; T1DM., Disp: 200 each, Rfl: 6  •  albuterol (PROVENTIL HFA,VENTOLIN HFA) 90 mcg/act inhaler,   (Patient not taking: Reported on 3/28/2023), Disp: , Rfl:   •  ofloxacin (OCUFLOX) 0.3 % ophthalmic solution, Apply 1 drop to eye 4 (four) times a day (Patient not taking: Reported on 3/28/2023), Disp: 5 mL, Rfl: 0  Review of Systems   Constitutional: Negative for diaphoresis, fatigue and unexpected weight change. Eyes: Negative for photophobia and visual disturbance. Gastrointestinal: Negative for constipation, diarrhea and vomiting. Endocrine: Negative for polydipsia and polyuria. Skin: Negative. Physical Exam:  Body mass index is 28.87 kg/m². /76   Pulse (!) 113   Ht 5' 2.5" (1.588 m)   Wt 72.8 kg (160 lb 6.4 oz)   SpO2 98%   BMI 28.87 kg/m²    Wt Readings from Last 3 Encounters:   09/12/23 72.8 kg (160 lb 6.4 oz)   05/16/23 72.6 kg (160 lb)   03/28/23 73 kg (161 lb)       Physical Exam  Constitutional:       Appearance: Normal appearance. She is normal weight. Cardiovascular:      Rate and Rhythm: Normal rate and regular rhythm. Pulses: Normal pulses. no weak pulses          Dorsalis pedis pulses are 2+ on the right side and 2+ on the left side. Pulmonary:      Effort: Pulmonary effort is normal.   Abdominal:      General: Abdomen is flat. Palpations: Abdomen is soft. Musculoskeletal:      Cervical back: Normal range of motion and neck supple. Feet:      Right foot:      Skin integrity: No ulcer, skin breakdown, erythema, warmth, callus or dry skin. Left foot:      Skin integrity: No ulcer, skin breakdown, erythema, warmth, callus or dry skin. Skin:     Capillary Refill: Capillary refill takes less than 2 seconds. Neurological:      General: No focal deficit present. Mental Status: She is alert and oriented to person, place, and time. Psychiatric:         Mood and Affect: Mood normal.         Labs:    Latest Reference Range & Units 03/03/21 07:33 07/24/21 07:32 10/05/21 09:48 03/29/22 11:05 07/11/22 07:15 11/15/22 09:56   Hemoglobin A1C Normal 3.8-5.6%; PreDiabetic 5.7-6.4%;  Diabetic >=6.5%; Glycemic control for adults with diabetes <7.0% % 6.7 (H) 6.7 (H) 5.4 5.8 (H) 6.0 (H) 5.9 (H)   (H): Data is abnormally high     Latest Reference Range & Units 10/05/21 09:48 03/29/22 11:05   EXT Creatinine Urine mg/dL 86.0 128.0   MICROALBUMIN/CREATININE RATIO 0 - 30 mg/g creatinine  9   MICROALBUM.,U,RANDOM 0.0 - 20.0 mg/L  11.8   Protein Urine Random mg/dL 13    Prot/Creat Ratio, Ur 0.00 - 0.10  0.15 (H)    (H): Data is abnormally high     Latest Reference Range & Units 11/15/22 09:56   Cholesterol See Comment mg/dL 182   Triglycerides See Comment mg/dL 56   HDL >=50 mg/dL 96   Non-HDL Cholesterol mg/dl 86   LDL Calculated 0 - 100 mg/dL 75      Latest Reference Range & Units 07/11/22 07:15 11/15/22 09:56   TSH 3RD GENERATON 0.450 - 4.500 uIU/mL 1.040 1.750      08/23/18 00:00 08/26/19 11:10 05/14/22 00:00   Right Eye Diabetic Retinopathy None (E) None (E) None (E)   (E): External lab result     08/23/18 00:00 08/26/19 11:10 05/14/22 00:00   Left Eye Diabetic Retinopathy None (E) None (E) None (E)   (E): External lab result    Range & Units 7/12/22  3:52 PM 3/29/22 11:05 AM 7/24/21  7:32 AM 3/3/21  7:33 AM 9/22/20  9:14 AM 8/7/20  7:07 AM 7/7/20 12:13 PM    Vit D, 25-Hydroxy 30.0 - 100.0 ng/mL 26.7 Low   20.5 Low   19.6 Low   20.3 Low   20.0 Low   20.9 Low   19.9 Low       Latest Reference Range & Units 03/29/22 11:05 05/06/22 10:34 07/11/22 07:15 08/16/22 10:18 09/13/22 09:31 11/15/22 09:56   Calcium 8.3 - 10.1 mg/dL 10.4 (H) 9.9 11.0 (H) 10.2 (H) 9.1 9.3   (H): Data is abnormally high    Impression:  1. Pre-existing type 1 diabetes mellitus with hyperglycemia during pregnancy in third trimester ([address])    2. Vitamin D deficiency    3. Type 1 diabetes mellitus without complication (HCC)    4. Insulin pump status           Plan:    [place] was seen today for diabetes type 1. Diagnoses and all orders for this visit:    Pre-existing type 1 diabetes mellitus with hyperglycemia during pregnancy in third trimester ([address])  -     Hemoglobin A1C; Future  -     Comprehensive metabolic panel; Future  -     Albumin / creatinine urine ratio; Future  -     Lipid panel; Future  -     T4, free;  Future  - TSH, 3rd generation; Future  -     Insulin Glargine Solostar (Lantus SoloStar) 100 UNIT/ML SOPN; Inject 0.26 mL (26 Units total) under the skin if needed (pump failure)  -     insulin aspart (NovoLOG FlexPen) 100 UNIT/ML injection pen; Inject 1-10 Units under the skin if needed for high blood sugar (Pump failure)    Vitamin D deficiency  -     Hemoglobin A1C; Future  -     Comprehensive metabolic panel; Future  -     Albumin / creatinine urine ratio; Future  -     Lipid panel; Future  -     T4, free; Future  -     TSH, 3rd generation; Future    Type 1 diabetes mellitus without complication (HCC)  -     Hemoglobin A1C; Future  -     Comprehensive metabolic panel; Future  -     Albumin / creatinine urine ratio; Future  -     Lipid panel; Future  -     T4, free; Future  -     TSH, 3rd generation; Future    Insulin pump status  -     Hemoglobin A1C; Future  -     Comprehensive metabolic panel; Future  -     Albumin / creatinine urine ratio; Future  -     Lipid panel; Future  -     T4, free; Future  -     TSH, 3rd generation; Future        Patient is a 27yF With PMHx of T1DM since age 16 with no complications, on insulin pump with other Pmhx of vitD deficiency and incidentally noted hypercalcemia presents today for DM follow up. Last visit 03/23     1) t1DM:- We do not have recent Hba1C, last level 11/22 was 5.9%. Pump download shows TIR % 38% only but this is set at pregnancy ranges from . Discussed data falsely skewed d/t this. But does have high BG during the day d/t frequent snacking and inability to give herself bolus for this. Discussed for now will adjust her basal rates slightly overnight when Bg high and by 10% during the day, will also adjust her CR slightly. She will change the BG target ranges on her dexcom. Mn-2am :- 08u/hr, CR 1:9, ISF 1:31 with goal 110  2am-7am:- 0.9u.hr, CR 1:9, ISF 1:31 with goal 110  7am-MN-  1.30u. hr, CR 1:6.5, ISF 1:31 with goal 110    Screening:-   Retinopathy- 05/22, reminded needs repeat screening soon  Nephropathy- overdue, 03/22, will order now  Lipide levels- uptodate 11/22, LDL <100, repeat 11/23    2) VitD deficiency:- Will order repeat now, currently not on supplementation    3) Hypercalcemia:- incidentally noted with highest calcium level 11 on 07/22, d/t PTHrP elevation seen during pregnancy. Last CMP 12/22 was normal 9.8, No symptoms of fracture, stones. Will repeat CMP now and if elevated will also check PTH/PTHrP for further workup    RTC in 3 months    Discussed with the patient and all questioned fully answered. She will call me if any problems arise.     Counseled patient on diagnostic results, prognosis, risk and benefit of treatment options, instruction for management, importance of treatment compliance, Risk  factor reduction and impressions      Richard Hay MD

## 2023-10-18 ENCOUNTER — DOCUMENTATION (OUTPATIENT)
Dept: ENDOCRINOLOGY | Facility: HOSPITAL | Age: 33
End: 2023-10-18

## 2023-10-31 ENCOUNTER — DOCUMENTATION (OUTPATIENT)
Dept: ENDOCRINOLOGY | Facility: HOSPITAL | Age: 33
End: 2023-10-31

## 2023-11-07 ENCOUNTER — OFFICE VISIT (OUTPATIENT)
Dept: OBGYN CLINIC | Facility: CLINIC | Age: 33
End: 2023-11-07
Payer: COMMERCIAL

## 2023-11-07 VITALS
BODY MASS INDEX: 28.35 KG/M2 | DIASTOLIC BLOOD PRESSURE: 72 MMHG | SYSTOLIC BLOOD PRESSURE: 122 MMHG | HEIGHT: 63 IN | WEIGHT: 160 LBS

## 2023-11-07 DIAGNOSIS — M54.2 CHRONIC NECK PAIN: ICD-10-CM

## 2023-11-07 DIAGNOSIS — G89.29 CHRONIC NECK PAIN: ICD-10-CM

## 2023-11-07 DIAGNOSIS — S16.1XXD CERVICAL MYOFASCIAL STRAIN, SUBSEQUENT ENCOUNTER: Primary | ICD-10-CM

## 2023-11-07 PROCEDURE — 99214 OFFICE O/P EST MOD 30 MIN: CPT | Performed by: ORTHOPAEDIC SURGERY

## 2023-11-07 NOTE — PROGRESS NOTES
Assessment:     1. Cervical myofascial strain, subsequent encounter    2. Chronic neck pain        Plan:     Problem List Items Addressed This Visit          Musculoskeletal and Integument    Cervical myofascial strain - Primary    Relevant Orders    MRI cervical spine w wo contrast     Other Visit Diagnoses       Chronic neck pain             Findings consistent with chronic neck pain with cervical strain not resolving. Discussed findings and treatment options with the patient. Patient have gone to therapy but still continues to have pain in her cervical spine. I am concerned that there may underlying disc disease that may be causing her persistent pain. I recommend an MRI of her cervical spine to better assess her spine. I discussed with patient treatment options once the MRI is done. All patient's questions were answered to her satisfaction. This note is created using dictation transcription. It may contain typographical errors, grammatical errors, improperly dictated words, background noise and other errors. Subjective:     Patient ID: Germain Ruiz is a 29 y.o. female. Chief Complaint:  Patient is a 29year old female for a follow up evaluation of left cervical myofascial strain. Patient was last seen 5/16/23 where patient was recommend formal physical therapy to treat this condition. Discussed importance of her doing stretches on her own as well. She is to continue NSAIDs and heat as needed for pain. Patient states today that she is improving with her pain however states she still has tightness around her cervical spine. Patient states she has been discharged from PT and keeps up with HEP. Patient states she continues to take NSAIDs to provide relief. She continued experiencing pain and stiffness over her cervical spine. Denied weakness or numbness in her arm. Her shoulder pain also has improved.     Allergy:  Allergies   Allergen Reactions    Other Cough and Sneezing     Cat dander Mold Extract  [Trichophyton]      Other reaction(s): Resp Sympt Mild    Molds & Smuts      Other reaction(s): Resp Sympt Mild    Pollen Extract      Other reaction(s): Resp Sympt Mild     Medications:  all current active meds have been reviewed  Past Medical History:  Past Medical History:   Diagnosis Date    Anemia     H/O vitamin D deficiency     Pre-existing type 1 diabetes affecting pregnancy, antepartum     age 14    Strain of left pectoralis muscle 12/20/2022    Vacuum-assisted vaginal delivery 12/12/2019    Varicella      Past Surgical History:  Past Surgical History:   Procedure Laterality Date    DENTAL IMPLANT  2018    TONSILLECTOMY AND ADENOIDECTOMY       Family History:  Family History   Problem Relation Age of Onset    No Known Problems Mother     No Known Problems Father     Diabetes type I Brother     Skin cancer Family      Social History:  Social History     Substance and Sexual Activity   Alcohol Use Yes    Comment: social     Social History     Substance and Sexual Activity   Drug Use Never     Social History     Tobacco Use   Smoking Status Never   Smokeless Tobacco Never     Review of Systems   Constitutional:  Negative for chills, fever and unexpected weight change. HENT:  Negative for hearing loss, nosebleeds and sore throat. Eyes:  Negative for pain, redness and visual disturbance. Respiratory:  Negative for cough, shortness of breath and wheezing. Cardiovascular:  Negative for chest pain, palpitations and leg swelling. Gastrointestinal:  Negative for abdominal pain, nausea and vomiting. Endocrine: Negative for polydipsia and polyuria. Genitourinary:  Negative for dysuria and hematuria. Musculoskeletal:  Positive for neck pain and neck stiffness. See HPI   Skin:  Negative for rash and wound. Neurological:  Negative for dizziness, numbness and headaches. Psychiatric/Behavioral:  Negative for decreased concentration and suicidal ideas.  The patient is not nervous/anxious. Objective:  BP Readings from Last 1 Encounters:   11/07/23 122/72      Wt Readings from Last 1 Encounters:   11/07/23 72.6 kg (160 lb)      BMI:   Estimated body mass index is 28.8 kg/m² as calculated from the following:    Height as of this encounter: 5' 2.5" (1.588 m). Weight as of this encounter: 72.6 kg (160 lb). BSA:   Estimated body surface area is 1.75 meters squared as calculated from the following:    Height as of this encounter: 5' 2.5" (1.588 m). Weight as of this encounter: 72.6 kg (160 lb). Physical Exam  Vitals and nursing note reviewed. Constitutional:       Appearance: Normal appearance. She is well-developed. HENT:      Head: Normocephalic and atraumatic. Right Ear: External ear normal.      Left Ear: External ear normal.   Eyes:      General: No scleral icterus. Extraocular Movements: Extraocular movements intact. Conjunctiva/sclera: Conjunctivae normal.   Pulmonary:      Effort: Pulmonary effort is normal. No respiratory distress. Musculoskeletal:      Cervical back: Neck supple. Comments: See Ortho exam   Skin:     General: Skin is warm and dry. Neurological:      General: No focal deficit present. Mental Status: She is alert and oriented to person, place, and time. Deep Tendon Reflexes: Reflexes are normal and symmetric. Psychiatric:         Mood and Affect: Mood normal.         Behavior: Behavior normal.       Back Exam     Tenderness   The patient is experiencing tenderness in the cervical (Left paraspinous muscles). Range of Motion   The patient has normal back ROM. Back lateral bend left: Pain. Reflexes   Biceps:  normal    Other   Sensation: normal      Left Shoulder Exam   Left shoulder exam is normal.    Tenderness   The patient is experiencing no tenderness.      Muscle Strength   Abduction: 5/5   Internal rotation: 5/5   External rotation: 5/5   Biceps: 5/5     Other   Erythema: absent  Scars: absent  Sensation: normal  Pulse: present               No new images for review today    Scribe Attestation      I,:  [facility] am acting as a scribe while in the presence of the attending physician.:       I,:  Ethan Payne MD personally performed the services described in this documentation    as scribed in my presence.:

## 2023-11-14 ENCOUNTER — DOCUMENTATION (OUTPATIENT)
Dept: ENDOCRINOLOGY | Facility: HOSPITAL | Age: 33
End: 2023-11-14

## 2023-11-25 ENCOUNTER — HOSPITAL ENCOUNTER (OUTPATIENT)
Dept: MRI IMAGING | Facility: HOSPITAL | Age: 33
Discharge: HOME | End: 2023-11-25
Attending: ORTHOPAEDIC SURGERY
Payer: COMMERCIAL

## 2023-11-25 DIAGNOSIS — S16.1XXD CERVICAL MYOFASCIAL STRAIN, SUBSEQUENT ENCOUNTER: ICD-10-CM

## 2023-11-25 PROCEDURE — G1004 CDSM NDSC: HCPCS

## 2023-11-25 PROCEDURE — A9585 GADOBUTROL INJECTION: HCPCS | Performed by: NURSE PRACTITIONER

## 2023-11-25 PROCEDURE — 72156 MRI NECK SPINE W/O & W/DYE: CPT

## 2023-11-25 RX ORDER — GADOBUTROL 604.72 MG/ML
7 INJECTION INTRAVENOUS
Status: COMPLETED | OUTPATIENT
Start: 2023-11-25 | End: 2023-11-25

## 2023-11-25 RX ADMIN — GADOBUTROL 7 ML: 604.72 INJECTION INTRAVENOUS at 16:38

## 2023-11-29 DIAGNOSIS — O24.013 PRE-EXISTING TYPE 1 DIABETES MELLITUS WITH HYPERGLYCEMIA DURING PREGNANCY IN THIRD TRIMESTER (HCC): ICD-10-CM

## 2023-11-29 DIAGNOSIS — E10.65 PRE-EXISTING TYPE 1 DIABETES MELLITUS WITH HYPERGLYCEMIA DURING PREGNANCY IN THIRD TRIMESTER (HCC): ICD-10-CM

## 2023-11-30 DIAGNOSIS — E10.65 PRE-EXISTING TYPE 1 DIABETES MELLITUS WITH HYPERGLYCEMIA DURING PREGNANCY IN THIRD TRIMESTER (HCC): Primary | ICD-10-CM

## 2023-11-30 DIAGNOSIS — O24.013 PRE-EXISTING TYPE 1 DIABETES MELLITUS WITH HYPERGLYCEMIA DURING PREGNANCY IN THIRD TRIMESTER (HCC): Primary | ICD-10-CM

## 2023-11-30 DIAGNOSIS — O24.013 PRE-EXISTING TYPE 1 DIABETES MELLITUS WITH HYPERGLYCEMIA DURING PREGNANCY IN THIRD TRIMESTER (HCC): ICD-10-CM

## 2023-11-30 DIAGNOSIS — E10.65 PRE-EXISTING TYPE 1 DIABETES MELLITUS WITH HYPERGLYCEMIA DURING PREGNANCY IN THIRD TRIMESTER (HCC): ICD-10-CM

## 2023-11-30 RX ORDER — INSULIN ASPART 100 [IU]/ML
INJECTION, SOLUTION INTRAVENOUS; SUBCUTANEOUS
Qty: 110 ML | Refills: 1 | Status: SHIPPED | OUTPATIENT
Start: 2023-11-30 | End: 2023-11-30 | Stop reason: SDUPTHER

## 2023-11-30 RX ORDER — INSULIN ASPART 100 [IU]/ML
INJECTION, SOLUTION INTRAVENOUS; SUBCUTANEOUS
Qty: 110 ML | Refills: 1 | Status: SHIPPED | OUTPATIENT
Start: 2023-11-30

## 2023-11-30 NOTE — TELEPHONE ENCOUNTER
Script for Novolog that was sent today does not say how often she uses the 120 units.  It must say daily

## 2024-01-02 DIAGNOSIS — O24.013 PRE-EXISTING TYPE 1 DIABETES MELLITUS WITH HYPERGLYCEMIA DURING PREGNANCY IN THIRD TRIMESTER (HCC): ICD-10-CM

## 2024-01-02 DIAGNOSIS — E10.65 PRE-EXISTING TYPE 1 DIABETES MELLITUS WITH HYPERGLYCEMIA DURING PREGNANCY IN THIRD TRIMESTER (HCC): ICD-10-CM

## 2024-02-10 ENCOUNTER — APPOINTMENT (OUTPATIENT)
Dept: LAB | Facility: HOSPITAL | Age: 34
End: 2024-02-10
Payer: COMMERCIAL

## 2024-02-10 DIAGNOSIS — E10.65 PRE-EXISTING TYPE 1 DIABETES MELLITUS WITH HYPERGLYCEMIA DURING PREGNANCY IN THIRD TRIMESTER (HCC): ICD-10-CM

## 2024-02-10 DIAGNOSIS — O24.013 PRE-EXISTING TYPE 1 DIABETES MELLITUS WITH HYPERGLYCEMIA DURING PREGNANCY IN THIRD TRIMESTER (HCC): ICD-10-CM

## 2024-02-10 DIAGNOSIS — Z96.41 INSULIN PUMP STATUS: ICD-10-CM

## 2024-02-10 DIAGNOSIS — E10.9 TYPE 1 DIABETES MELLITUS WITHOUT COMPLICATION (HCC): ICD-10-CM

## 2024-02-10 DIAGNOSIS — E55.9 VITAMIN D DEFICIENCY: ICD-10-CM

## 2024-02-10 LAB
25(OH)D3 SERPL-MCNC: 13.6 NG/ML (ref 30–100)
ALBUMIN SERPL BCP-MCNC: 4.3 G/DL (ref 3.5–5)
ALP SERPL-CCNC: 46 U/L (ref 34–104)
ALT SERPL W P-5'-P-CCNC: 7 U/L (ref 7–52)
ANION GAP SERPL CALCULATED.3IONS-SCNC: 4 MMOL/L
AST SERPL W P-5'-P-CCNC: 12 U/L (ref 13–39)
BILIRUB SERPL-MCNC: 0.56 MG/DL (ref 0.2–1)
BUN SERPL-MCNC: 16 MG/DL (ref 5–25)
CALCIUM SERPL-MCNC: 8.7 MG/DL (ref 8.4–10.2)
CHLORIDE SERPL-SCNC: 104 MMOL/L (ref 96–108)
CHOLEST SERPL-MCNC: 183 MG/DL
CO2 SERPL-SCNC: 29 MMOL/L (ref 21–32)
CREAT SERPL-MCNC: 0.82 MG/DL (ref 0.6–1.3)
CREAT UR-MCNC: 234.6 MG/DL
GFR SERPL CREATININE-BSD FRML MDRD: 94 ML/MIN/1.73SQ M
GLUCOSE P FAST SERPL-MCNC: 151 MG/DL (ref 65–99)
HDLC SERPL-MCNC: 77 MG/DL
LDLC SERPL CALC-MCNC: 94 MG/DL (ref 0–100)
MICROALBUMIN UR-MCNC: 8.9 MG/L
MICROALBUMIN/CREAT 24H UR: 4 MG/G CREATININE (ref 0–30)
NONHDLC SERPL-MCNC: 106 MG/DL
POTASSIUM SERPL-SCNC: 4.2 MMOL/L (ref 3.5–5.3)
PROT SERPL-MCNC: 7.1 G/DL (ref 6.4–8.4)
SODIUM SERPL-SCNC: 137 MMOL/L (ref 135–147)
T4 FREE SERPL-MCNC: 0.79 NG/DL (ref 0.61–1.12)
TRIGL SERPL-MCNC: 58 MG/DL
TSH SERPL DL<=0.05 MIU/L-ACNC: 1.04 UIU/ML (ref 0.45–4.5)

## 2024-02-10 PROCEDURE — 84443 ASSAY THYROID STIM HORMONE: CPT

## 2024-02-10 PROCEDURE — 83036 HEMOGLOBIN GLYCOSYLATED A1C: CPT

## 2024-02-10 PROCEDURE — 82043 UR ALBUMIN QUANTITATIVE: CPT

## 2024-02-10 PROCEDURE — 80061 LIPID PANEL: CPT

## 2024-02-10 PROCEDURE — 80053 COMPREHEN METABOLIC PANEL: CPT

## 2024-02-10 PROCEDURE — 84439 ASSAY OF FREE THYROXINE: CPT

## 2024-02-10 PROCEDURE — 36415 COLL VENOUS BLD VENIPUNCTURE: CPT

## 2024-02-10 PROCEDURE — 82570 ASSAY OF URINE CREATININE: CPT

## 2024-02-10 PROCEDURE — 82306 VITAMIN D 25 HYDROXY: CPT

## 2024-02-11 LAB
EST. AVERAGE GLUCOSE BLD GHB EST-MCNC: 137 MG/DL
HBA1C MFR BLD: 6.4 %

## 2024-03-11 LAB — HBA1C MFR BLD HPLC: 6.1 %

## 2024-03-27 ENCOUNTER — TELEPHONE (OUTPATIENT)
Age: 34
End: 2024-03-27

## 2024-04-09 ENCOUNTER — OFFICE VISIT (OUTPATIENT)
Dept: FAMILY MEDICINE CLINIC | Facility: CLINIC | Age: 34
End: 2024-04-09
Payer: COMMERCIAL

## 2024-04-09 ENCOUNTER — TELEMEDICINE (OUTPATIENT)
Facility: HOSPITAL | Age: 34
End: 2024-04-09
Payer: COMMERCIAL

## 2024-04-09 VITALS
HEIGHT: 62 IN | BODY MASS INDEX: 31.14 KG/M2 | TEMPERATURE: 99 F | RESPIRATION RATE: 16 BRPM | HEART RATE: 98 BPM | SYSTOLIC BLOOD PRESSURE: 110 MMHG | WEIGHT: 169.2 LBS | OXYGEN SATURATION: 98 % | DIASTOLIC BLOOD PRESSURE: 68 MMHG

## 2024-04-09 VITALS — BODY MASS INDEX: 29.23 KG/M2 | WEIGHT: 165 LBS | HEIGHT: 63 IN

## 2024-04-09 DIAGNOSIS — Z46.81 INSULIN PUMP TITRATION: ICD-10-CM

## 2024-04-09 DIAGNOSIS — E55.9 VITAMIN D DEFICIENCY: ICD-10-CM

## 2024-04-09 DIAGNOSIS — E10.649 HYPOGLYCEMIA DUE TO TYPE 1 DIABETES MELLITUS (HCC): ICD-10-CM

## 2024-04-09 DIAGNOSIS — E10.65 PRE-EXISTING TYPE 1 DIABETES MELLITUS WITH HYPERGLYCEMIA DURING PREGNANCY IN FIRST TRIMESTER (HCC): Primary | ICD-10-CM

## 2024-04-09 DIAGNOSIS — O24.011 PRE-EXISTING TYPE 1 DIABETES MELLITUS WITH HYPERGLYCEMIA DURING PREGNANCY IN FIRST TRIMESTER (HCC): Primary | ICD-10-CM

## 2024-04-09 DIAGNOSIS — Z00.00 ANNUAL PHYSICAL EXAM: Primary | ICD-10-CM

## 2024-04-09 DIAGNOSIS — Z3A.13 13 WEEKS GESTATION OF PREGNANCY: ICD-10-CM

## 2024-04-09 PROBLEM — O24.013 PRE-EXISTING TYPE 1 DIABETES MELLITUS WITH HYPERGLYCEMIA DURING PREGNANCY IN THIRD TRIMESTER (HCC): Status: RESOLVED | Noted: 2017-12-22 | Resolved: 2024-04-09

## 2024-04-09 PROBLEM — Z3A.34 34 WEEKS GESTATION OF PREGNANCY: Status: RESOLVED | Noted: 2021-09-29 | Resolved: 2024-04-09

## 2024-04-09 PROCEDURE — 99204 OFFICE O/P NEW MOD 45 MIN: CPT | Performed by: NURSE PRACTITIONER

## 2024-04-09 PROCEDURE — 99395 PREV VISIT EST AGE 18-39: CPT | Performed by: FAMILY MEDICINE

## 2024-04-09 NOTE — ASSESSMENT & PLAN NOTE
-First visit weight 165 lbs. BMI <30.   -Recommended weight gain 15 to 25 lbs.  -Start GDM meal plan and follow up with dietitian.

## 2024-04-09 NOTE — PROGRESS NOTES
Virtual Regular Visit    Verification of patient location:    Patient is located at Home in the following state in which I hold an active license [place]      Assessment/Plan:    Problem List Items Addressed This Visit       Vitamin D deficiency     -Last D level 13.6 low.  -On Vitamin D3 supplements and prenatal vitamin.          Relevant Orders    Comprehensive metabolic panel    Protein / creatinine ratio, urine    Insulin pump titration    Relevant Orders    Comprehensive metabolic panel    Protein / creatinine ratio, urine    Hypoglycemia due to type 1 diabetes mellitus (HCC)    Relevant Orders    Comprehensive metabolic panel    Protein / creatinine ratio, urine    Pre-existing type 1 diabetes mellitus with hyperglycemia during pregnancy in first trimester (HCC) - Primary     -A1c 6.1% close to goal. Last CMP within normal.   -Check UPCR and CMP.   -A1c goal <6% with minimal hypoglycemia.   -Novolog via T slim insulin pump and Dexcom G7 CGM; not in CIQ; basal adjustments due to hyperglycemia and hypoglycemia as follows:  MN-3 AM@ 0.60 units/hour;  3 AM-7 AM@ 0.90 units/hour;  7 AM-10 PM@ 1.28 units/hour;  10 PM-MN@ 1.07 units/hour.   -Follow up with dietitian as scheduled.   -Keep 3 day food log to review with dietitian.  -Self monitoring blood glucose (SMBG) fasting; 2 hours after start of each meal and with hypoglycemia.   -Glucose goals: fasting 60-90 mg/dL, 140 mg/dL or less 1 hour post meals, and 120 mg/dL or less 2 hours post meal.   -Report glucose readings weekly via [employer].  -Start GDM calorie meal plan with 3 meals and 3 snacks including recommended combination of carb, protein and fat per meal/snack.  -Please eat meal or snack every 2-3.5 hours while awake.  -No more than 8 to 10 hours of fasting overnight.  -Refer to [employer] MyPlate online as a reference.  -2nd/3rd trimester minimum total daily carbohydrates 175 grams paired with half grams in protein.   -Stay active if no restriction from your  OB, walk up to 30 minutes a day.  -Always have glucose available to treat hypoglycemia. Use 15:15 rule.   -Refer to hypoglycemia patient education sheet. SMBG when experiencing signs and symptoms of hypoglycemia and prior to driving.   -Serial fetal growth ultrasounds.  -20 weeks detailed fetal growth ultrasound.  -22-24 weeks fetal echo.  -At 32 weeks gestation; NST twice a week and MARTINEZ weekly.   -Continue prenatal vitamin as recommended.  -Add baby aspirin 162 mg daily.   -At 36 weeks gestation, stop baby aspirin.   -Continue follow-up with your OB and MFM as recommended.  -Stay in close contact with diabetes education team.  -Insulin requirements during pregnancy; basal/bolus concept and Metformin discussed.  -Very important to maintain tight glucose control during pregnancy to decrease risk factors including fetal macrosomia; birth injury; risk of ; polyhydramnios; pre-term labor; pre-eclampsia;  hypoglycemia; jaundice and stillbirth.   -Meal plan and hypoglycemia patient education.       Lab Results   Component Value Date    HGBA1C 6.1 2024            Relevant Orders    Comprehensive metabolic panel    Protein / creatinine ratio, urine    13 weeks gestation of pregnancy    Relevant Orders    Comprehensive metabolic panel    Protein / creatinine ratio, urine    BMI 28.0-28.9,adult     -First visit weight 165 lbs. BMI <30.   -Recommended weight gain 15 to 25 lbs.  -Start GDM meal plan and follow up with dietitian.          Relevant Orders    Comprehensive metabolic panel    Protein / creatinine ratio, urine            Reason for visit is   Chief Complaint   Patient presents with    Diabetes Type 1    Virtual Regular Visit          Encounter provider CODY Handy    Provider located at [facility] [place]  [address] [id number]      Recent Visits  No visits were found meeting these conditions.  Showing  recent visits within past 7 days and meeting all other requirements  Today's Visits  Date Type Provider Dept   24 Telemedicine CODY Handy An    24 Office Visit Deja Wang DO [facility]   Showing today's visits and meeting all other requirements  Future Appointments  No visits were found meeting these conditions.  Showing future appointments within next 150 days and meeting all other requirements       The patient was identified by name and date of birth. Cathie Ward was informed that this is a telemedicine visit and that the visit is being conducted through the Epic Embedded platform. She agrees to proceed.  My office door was closed. No one else was in the room.  She acknowledged consent and understanding of privacy and security of the video platform. The patient has agreed to participate and understands they can discontinue the visit at any time.    Patient is aware this is a billable service.     Subjective  Cathie Ward is a 34 y.o. female  13 6/7 weeks gestation T1DM on Novolog via T slim insulin pump and Dexcom G7 CGM; not in CIQ due to needing pump upgrade with latest software upgrade. Pending new insulin pump 5 to 7 days. Having hypoglycemia overnight; has self adjusted insulin pump overnight from 0.75 to 0.65 units/hour. At times does skip bedtime snack and encouraged to have bedtime snack daily. Insulin pump download and Dexcom CGM reviewed with insulin pump adjustments to basal settings. Last night hyperglycemia overnight due to pizza.       Has 2 toddlers Aneudy and Maliha. Works as RN at [employer]. Trying to follow previous dietary recommendations. Has an appointment scheduled with dietitian.          Past Medical History:   Diagnosis Date    Anemia     H/O vitamin D deficiency     Pre-existing type 1 diabetes affecting pregnancy, antepartum     age 16    Strain of left pectoralis muscle 2022    Vacuum-assisted vaginal delivery 2019     "Varicella        Past Surgical History:   Procedure Laterality Date    DENTAL IMPLANT  2018    TONSILLECTOMY AND ADENOIDECTOMY         Current Outpatient Medications   Medication Sig Dispense Refill    acetone, urine, test strip Use 1 strip if needed for high blood sugar      albuterol (PROVENTIL HFA,VENTOLIN HFA) 90 mcg/act inhaler   (Patient not taking: Reported on 3/28/2023)      cholecalciferol (VITAMIN D3) 1,000 units tablet Take 2 tablets (2,000 Units total) by mouth daily 100 tablet 1    glucagon 1 MG injection Inject 1 mg under the skin once as needed for low blood sugar 1 kit 1    insulin aspart (NovoLOG FlexPen) 100 UNIT/ML injection pen Inject 1-10 Units under the skin if needed for high blood sugar (Pump failure) 3 mL 0    insulin aspart (NovoLOG) 100 units/mL injection 120 Units by Subcutaneous Insulin Pump route in the morning Use UPTO 120u daily via insulin pump 110 mL 2    insulin detemir (Levemir FlexTouch) 100 Units/mL injection pen Inject 15 Units under the skin if needed (pump failure) 3 mL 0    Insulin Glargine Solostar (Lantus SoloStar) 100 UNIT/ML SOPN Inject 0.26 mL (26 Units total) under the skin if needed (pump failure) 3 mL 0    Insulin Pen Needle (Pen Needles 3/16\") 31G X 5 MM MISC Use in the morning 30 each 0    NovoLOG 100 UNIT/ML injection Use up to 120 units daily via insulin pump 110 mL 1    ofloxacin (OCUFLOX) 0.3 % ophthalmic solution Apply 1 drop to eye 4 (four) times a day (Patient not taking: Reported on 3/28/2023) 5 mL 0    OneTouch Delica Lancets 30G MISC Use up to 8 a day. Pregnancy, T1DM. 200 each 6    OneTouch Verio test strip Test up to 8 times a day and as instructed. Pregnancy; T1DM. 200 each 6     No current facility-administered medications for this visit.        Allergies   Allergen Reactions    Other Cough and Sneezing     Cat dander      Mold Extract  [Trichophyton]      Other reaction(s): Resp Sympt Mild    Molds & Smuts      Other reaction(s): Resp Sympt Mild    " "Pollen Extract      Other reaction(s): Resp Sympt Mild       Review of Systems   Constitutional:  Positive for fatigue. Negative for fever.   HENT:  Negative for congestion and trouble swallowing.    Eyes:  Negative for visual disturbance.        Last eye exam 6/2023; next scheduled for 6/9/2024.    Respiratory:  Negative for cough and shortness of breath.    Cardiovascular:  Negative for chest pain, palpitations and leg swelling.   Gastrointestinal:  Positive for nausea. Negative for constipation and vomiting.   Endocrine: Negative for polydipsia, polyphagia and polyuria.   Genitourinary:  Negative for difficulty urinating and vaginal bleeding.   Musculoskeletal:  Negative for back pain.   Skin:  Negative for rash.   Neurological:  Negative for headaches.   Psychiatric/Behavioral:  Negative for sleep disturbance.        Video Exam    Vitals:    04/09/24 1209   Weight: 74.8 kg (165 lb)   Height: 5' 2.5\" (1.588 m)       Physical Exam  HENT:      Head: Normocephalic.      Nose: Nose normal.   Eyes:      Conjunctiva/sclera: Conjunctivae normal.   Pulmonary:      Effort: Pulmonary effort is normal.   Neurological:      Mental Status: She is alert and oriented to person, place, and time.   Psychiatric:         Mood and Affect: Mood normal.         Behavior: Behavior normal.         Thought Content: Thought content normal.         Judgment: Judgment normal.          Visit Time  Total Visit Duration: 50 minutes with patient; 15-20 minutes charting; reviewing insulin pump download and Dexcom report.         "

## 2024-04-09 NOTE — ASSESSMENT & PLAN NOTE
-A1c 6.1% close to goal. Last CMP within normal.   -Check UPCR and CMP.   -A1c goal <6% with minimal hypoglycemia.   -Novolog via T slim insulin pump and Dexcom G7 CGM; not in CIQ; basal adjustments due to hyperglycemia and hypoglycemia as follows:  MN-3 AM@ 0.60 units/hour;  3 AM-7 AM@ 0.90 units/hour;  7 AM-10 PM@ 1.28 units/hour;  10 PM-MN@ 1.07 units/hour.   -Follow up with dietitian as scheduled.   -Keep 3 day food log to review with dietitian.  -Self monitoring blood glucose (SMBG) fasting; 2 hours after start of each meal and with hypoglycemia.   -Glucose goals: fasting 60-90 mg/dL, 140 mg/dL or less 1 hour post meals, and 120 mg/dL or less 2 hours post meal.   -Report glucose readings weekly via [employer].  -Start GDM calorie meal plan with 3 meals and 3 snacks including recommended combination of carb, protein and fat per meal/snack.  -Please eat meal or snack every 2-3.5 hours while awake.  -No more than 8 to 10 hours of fasting overnight.  -Refer to [employer] MyPlate online as a reference.  -2nd/3rd trimester minimum total daily carbohydrates 175 grams paired with half grams in protein.   -Stay active if no restriction from your OB, walk up to 30 minutes a day.  -Always have glucose available to treat hypoglycemia. Use 15:15 rule.   -Refer to hypoglycemia patient education sheet. SMBG when experiencing signs and symptoms of hypoglycemia and prior to driving.   -Serial fetal growth ultrasounds.  -20 weeks detailed fetal growth ultrasound.  -22-24 weeks fetal echo.  -At 32 weeks gestation; NST twice a week and MARTINEZ weekly.   -Continue prenatal vitamin as recommended.  -Add baby aspirin 162 mg daily.   -At 36 weeks gestation, stop baby aspirin.   -Continue follow-up with your OB and MFM as recommended.  -Stay in close contact with diabetes education team.  -Insulin requirements during pregnancy; basal/bolus concept and Metformin discussed.  -Very important to maintain tight glucose control during pregnancy  to decrease risk factors including fetal macrosomia; birth injury; risk of ; polyhydramnios; pre-term labor; pre-eclampsia;  hypoglycemia; jaundice and stillbirth.   -Meal plan and hypoglycemia patient education.       Lab Results   Component Value Date    HGBA1C 6.1 2024

## 2024-04-09 NOTE — PROGRESS NOTES
ADULT ANNUAL PHYSICAL  [facility]    NAME: Cathie Ward  AGE: 34 y.o. SEX: female  : 1990     DATE: 2024     Assessment and Plan:     Problem List Items Addressed This Visit    None  Visit Diagnoses     Annual physical exam    -  Primary      The patient had a normal exam today in the office.  She is currently 10 weeks pregnant.  She will continue to follow-up very closely with maternal-fetal medicine as well as endocrinology for her diabetes.  She did have a comprehensive lab panel done and all of her labs were essentially in the normal range with the exception of her hemoglobin A1c which is at the therapeutic range for her diabetes.  She will continue with her current regimen.  We will see her back in the office as scheduled.      Immunizations and preventive care screenings were discussed with patient today. Appropriate education was printed on patient's after visit summary.    Counseling:  Dental Health: discussed importance of regular tooth brushing, flossing, and dental visits.  Injury prevention: discussed safety/seat belts, safety helmets, smoke detectors, carbon dioxide detectors, and smoking near bedding or upholstery.  Exercise: the importance of regular exercise/physical activity was discussed. Recommend exercise 3-5 times per week for at least 30 minutes.       Depression Screening and Follow-up Plan: Patient was screened for depression during today's encounter. They screened negative with a PHQ-2 score of 0.        Return in about 1 year (around 2025) for Annual physical.     Chief Complaint:     Chief Complaint   Patient presents with   • Annual Exam      History of Present Illness:     Adult Annual Physical   Patient here for a comprehensive physical exam. The patient reports no problems. the patient has a history of type 1 diabetes that is monitored and treated by endocrinology along with maternal-fetal medicine  since the patient is currently 10 weeks pregnant.  They have been regulating her insulin and monitoring her closely.  The patient denies any chest pain, shortness of breath, or palpitations.  There is no edema.  There are no headaches or visual changes.  There is no lightheadedness, dizziness, or fainting spells.  The patient currently denies any nausea, vomiting, or GERD symptoms.  she has normal bowel movements and normal urine output.  she has a normal appetite.  There is some nausea with her pregnancy.    Her diabetes is well controlled.    She is doing very well overall with her medications.  Diet and Physical Activity  Diet/Nutrition: well balanced diet, diabetic diet, low calorie diet, low carb diet, and consuming 3-5 servings of fruits/vegetables daily.   Exercise: walking and she is walking on the treadmill.   .      Depression Screening  PHQ-2/9 Depression Screening    Little interest or pleasure in doing things: 0 - not at all  Feeling down, depressed, or hopeless: 0 - not at all  PHQ-2 Score: 0  PHQ-2 Interpretation: Negative depression screen       General Health  Sleep: sleeps well.   Hearing: normal - bilateral.  Vision: no vision problems and most recent eye exam <1 year ago.   Dental: regular dental visits and brushes teeth twice daily.       /GYN Health  Follows with gynecology? yes           Review of Systems:     Review of Systems   Constitutional: Negative.    HENT: Negative.     Eyes: Negative.    Respiratory: Negative.     Cardiovascular: Negative.    Gastrointestinal: Negative.    Endocrine: Negative.    Genitourinary: Negative.    Musculoskeletal: Negative.    Skin: Negative.    Allergic/Immunologic: Negative.    Neurological: Negative.    Hematological: Negative.    Psychiatric/Behavioral: Negative.        Past Medical History:     Past Medical History:   Diagnosis Date   • Anemia    • H/O vitamin D deficiency    • Pre-existing type 1 diabetes affecting pregnancy, antepartum     age 16    • Strain of left pectoralis muscle 12/20/2022   • Vacuum-assisted vaginal delivery 12/12/2019   • Varicella       Past Surgical History:     Past Surgical History:   Procedure Laterality Date   • DENTAL IMPLANT  2018   • TONSILLECTOMY AND ADENOIDECTOMY        Social History:     Social History     Socioeconomic History   • Marital status: /Civil Union     Spouse name: None   • Number of children: 1   • Years of education: None   • Highest education level: None   Occupational History   • None   Tobacco Use   • Smoking status: Never     Passive exposure: Past   • Smokeless tobacco: Never   Vaping Use   • Vaping status: Never Used   Substance and Sexual Activity   • Alcohol use: Not Currently     Comment: social   • Drug use: Never   • Sexual activity: Yes     Partners: Male     Birth control/protection: None   Other Topics Concern   • None   Social History Narrative   • None     Social Determinants of Health     Financial Resource Strain: Low Risk  (3/16/2021)    Overall Financial Resource Strain (CARDIA)    • Difficulty of Paying Living Expenses: Not hard at all   Food Insecurity: No Food Insecurity (3/16/2021)    Hunger Vital Sign    • Worried About Running Out of Food in the Last Year: Never true    • Ran Out of Food in the Last Year: Never true   Transportation Needs: No Transportation Needs (3/16/2021)    PRAPARE - Transportation    • Lack of Transportation (Medical): No    • Lack of Transportation (Non-Medical): No   Physical Activity: Inactive (11/11/2020)    Exercise Vital Sign    • Days of Exercise per Week: 0 days    • Minutes of Exercise per Session: 0 min   Stress: No Stress Concern Present (11/11/2020)    [employer] - Occupational Stress Questionnaire    • Feeling of Stress : Not at all   Social Connections: Moderately Isolated (11/11/2020)    Social Connection and Isolation Panel [NHANES]    • Frequency of Communication with Friends and Family: More than three times  "a week    • Frequency of Social Gatherings with Friends and Family: Twice a week    • Attends Evangelical Services: Never    • Active Member of Clubs or Organizations: No    • Attends Club or Organization Meetings: Never    • Marital Status:    Intimate Partner Violence: Not At Risk (4/9/2024)    Humiliation, Afraid, Rape, and Kick questionnaire    • Fear of Current or Ex-Partner: No    • Emotionally Abused: No    • Physically Abused: No    • Sexually Abused: No   Housing Stability: Not on file      Family History:     Family History   Problem Relation Age of Onset   • No Known Problems Mother    • No Known Problems Father    • Diabetes type I Brother    • Skin cancer Family       Current Medications:     Current Outpatient Medications   Medication Sig Dispense Refill   • acetone, urine, test strip Use 1 strip if needed for high blood sugar     • cholecalciferol (VITAMIN D3) 1,000 units tablet Take 2 tablets (2,000 Units total) by mouth daily 100 tablet 1   • glucagon 1 MG injection Inject 1 mg under the skin once as needed for low blood sugar 1 kit 1   • insulin aspart (NovoLOG FlexPen) 100 UNIT/ML injection pen Inject 1-10 Units under the skin if needed for high blood sugar (Pump failure) 3 mL 0   • insulin aspart (NovoLOG) 100 units/mL injection 120 Units by Subcutaneous Insulin Pump route in the morning Use UPTO 120u daily via insulin pump 110 mL 2   • insulin detemir (Levemir FlexTouch) 100 Units/mL injection pen Inject 15 Units under the skin if needed (pump failure) 3 mL 0   • Insulin Glargine Solostar (Lantus SoloStar) 100 UNIT/ML SOPN Inject 0.26 mL (26 Units total) under the skin if needed (pump failure) 3 mL 0   • Insulin Pen Needle (Pen Needles 3/16\") 31G X 5 MM MISC Use in the morning 30 each 0   • NovoLOG 100 UNIT/ML injection Use up to 120 units daily via insulin pump 110 mL 1   • OneTouch Delica Lancets 30G MISC Use up to 8 a day. Pregnancy, T1DM. 200 each 6   • OneTouch Verio test strip Test up " "to 8 times a day and as instructed. Pregnancy; T1DM. 200 each 6   • albuterol (PROVENTIL HFA,VENTOLIN HFA) 90 mcg/act inhaler   (Patient not taking: Reported on 3/28/2023)     • ofloxacin (OCUFLOX) 0.3 % ophthalmic solution Apply 1 drop to eye 4 (four) times a day (Patient not taking: Reported on 3/28/2023) 5 mL 0     No current facility-administered medications for this visit.      Allergies:     Allergies   Allergen Reactions   • Other Cough and Sneezing     Cat dander     • Mold Extract  [Trichophyton]      Other reaction(s): Resp Sympt Mild   • Molds & Smuts      Other reaction(s): Resp Sympt Mild   • Pollen Extract      Other reaction(s): Resp Sympt Mild      Physical Exam:     /68 (BP Location: Right arm, Patient Position: Sitting, Cuff Size: Standard)   Pulse 98   Temp 99 °F (37.2 °C) (Tympanic)   Resp 16   Ht 5' 2.1\" (1.577 m)   Wt 76.7 kg (169 lb 3.2 oz)   SpO2 98%   BMI 30.85 kg/m²     Physical Exam  Constitutional:       General: She is not in acute distress.     Appearance: Normal appearance. She is well-developed. She is not diaphoretic.   HENT:      Head: Normocephalic and atraumatic.      Right Ear: Tympanic membrane, ear canal and external ear normal.      Left Ear: Tympanic membrane, ear canal and external ear normal.      Nose: Nose normal.      Mouth/Throat:      Mouth: Mucous membranes are moist.      Pharynx: Oropharynx is clear. No oropharyngeal exudate.   Eyes:      General: No scleral icterus.        Right eye: No discharge.         Left eye: No discharge.      Extraocular Movements: Extraocular movements intact.      Pupils: Pupils are equal, round, and reactive to light.   Neck:      Thyroid: No thyromegaly.      Vascular: No JVD.      Trachea: No tracheal deviation.   Cardiovascular:      Rate and Rhythm: Normal rate and regular rhythm.      Heart sounds: Normal heart sounds. No murmur heard.     No friction rub. No gallop.   Pulmonary:      Effort: Pulmonary effort is normal. " No respiratory distress.      Breath sounds: Normal breath sounds. No wheezing or rales.   Chest:      Chest wall: No tenderness.   Abdominal:      General: Bowel sounds are normal. There is no distension.      Palpations: Abdomen is soft. There is no mass.      Tenderness: There is no abdominal tenderness. There is no guarding or rebound.      Hernia: No hernia is present.   Musculoskeletal:         General: No tenderness or deformity. Normal range of motion.      Cervical back: Normal range of motion and neck supple.   Lymphadenopathy:      Cervical: No cervical adenopathy.   Skin:     General: Skin is warm and dry.      Coloration: Skin is not pale.      Findings: No erythema or rash.   Neurological:      Mental Status: She is alert and oriented to person, place, and time.      Cranial Nerves: No cranial nerve deficit.      Sensory: No sensory deficit.      Motor: No abnormal muscle tone.      Coordination: Coordination normal.      Deep Tendon Reflexes: Reflexes normal.   Psychiatric:         Mood and Affect: Mood normal.         Behavior: Behavior normal.         Thought Content: Thought content normal.         Judgment: Judgment normal.          Deja Wang DO   [facility]

## 2024-04-09 NOTE — PATIENT INSTRUCTIONS
-A1c 6.1% close to goal. Last CMP within normal.   -Check UPCR and CMP.   -A1c goal <6% with minimal hypoglycemia.   -Novolog via T slim insulin pump and Dexcom G7 CGM; not in CIQ; basal adjustments due to hyperglycemia and hypoglycemia as follows:  MN-3 AM@ 0.60 units/hour;  3 AM-7 AM@ 0.90 units/hour;  7 AM-10 PM@ 1.28 units/hour;  10 PM-MN@ 1.07 units/hour.   -Follow up with dietitian as scheduled.   -Keep 3 day food log to review with dietitian.  -Self monitoring blood glucose (SMBG) fasting; 2 hours after start of each meal and with hypoglycemia.   -Glucose goals: fasting 60-90 mg/dL, 140 mg/dL or less 1 hour post meals, and 120 mg/dL or less 2 hours post meal.   -Report glucose readings weekly via myAchy.  -Start GDM calorie meal plan with 3 meals and 3 snacks including recommended combination of carb, protein and fat per meal/snack.  -Please eat meal or snack every 2-3.5 hours while awake.  -No more than 8 to 10 hours of fasting overnight.  -Refer to [employer] MyPlate online as a reference.  -2nd/3rd trimester minimum total daily carbohydrates 175 grams paired with half grams in protein.   -Stay active if no restriction from your OB, walk up to 30 minutes a day.  -Always have glucose available to treat hypoglycemia. Use 15:15 rule.   -Refer to hypoglycemia patient education sheet. SMBG when experiencing signs and symptoms of hypoglycemia and prior to driving.   -Serial fetal growth ultrasounds.  -20 weeks detailed fetal growth ultrasound.  -22-24 weeks fetal echo.  -At 32 weeks gestation; NST twice a week and MARTINEZ weekly.   -Continue prenatal vitamin as recommended.  -Add baby aspirin 162 mg daily.   -At 36 weeks gestation, stop baby aspirin.   -Continue follow-up with your OB and MFM as recommended.  -Stay in close contact with diabetes education team.  -Insulin requirements during pregnancy; basal/bolus concept and Metformin discussed.  -Very important to maintain tight glucose control during pregnancy  to decrease risk factors including fetal macrosomia; birth injury; risk of ; polyhydramnios; pre-term labor; pre-eclampsia;  hypoglycemia; jaundice and stillbirth.   -Meal plan and hypoglycemia patient education.

## 2024-04-10 ENCOUNTER — DOCUMENTATION (OUTPATIENT)
Dept: ENDOCRINOLOGY | Facility: HOSPITAL | Age: 34
End: 2024-04-10

## 2024-04-10 NOTE — PROGRESS NOTES
Statement of Medical Necessity order for a new pump and Dexcom G7 supplies completed and faxed with the last chart note to [phone].

## 2024-04-19 ENCOUNTER — TELEMEDICINE (OUTPATIENT)
Dept: PERINATAL CARE | Facility: CLINIC | Age: 34
End: 2024-04-19
Payer: COMMERCIAL

## 2024-04-19 DIAGNOSIS — E10.65 PRE-EXISTING TYPE 1 DIABETES MELLITUS WITH HYPERGLYCEMIA DURING PREGNANCY IN SECOND TRIMESTER (HCC): Primary | ICD-10-CM

## 2024-04-19 DIAGNOSIS — E66.3 OVERWEIGHT WITH BODY MASS INDEX (BMI) OF 29 TO 29.9 IN ADULT: ICD-10-CM

## 2024-04-19 DIAGNOSIS — O24.012 PRE-EXISTING TYPE 1 DIABETES MELLITUS WITH HYPERGLYCEMIA DURING PREGNANCY IN SECOND TRIMESTER (HCC): Primary | ICD-10-CM

## 2024-04-19 DIAGNOSIS — Z3A.15 15 WEEKS GESTATION OF PREGNANCY: ICD-10-CM

## 2024-04-19 PROCEDURE — G0108 DIAB MANAGE TRN  PER INDIV: HCPCS | Performed by: DIETITIAN, REGISTERED

## 2024-04-19 NOTE — PROGRESS NOTES
Virtual Regular Visit    Verification of patient location: [place]    Patient is located at Other in the following state in which I hold an active license [place]      Assessment/Plan:    Problem List Items Addressed This Visit    None           Reason for visit is   Chief Complaint   Patient presents with    Virtual Regular Visit          Encounter provider Sury Delaney    Provider located at [address] [place] [address]  [phone]      Recent Visits  No visits were found meeting these conditions.  Showing recent visits within past 7 days and meeting all other requirements  Today's Visits  Date Type Provider Dept   24 Telemedicine Sury Delaney [facility]   Showing today's visits and meeting all other requirements  Future Appointments  No visits were found meeting these conditions.  Showing future appointments within next 150 days and meeting all other requirements       The patient was identified by name and date of birth. Cathie Ward was informed that this is a telemedicine visit and that the visit is being conducted through the Applimation platform. She agrees to proceed..  My office door was closed. No one else was in the room.  She acknowledged consent and understanding of privacy and security of the video platform. The patient has agreed to participate and understands they can discontinue the visit at any time.    Patient is aware this is a billable service.     Subjective  Cathie Ward is a 34 y.o. female pregnant patient .      HPI     Past Medical History:   Diagnosis Date    Anemia     H/O vitamin D deficiency     Pre-existing type 1 diabetes affecting pregnancy, antepartum     age 16    Strain of left pectoralis muscle 2022    Vacuum-assisted vaginal delivery 2019    Varicella        Past Surgical History:   Procedure Laterality Date    DENTAL IMPLANT  2018    TONSILLECTOMY AND  "ADENOIDECTOMY         Current Outpatient Medications   Medication Sig Dispense Refill    acetone, urine, test strip Use 1 strip if needed for high blood sugar      albuterol (PROVENTIL HFA,VENTOLIN HFA) 90 mcg/act inhaler   (Patient not taking: Reported on 3/28/2023)      cholecalciferol (VITAMIN D3) 1,000 units tablet Take 2 tablets (2,000 Units total) by mouth daily 100 tablet 1    glucagon 1 MG injection Inject 1 mg under the skin once as needed for low blood sugar 1 kit 1    insulin aspart (NovoLOG FlexPen) 100 UNIT/ML injection pen Inject 1-10 Units under the skin if needed for high blood sugar (Pump failure) 3 mL 0    insulin aspart (NovoLOG) 100 units/mL injection 120 Units by Subcutaneous Insulin Pump route in the morning Use UPTO 120u daily via insulin pump 110 mL 2    insulin detemir (Levemir FlexTouch) 100 Units/mL injection pen Inject 15 Units under the skin if needed (pump failure) 3 mL 0    Insulin Glargine Solostar (Lantus SoloStar) 100 UNIT/ML SOPN Inject 0.26 mL (26 Units total) under the skin if needed (pump failure) 3 mL 0    Insulin Pen Needle (Pen Needles 3/16\") 31G X 5 MM MISC Use in the morning 30 each 0    NovoLOG 100 UNIT/ML injection Use up to 120 units daily via insulin pump 110 mL 1    ofloxacin (OCUFLOX) 0.3 % ophthalmic solution Apply 1 drop to eye 4 (four) times a day (Patient not taking: Reported on 3/28/2023) 5 mL 0    OneTouch Delica Lancets 30G MISC Use up to 8 a day. Pregnancy, T1DM. 200 each 6    OneTouch Verio test strip Test up to 8 times a day and as instructed. Pregnancy; T1DM. 200 each 6     No current facility-administered medications for this visit.        Allergies   Allergen Reactions    Other Cough and Sneezing     Cat dander      Mold Extract  [Trichophyton]      Other reaction(s): Resp Sympt Mild    Molds & Smuts      Other reaction(s): Resp Sympt Mild    Pollen Extract      Other reaction(s): Resp Sympt Mild       Review of Systems    Video Exam    There were no " "vitals filed for this visit.    Physical Exam --not performed    Visit Time  Total Visit Duration: 30 minutes        Thank you for referring your patient to [facility] Maternal Fetal Medicine Diabetes in Pregnancy Program.     Cathie Ward is a  34 y.o. female who presents today for Individual  Class 2.  Patient is at 15w2d gestation, Estimated Date of Delivery: 10/9/24.     Reviewed and updated the following from patients medical record: PMH, Problem List, Allergies, and Current Medications.    Visit Diagnosis:  Pre-existing type 1 DM with hyperglycemia in pregnancy    Additional Pregnancy Complications:  Overweight prior to pregnancy    Labs:    No results found for: \"[id number]\"    Lab Results   Component Value Date    GLUF 151 (H) 02/10/2024      3/11/24 XsC0n-9.1%      Medications:  Novolog in her T-Slim insulin pump  Attempting to get another insulin pump that is compatible with DexCom G7.  DexCom G7 which is not connected with the T-Slim pump    Maynor Sherman, (CODY Fenton adjustment on 4/17/24)     Well your basal settings during the day were high and we decreased all rates but you need another decrease. Try to avoid stacking insulin and of course we want to prevent you needing to treat lows with juice.   MN-3 AM@ 0.60 units/hour;  3 AM-7 AM@ 0.90 units/hour;  7 AM-10 PM from 1.28 to 1.15 units/hour;  (Patient stated she is doing 1.5 units/hour at this time & 1:8.5 gm Insulin to CHO ratio))  10 PM-MN from 1.07 to 1.15 units/hour.  (Patient stated she is doing 1.15 units /hour & 1:6.5 gm Insulin to CHO ratio)  Let me know new basal total.   Please increase 7 AM and 10 PM carb ratio from 6.5 to 8.5.      Thanks,  Juany    Anthropometrics:  Ht Readings from Last 3 Encounters:   04/09/24 5' 2.1\" (1.577 m)   04/09/24 5' 2.5\" (1.588 m)   11/07/23 5' 2.5\" (1.588 m)     Wt Readings from Last 3 Encounters:   04/09/24 76.7 kg (169 lb 3.2 oz)   04/09/24 74.8 kg (165 lb)   11/07/23 72.6 kg (160 lb)     Pre-gravid " weight:  160 pounds  Pre-gravid BMI: 29.0  Weight Change: gained 9.1 pounds  Weight gain recommendations: BMI (25-29.9) 15-25 lbs  Comments: may gain 15 more pounds for the remainder of the pregnancy    Recent Ultra Sound Results:  Next US date: 24    Blood Glucose Monitoring:  Reinforcement of blood glucose goals and reporting guidelines.     Testing blood sugars: 4 x per day (Fasting, 2 hour after start of each meal)  Method of reporting blood sugars: Using DexCom G7 to control her BG     Report blood sugar results weekly via:  Phone: [phone]   OR  My Chart (Message with image attachment, or Glucose Flowsheet)    Goal Blood Sugar Ranges:   Fastin-90 mg/dL  1 hour after the start of each meal: 140 mg/dL or less  2 hours after start of each meal: 120 mg/dL or less      BG Log:  See attached DexCom G7 report. DexCom indicates she has low BG fro 48 to 59 multiple times during the night--noticed her results are more even with a Kathleen bar than when had apple & peanut butter overnight. Had higher BG during the day after meals sergey when had 1 cup oatmeal with 1 Tbsp peanut butter &  1 Tbsp flaxseed today for breakfast with 215. Suspect the higher result today was due to too much fat from the flaxseed & peanut butter together. Reported her BG p breakfast is better with a mini-bagel & eggs.     Meal Plan:  Calories: 1800 calorie (CHO: 45-15-45-15-45-30) (PRO:2-1-3-1-3-2)    Diet Type: Low Carbohydrate  Additional Nutrition concerns: Craving chicken & eggs with this pregnancy    24 hr Diet Recall:  Provided at this Class    Diet review: Eating 3 meals & 3 snacks. States she feels better since her pump was readjusted. Reads foods labels for CHO gm counting; striving for 45 gm CHO per lunch & dinner. . Tiring of same meals & has tired some variations with varying BG results.Suspect she is underestimating her CHO gm total when adds a food with no label to a food with a Food label.  Is eating protein & CHO foods  at all 3 meals & 3 snacks. Diet recall indicates she is following the meal plan closely. Advised to eat the lar bar or try an Extend Bar for her bedtime snack, The Extend bar contains uncooked cornstarch which is released slower overnight & kepes the nighttime BG even for 9 hours.  Agreed to try the Extend bars.        Diet Instruction:  The patient was instructed on the following:  Individualized meal plan.   Importance of consistent carbohydrate intake via 3 meals and 3 snacks per day   Importance of protein as it relates to blood glucose control.   Encouraged  patient to eat every 2.0-3.5 hours while awake  Encouraged patient to go no longer than 8-10 hours fasting overnight until first meal of the day.  Provided suggested meal/snack options to increase nutrition and maintain consistent meal and snack intakes.      Physical Activity:  Discussed benefits of physical activity to optimize blood glucose control, encouraged activity at patient is physically able. Always consult a physician prior to starting an exercise program. Recommend 20-30 minutes daily.  Is patient physically active? 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 Walks on her treadmill at home twice weekly. Advised to always walk on treadmill on days when she does the inactive desk job & on her days off.     Sick day Guidelines:   Patient advised that sickness will raise blood sugar and need to continue medication regimen as directed. If blood sugar is > 160 mg/dL twice in one day call doctor. Instructed on what to do when unable to consume normal meal plan.     Hypoglycemia & Treatment Guidelines:  Reviewed what hypoglycemia is, signs and symptoms, and how to treat via the 15:15 rule.    Post-Partum Guidelines:  Completion of 75 gm CHO 2 hr gtt at 6 weeks post-partum to check for Type 2 DM diagnosis    Breastfeeding Guidelines:  Continue GDM meal plan plus additional 350-500 calories daily.  "Stay hydrated by drinking 8-10 (8 oz.) fluids daily. Examples of protein and carbohydrate snacks provided.    Dining Out & Travel Guidelines:  Patient advised to be prepared with extra diabetes supplies, medications, and snacks, as well as sticking to the same time schedule and portions eaten at home for meals and snacks.    Maternal-Fetal Testing:    Ultrasounds- growth scans every 4 weeks.   NST- twice weekly starting at 32nd week GA   MARTINEZ- weekly starting at 32 weeks GA    Patient Stated Goal: \"I will walk 20-30 minutes after dinner daily\"  Goal Assessment: Not on track    Diabetes Self Management Support Plan outside of ongoing care: Spouse/Family    Learner/s Present:Learners Present: Patient   Barriers to Learning/Change: No Barriers  Expected Compliance: good    Date to report blood sugars: weekly  Follow up date: as needed    Begin Time: 1:10 PM  End Time: 1:45 PM    It was a pleasure working with them today. Please feel free to call with any questions or concerns.    Sury Delaney, MS, RD, [facility]  Diabetes Educator  [facility] Maternal Fetal Medicine  Diabetes in Pregnancy Program  [address], [address]  [place] [address]         "

## 2024-04-23 ENCOUNTER — ROUTINE PRENATAL (OUTPATIENT)
Dept: PERINATAL CARE | Facility: CLINIC | Age: 34
End: 2024-04-23
Payer: COMMERCIAL

## 2024-04-23 ENCOUNTER — TELEPHONE (OUTPATIENT)
Dept: PERINATAL CARE | Facility: CLINIC | Age: 34
End: 2024-04-23

## 2024-04-23 VITALS
SYSTOLIC BLOOD PRESSURE: 118 MMHG | BODY MASS INDEX: 30.12 KG/M2 | WEIGHT: 170 LBS | HEIGHT: 63 IN | HEART RATE: 102 BPM | DIASTOLIC BLOOD PRESSURE: 68 MMHG

## 2024-04-23 DIAGNOSIS — Z36.82 ENCOUNTER FOR (NT) NUCHAL TRANSLUCENCY SCAN: ICD-10-CM

## 2024-04-23 DIAGNOSIS — Z3A.12 12 WEEKS GESTATION OF PREGNANCY: ICD-10-CM

## 2024-04-23 DIAGNOSIS — O99.211 SEVERE OBESITY DUE TO EXCESS CALORIES AFFECTING PREGNANCY IN FIRST TRIMESTER (HCC): ICD-10-CM

## 2024-04-23 DIAGNOSIS — Z82.49 FAMILY HISTORY OF CARDIAC DISORDER: ICD-10-CM

## 2024-04-23 DIAGNOSIS — O24.012 PRE-EXISTING TYPE 1 DIABETES MELLITUS WITH HYPERGLYCEMIA DURING PREGNANCY IN SECOND TRIMESTER (HCC): Primary | ICD-10-CM

## 2024-04-23 DIAGNOSIS — E10.65 PRE-EXISTING TYPE 1 DIABETES MELLITUS WITH HYPERGLYCEMIA DURING PREGNANCY IN SECOND TRIMESTER (HCC): Primary | ICD-10-CM

## 2024-04-23 DIAGNOSIS — O09.899 SUPERVISION OF OTHER HIGH RISK PREGNANCIES, UNSPECIFIED TRIMESTER: ICD-10-CM

## 2024-04-23 DIAGNOSIS — E66.01 SEVERE OBESITY DUE TO EXCESS CALORIES AFFECTING PREGNANCY IN FIRST TRIMESTER (HCC): ICD-10-CM

## 2024-04-23 PROBLEM — O98.512 COVID-19 AFFECTING PREGNANCY IN SECOND TRIMESTER: Status: RESOLVED | Noted: 2021-11-29 | Resolved: 2024-04-23

## 2024-04-23 PROBLEM — U07.1 COVID-19 AFFECTING PREGNANCY IN SECOND TRIMESTER: Status: RESOLVED | Noted: 2021-11-29 | Resolved: 2024-04-23

## 2024-04-23 PROCEDURE — 76813 OB US NUCHAL MEAS 1 GEST: CPT | Performed by: OBSTETRICS & GYNECOLOGY

## 2024-04-23 PROCEDURE — 99214 OFFICE O/P EST MOD 30 MIN: CPT | Performed by: OBSTETRICS & GYNECOLOGY

## 2024-04-23 NOTE — TELEPHONE ENCOUNTER
Karen for patient to call the office and schedule a Fetal Echo with Dr. Rosales during the time frame of 6/30-7/14 @ [facility].

## 2024-04-23 NOTE — LETTER
April 25, 2024     Claudia Ching, DO  [address]  [address]  [facility] [address]    Patient: Cathie Ward   YOB: 1990   Date of Visit: 4/23/2024       Dear Dr. Ching:    Thank you for referring Cathie Ward to me for evaluation. Below are my notes for this consultation.    If you have questions, please do not hesitate to call me. I look forward to following your patient along with you.         Sincerely,        Parker Franco MD        CC: No Recipients    Parker Franco MD  4/25/2024  5:17 PM  Sign when Signing Visit  A fetal ultrasound was completed. See Ob procedures in Epic for an interpretation and recommendations. Do not hesitate to contact us in [facility] if you have questions.    Parker Franco MD, MSCE  Maternal Fetal Medicine

## 2024-04-25 PROBLEM — E66.01 SEVERE OBESITY DUE TO EXCESS CALORIES AFFECTING PREGNANCY IN FIRST TRIMESTER (HCC): Status: ACTIVE | Noted: 2024-04-25

## 2024-04-25 PROBLEM — O99.211 SEVERE OBESITY DUE TO EXCESS CALORIES AFFECTING PREGNANCY IN FIRST TRIMESTER (HCC): Status: ACTIVE | Noted: 2024-04-25

## 2024-04-25 NOTE — PROGRESS NOTES
A fetal ultrasound was completed. See Ob procedures in Epic for an interpretation and recommendations. Do not hesitate to contact us in [facility] if you have questions.    Parker Franco MD, MSCE  Maternal Fetal Medicine

## 2024-05-01 ENCOUNTER — TELEPHONE (OUTPATIENT)
Dept: PERINATAL CARE | Facility: CLINIC | Age: 34
End: 2024-05-01

## 2024-05-01 ENCOUNTER — APPOINTMENT (OUTPATIENT)
Dept: LAB | Facility: HOSPITAL | Age: 34
End: 2024-05-01
Payer: COMMERCIAL

## 2024-05-01 ENCOUNTER — TREATMENT (OUTPATIENT)
Dept: PERINATAL CARE | Facility: CLINIC | Age: 34
End: 2024-05-01

## 2024-05-01 DIAGNOSIS — E10.65 PRE-EXISTING TYPE 1 DIABETES MELLITUS WITH HYPERGLYCEMIA DURING PREGNANCY IN FIRST TRIMESTER (HCC): ICD-10-CM

## 2024-05-01 DIAGNOSIS — Z3A.13 13 WEEKS GESTATION OF PREGNANCY: ICD-10-CM

## 2024-05-01 DIAGNOSIS — E10.649 HYPOGLYCEMIA DUE TO TYPE 1 DIABETES MELLITUS (HCC): ICD-10-CM

## 2024-05-01 DIAGNOSIS — O24.011 PRE-EXISTING TYPE 1 DIABETES MELLITUS WITH HYPERGLYCEMIA DURING PREGNANCY IN FIRST TRIMESTER (HCC): ICD-10-CM

## 2024-05-01 DIAGNOSIS — Z46.81 INSULIN PUMP TITRATION: ICD-10-CM

## 2024-05-01 DIAGNOSIS — Z3A.17 17 WEEKS GESTATION OF PREGNANCY: ICD-10-CM

## 2024-05-01 DIAGNOSIS — O24.012 PRE-EXISTING TYPE 1 DIABETES MELLITUS WITH HYPERGLYCEMIA DURING PREGNANCY IN SECOND TRIMESTER (HCC): Primary | ICD-10-CM

## 2024-05-01 DIAGNOSIS — E55.9 VITAMIN D DEFICIENCY: ICD-10-CM

## 2024-05-01 DIAGNOSIS — E10.65 PRE-EXISTING TYPE 1 DIABETES MELLITUS WITH HYPERGLYCEMIA DURING PREGNANCY IN SECOND TRIMESTER (HCC): Primary | ICD-10-CM

## 2024-05-01 LAB
CREAT UR-MCNC: 136.7 MG/DL
PROT UR-MCNC: 7 MG/DL
PROT/CREAT UR: 0.05 MG/G{CREAT} (ref 0–0.1)

## 2024-05-01 PROCEDURE — 80053 COMPREHEN METABOLIC PANEL: CPT

## 2024-05-01 PROCEDURE — 82570 ASSAY OF URINE CREATININE: CPT

## 2024-05-01 PROCEDURE — 36415 COLL VENOUS BLD VENIPUNCTURE: CPT

## 2024-05-01 PROCEDURE — 84156 ASSAY OF PROTEIN URINE: CPT

## 2024-05-01 NOTE — TELEPHONE ENCOUNTER
Reason for Call: Discuss Insulin Pump (Hypoglycemia in AM; Hyperglycemia after meals requiring correction boluses; Incorrect Target BG of 100) and Diabetes Type 1 (Currently Pregnant; 17w0d)  - Reviewed insulin pump with Dr. Cesar prior to phone call.  - Dr. Cesar informed me that she was notified by the lab that the patient has a blood sugar result of 44    Outcome: No Answer. Left VM. Encouraged pt to give a call to the office at #[phone] to discuss insulin pump. Mentioned that we received a lab result of a low blood sugar and that some of her basal settings likely need to be decreased to prevent low blood sugars.    Dodie Lane RD  Diabetes Educator   [facility]  Diabetes and Pregnancy Program

## 2024-05-01 NOTE — PROGRESS NOTES
Insulin Pump Adjustment  Date: 24    Patient: Cathie Ward  : 1990    Estimated Date of Delivery: 10/9/24  GA: 17w0d     Reason for Documentation: Insulin Pump Adjustment (Tandem T-Slim) and Diabetes Type 1 (Currently Pregnant; 17w0d/)     ASSESSMENT     Insulin Pump: Tandem T-Slim; See attached files for report  Corrections for hyperglycemia likely causing hypoglycemia after meals d/t insulin stacking    CGM: Dexcom; See attached files for report  Night-time lows between 11:45pm-12am  Night-time highs between 10-10:20pm  Day-time highs between 2:40-4:10pm    PLAN     INSULIN PUMP ADJUSTMENTS    CHANGE Basal Rates: Due to trends of hypoglycemia    Times Current Basal Rate (u/hr) NEW Basal Rate (u/hr) % Change   MN - 3am 0.550 0.470 Decrease 15%   3am - 7am 0.810 0.700 Decrease 15%   7am-12pm 1.050 0.900 Decrease 15%   12pm-4pm 1.200 1.200 No Change   4pm-10pm 1.050 1.050 No Change   10pm-MN 1.100 1.100 No Change     CHANGE Target BG from 100 to 90mg/dL    Continue Correction Factor: 31 mg/dL    Continue Carb Ratios:     Times Carb Ratio (g)   MN - 3am 9.0   3am - 7am 9.0   7am-12pm 8.5   12pm-4pm 8.5   4pm-10pm 8.5   10pm-MN 6.5     - Avoid stacking insulin by not giving correction boluses too soon after a meal or one correction after another  - Contact the diabetes team if you notice any trends in high or low blood sugars  - Download insulin pump again on Monday () and send message to Juany Crawford with any insights about blood sugars    Continue other recommendations:  - Assigned Meal Plan (including 3 meals and 3 snacks): 1800 calorie (CHO: 45-15-45-15-45-30) (PRO: 2-1-3-1-3-2)  - Testing B x per day (Fasting, 2 hour after start of each meal)  - Walking (or other preferred physical activity) daily - recommend at least 20-30 minutes daily, preferably after dinner if able (unless otherwise instructed per OB)     MONITORING     EDUCATION: (Diabetes and Pregnancy Program)    Completed: Class  "1 (Pt Goal: \"I will walk 20-30 minutes after dinner daily\") and Class 2    Needs Scheduled: None at this time, Follow-ups to be scheduled as indicated per weekly review of blood sugar logs    WEIGHT:     Pre-Gravid Wt Pre-Gravid BMI TWG   Pregravid weight not on file Could not be calculated Not found.     FETAL MONITORITNG - ULTRASOUNDS  Recent Ultrasounds Findings: NML Growth/MARTINEZ  US Follow-Ups: Scheduled Appropriately  Further Fetal Surveillance: Beginning at 32 weeks (NST twice weekly + MARTINEZ once a week)    LABS  Lab Results   Component Value Date/Time    GLUF 151 (H) 02/10/2024 10:45 AM    HGBA1C 6.1 03/11/2024 12:00 AM    HGBA1C 6.4 (H) 02/10/2024 10:45 AM     Active Orders (needing to be collected):  None    Dodie Lane RD   Diabetes and Pregnancy Program   Maternal Fetal Medicine  [facility]  "

## 2024-05-01 NOTE — PATIENT INSTRUCTIONS
INSULIN PUMP ADJUSTMENTS    CHANGE Basal Rates:    Times NEW Basal Rate (u/hr)   MN - 3am 0.470   3am - 7am 0.700   7am-12pm 0.900   12pm-4pm 1.200   4pm-10pm 1.050   10pm-MN 1.100     CHANGE Target BG from 100 to 90mg/dL    Continue Correction Factor: 31 mg/dL    Continue Carb Ratios:     Times Carb Ratio (g)   MN - 3am 9.0   3am - 7am 9.0   7am-12pm 8.5   12pm-4pm 8.5   4pm-10pm 8.5   10pm-MN 6.5     - Avoid stacking insulin by not giving correction boluses too soon after a meal or one correction after another  - Contact the diabetes team if you notice any trends in high or low blood sugars  - Download insulin pump again on Monday () and send message to Juany Crawford with any insights about blood sugars    Continue other recommendations:  - Assigned Meal Plan (including 3 meals and 3 snacks): 1800 calorie (CHO: 45-15-45-15-45-30) (PRO: 2-1-3-1-3-2)  - Testing B x per day (Fasting, 2 hour after start of each meal)  - Walking (or other preferred physical activity) daily - recommend at least 20-30 minutes daily, preferably after dinner if able (unless otherwise instructed per OB)

## 2024-05-01 NOTE — TELEPHONE ENCOUNTER
Incoming call from patient returning call from diabetes educator. Warm transferred call to diabetes educatorDodie. Patient verbalized appreciation.

## 2024-05-01 NOTE — TELEPHONE ENCOUNTER
Reason for Call: Discuss Insulin Pump Settings and Diabetes Type 1 (Currently Pregnant; 17w0d/)    Outcome: Spoke with patient. She is off of work on Tuesdays. She is off of work today and the rest of the week for vacation. She mentions that her blood sugars tend to look different when she is working d/t the change in physical activity levels. She agrees that some of the low blood sugars after meals are likely related to correction boluses. She will try not to give correction boluses. She mentioned that she was treating lows with fruit snacks which spiked her blood sugars to much at times. She prefers to use juice when treating a low so that it does not spike her blood sugars too much. She has been giving a bolus for her bedtime snack which she mentions could have contributed to low blood sugars also. She mentioned that she had some blood work done for [place] to check her kidney function. These labs were completed at [facility] today 5/1/24.    - Discussed recommendations for insulin adjustment which will be sent in a [employer] Message.  - Advised pt to avoid giving correction boluses too soon to avoid stacking insulin  - Encouraged her to reach out if she notices any trends in her blood sugars    Duration: 12min    Dodie Lane RD  Diabetes Educator   [facility]  Diabetes and Pregnancy Program

## 2024-05-06 ENCOUNTER — TELEPHONE (OUTPATIENT)
Dept: PERINATAL CARE | Facility: CLINIC | Age: 34
End: 2024-05-06

## 2024-05-06 ENCOUNTER — TELEPHONE (OUTPATIENT)
Age: 34
End: 2024-05-06

## 2024-05-06 NOTE — TELEPHONE ENCOUNTER
Patient called to notify Juany that she cannot currently get into her Mychart. She downloaded her insulin pump and would like it to be reviewed. Her blood sugars have run high the last 2 nights. She has not had a change in her diet. She has treated the highs with microbolusing. Her call back number is [phone].

## 2024-05-06 NOTE — TELEPHONE ENCOUNTER
Reason for Call: Obtain Lab Results (Completed 24 at [facility] per pt) and Diabetes Type 1 (Currently Pregnant; 17w5d)    Outcome: Labs to be received at [place]  Office Fax Number #[phone]. Once received, will fax to Central Scheduling to be added to patient's EMR chart. Notified Juany Crawford.    Dodie Lane RD  Diabetes Educator   [facility]  Diabetes and Pregnancy Program

## 2024-05-06 NOTE — TELEPHONE ENCOUNTER
Attempted to reach Whit via phone without success. Insulin pump adjustments sent via [employer] message. Pending return phone call.

## 2024-05-08 LAB
ALBUMIN SERPL BCP-MCNC: 4 G/DL (ref 3.5–5)
ALP SERPL-CCNC: 41 U/L (ref 34–104)
ALT SERPL W P-5'-P-CCNC: 8 U/L (ref 7–52)
ANION GAP SERPL CALCULATED.3IONS-SCNC: 9 MMOL/L (ref 4–13)
AST SERPL W P-5'-P-CCNC: 16 U/L (ref 13–39)
BILIRUB SERPL-MCNC: 0.32 MG/DL (ref 0.2–1)
BUN SERPL-MCNC: 12 MG/DL (ref 5–25)
CALCIUM SERPL-MCNC: 8.6 MG/DL (ref 8.4–10.2)
CHLORIDE SERPL-SCNC: 104 MMOL/L (ref 96–108)
CO2 SERPL-SCNC: 24 MMOL/L (ref 21–32)
CREAT SERPL-MCNC: 0.75 MG/DL (ref 0.6–1.3)
GFR SERPL CREATININE-BSD FRML MDRD: 105 ML/MIN/1.73SQ M
GLUCOSE SERPL-MCNC: 44 MG/DL (ref 65–140)
POTASSIUM SERPL-SCNC: 3.7 MMOL/L (ref 3.5–5.3)
PROT SERPL-MCNC: 7 G/DL (ref 6.4–8.4)
SODIUM SERPL-SCNC: 137 MMOL/L (ref 135–147)

## 2024-05-15 ENCOUNTER — TELEPHONE (OUTPATIENT)
Age: 34
End: 2024-05-15

## 2024-05-15 DIAGNOSIS — O24.013 PRE-EXISTING TYPE 1 DIABETES MELLITUS WITH HYPERGLYCEMIA DURING PREGNANCY IN THIRD TRIMESTER (HCC): ICD-10-CM

## 2024-05-15 DIAGNOSIS — E10.65 PRE-EXISTING TYPE 1 DIABETES MELLITUS WITH HYPERGLYCEMIA DURING PREGNANCY IN THIRD TRIMESTER (HCC): ICD-10-CM

## 2024-05-19 NOTE — TELEPHONE ENCOUNTER
PA for NOVOLOG 100    Submitted via    []CMM-KEY     [x]SurescriDDVTECH-Case ID # [id number]  []Faxed to plan   []Other website    []Phone call Case ID #      Office notes sent, clinical questions answered. Awaiting determination    Turnaround time for your insurance to make a decision on your Prior Authorization can take 7-21 business days.

## 2024-05-20 ENCOUNTER — TELEMEDICINE (OUTPATIENT)
Facility: HOSPITAL | Age: 34
End: 2024-05-20
Payer: COMMERCIAL

## 2024-05-20 ENCOUNTER — TELEPHONE (OUTPATIENT)
Facility: HOSPITAL | Age: 34
End: 2024-05-20

## 2024-05-20 ENCOUNTER — TELEPHONE (OUTPATIENT)
Age: 34
End: 2024-05-20

## 2024-05-20 VITALS — WEIGHT: 170 LBS | HEIGHT: 63 IN | BODY MASS INDEX: 30.12 KG/M2

## 2024-05-20 DIAGNOSIS — E55.9 VITAMIN D DEFICIENCY: ICD-10-CM

## 2024-05-20 DIAGNOSIS — O24.012 PRE-EXISTING TYPE 1 DIABETES MELLITUS WITH HYPERGLYCEMIA DURING PREGNANCY IN SECOND TRIMESTER (HCC): Primary | ICD-10-CM

## 2024-05-20 DIAGNOSIS — E10.65 PRE-EXISTING TYPE 1 DIABETES MELLITUS WITH HYPERGLYCEMIA DURING PREGNANCY IN SECOND TRIMESTER (HCC): Primary | ICD-10-CM

## 2024-05-20 DIAGNOSIS — Z3A.16 16 WEEKS GESTATION OF PREGNANCY: ICD-10-CM

## 2024-05-20 DIAGNOSIS — Z46.81 INSULIN PUMP TITRATION: ICD-10-CM

## 2024-05-20 PROBLEM — Z3A.19 19 WEEKS GESTATION OF PREGNANCY: Status: ACTIVE | Noted: 2024-04-09

## 2024-05-20 PROCEDURE — 99215 OFFICE O/P EST HI 40 MIN: CPT | Performed by: NURSE PRACTITIONER

## 2024-05-20 PROCEDURE — 99417 PROLNG OP E/M EACH 15 MIN: CPT | Performed by: NURSE PRACTITIONER

## 2024-05-20 PROCEDURE — 95251 CONT GLUC MNTR ANALYSIS I&R: CPT | Performed by: NURSE PRACTITIONER

## 2024-05-20 NOTE — ASSESSMENT & PLAN NOTE
-A1c 6.1% close to goal. Repeat A1c with next lab.   -Baseline UPCR 0.05; CMP within normal.   -A1c goal <6% with minimal hypoglycemia.   -Novolog via T slim insulin pump in CIQ (restarted with new insulin pump) and Dexcom G7 CGM.  -Due to basal auto-suspending, discontinue CIQ, decrease BG target to 90; decrease Active Insulin Aleksandr to 4 hours.   -Current basal settings:  MN-3 AM@ 0.60 units/hour;  3 AM-7 AM@ 0.92 units/hour;  7 AM-12 PM@ 0.99 units/hour;  12 PM-4 PM@ 0.86 units/hour;  4 PM-10 PM@ 0.92 units/hour;  10 PM-MN@ 0.88 units/hour.  -Next download consider lower carb ratio if 2 hours postprandial readings are above 120 without basal auto-suspensions.    -Follow up with dietitian as needed.   -Self monitoring blood glucose (SMBG) fasting; 2 hours after start of each meal and with hypoglycemia.   -Glucose goals: fasting 60-90 mg/dL, 140 mg/dL or less 1 hour post meals, and 120 mg/dL or less 2 hours post meal.   -Weekly insulin pump downloads.   -GDM meal plan with 3 meals and 3 snacks including recommended combination of carb, protein and fat per meal/snack.  -Please eat meal or snack every 2-3.5 hours while awake.  -No more than 8 to 10 hours of fasting overnight.  -Refer to Sweet Success MyPlate online as a reference.  -2nd/3rd trimester minimum total daily carbohydrates 175 grams paired with half grams in protein.   -Stay active if no restriction from your OB, walk up to 30 minutes a day.  -Always have glucose available to treat hypoglycemia. Use 15:15 rule.   -Refer to hypoglycemia patient education sheet. SMBG when experiencing signs and symptoms of hypoglycemia and prior to driving.   -Serial fetal growth ultrasounds.  -20 weeks detailed fetal growth ultrasound.  -22-24 weeks fetal echo.  -At 32 weeks gestation; NST twice a week and MARTINEZ weekly.   -Continue prenatal vitamin, vitamin D3 and baby aspirin as recommended.  -At 36 weeks gestation, stop baby aspirin.   -Continue follow-up with your OB and MFM  as recommended.  -Stay in close contact with diabetes education team.  -Schedule follow up with endocrinology for Nov. 2024.   Lab Results   Component Value Date    HGBA1C 6.1 03/11/2024

## 2024-05-20 NOTE — TELEPHONE ENCOUNTER
Message left to call office at [phone] and request to be transferred to [facility] due to 1 PM appointment. Pending return phone call.

## 2024-05-20 NOTE — TELEPHONE ENCOUNTER
Patient called stating she was late for appointment and could not access virtual visit. Patient hung up while on hold to reach  Juany Hagen to inform of technical difficulties.

## 2024-05-20 NOTE — ASSESSMENT & PLAN NOTE
-First visit weight 165 lbs. BMI <30.   -Current weight 170 lbs.   -Recommended weight gain 15 to 25 lbs.  -Continue GDM meal plan and follow up with dietitian as needed.

## 2024-05-20 NOTE — PATIENT INSTRUCTIONS
-A1c 6.1% close to goal. Repeat A1c with next lab.   -Baseline UPCR 0.05; CMP within normal.   -A1c goal <6% with minimal hypoglycemia.   -Novolog via T slim insulin pump in CIQ (restarted with new insulin pump) and Dexcom G7 CGM.  -Due to basal auto-suspending, discontinue CIQ, decrease BG target to 90; decrease Active Insulin Aleksandr to 4 hours.   -Current basal settings:  MN-3 AM@ 0.60 units/hour;  3 AM-7 AM@ 0.92 units/hour;  7 AM-12 PM@ 0.99 units/hour;  12 PM-4 PM@ 0.86 units/hour;  4 PM-10 PM@ 0.92 units/hour;  10 PM-MN@ 0.88 units/hour.  -Next download consider lower carb ratio if 2 hours postprandial readings are above 120 without basal auto-suspensions.    -Follow up with dietitian as needed.   -Self monitoring blood glucose (SMBG) fasting; 2 hours after start of each meal and with hypoglycemia.   -Glucose goals: fasting 60-90 mg/dL, 140 mg/dL or less 1 hour post meals, and 120 mg/dL or less 2 hours post meal.   -Weekly insulin pump downloads.   -GDM meal plan with 3 meals and 3 snacks including recommended combination of carb, protein and fat per meal/snack.  -Please eat meal or snack every 2-3.5 hours while awake.  -No more than 8 to 10 hours of fasting overnight.  -Refer to Sweet Success MyPlate online as a reference.  -2nd/3rd trimester minimum total daily carbohydrates 175 grams paired with half grams in protein.   -Stay active if no restriction from your OB, walk up to 30 minutes a day.  -Always have glucose available to treat hypoglycemia. Use 15:15 rule.   -Refer to hypoglycemia patient education sheet. SMBG when experiencing signs and symptoms of hypoglycemia and prior to driving.   -Serial fetal growth ultrasounds.  -20 weeks detailed fetal growth ultrasound.  -22-24 weeks fetal echo.  -At 32 weeks gestation; NST twice a week and MARTINEZ weekly.   -Continue prenatal vitamin, vitamin D3 and baby aspirin as recommended.  -At 36 weeks gestation, stop baby aspirin.   -Continue follow-up with your OB and MFM  as recommended.  -Stay in close contact with diabetes education team.  -Schedule follow up with endocrinology for Nov. 2024.

## 2024-05-20 NOTE — PROGRESS NOTES
Virtual Regular Visit    Verification of patient location:    Patient is located at Home in the following state in which I hold an active license [place]      Assessment/Plan:    Problem List Items Addressed This Visit       Vitamin D deficiency     -Last D level 13.6 low.  -On Vitamin D3 supplements and prenatal vitamin.          Insulin pump titration    Pre-existing type 1 diabetes mellitus with hyperglycemia during pregnancy in second trimester (HCC) - Primary     -A1c 6.1% close to goal. Repeat A1c with next lab.   -Baseline UPCR 0.05; CMP within normal.   -A1c goal <6% with minimal hypoglycemia.   -Novolog via T slim insulin pump in CIQ (restarted with new insulin pump) and Dexcom G7 CGM.  -Due to basal auto-suspending, discontinue CIQ, decrease BG target to 90; decrease Active Insulin Aleksandr to 4 hours.   -Current basal settings:  MN-3 AM@ 0.60 units/hour;  3 AM-7 AM@ 0.92 units/hour;  7 AM-12 PM@ 0.99 units/hour;  12 PM-4 PM@ 0.86 units/hour;  4 PM-10 PM@ 0.92 units/hour;  10 PM-MN@ 0.88 units/hour.  -Next download consider lower carb ratio if 2 hours postprandial readings are above 120 without basal auto-suspensions.    -Follow up with dietitian as needed.   -Self monitoring blood glucose (SMBG) fasting; 2 hours after start of each meal and with hypoglycemia.   -Glucose goals: fasting 60-90 mg/dL, 140 mg/dL or less 1 hour post meals, and 120 mg/dL or less 2 hours post meal.   -Weekly insulin pump downloads.   -GDM meal plan with 3 meals and 3 snacks including recommended combination of carb, protein and fat per meal/snack.  -Please eat meal or snack every 2-3.5 hours while awake.  -No more than 8 to 10 hours of fasting overnight.  -Refer to Sweet Success MyPlate online as a reference.  -2nd/3rd trimester minimum total daily carbohydrates 175 grams paired with half grams in protein.   -Stay active if no restriction from your OB, walk up to 30 minutes a day.  -Always have glucose available to treat hypoglycemia. Use  15:15 rule.   -Refer to hypoglycemia patient education sheet. SMBG when experiencing signs and symptoms of hypoglycemia and prior to driving.   -Serial fetal growth ultrasounds.  -20 weeks detailed fetal growth ultrasound.  -22-24 weeks fetal echo.  -At 32 weeks gestation; NST twice a week and MARTINEZ weekly.   -Continue prenatal vitamin, vitamin D3 and baby aspirin as recommended.  -At 36 weeks gestation, stop baby aspirin.   -Continue follow-up with your OB and MFM as recommended.  -Stay in close contact with diabetes education team.  -Schedule follow up with endocrinology for 2024.   Lab Results   Component Value Date    HGBA1C 6.1 2024            16 weeks gestation of pregnancy    BMI 30.0-30.9,adult     -First visit weight 165 lbs. BMI <30.   -Current weight 170 lbs.   -Recommended weight gain 15 to 25 lbs.  -Continue GDM meal plan and follow up with dietitian as needed.                  Reason for visit is   Chief Complaint   Patient presents with    Diabetes Type 1    Virtual Regular Visit          Encounter provider CODY Handy      Recent Visits  No visits were found meeting these conditions.  Showing recent visits within past 7 days and meeting all other requirements  Today's Visits  Date Type Provider Dept   24 Telephone CODY Handy An    24 Telemedicine CODY Handy An    Showing today's visits and meeting all other requirements  Future Appointments  No visits were found meeting these conditions.  Showing future appointments within next 150 days and meeting all other requirements       The patient was identified by name and date of birth. Cathie Ward was informed that this is a telemedicine visit and that the visit is being conducted through the Epic Embedded platform. She agrees to proceed..  My office door was closed. No one else was in the room.  She acknowledged consent and understanding of privacy and security of the video platform. The  patient has agreed to participate and understands they can discontinue the visit at any time.    Patient is aware this is a billable service.     Subjective  Cathie Ward is a 34 y.o. female  16 0/7 weeks gestation (new [place] given) T1DM on Novolog via T slim insulin pump in CIQ that was restarted with new insulin pump and Dexcom CGM. CIQ causing basal auto-suspensions resulting in hyperglycemia. Recommended discontinuing CIQ. On , did have issue with occlusion  with hyperglycemia. At times CGM alerting to readings less than 60 but false. Overall hypoglycemia improved since last visit. Following GDM meal plan. Walking on the treadmill 3 times a week for 30 minutes.   Vitamin D deficiency-taking D3 and prenatal vitamins.     Past Medical History:   Diagnosis Date    Anemia     H/O vitamin D deficiency     Pre-existing type 1 diabetes affecting pregnancy, antepartum     age 16    Strain of left pectoralis muscle 2022    Vacuum-assisted vaginal delivery 2019    Varicella        Past Surgical History:   Procedure Laterality Date    DENTAL IMPLANT      TONSILLECTOMY AND ADENOIDECTOMY         Current Outpatient Medications   Medication Sig Dispense Refill    BABY ASPIRIN PO Take 162 mg by mouth in the morning      acetone, urine, test strip Use 1 strip if needed for high blood sugar      albuterol (PROVENTIL HFA,VENTOLIN HFA) 90 mcg/act inhaler   (Patient not taking: Reported on 2024)      cholecalciferol (VITAMIN D3) 1,000 units tablet Take 2 tablets (2,000 Units total) by mouth daily 100 tablet 1    glucagon 1 MG injection Inject 1 mg under the skin once as needed for low blood sugar (Patient not taking: Reported on 2024) 1 kit 1    insulin aspart (NovoLOG FlexPen) 100 UNIT/ML injection pen Inject 1-10 Units under the skin if needed for high blood sugar (Pump failure) 3 mL 0    insulin aspart (NovoLOG) 100 units/mL injection 120 Units by Subcutaneous Insulin Pump route in the  "morning Use UPTO 120u daily via insulin pump 110 mL 1    insulin detemir (Levemir FlexTouch) 100 Units/mL injection pen Inject 15 Units under the skin if needed (pump failure) (Patient not taking: Reported on 4/23/2024) 3 mL 0    Insulin Glargine Solostar (Lantus SoloStar) 100 UNIT/ML SOPN Inject 0.26 mL (26 Units total) under the skin if needed (pump failure) (Patient not taking: Reported on 4/23/2024) 3 mL 0    Insulin Pen Needle (Pen Needles 3/16\") 31G X 5 MM MISC Use in the morning 30 each 0    NovoLOG 100 UNIT/ML injection Use up to 120 units daily via insulin pump 110 mL 1    ofloxacin (OCUFLOX) 0.3 % ophthalmic solution Apply 1 drop to eye 4 (four) times a day (Patient not taking: Reported on 3/28/2023) 5 mL 0    OneTouch Delica Lancets 30G MISC Use up to 8 a day. Pregnancy, T1DM. 200 each 6    OneTouch Verio test strip Test up to 8 times a day and as instructed. Pregnancy; T1DM. 200 each 6     No current facility-administered medications for this visit.        Allergies   Allergen Reactions    Other Cough and Sneezing     Cat dander      Mold Extract  [Trichophyton]      Other reaction(s): Resp Sympt Mild    Molds & Smuts      Other reaction(s): Resp Sympt Mild    Pollen Extract      Other reaction(s): Resp Sympt Mild       Review of Systems   Constitutional:  Negative for fatigue and fever.   HENT:  Negative for congestion.    Eyes:  Negative for visual disturbance.        Due 6/2024    Respiratory:  Negative for cough and shortness of breath.    Cardiovascular:  Negative for chest pain, palpitations and leg swelling.   Gastrointestinal:  Negative for constipation, nausea and vomiting.   Endocrine: Negative for polydipsia, polyphagia and polyuria.   Genitourinary:  Negative for difficulty urinating and vaginal bleeding.   Musculoskeletal:  Negative for back pain.   Skin:  Negative for rash.   Neurological:  Negative for headaches.   Psychiatric/Behavioral:  Negative for sleep disturbance.        Video " "Exam  Refer to attached files for insulin pump download and Dexcom report.   CGM Interpretation  Cathie Ward   Device used Dexcom for Personal Use  Indication: Type of Diabetes: Type 1 Diabetes  More than 72 hours of data was reviewed. Report to be scanned to chart.   Date Range: 5/13 to 5/19/2024  Analysis of data:   Average Glucose: 138 mg/dl  Coefficient of Variation: 34%  SD : 47 mg/dl  Time in Target Range: 42%  Time Above Range: 57%  Time Below Range: <1%   Interpretation of data:   Hyperglycemia noted due to basal auto-suspensions. Goal BG (days  and nights ) TIR 70% or higher. CIQ discontinued.    Vitals:    05/20/24 1310   Weight: 77.1 kg (170 lb)   Height: 5' 2.5\" (1.588 m)       Physical Exam  HENT:      Head: Normocephalic.      Nose: Nose normal.   Eyes:      Conjunctiva/sclera: Conjunctivae normal.   Pulmonary:      Effort: Pulmonary effort is normal.   Neurological:      Mental Status: She is alert and oriented to person, place, and time.   Psychiatric:         Mood and Affect: Mood normal.         Behavior: Behavior normal.         Thought Content: Thought content normal.         Judgment: Judgment normal.          Visit Time  Total Visit Duration: 53 minutes with patient and 15 minutes charting/reviewing chart.         "

## 2024-05-21 ENCOUNTER — TELEPHONE (OUTPATIENT)
Age: 34
End: 2024-05-21

## 2024-05-21 DIAGNOSIS — O24.013 PRE-EXISTING TYPE 1 DIABETES MELLITUS WITH HYPERGLYCEMIA DURING PREGNANCY IN THIRD TRIMESTER (HCC): ICD-10-CM

## 2024-05-21 DIAGNOSIS — E10.65 PRE-EXISTING TYPE 1 DIABETES MELLITUS WITH HYPERGLYCEMIA DURING PREGNANCY IN THIRD TRIMESTER (HCC): ICD-10-CM

## 2024-05-22 DIAGNOSIS — O24.012 PRE-EXISTING TYPE 1 DIABETES MELLITUS WITH HYPERGLYCEMIA DURING PREGNANCY IN SECOND TRIMESTER (HCC): Primary | ICD-10-CM

## 2024-05-22 DIAGNOSIS — E10.65 PRE-EXISTING TYPE 1 DIABETES MELLITUS WITH HYPERGLYCEMIA DURING PREGNANCY IN SECOND TRIMESTER (HCC): Primary | ICD-10-CM

## 2024-05-22 NOTE — TELEPHONE ENCOUNTER
PA for aspart Denied    Reason:        Message sent to office clinical pool Yes    Denial letter scanned into Media Yes    Appeal started No ( Provider will need to decide if appeal is warranted and send clinical documentation to PA team for initiation.)    **Please follow up with your patient regarding denial and next steps**

## 2024-05-25 NOTE — TELEPHONE ENCOUNTER
Duplicate PA encounter please see telephone encounter from 5/15/24 regarding ASPART PA. Please review patient's chart to see status of PA and to document anything regarding this medication in regards to anything regarding the authorization process etc before creating another encounter Thank You.

## 2024-05-29 ENCOUNTER — ROUTINE PRENATAL (OUTPATIENT)
Dept: PERINATAL CARE | Facility: OTHER | Age: 34
End: 2024-05-29
Payer: COMMERCIAL

## 2024-05-29 ENCOUNTER — APPOINTMENT (OUTPATIENT)
Dept: LAB | Facility: CLINIC | Age: 34
End: 2024-05-29
Payer: COMMERCIAL

## 2024-05-29 VITALS
HEART RATE: 94 BPM | SYSTOLIC BLOOD PRESSURE: 112 MMHG | BODY MASS INDEX: 30.44 KG/M2 | WEIGHT: 171.8 LBS | DIASTOLIC BLOOD PRESSURE: 60 MMHG | HEIGHT: 63 IN

## 2024-05-29 DIAGNOSIS — Z3A.17 17 WEEKS GESTATION OF PREGNANCY: ICD-10-CM

## 2024-05-29 DIAGNOSIS — Z36.3 ENCOUNTER FOR ANTENATAL SCREENING FOR MALFORMATION USING ULTRASOUND: Primary | ICD-10-CM

## 2024-05-29 LAB
EST. AVERAGE GLUCOSE BLD GHB EST-MCNC: 128 MG/DL
HBA1C MFR BLD: 6.1 %

## 2024-05-29 PROCEDURE — 83036 HEMOGLOBIN GLYCOSYLATED A1C: CPT | Performed by: NURSE PRACTITIONER

## 2024-05-29 PROCEDURE — 76805 OB US >/= 14 WKS SNGL FETUS: CPT | Performed by: OBSTETRICS & GYNECOLOGY

## 2024-05-29 PROCEDURE — 36415 COLL VENOUS BLD VENIPUNCTURE: CPT | Performed by: NURSE PRACTITIONER

## 2024-05-29 PROCEDURE — 99213 OFFICE O/P EST LOW 20 MIN: CPT | Performed by: OBSTETRICS & GYNECOLOGY

## 2024-05-29 RX ORDER — AMOXICILLIN 500 MG/1
500 CAPSULE ORAL EVERY 8 HOURS SCHEDULED
COMMUNITY

## 2024-05-29 NOTE — PROGRESS NOTES
The patient was seen today for an ultrasound.  Please see ultrasound report (located under Ob Procedures) for additional details.   Thank you very much for allowing us to participate in the care of this very nice patient.  Should you have any questions, please do not hesitate to contact me.     Diogenes Fortune MD FACOG  Attending Physician, Maternal-Fetal Medicine  [facility]

## 2024-05-29 NOTE — PROGRESS NOTES
Ultrasound Probe Disinfection    A transvaginal ultrasound was performed.   Prior to use, disinfection was performed with High Level Disinfection Process ([employer]).  Probe serial number [id number] was used.      Nguyen Hodges  05/29/24  8:11 AM

## 2024-06-04 LAB
LEFT EYE DIABETIC RETINOPATHY: NORMAL
RIGHT EYE DIABETIC RETINOPATHY: NORMAL

## 2024-06-18 ENCOUNTER — DOCUMENTATION (OUTPATIENT)
Facility: HOSPITAL | Age: 34
End: 2024-06-18

## 2024-06-18 DIAGNOSIS — E10.65 PRE-EXISTING TYPE 1 DIABETES MELLITUS WITH HYPERGLYCEMIA DURING PREGNANCY IN SECOND TRIMESTER (HCC): Primary | ICD-10-CM

## 2024-06-18 DIAGNOSIS — O24.012 PRE-EXISTING TYPE 1 DIABETES MELLITUS WITH HYPERGLYCEMIA DURING PREGNANCY IN SECOND TRIMESTER (HCC): Primary | ICD-10-CM

## 2024-06-18 DIAGNOSIS — Z46.81 INSULIN PUMP TITRATION: ICD-10-CM

## 2024-06-18 DIAGNOSIS — Z3A.20 20 WEEKS GESTATION OF PREGNANCY: ICD-10-CM

## 2024-06-18 NOTE — ASSESSMENT & PLAN NOTE
Due to hyperglycemia, please adjust insulin pump as follows and be mindful of time adjustments:  MN-3 AM@ 0.76 units/hour;  3 AM-8 AM@ 0.86 units/hour;  8 AM-12 PM from 0.86 to 0.95 units/hour;  12 PM-4 PM from 0.89 to 0.98 units/hour;  4 PM-10 PM from 1.06 to 1.17 units/hour;  10 PM-MN from 1.17 to 1.22 units/hour.  Let me know new basal total.    Refer to attached file for insulin pump report.   Lab Results   Component Value Date    HGBA1C 6.1 (H) 05/29/2024

## 2024-06-18 NOTE — PROGRESS NOTES
Problem List Items Addressed This Visit       Insulin pump titration    Pre-existing type 1 diabetes mellitus with hyperglycemia during pregnancy in second trimester (HCC) - Primary     Due to hyperglycemia, please adjust insulin pump as follows and be mindful of time adjustments:  MN-3 AM@ 0.76 units/hour;  3 AM-8 AM@ 0.86 units/hour;  8 AM-12 PM from 0.86 to 0.95 units/hour;  12 PM-4 PM from 0.89 to 0.98 units/hour;  4 PM-10 PM from 1.06 to 1.17 units/hour;  10 PM-MN from 1.17 to 1.22 units/hour.  Let me know new basal total.    Refer to attached file for insulin pump report.   Lab Results   Component Value Date    HGBA1C 6.1 (H) 05/29/2024            20 weeks gestation of pregnancy

## 2024-06-25 ENCOUNTER — ROUTINE PRENATAL (OUTPATIENT)
Dept: PERINATAL CARE | Facility: CLINIC | Age: 34
End: 2024-06-25
Payer: COMMERCIAL

## 2024-06-25 VITALS
HEART RATE: 111 BPM | SYSTOLIC BLOOD PRESSURE: 110 MMHG | BODY MASS INDEX: 31.22 KG/M2 | DIASTOLIC BLOOD PRESSURE: 60 MMHG | WEIGHT: 176.2 LBS | HEIGHT: 63 IN

## 2024-06-25 DIAGNOSIS — Z82.79 FAMILY HISTORY OF CONGENITAL ANOMALY: Primary | ICD-10-CM

## 2024-06-25 DIAGNOSIS — Z36.86 ENCOUNTER FOR ANTENATAL SCREENING FOR CERVICAL LENGTH: ICD-10-CM

## 2024-06-25 DIAGNOSIS — E10.65 PRE-EXISTING TYPE 1 DIABETES MELLITUS WITH HYPERGLYCEMIA DURING PREGNANCY IN SECOND TRIMESTER (HCC): ICD-10-CM

## 2024-06-25 DIAGNOSIS — Z3A.21 21 WEEKS GESTATION OF PREGNANCY: ICD-10-CM

## 2024-06-25 DIAGNOSIS — Z36.89 ENCOUNTER FOR FETAL ANATOMIC SURVEY: ICD-10-CM

## 2024-06-25 DIAGNOSIS — O24.012 PRE-EXISTING TYPE 1 DIABETES MELLITUS WITH HYPERGLYCEMIA DURING PREGNANCY IN SECOND TRIMESTER (HCC): ICD-10-CM

## 2024-06-25 PROBLEM — O99.212 SEVERE OBESITY DUE TO EXCESS CALORIES AFFECTING PREGNANCY IN SECOND TRIMESTER (HCC): Status: ACTIVE | Noted: 2024-04-25

## 2024-06-25 PROCEDURE — 76817 TRANSVAGINAL US OBSTETRIC: CPT | Performed by: OBSTETRICS & GYNECOLOGY

## 2024-06-25 PROCEDURE — 76811 OB US DETAILED SNGL FETUS: CPT | Performed by: OBSTETRICS & GYNECOLOGY

## 2024-06-25 PROCEDURE — 99214 OFFICE O/P EST MOD 30 MIN: CPT | Performed by: OBSTETRICS & GYNECOLOGY

## 2024-06-25 RX ORDER — PRENATAL VIT/IRON FUM/FOLIC AC 27MG-0.8MG
1 TABLET ORAL
COMMUNITY

## 2024-06-25 NOTE — PROGRESS NOTES
"[facility]: Cathie Ward was seen today at 21w1d for anatomic survey and cervical length screening ultrasound.  See ultrasound report under \"OB Procedures\" tab.  Please don't hesitate to contact our office with any concerns or questions.  -Milly Moody MD      "

## 2024-06-25 NOTE — PROGRESS NOTES
Ultrasound Probe Disinfection    A transvaginal ultrasound was performed.   Prior to use, disinfection was performed with High Level Disinfection Process ([employer]).  Probe serial number [id number] was used.      Samaria West  06/25/24  10:06 AM

## 2024-06-25 NOTE — LETTER
"2024     Claudia Ching, DO  [address]  [address]  [place] [address]    Patient: Cathie Ward   YOB: 1990   Date of Visit: 2024       Dear Dr. Ching:    Cathie Ward was seen today for an ultrasound, which will be separately forwarded via Fax. Please contact our office if you do not receive the report, or with any questions/concerns.    Sincerely,  Milly Moody MD      CC: No Recipients    Milly Moody MD  2024 11:36 AM  Sign when Signing Visit  [facility]: Cathie Ward was seen today at 21w1d for anatomic survey and cervical length screening ultrasound.  See ultrasound report under \"OB Procedures\" tab.  Please don't hesitate to contact our office with any concerns or questions.  -Milly Moody MD         "

## 2024-07-01 ENCOUNTER — TREATMENT (OUTPATIENT)
Dept: PERINATAL CARE | Facility: CLINIC | Age: 34
End: 2024-07-01

## 2024-07-01 DIAGNOSIS — Z97.8 USES SELF-APPLIED CONTINUOUS GLUCOSE MONITORING DEVICE: ICD-10-CM

## 2024-07-01 DIAGNOSIS — Z46.81 INSULIN PUMP TITRATION: ICD-10-CM

## 2024-07-01 DIAGNOSIS — E10.65 PRE-EXISTING TYPE 1 DIABETES MELLITUS WITH HYPERGLYCEMIA DURING PREGNANCY IN SECOND TRIMESTER (HCC): Primary | ICD-10-CM

## 2024-07-01 DIAGNOSIS — Z3A.22 22 WEEKS GESTATION OF PREGNANCY: ICD-10-CM

## 2024-07-01 DIAGNOSIS — Z96.41 INSULIN PUMP STATUS: ICD-10-CM

## 2024-07-01 DIAGNOSIS — O24.012 PRE-EXISTING TYPE 1 DIABETES MELLITUS WITH HYPERGLYCEMIA DURING PREGNANCY IN SECOND TRIMESTER (HCC): Primary | ICD-10-CM

## 2024-07-01 NOTE — PATIENT INSTRUCTIONS
INSULIN PUMP ADJUSTMENTS    CHANGE Basal Rates: Due to trends of hyperglycemia and frequent correction doses required    Times Current Basal Rate (u/hr) NEW Basal Rate (u/hr) % Change   MN - 3am 0.760 0.760 No Change   3am - 8am 0.860 0.860 No Change   8am-12pm 0.950 0.950 No Change   12pm-4pm 0.980 1.075 10% Increase   4pm-10pm 1.170 1.170 No Change   10pm-MN 1.160 1.160 No Change     Continue Target Bmg/dL    CHANGE Correction Factor from 31 mg/dL to 30mg/dL    Change ALL Carb Ratios:     Times Current Carb Ratio (g) NEW Carb Ratio (g)   MN - 3am 6.7 7   3am - 8am 6.7 7   8am-12pm 6.7 7   12pm-4pm 6.7 8   4pm-10pm 6.7 8   10pm-MN 6.7 8     - Avoid stacking insulin by not giving correction boluses too soon after a meal or one correction after another  - Contact the diabetes team if you notice any trends in high or low blood sugars related to recent adjustments  - Download insulin pump again on Monday () and send message to Juany Crawford with any insights about blood sugars    Continue:  - Assigned Meal Plan (including 3 meals and 3 snacks): 1800 calorie (CHO: 45-15-45-15-45-30) (PRO: 2-1-3-1-3-2)  - Testing B x per day (Fasting, 2 hour after start of each meal)  - Walking (or other preferred physical activity) daily - recommend at least 20-30 minutes daily, preferably after dinner if able (unless otherwise instructed per OB)

## 2024-07-01 NOTE — PROGRESS NOTES
"Insulin Pump Adjustment  Date: 24    Patient: Cathie Ward  : 1990    Estimated Date of Delivery: 10/9/24  GA: 22w0d    Reason for Documentation: Insulin Pump Adjustment (24 - 24) and Diabetes Type 1 (Currently Pregnant; 22w0d)     ASSESSMENT     Insulin Pump: Tandem T-Slim; See attached files for report    CGM: Dexcom; See attached files for report    PLAN     INSULIN PUMP ADJUSTMENTS    CHANGE Basal Rates: Due to trends of hyperglycemia and frequent correction doses required    Times Current Basal Rate (u/hr) NEW Basal Rate (u/hr) % Change   MN - 3am 0.760 0.760 No Change   3am - 8am 0.860 0.860 No Change   8am-12pm 0.950 0.950 No Change   12pm-4pm 0.980 1.075 10% Increase   4pm-10pm 1.170 1.170 No Change   10pm-MN 1.160 1.160 No Change     Continue Target Bmg/dL    CHANGE Correction Factor from 31 mg/dL to 30mg/dL    Change ALL Carb Ratios:     Times Current Carb Ratio (g) NEW Carb Ratio (g)   MN - 3am 6.7 7   3am - 8am 6.7 7   8am-12pm 6.7 7   12pm-4pm 6.7 8   4pm-10pm 6.7 8   10pm-MN 6.7 8     - Avoid stacking insulin by not giving correction boluses too soon after a meal or one correction after another  - Contact the diabetes team if you notice any trends in high or low blood sugars related to recent adjustments  - Download insulin pump again on Monday () and send message to Juany Crawford with any insights about blood sugars    Continue:  - Assigned Meal Plan (including 3 meals and 3 snacks): 1800 calorie (CHO: 45-15-45-15-45-30) (PRO: 2-1-3-1-3-2)  - Testing B x per day (Fasting, 2 hour after start of each meal)  - Walking (or other preferred physical activity) daily - recommend at least 20-30 minutes daily, preferably after dinner if able (unless otherwise instructed per OB)     MONITORING     EDUCATION: (Diabetes and Pregnancy Program)    Completed: Class 1 (Pt Goal: \"I will walk 20-30 minutes after dinner daily\") and Class 2    Needs Scheduled: None at this time, " Follow-ups to be scheduled as indicated per weekly review of blood sugar logs    WEIGHT:     Pre-Gravid Wt Pre-Gravid BMI TWG   74.8 kg (165 lb) 29.68 5.08 kg (11 lb 3.2 oz)     FETAL MONITORITNG - ULTRASOUNDS  Recent Ultrasounds Findings: NML Growth/MARTINEZ  US Follow-Ups: Scheduled Appropriately  Further Fetal Surveillance: Beginning at 32 weeks (NST twice weekly + MARTINEZ once a week)    LABS  Lab Results   Component Value Date/Time    GLUF 151 (H) 02/10/2024 10:45 AM    HGBA1C 6.1 (H) 05/29/2024 09:29 AM     Active Orders (needing to be collected):  None    Dodie Lane RD   Diabetes and Pregnancy Program   Maternal Fetal Medicine  [facility]

## 2024-07-09 ENCOUNTER — ROUTINE PRENATAL (OUTPATIENT)
Age: 34
End: 2024-07-09
Payer: COMMERCIAL

## 2024-07-09 DIAGNOSIS — Z36.89 NORMAL FETAL CARDIAC EXAM: Primary | ICD-10-CM

## 2024-07-09 PROCEDURE — 76820 UMBILICAL ARTERY ECHO: CPT | Performed by: PEDIATRICS

## 2024-07-09 PROCEDURE — 76827 ECHO EXAM OF FETAL HEART: CPT | Performed by: PEDIATRICS

## 2024-07-09 PROCEDURE — 99205 OFFICE O/P NEW HI 60 MIN: CPT | Performed by: PEDIATRICS

## 2024-07-09 PROCEDURE — 76825 ECHO EXAM OF FETAL HEART: CPT | Performed by: PEDIATRICS

## 2024-07-09 PROCEDURE — 93325 DOPPLER ECHO COLOR FLOW MAPG: CPT | Performed by: PEDIATRICS

## 2024-07-09 NOTE — PROGRESS NOTES
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     Fetal Echocardiogram demonstrated normal cardiac anatomy and function.    I reviewed the normal findings.. We also discussed, that due to the technical limitations of fetal echocardiography and the nature of fetal circulation, a number of cardiovascular defects cannot be definitively ruled out at this stage.  Examples include but are not limited to: ASD, PDA, small VSD, coarctation of the aorta, partial anomalous pulmonary venous connection. Please see full echocardiogram report under OB procedures.    Assessment and Recommendations:  -Normal fetal cardiac anatomy and function.   -Normal  care and prenatal care are recommended.    -There is no follow-up needed.    Alejandro Rosales MD  Pediatric Cardiology  [facility]  Phone:[phone]  Fax: [phone]  [email or web address]    I spent 60minutes - on the day of service - reviewing the patient's chart, reviewing previous imaging studies, counseling the patient about the fetal cardiac anatomy and plan, coordinating care, and documenting care.

## 2024-07-18 NOTE — PATIENT INSTRUCTIONS
-A1c 6.1% close to goal. Repeat A1c, CMP and UPCR with 28 week labs.   -Baseline UPCR 0.05; CMP within normal.   -A1c goal <6% with minimal hypoglycemia.   -Novolog via T slim insulin pump in CIQ (restarted with new insulin pump) and Dexcom G7 CGM.  -Due to basal auto-suspending, discontinue CIQ, decrease BG target to 90; decrease Active Insulin Aleksandr to 4 hours.   -Due to hyperglycemia:  MN-3 AM from 0.68 to 0.75 units/hour;  3 AM-8 AM@ 0.77 units/hour;  8 AM-10 AM@ 0.95 units/hour;  10 PM-12 PM@ 1.05 units/hour;  12 PM-4 PM@ 1.075 units/hour;  4 PM-10 PM@ 1.29 units/hour;  10 PM-MN@ 1.32 units/hour.  -Follow up with dietitian as needed.   -Self monitoring blood glucose (SMBG) fasting; 2 hours after start of each meal and with hypoglycemia.   -Glucose goals: fasting 60-90 mg/dL, 140 mg/dL or less 1 hour post meals, and 120 mg/dL or less 2 hours post meal.   -Weekly insulin pump downloads.   -GDM meal plan with 3 meals and 3 snacks including recommended combination of carb, protein and fat per meal/snack.  -Please eat meal or snack every 2-3.5 hours while awake.  -No more than 8 to 10 hours of fasting overnight.  -Refer to [employer] MyPlate online as a reference.  -2nd/3rd trimester minimum total daily carbohydrates 175 grams paired with half grams in protein.   -Stay active if no restriction from your OB, walk up to 30 minutes a day.  -Always have glucose available to treat hypoglycemia. Use 15:15 rule.   -Refer to hypoglycemia patient education sheet. SMBG when experiencing signs and symptoms of hypoglycemia and prior to driving.   -Serial fetal growth ultrasounds.  -20 weeks detailed fetal growth ultrasound completed 6/29/2024.  -22-24 weeks fetal echo completed 7/9/2024.  -At 32 weeks gestation; NST twice a week and MARTINEZ weekly.   -Continue prenatal vitamin, vitamin D3 and baby aspirin as recommended.  -At 36 weeks gestation, stop baby aspirin.   -Continue follow-up with your OB and MFM as recommended.  -Stay  in close contact with diabetes education team.  -Schedule follow up with endocrinology for 2024.     Thank you for choosing us for your  care today.  If you have any questions about your ultrasound or care, please do not hesitate to contact us or your primary obstetrician.        Some general instructions for your pregnancy are:    Exercise: Aim for 150 minutes per week of regular exercise.  Walking is great!  Nutrition: Choose healthy sources of calcium, iron, and protein.  Avoid ultraprocessed foods and added sugar.  Learn about Preeclampsia: preeclampsia is a common, potentially serious high blood pressure complication in pregnancy.  A blood pressure of 140mmHg (systolic or top number) or 90mmHg (diastolic or bottom number) should be evaluated by your doctor.  Aspirin is sometimes prescribed in early pregnancy to prevent preeclampsia in women with risk factors - ask your obstetrician if you should be on this medication.  For more resources, visit:  https://www.[email or web address].org/preeclampsia  If you smoke, please try to quit completely but also try to reduce your smoking by as much as possible (as soon as possible).  Do not vape.  Please also avoid cannabis products.  Other warning signs to watch out for in pregnancy or postpartum: chest pain, obstructed breathing or shortness of breath, seizures, thoughts of hurting yourself or your baby, bleeding, a painful or swollen leg, fever, or headache (see [employer] POST-BIRTH Warning Signs campaign).  If these happen call 911.  Itching is also not normal in pregnancy and if you experience this, especially over your hands and feet, potentially worse at night, notify your doctors.

## 2024-07-22 DIAGNOSIS — E10.65 PRE-EXISTING TYPE 1 DIABETES MELLITUS WITH HYPERGLYCEMIA DURING PREGNANCY IN THIRD TRIMESTER (HCC): ICD-10-CM

## 2024-07-22 DIAGNOSIS — O24.013 PRE-EXISTING TYPE 1 DIABETES MELLITUS WITH HYPERGLYCEMIA DURING PREGNANCY IN THIRD TRIMESTER (HCC): ICD-10-CM

## 2024-07-22 RX ORDER — INSULIN ASPART 100 [IU]/ML
1-10 INJECTION, SOLUTION INTRAVENOUS; SUBCUTANEOUS AS NEEDED
Qty: 15 ML | Refills: 0 | Status: SHIPPED | OUTPATIENT
Start: 2024-07-22

## 2024-07-23 ENCOUNTER — TELEMEDICINE (OUTPATIENT)
Facility: HOSPITAL | Age: 34
End: 2024-07-23
Payer: COMMERCIAL

## 2024-07-23 VITALS — HEIGHT: 63 IN | WEIGHT: 179 LBS | BODY MASS INDEX: 31.71 KG/M2

## 2024-07-23 DIAGNOSIS — E55.9 VITAMIN D DEFICIENCY: ICD-10-CM

## 2024-07-23 DIAGNOSIS — E10.65 PRE-EXISTING TYPE 1 DIABETES MELLITUS WITH HYPERGLYCEMIA DURING PREGNANCY IN SECOND TRIMESTER (HCC): Primary | ICD-10-CM

## 2024-07-23 DIAGNOSIS — Z46.81 INSULIN PUMP TITRATION: ICD-10-CM

## 2024-07-23 DIAGNOSIS — O24.012 PRE-EXISTING TYPE 1 DIABETES MELLITUS WITH HYPERGLYCEMIA DURING PREGNANCY IN SECOND TRIMESTER (HCC): Primary | ICD-10-CM

## 2024-07-23 DIAGNOSIS — Z3A.25 25 WEEKS GESTATION OF PREGNANCY: ICD-10-CM

## 2024-07-23 PROCEDURE — 99214 OFFICE O/P EST MOD 30 MIN: CPT | Performed by: NURSE PRACTITIONER

## 2024-07-23 NOTE — PROGRESS NOTES
Virtual Regular Visit  Name: Cathie Ward      : 1990      MRN: [id number]  Encounter Provider: CODY Handy  Encounter Date: 2024   Encounter department: [facility]    Verification of patient location:    Patient is located at Home in the following state in which I hold an active license [place]    Assessment & Plan   1. Pre-existing type 1 diabetes mellitus with hyperglycemia during pregnancy in second trimester (HCC)  Assessment & Plan:  -A1c 6.1% close to goal. Repeat A1c, CMP and UPCR with 28 week labs.   -Baseline UPCR 0.05; CMP within normal.   -A1c goal <6% with minimal hypoglycemia.   -Novolog via T slim insulin pump in CIQ (restarted with new insulin pump) and Dexcom G7 CGM.  -Due to basal auto-suspending, discontinue CIQ, decrease BG target to 90; decrease Active Insulin Aleksandr to 4 hours.   -Due to hyperglycemia:  MN-3 AM from 0.68 to 0.75 units/hour;  3 AM-8 AM@ 0.77 units/hour;  8 AM-10 AM@ 0.95 units/hour;  10 PM-12 PM@ 1.05 units/hour;  12 PM-4 PM@ 1.075 units/hour;  4 PM-10 PM@ 1.29 units/hour;  10 PM-MN@ 1.32 units/hour.  -Follow up with dietitian as needed.   -Self monitoring blood glucose (SMBG) fasting; 2 hours after start of each meal and with hypoglycemia.   -Glucose goals: fasting 60-90 mg/dL, 140 mg/dL or less 1 hour post meals, and 120 mg/dL or less 2 hours post meal.   -Weekly insulin pump downloads.   -GDM meal plan with 3 meals and 3 snacks including recommended combination of carb, protein and fat per meal/snack.  -Please eat meal or snack every 2-3.5 hours while awake.  -No more than 8 to 10 hours of fasting overnight.  -Refer to [employer] MyPlate online as a reference.  -2nd/3rd trimester minimum total daily carbohydrates 175 grams paired with half grams in protein.   -Stay active if no restriction from your OB, walk up to 30 minutes a day.  -Always have glucose available to treat hypoglycemia. Use 15:15 rule.   -Refer to hypoglycemia  patient education sheet. SMBG when experiencing signs and symptoms of hypoglycemia and prior to driving.   -Serial fetal growth ultrasounds.  -20 weeks detailed fetal growth ultrasound completed 6/29/2024.  -22-24 weeks fetal echo completed 7/9/2024.  -At 32 weeks gestation; NST twice a week and MARTINEZ weekly.   -Continue prenatal vitamin, vitamin D3 and baby aspirin as recommended.  -At 36 weeks gestation, stop baby aspirin.   -Continue follow-up with your OB and MFM as recommended.  -Stay in close contact with diabetes education team.  -Schedule follow up with endocrinology for Nov. 2024.   Lab Results   Component Value Date    HGBA1C 6.1 (H) 05/29/2024     Orders:  -     Comprehensive metabolic panel  -     Hemoglobin A1C  -     Protein / creatinine ratio, urine; Future  -     Vitamin D 25 hydroxy; Future  2. 25 weeks gestation of pregnancy  -     Comprehensive metabolic panel  -     Hemoglobin A1C  -     Protein / creatinine ratio, urine; Future  -     Vitamin D 25 hydroxy; Future  3. BMI 32.0-32.9,adult  Assessment & Plan:  -First visit weight 165 lbs. BMI <30.   -Current weight 179 lbs.   -TWG 14 lbs.   -Recommended weight gain 15 to 25 lbs.  -Continue GDM meal plan and follow up with dietitian as needed.   Orders:  -     Comprehensive metabolic panel  -     Hemoglobin A1C  -     Protein / creatinine ratio, urine; Future  -     Vitamin D 25 hydroxy; Future  4. Vitamin D deficiency  Assessment & Plan:  -2/2024 Vitamin D level 13.6 low.  -On Vitamin D3 1000 iu capsule and prenatal vitamin with D 2000 iu.  -Repeat D level.   Orders:  -     Comprehensive metabolic panel  -     Hemoglobin A1C  -     Protein / creatinine ratio, urine; Future  -     Vitamin D 25 hydroxy; Future  5. Insulin pump titration  -     Comprehensive metabolic panel  -     Hemoglobin A1C  -     Protein / creatinine ratio, urine; Future  -     Vitamin D 25 hydroxy; Future        Encounter provider CODY Handy    The patient was identified by  name and date of birth. Cathie Ward was informed that this is a telemedicine visit and that the visit is being conducted through the Epic Embedded platform. She agrees to proceed..  My office door was closed. No one else was in the room.  She acknowledged consent and understanding of privacy and security of the video platform. The patient has agreed to participate and understands they can discontinue the visit at any time.    Patient is aware this is a billable service.     History of Present Illness     Cathie Ward is a 34 y.o. female  25 1/7 weeks gestation T1DM, on Novolog via T slim insulin pump and Dexcom CGM. Last A1c 6.1%. Insulin pump review with recommendations given during visit due to hyperglycemia. Encouraged to avoid correction bolus insulin close to bed or overnight. 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 Discussed adding another basal profile for off days but may forget to switch basal settings. Having more difficulties managing glucose readings with this pregnancy. Encouraged to download [employer] norm for alerts regarding insulin pump adjustments. Following GDM meal plan. At times giving a correction bolus resulting in glucose reading less than 60. Uses 15 by 15 rule for hypoglycemia. Recent family vacation and exposed to Covid, currently no symptoms. History of Covid with last pregnancy and receiving therapy. Weight stable.   Positive fetal movement.   Vitamin D deficiency- taking D3 1000 iu capsule plus prenatal vitamin for a total of 3000 iu daily, last D level low.   Review of Systems   Constitutional:  Negative for fatigue and fever.   Eyes:  Negative for visual disturbance.        Last eye exam 2024 within normal.    Respiratory:  Negative for cough and shortness of breath.    Cardiovascular:  Negative for chest pain, palpitations and leg swelling.   Gastrointestinal:  Negative for constipation, nausea and vomiting.   Endocrine: Negative for  "polydipsia, polyphagia and polyuria.   Genitourinary:  Negative for vaginal bleeding.   Musculoskeletal:  Negative for back pain.   Skin:  Negative for rash.   Neurological:  Negative for headaches.   Psychiatric/Behavioral:  Negative for sleep disturbance.      Medical History Reviewed by provider this encounter:  Tobacco  Allergies  Meds  Problems  Med Hx  Surg Hx  Fam Hx  Soc   Hx      Current Outpatient Medications on File Prior to Visit   Medication Sig Dispense Refill    acetone, urine, test strip Use 1 strip if needed for high blood sugar      albuterol (PROVENTIL HFA,VENTOLIN HFA) 90 mcg/act inhaler       amoxicillin (AMOXIL) 500 mg capsule Take 500 mg by mouth every 8 (eight) hours (Patient not taking: Reported on 6/25/2024)      BABY ASPIRIN PO Take 162 mg by mouth in the morning      cholecalciferol (VITAMIN D3) 1,000 units tablet Take 2 tablets (2,000 Units total) by mouth daily 100 tablet 1    glucagon 1 MG injection Inject 1 mg under the skin once as needed for low blood sugar 1 kit 1    insulin aspart (NovoLOG FlexPen) 100 UNIT/ML injection pen Inject 1-10 Units under the skin if needed for high blood sugar (Pump failure) 15 mL 0    insulin aspart (NovoLOG) 100 units/mL injection 120 Units by Subcutaneous Insulin Pump route in the morning Use UPTO 120u daily via insulin pump. NOVOLOG Brand 110 mL 1    Insulin Glargine Solostar (Lantus SoloStar) 100 UNIT/ML SOPN Inject 0.26 mL (26 Units total) under the skin if needed (pump failure) (Patient not taking: Reported on 4/23/2024) 3 mL 0    Insulin Pen Needle (Pen Needles 3/16\") 31G X 5 MM MISC Use in the morning (Patient not taking: Reported on 6/25/2024) 30 each 0    NovoLOG 100 UNIT/ML injection Use up to 120 units daily via insulin pump 110 mL 1    ofloxacin (OCUFLOX) 0.3 % ophthalmic solution Apply 1 drop to eye 4 (four) times a day 5 mL 0    OneTouch Delica Lancets 30G MISC Use up to 8 a day. Pregnancy, T1DM. 200 each 6    OneTouch Verio test " "strip Test up to 8 times a day and as instructed. Pregnancy; T1DM. 200 each 6    prenatal vitamin (CLASSIC FORMULA) 27-0.8 mg Take 1 tablet by mouth every morning before breakfast       No current facility-administered medications on file prior to visit.      Social History     Tobacco Use    Smoking status: Never     Passive exposure: Past    Smokeless tobacco: Never   Vaping Use    Vaping status: Never Used   Substance and Sexual Activity    Alcohol use: Not Currently     Comment: social    Drug use: Never    Sexual activity: Yes     Partners: Male     Birth control/protection: None     Objective     Ht 5' 2.5\" (1.588 m)   Wt 81.2 kg (179 lb) Comment: at home  BMI 32.22 kg/m²   Every 1 to 2 weeks review of insulin pump download with recommendations.  Refer to attached file for insulin pump download.   Physical Exam  HENT:      Head: Normocephalic.      Nose: Nose normal.   Eyes:      Conjunctiva/sclera: Conjunctivae normal.   Pulmonary:      Effort: Pulmonary effort is normal.   Neurological:      Mental Status: She is alert and oriented to person, place, and time.   Psychiatric:         Mood and Affect: Mood normal.         Behavior: Behavior normal.         Thought Content: Thought content normal.         Judgment: Judgment normal.         Visit Time  Total Visit Duration: 51 minutes with patient and 15 minutes.         "

## 2024-07-23 NOTE — ASSESSMENT & PLAN NOTE
-A1c 6.1% close to goal. Repeat A1c, CMP and UPCR with 28 week labs.   -Baseline UPCR 0.05; CMP within normal.   -A1c goal <6% with minimal hypoglycemia.   -Novolog via T slim insulin pump in CIQ (restarted with new insulin pump) and Dexcom G7 CGM.  -Due to basal auto-suspending, discontinue CIQ, decrease BG target to 90; decrease Active Insulin Aleksandr to 4 hours.   -Due to hyperglycemia:  MN-3 AM from 0.68 to 0.75 units/hour;  3 AM-8 AM@ 0.77 units/hour;  8 AM-10 AM@ 0.95 units/hour;  10 PM-12 PM@ 1.05 units/hour;  12 PM-4 PM@ 1.075 units/hour;  4 PM-10 PM@ 1.29 units/hour;  10 PM-MN@ 1.32 units/hour.  -Follow up with dietitian as needed.   -Self monitoring blood glucose (SMBG) fasting; 2 hours after start of each meal and with hypoglycemia.   -Glucose goals: fasting 60-90 mg/dL, 140 mg/dL or less 1 hour post meals, and 120 mg/dL or less 2 hours post meal.   -Weekly insulin pump downloads.   -GDM meal plan with 3 meals and 3 snacks including recommended combination of carb, protein and fat per meal/snack.  -Please eat meal or snack every 2-3.5 hours while awake.  -No more than 8 to 10 hours of fasting overnight.  -Refer to [employer] MyPlate online as a reference.  -2nd/3rd trimester minimum total daily carbohydrates 175 grams paired with half grams in protein.   -Stay active if no restriction from your OB, walk up to 30 minutes a day.  -Always have glucose available to treat hypoglycemia. Use 15:15 rule.   -Refer to hypoglycemia patient education sheet. SMBG when experiencing signs and symptoms of hypoglycemia and prior to driving.   -Serial fetal growth ultrasounds.  -20 weeks detailed fetal growth ultrasound completed 6/29/2024.  -22-24 weeks fetal echo completed 7/9/2024.  -At 32 weeks gestation; NST twice a week and MARTINEZ weekly.   -Continue prenatal vitamin, vitamin D3 and baby aspirin as recommended.  -At 36 weeks gestation, stop baby aspirin.   -Continue follow-up with your OB and MFM as recommended.  -Stay  in close contact with diabetes education team.  -Schedule follow up with endocrinology for Nov. 2024.   Lab Results   Component Value Date    HGBA1C 6.1 (H) 05/29/2024

## 2024-07-24 NOTE — ASSESSMENT & PLAN NOTE
-First visit weight 165 lbs. BMI <30.   -Current weight 179 lbs.   -TWG 14 lbs.   -Recommended weight gain 15 to 25 lbs.  -Continue GDM meal plan and follow up with dietitian as needed.

## 2024-07-26 ENCOUNTER — TELEPHONE (OUTPATIENT)
Dept: ENDOCRINOLOGY | Facility: HOSPITAL | Age: 34
End: 2024-07-26

## 2024-07-26 ENCOUNTER — TELEPHONE (OUTPATIENT)
Facility: HOSPITAL | Age: 34
End: 2024-07-26

## 2024-07-26 NOTE — TELEPHONE ENCOUNTER
I called the patient back to see if she was able to her vials.  She is to call back and let us know if they still need to be sent.

## 2024-07-26 NOTE — TELEPHONE ENCOUNTER
Left voicemail informing patient that we needed to reschedule her 9/17 virtual appointment with Juany as Juany will be out of the office that day. Requested she give our office a call back at [phone].

## 2024-08-13 ENCOUNTER — ULTRASOUND (OUTPATIENT)
Dept: PERINATAL CARE | Facility: CLINIC | Age: 34
End: 2024-08-13
Payer: COMMERCIAL

## 2024-08-13 ENCOUNTER — APPOINTMENT (OUTPATIENT)
Dept: LAB | Facility: HOSPITAL | Age: 34
End: 2024-08-13
Payer: COMMERCIAL

## 2024-08-13 VITALS
HEIGHT: 63 IN | BODY MASS INDEX: 32.46 KG/M2 | SYSTOLIC BLOOD PRESSURE: 96 MMHG | HEART RATE: 104 BPM | WEIGHT: 183.2 LBS | DIASTOLIC BLOOD PRESSURE: 54 MMHG

## 2024-08-13 DIAGNOSIS — Z3A.28 28 WEEKS GESTATION OF PREGNANCY: Primary | ICD-10-CM

## 2024-08-13 DIAGNOSIS — O24.012 PRE-EXISTING TYPE 1 DIABETES MELLITUS WITH HYPERGLYCEMIA DURING PREGNANCY IN SECOND TRIMESTER (HCC): ICD-10-CM

## 2024-08-13 DIAGNOSIS — E55.9 VITAMIN D DEFICIENCY: ICD-10-CM

## 2024-08-13 DIAGNOSIS — Z3A.25 25 WEEKS GESTATION OF PREGNANCY: ICD-10-CM

## 2024-08-13 DIAGNOSIS — Z46.81 INSULIN PUMP TITRATION: ICD-10-CM

## 2024-08-13 DIAGNOSIS — O24.013 PREGNANCY COMPLICATED BY PRE-EXISTING TYPE 1 DIABETES, THIRD TRIMESTER: ICD-10-CM

## 2024-08-13 DIAGNOSIS — Z36.89 ENCOUNTER FOR ULTRASOUND TO ASSESS FETAL GROWTH: ICD-10-CM

## 2024-08-13 DIAGNOSIS — E10.65 PRE-EXISTING TYPE 1 DIABETES MELLITUS WITH HYPERGLYCEMIA DURING PREGNANCY IN SECOND TRIMESTER (HCC): ICD-10-CM

## 2024-08-13 PROBLEM — E66.01 SEVERE OBESITY DUE TO EXCESS CALORIES AFFECTING PREGNANCY IN SECOND TRIMESTER (HCC): Status: RESOLVED | Noted: 2024-04-25 | Resolved: 2024-08-13

## 2024-08-13 PROBLEM — O99.212 SEVERE OBESITY DUE TO EXCESS CALORIES AFFECTING PREGNANCY IN SECOND TRIMESTER (HCC): Status: RESOLVED | Noted: 2024-04-25 | Resolved: 2024-08-13

## 2024-08-13 LAB
25(OH)D3 SERPL-MCNC: 43.2 NG/ML (ref 30–100)
ALBUMIN SERPL BCG-MCNC: 3.6 G/DL (ref 3.5–5)
ALP SERPL-CCNC: 70 U/L (ref 34–104)
ALT SERPL W P-5'-P-CCNC: 10 U/L (ref 7–52)
ANION GAP SERPL CALCULATED.3IONS-SCNC: 9 MMOL/L (ref 4–13)
AST SERPL W P-5'-P-CCNC: 15 U/L (ref 13–39)
BILIRUB SERPL-MCNC: 0.34 MG/DL (ref 0.2–1)
BUN SERPL-MCNC: 9 MG/DL (ref 5–25)
CALCIUM SERPL-MCNC: 8.2 MG/DL (ref 8.4–10.2)
CHLORIDE SERPL-SCNC: 102 MMOL/L (ref 96–108)
CO2 SERPL-SCNC: 25 MMOL/L (ref 21–32)
CREAT SERPL-MCNC: 0.62 MG/DL (ref 0.6–1.3)
CREAT UR-MCNC: 84 MG/DL
EST. AVERAGE GLUCOSE BLD GHB EST-MCNC: 134 MG/DL
GFR SERPL CREATININE-BSD FRML MDRD: 118 ML/MIN/1.73SQ M
GLUCOSE P FAST SERPL-MCNC: 119 MG/DL (ref 65–99)
HBA1C MFR BLD: 6.3 %
POTASSIUM SERPL-SCNC: 3.7 MMOL/L (ref 3.5–5.3)
PROT SERPL-MCNC: 6.6 G/DL (ref 6.4–8.4)
PROT UR-MCNC: 9.4 MG/DL
PROT/CREAT UR: 0.1 MG/G{CREAT} (ref 0–0.1)
SODIUM SERPL-SCNC: 136 MMOL/L (ref 135–147)

## 2024-08-13 PROCEDURE — 76816 OB US FOLLOW-UP PER FETUS: CPT | Performed by: OBSTETRICS & GYNECOLOGY

## 2024-08-13 PROCEDURE — 82570 ASSAY OF URINE CREATININE: CPT

## 2024-08-13 PROCEDURE — 36415 COLL VENOUS BLD VENIPUNCTURE: CPT

## 2024-08-13 PROCEDURE — 84156 ASSAY OF PROTEIN URINE: CPT

## 2024-08-13 PROCEDURE — 82306 VITAMIN D 25 HYDROXY: CPT

## 2024-08-13 PROCEDURE — 99214 OFFICE O/P EST MOD 30 MIN: CPT | Performed by: OBSTETRICS & GYNECOLOGY

## 2024-08-13 NOTE — PROGRESS NOTES
"[facility]: Cathie Ward was seen today at 28w1d for fetal growth assessment ultrasound.  See ultrasound report under \"OB Procedures\" tab.  Please don't hesitate to contact our office with any concerns or questions.  -Milly Moody MD    "

## 2024-08-13 NOTE — LETTER
Dear. Cathie Mcclure was seen today for an ultrasound, which will be separately forwarded via Fax. Please contact our office if you do not receive the report, or with any questions/concerns.    Sincerely,  Milly Moody MD

## 2024-08-29 ENCOUNTER — DOCUMENTATION (OUTPATIENT)
Facility: HOSPITAL | Age: 34
End: 2024-08-29

## 2024-08-29 DIAGNOSIS — E10.65 PRE-EXISTING TYPE 1 DIABETES MELLITUS WITH HYPERGLYCEMIA DURING PREGNANCY IN THIRD TRIMESTER (HCC): Primary | ICD-10-CM

## 2024-08-29 DIAGNOSIS — Z3A.30 30 WEEKS GESTATION OF PREGNANCY: ICD-10-CM

## 2024-08-29 DIAGNOSIS — O24.013 PRE-EXISTING TYPE 1 DIABETES MELLITUS WITH HYPERGLYCEMIA DURING PREGNANCY IN THIRD TRIMESTER (HCC): Primary | ICD-10-CM

## 2024-08-29 DIAGNOSIS — Z46.81 INSULIN PUMP TITRATION: ICD-10-CM

## 2024-08-29 NOTE — PROGRESS NOTES
Problem List Items Addressed This Visit       Pre-existing type 1 diabetes mellitus with hyperglycemia during pregnancy in third trimester (HCC) - Primary     -Last A1c 6.3% not at goal of <6% with minimal hypoglycemia.   Due to hyperglycemia noted, please adjust insulin pump settings as follows:   MN-3 AM from 1.02 to 1.08 units/hour;  3 AM-6 AM from 0.98 to 1.08 units/hour;  6 AM-8 AM from 1.02 to 1.12 units/hour;  8 AM-10 AM from 1.39 to 1.53 units/hour;  10 AM-12 PM from 1.34 to 1.47 units/hour;  12 PM-4 PM from 1.54 to 1.69 units/hour;  4 PM-10 PM@ 1.68 units/hour;  10 PM-MN from 1.44 to 1.51 units/hour.  Let me know new basal.   Decrease correction factor to 22.   Please avoid correction bolus doses close to bedtime.     Refer to attached file for insulin pump download.    Hypoglycemia <54 subsided with 8/22/2024 insulin pump adjustments but hyperglycemia still noted.   Recommended to decrease basal settings by 5% if hypoglycemia becomes an issue post above insulin adjustments.   Lab Results   Component Value Date    HGBA1C 6.3 (H) 08/13/2024            Insulin pump titration    30 weeks gestation of pregnancy

## 2024-08-29 NOTE — ASSESSMENT & PLAN NOTE
-Last A1c 6.3% not at goal of <6% with minimal hypoglycemia.   Due to hyperglycemia noted, please adjust insulin pump settings as follows:   MN-3 AM from 1.02 to 1.08 units/hour;  3 AM-6 AM from 0.98 to 1.08 units/hour;  6 AM-8 AM from 1.02 to 1.12 units/hour;  8 AM-10 AM from 1.39 to 1.53 units/hour;  10 AM-12 PM from 1.34 to 1.47 units/hour;  12 PM-4 PM from 1.54 to 1.69 units/hour;  4 PM-10 PM@ 1.68 units/hour;  10 PM-MN from 1.44 to 1.51 units/hour.  Let me know new basal.   Decrease correction factor to 22.   Please avoid correction bolus doses close to bedtime.     Refer to attached file for insulin pump download.    Hypoglycemia <54 subsided with 8/22/2024 insulin pump adjustments but hyperglycemia still noted.   Recommended to decrease basal settings by 5% if hypoglycemia becomes an issue post above insulin adjustments.   Lab Results   Component Value Date    HGBA1C 6.3 (H) 08/13/2024

## 2024-09-06 ENCOUNTER — TELEPHONE (OUTPATIENT)
Dept: PERINATAL CARE | Facility: CLINIC | Age: 34
End: 2024-09-06

## 2024-09-06 NOTE — TELEPHONE ENCOUNTER
Reason for Call: Hypoglycemia (Overnight per Dexcom; Last Insulin Pump Download: 9/3/24) and Diabetes Type 1 (Currently Pregnant; 31w4d)    Outcome: No Answer. Left VM. Requested pt provide insight related to hypoglycemia (caused by recent adjustments or stacking of correction doses?). Per Juany Crawford, if experiencing hypoglycemia related to the insulin adjustments, decrease basal rates by 5%. Encouraged patient to call if she has any concerns.    Dodie Lane RD  Diabetes Educator   [facility]  Diabetes and Pregnancy Program

## 2024-09-12 ENCOUNTER — TREATMENT (OUTPATIENT)
Facility: HOSPITAL | Age: 34
End: 2024-09-12

## 2024-09-12 ENCOUNTER — ULTRASOUND (OUTPATIENT)
Dept: PERINATAL CARE | Facility: CLINIC | Age: 34
End: 2024-09-12
Payer: COMMERCIAL

## 2024-09-12 VITALS
HEIGHT: 63 IN | WEIGHT: 187.8 LBS | BODY MASS INDEX: 33.27 KG/M2 | DIASTOLIC BLOOD PRESSURE: 74 MMHG | HEART RATE: 78 BPM | SYSTOLIC BLOOD PRESSURE: 118 MMHG

## 2024-09-12 DIAGNOSIS — E10.65 PRE-EXISTING TYPE 1 DIABETES MELLITUS WITH HYPERGLYCEMIA DURING PREGNANCY IN THIRD TRIMESTER (HCC): ICD-10-CM

## 2024-09-12 DIAGNOSIS — O40.3XX0 POLYHYDRAMNIOS IN THIRD TRIMESTER COMPLICATION, SINGLE OR UNSPECIFIED FETUS: ICD-10-CM

## 2024-09-12 DIAGNOSIS — O24.013 PRE-EXISTING TYPE 1 DIABETES MELLITUS WITH HYPERGLYCEMIA DURING PREGNANCY IN THIRD TRIMESTER (HCC): ICD-10-CM

## 2024-09-12 DIAGNOSIS — Z82.79 FAMILY HISTORY OF CONGENITAL ANOMALY: ICD-10-CM

## 2024-09-12 DIAGNOSIS — Z36.89 ENCOUNTER FOR ULTRASOUND TO CHECK FETAL GROWTH: ICD-10-CM

## 2024-09-12 DIAGNOSIS — Z3A.32 32 WEEKS GESTATION OF PREGNANCY: Primary | ICD-10-CM

## 2024-09-12 DIAGNOSIS — O24.013 TYPE 1 DIABETES MELLITUS DURING PREGNANCY IN THIRD TRIMESTER: Primary | ICD-10-CM

## 2024-09-12 DIAGNOSIS — Z3A.32 32 WEEKS GESTATION OF PREGNANCY: ICD-10-CM

## 2024-09-12 PROCEDURE — 99214 OFFICE O/P EST MOD 30 MIN: CPT | Performed by: STUDENT IN AN ORGANIZED HEALTH CARE EDUCATION/TRAINING PROGRAM

## 2024-09-12 PROCEDURE — 76816 OB US FOLLOW-UP PER FETUS: CPT | Performed by: STUDENT IN AN ORGANIZED HEALTH CARE EDUCATION/TRAINING PROGRAM

## 2024-09-12 PROCEDURE — 59025 FETAL NON-STRESS TEST: CPT | Performed by: STUDENT IN AN ORGANIZED HEALTH CARE EDUCATION/TRAINING PROGRAM

## 2024-09-12 NOTE — LETTER
2024     [facility]  [address]  [facility] [address]    Patient: Cathie Ward   YOB: 1990   Date of Visit: 2024       Dear Dr. Becker:    Thank you for referring Cathie Ward to me for evaluation. Below are my notes for this consultation.    If you have questions, please do not hesitate to call me. I look forward to following your patient along with you.         Sincerely,        Cate Cao MD        CC: No Recipients    Cate Cao MD  2024  1:21 PM  Sign when Signing Visit  [facility]: Ms. Ward was seen today for NST (found under the pregnancy episode) which I reviewed the RN assessment and agree, and fetal growth ultrasound (see ultrasound report under OB procedures tab).         MDM:   I. Diagnoses/Problems addressed:  Stable chronic illness: T1DM, polyhydramnios  II.  Data: I reviewed 1 lab tests ordered by another provider., diabetes communication   III.  Risk of morbidity:  minimal    Please don't hesitate to contact our office with any concerns or questions.  -Cate Cao MD

## 2024-09-12 NOTE — PROGRESS NOTES
[facility]: Ms. Ward was seen today for NST (found under the pregnancy episode) which I reviewed the RN assessment and agree, and fetal growth ultrasound (see ultrasound report under OB procedures tab).         MDM:   I. Diagnoses/Problems addressed:  Stable chronic illness: T1DM, polyhydramnios  II.  Data: I reviewed 1 lab tests ordered by another provider., diabetes communication   III.  Risk of morbidity:  minimal    Please don't hesitate to contact our office with any concerns or questions.  -Cate Cao MD

## 2024-09-12 NOTE — PROGRESS NOTES
Insulin Pump Adjustment  Date: 24    Patient: Cathie Ward  : 1990    Estimated Date of Delivery: 10/9/24  GA: 32w3d    Reason for Documentation: Diabetes Type 1 (32w3d/) and Medication Management (Insulin Pump (Tandem Tslim))     ASSESSMENT     Insulin Pump: Tandem T-Slim; See attached files for report    CGM: Dexcom; See attached files for report    PLAN     INSULIN PUMP ADJUSTMENTS - Dexcom/Pump reports reviewed/Adjustments approved by Dr. Fortune    CHANGE Basal Rates as seen below: Due to trends of hyperglycemia and frequent correction doses required    Times Current Basal Rate (u/hr) NEW Basal Rate (u/hr) % Change   MN - 3am 1.080  No Change   3am - 6am 1.080  No Change   6am-8am 1.120  No Change   8am-10am 1.530  No Change   10am-12pm 1.470 1.540 5% Increase   12pm-4pm 1.690 1.770 5% Increase   4pm-10pm 1.680 1.760 5% Increase   10pm-MN 1.510  No Change     Continue Target Bmg/dL    New Total Basal Dose: 35.52    Continue Correction Factor: 22     Change Carb Ratios:     Times Current Carb Ratio (g) NEW Carb Ratio (g)   MN - 3am 6.0    3am - 6am 6.0    6am-8am 6.0 5.0   8am-10am 6.0 5.0   10am-12pm 6.0 5.0   12pm-4pm 6.0 5.0   4pm-10pm 5.0    10pm-MN 6.0       - Avoid stacking insulin by not giving correction boluses too soon after a meal or one correction after another  - Contact the diabetes team if you notice any trends in high or low blood sugars related to recent adjustments  - Download insulin pump again on Monday () and send message to Juany Crawford with any insights about blood sugars    Continue:  - Assigned Meal Plan (including 3 meals and 3 snacks): 1800 calorie (CHO: 45-15-45-15-45-30) (PRO: 2-1-3-1-3-2)  - Testing B x per day (Fasting, 2 hour after start of each meal)  - Walking (or other preferred physical activity) daily - recommend at least 20-30 minutes daily, preferably after dinner if able (unless otherwise instructed per OB)     MONITORING     EDUCATION:  "(Diabetes and Pregnancy Program)    Completed: Class 1 (Pt Goal: \"I will walk 20-30 minutes after dinner daily\") and Class 2    Needs Scheduled: None at this time, Follow-ups to be scheduled as indicated per weekly review of blood sugar logs    WEIGHT:     Pre-Gravid Wt Pre-Gravid BMI TWG   74.8 kg (165 lb) 29.68 10.3 kg (22 lb 12.8 oz)     FETAL MONITORITNG - ULTRASOUNDS  Recent Ultrasounds Findings: (9/12/24): AC96% EFW 76% Polyhydramnios  US Follow-Ups: Scheduled Appropriately - 10/8/24  Further Fetal Surveillance: Beginning at 32 weeks (NST twice weekly + MARTINEZ once a week)    LABS  Lab Results   Component Value Date/Time    GLUF 119 (H) 08/13/2024 11:38 AM    HGBA1C 6.3 (H) 08/13/2024 11:38 AM     Active Orders (needing to be collected):  None    Tita Valencia   Diabetes and Pregnancy Program   Maternal Fetal Medicine  [facility]  "

## 2024-09-12 NOTE — PROGRESS NOTES
Repeat Non-Stress Testing:    Patient verbalizes +FM. Pt denies ALL:               Leaking of fluid   Contractions   Vaginal bleeding   Decreased fetal movement    Patient is performing daily kick counts. Patient has no questions or concerns.   NST strip reviewed by Dr. Cao.

## 2024-09-20 ENCOUNTER — DOCUMENTATION (OUTPATIENT)
Dept: ENDOCRINOLOGY | Facility: HOSPITAL | Age: 34
End: 2024-09-20

## 2024-10-08 ENCOUNTER — ULTRASOUND (OUTPATIENT)
Dept: PERINATAL CARE | Facility: CLINIC | Age: 34
End: 2024-10-08
Payer: COMMERCIAL

## 2024-10-08 VITALS
WEIGHT: 194 LBS | BODY MASS INDEX: 36.63 KG/M2 | HEIGHT: 61 IN | DIASTOLIC BLOOD PRESSURE: 62 MMHG | SYSTOLIC BLOOD PRESSURE: 108 MMHG

## 2024-10-08 DIAGNOSIS — Z36.89 ENCOUNTER FOR ULTRASOUND TO CHECK FETAL GROWTH: ICD-10-CM

## 2024-10-08 DIAGNOSIS — O24.013 PRE-EXISTING TYPE 1 DIABETES MELLITUS WITH HYPERGLYCEMIA DURING PREGNANCY IN THIRD TRIMESTER (HCC): ICD-10-CM

## 2024-10-08 DIAGNOSIS — Z3A.36 36 WEEKS GESTATION OF PREGNANCY: Primary | ICD-10-CM

## 2024-10-08 DIAGNOSIS — E10.65 PRE-EXISTING TYPE 1 DIABETES MELLITUS WITH HYPERGLYCEMIA DURING PREGNANCY IN THIRD TRIMESTER (HCC): ICD-10-CM

## 2024-10-08 PROBLEM — O40.3XX0 POLYHYDRAMNIOS IN THIRD TRIMESTER: Status: RESOLVED | Noted: 2024-09-12 | Resolved: 2024-10-08

## 2024-10-08 PROCEDURE — 76816 OB US FOLLOW-UP PER FETUS: CPT | Performed by: STUDENT IN AN ORGANIZED HEALTH CARE EDUCATION/TRAINING PROGRAM

## 2024-10-08 PROCEDURE — 59025 FETAL NON-STRESS TEST: CPT | Performed by: STUDENT IN AN ORGANIZED HEALTH CARE EDUCATION/TRAINING PROGRAM

## 2024-10-08 PROCEDURE — 99213 OFFICE O/P EST LOW 20 MIN: CPT | Performed by: STUDENT IN AN ORGANIZED HEALTH CARE EDUCATION/TRAINING PROGRAM

## 2024-10-08 NOTE — PROGRESS NOTES
[facility]: Ms. Ward was seen today for NST (found under the pregnancy episode) which I reviewed the RN assessment and agree, and fetal growth ultrasound (see ultrasound report under OB procedures tab).         MDM:   I. Diagnoses/Problems addressed:  Stable chronic illness: type 1 diabetes  II.  Data:  limited  III.  Risk of morbidity: Low    Please don't hesitate to contact our office with any concerns or questions.  -Cate Cao MD

## 2024-11-16 NOTE — TELEPHONE ENCOUNTER
Patient in need of cartridge/insulin (Novolog) for administration  Unable to administer medication at this time  Medication marked as essential  Courtesy seven-day supply given per protocol  Sent to pharmacy (verified) of choice  Patient informed, verbalized understanding  Went in pt room to restart tube feeding after 5 hours of rest. Pt refused for it to be turned back on and stated that he was not ready for the feeding to be resumed and that he would let me know when he was ready.

## 2024-12-10 LAB
LEFT EYE DIABETIC RETINOPATHY: NORMAL
RIGHT EYE DIABETIC RETINOPATHY: NORMAL

## 2025-01-21 DIAGNOSIS — E10.65 PRE-EXISTING TYPE 1 DIABETES MELLITUS WITH HYPERGLYCEMIA DURING PREGNANCY IN THIRD TRIMESTER (HCC): ICD-10-CM

## 2025-01-21 DIAGNOSIS — O24.013 PRE-EXISTING TYPE 1 DIABETES MELLITUS WITH HYPERGLYCEMIA DURING PREGNANCY IN THIRD TRIMESTER (HCC): ICD-10-CM

## 2025-01-21 RX ORDER — INSULIN ASPART 100 [IU]/ML
INJECTION, SOLUTION INTRAVENOUS; SUBCUTANEOUS
Qty: 110 ML | Refills: 1 | Status: SHIPPED | OUTPATIENT
Start: 2025-01-21

## 2025-02-21 ENCOUNTER — TELEPHONE (OUTPATIENT)
Age: 35
End: 2025-02-21

## 2025-02-21 NOTE — TELEPHONE ENCOUNTER
Patient called stating her daughter is sick with the flu. She denies having any symptoms at this time, but wants to know if Tamiflu is safe for her to use with being a type 1 diabetic?

## 2025-03-25 ENCOUNTER — TELEPHONE (OUTPATIENT)
Age: 35
End: 2025-03-25

## 2025-03-25 NOTE — TELEPHONE ENCOUNTER
[facility] calling in asking for last OV date and the next upcoming O date. Verified last OV on file was 9/12/23. Upcoming visit scheduled on 4/22 w/ .   No further action needed

## 2025-03-28 ENCOUNTER — OFFICE VISIT (OUTPATIENT)
Dept: ENDOCRINOLOGY | Facility: HOSPITAL | Age: 35
End: 2025-03-28
Payer: COMMERCIAL

## 2025-03-28 VITALS
BODY MASS INDEX: 30.21 KG/M2 | SYSTOLIC BLOOD PRESSURE: 108 MMHG | WEIGHT: 160 LBS | OXYGEN SATURATION: 99 % | HEART RATE: 99 BPM | DIASTOLIC BLOOD PRESSURE: 72 MMHG | HEIGHT: 61 IN

## 2025-03-28 DIAGNOSIS — E10.9 TYPE 1 DIABETES MELLITUS WITHOUT COMPLICATION (HCC): Primary | ICD-10-CM

## 2025-03-28 DIAGNOSIS — E55.9 VITAMIN D DEFICIENCY: ICD-10-CM

## 2025-03-28 LAB — SL AMB POCT HEMOGLOBIN AIC: 6.5 (ref ?–6.5)

## 2025-03-28 PROCEDURE — 83036 HEMOGLOBIN GLYCOSYLATED A1C: CPT | Performed by: STUDENT IN AN ORGANIZED HEALTH CARE EDUCATION/TRAINING PROGRAM

## 2025-03-28 PROCEDURE — 99214 OFFICE O/P EST MOD 30 MIN: CPT | Performed by: STUDENT IN AN ORGANIZED HEALTH CARE EDUCATION/TRAINING PROGRAM

## 2025-03-28 NOTE — TELEPHONE ENCOUNTER
PHP/IOP Progress Note    Patient was seen by telehealth with 2 way video.  Patient was at home, writer was at home office in [place], [place].  Patient consent obtained for virtual visit.    CC: hospital f/u, depression, SI with plan to OD    Subjective: pt reports that anxiety is improving through coping mechanism. Pt reports that depression is increased at times when lonely. Pt stated mood is improved but anxious often. Pt reports appetite improving and sleep stable at this time. Pt reports that focus is stable and pt reports improved motivation. Pt stated med compliant and denies S/E. Pt denies SI/HI denies AVT hallucinations. Pt reports that she still has a tendency to use avoidance rather than address anxieties. Pt encouraged to use skills rather than avoid. Pt focused on ex  and disillusion of marriage.       Current Outpatient Medications   Medication Sig    ziprasidone (GEODON) 40 MG capsule Take 1 capsule by mouth daily (with dinner).    cholecalciferol (VITAMIN D) 1.25 mg(50,000 units) capsule Take 1 capsule by mouth 1 day a week for 4 doses. (Patient not taking: Reported on 3/13/2025)    glipiZIDE (GLUCOTROL) 10 MG tablet Take 1 tablet daily with the largest meal of the day.    ciclopirox 1 % shampoo Apply topically as directed. Use twice weekly for 4 weeks; allow a minimum of 3 days between applications. Can use 1x/week afterwards.    clobetasol (TEMOVATE) 0.05 % topical solution Apply topically 2 times daily as needed (Itching).    albuterol 108 (90 Base) MCG/ACT inhaler Inhale 2 puffs into the lungs every 4 hours as needed for Shortness of Breath or Wheezing.    atorvastatin (LIPITOR) 40 MG tablet TAKE 1 TABLET DAILY    insulin detemir (Levemir FlexTouch) 100 UNIT/ML pen-injector Inject 6 Units into the skin nightly. Prime 2 units before each dose.    blood glucose (Accu-Chek Guide) test strip Test blood sugar 3 times daily. Diagnosis: Diabetes. Meter: Accu-Chek.    SOFTCLIX LANCETS Misc Use to  Patient called stating that her novalog order was cancelled. Please review and send to [facility] in [facility]. She needs the 90 day supply. check glucose 3 times daily.    metformin (GLUCOPHAGE) 1000 MG tablet Take 1 tablet by mouth in the morning and 1 tablet in the evening. Take with meals.    Insulin Pen Needle (BD Pen Needle Tatianna U/F) 32G X 4 MM Misc USE TO INJECT INSULIN      DAILY. REMOVE NEEDLE       COVER(S) TO EXPOSE NEEDLE  BEFORE INJECTING     No current facility-administered medications for this encounter.       Mental Status Exam:    Appearance: Appears stated age, fair grooming/hygiene  Behavior: anxious, cooperative, good eye contact  Psychomotor Activity: No PMA/PMR  Speech: Average rate/volume/amount; spontaneous  Mood: \"anxious but getting better\"  Affect: anxious, congruent, full range, nonlabile  Thought process: Linear, logical, goal directed  Thought content: Denies SI/HI/paranoia/delusions  Perception: Denies AVH  Insight: fair  Judgment: fair  Cognition: Alert, oriented x 3  Concentration: fair  Impulse control: fair    Assessment/Plan    Active Hospital Problems    Diagnosis     Bipolar disorder, current episode depressed, severe, without psychotic features  (CMD)     Generalized anxiety disorder          - Continue psychotropic medication  - Continue Virtual PHP/IOP  - Therapist to collaborate with patient's family if deemed appropriate and patient provides consent.  - Advised patient to call 911 or go to ER if she develops active SI/HI or is unable to self care.      The 21st Century Cures Act makes medical notes like these available to patients in the interest of transparency. However, be advised this is a medical document. It is intended as peer to peer communication. It is written in medical language and may contain abbreviations or verbiage that are unfamiliar. It may appear blunt or direct. Medical documents are intended to carry relevant information, facts as evident, and the clinical opinion of the practitioner.  This note may have been transcribed using a voice dictation system. Voice recognition errors may occur. This  should not be taken to alter the content or meaning of this note        Steve Valdovinos, LUZ     Normal for race

## 2025-03-28 NOTE — PROGRESS NOTES
Established Patient Progress Note       No chief complaint on file.       History of Present Illness:   Cathie Ward is a 33 y.o. female with a history of type 1 diabetes since age 13 currently on Insulin pump with Tandem t slim- Dexcom closed loop with well controlled diabetes without any micro/macrovascula complications who presents today for diabetes management. Seen over a year ago. Cathie was pregnant since and delivered October 28 2024, had a healthy boy. Was following MFM during this time.   Control was ok during pregnancy, did have some low BG post partum and hence her rates were adjusted. Was also nursing more than and now stopped nursing overnight. Only nursing during day    Control since diagnosis:-   Medications tried thus far:- MDI before, now on insulin pump.   Current regime  Mn-7am:- 0.8u.hr, CR 1:9, ISF 1:32 with goal 110  7am-9am:- 0.9uhr, CR 1:8, ISF 1:32 with goal 110  9am-11am:- 0.85 hr, CR 1:8 ISF 1:32 with goal 110  11am- MN:-  0.85u.hr, CR 1:8, ISF 1:32 with goal 110     Basal 37u  Bolus 45u     BG control:-   TIR 80%  High 9.8%/1.1%  Low 8.4%/0.9%  Having PPH with bk and lunch.   Used to have low BG overnight when nursing     Hypoglycemic episodes: rare  Hyperglycemia symptoms:- no polyuria or polydipsia,no chest pain, dyspnea or TIAs,no numbness, tingling or pain in extremities  Diet- Is eating during the day and has consistent meals.   Activity- stays active with 3 kids and work  Weight- Stable      Opthamology: 05/22; no retinopathy, no macular edema  Hx of hyperlipidemia- none  Hx of hypertension- none  Last Lipid:- 11/22, LDL <100  Last urine microalbumin:- 8/24 normal   Last HbA1C 8/24: 6.3%     VitD deficiency:- takes 1000IU daily. Last VitD level 08/24 normal      Social Hx:- Does not smoke, drink alcohol or use drugs. 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    Patient Active Problem List   Diagnosis    Vitamin D deficiency    Pre-existing type 1 diabetes mellitus with  hyperglycemia during pregnancy in third trimester (HCC)    Insulin pump titration    Hypoglycemia due to type 1 diabetes mellitus (HCC)    Insulin pump status    Family history of cardiac disorder    Hypercalcemia    Cervical myofascial strain    Pregnancy complicated by pre-existing type 1 diabetes, third trimester    BMI 32.0-32.9,adult    Family history of congenital anomaly    30 weeks gestation of pregnancy      Past Medical History:   Diagnosis Date    Anemia     H/O vitamin D deficiency     Pre-existing type 1 diabetes affecting pregnancy, antepartum     age 16    Strain of left pectoralis muscle 12/20/2022    Vacuum-assisted vaginal delivery 12/12/2019    Varicella       Past Surgical History:   Procedure Laterality Date    DENTAL IMPLANT  2018    TONSILLECTOMY AND ADENOIDECTOMY        Family History   Problem Relation Age of Onset    No Known Problems Mother     No Known Problems Father     Diabetes type I Brother     Skin cancer Family      Social History     Tobacco Use    Smoking status: Never     Passive exposure: Past    Smokeless tobacco: Never   Substance Use Topics    Alcohol use: Not Currently     Comment: social     Allergies   Allergen Reactions    Other Cough and Sneezing     Cat dander      Mold Extract  [Trichophyton]      Other reaction(s): Resp Sympt Mild    Molds & Smuts      Other reaction(s): Resp Sympt Mild    Pollen Extract      Other reaction(s): Resp Sympt Mild       Current Outpatient Medications:     acetone, urine, test strip, Use 1 strip if needed for high blood sugar, Disp: , Rfl:     albuterol (PROVENTIL HFA,VENTOLIN HFA) 90 mcg/act inhaler, , Disp: , Rfl:     amoxicillin (AMOXIL) 500 mg capsule, Take 500 mg by mouth every 8 (eight) hours (Patient not taking: Reported on 6/25/2024), Disp: , Rfl:     cholecalciferol (VITAMIN D3) 1,000 units tablet, Take 2 tablets (2,000 Units total) by mouth daily, Disp: 100 tablet, Rfl: 1    glucagon 1 MG injection, Inject 1 mg under the skin  "once as needed for low blood sugar, Disp: 1 kit, Rfl: 1    insulin aspart (NovoLOG FlexPen) 100 UNIT/ML injection pen, Inject 1-10 Units under the skin if needed for high blood sugar (Pump failure), Disp: 15 mL, Rfl: 0    Insulin Glargine Solostar (Lantus SoloStar) 100 UNIT/ML SOPN, Inject 0.26 mL (26 Units total) under the skin if needed (pump failure) (Patient not taking: Reported on 4/23/2024), Disp: 3 mL, Rfl: 0    Insulin Pen Needle (Pen Needles 3/16\") 31G X 5 MM MISC, Use in the morning (Patient not taking: Reported on 6/25/2024), Disp: 30 each, Rfl: 0    NovoLOG 100 UNIT/ML injection, Use up to 120 units daily via insulin pump, Disp: 110 mL, Rfl: 1    NovoLOG 100 UNIT/ML injection, USE UP  UNITS DAILY VIA INSULIN PUMP IN THE MORNING. NOVOLOG BRAND, Disp: 110 mL, Rfl: 1    ofloxacin (OCUFLOX) 0.3 % ophthalmic solution, Apply 1 drop to eye 4 (four) times a day (Patient not taking: Reported on 8/13/2024), Disp: 5 mL, Rfl: 0    OneTouch Delica Lancets 30G MISC, Use up to 8 a day. Pregnancy, T1DM., Disp: 200 each, Rfl: 6    OneTouch Verio test strip, Test up to 8 times a day and as instructed. Pregnancy; T1DM., Disp: 200 each, Rfl: 6    prenatal vitamin (CLASSIC FORMULA) 27-0.8 mg, Take 1 tablet by mouth every morning before breakfast, Disp: , Rfl:     Review of Systems   Constitutional:  Negative for diaphoresis, fatigue and unexpected weight change.   Eyes:  Negative for photophobia and visual disturbance.   Gastrointestinal:  Negative for constipation, diarrhea and vomiting.   Endocrine: Negative for polydipsia and polyuria.   Skin: Negative.        Physical Exam:  There is no height or weight on file to calculate BMI.  There were no vitals taken for this visit.   Wt Readings from Last 3 Encounters:   10/08/24 88 kg (194 lb)   09/12/24 85.2 kg (187 lb 12.8 oz)   08/13/24 83.1 kg (183 lb 3.2 oz)       Physical Exam  Constitutional:       Appearance: Normal appearance. She is obese.   Cardiovascular:      " Rate and Rhythm: Normal rate and regular rhythm.      Pulses: Normal pulses. no weak pulses.           Dorsalis pedis pulses are 2+ on the right side and 2+ on the left side.   Pulmonary:      Effort: Pulmonary effort is normal.   Abdominal:      General: Abdomen is flat.      Palpations: Abdomen is soft.   Musculoskeletal:      Cervical back: Normal range of motion and neck supple.   Feet:      Right foot:      Skin integrity: No ulcer, skin breakdown, erythema, warmth, callus or dry skin.      Left foot:      Skin integrity: No ulcer, skin breakdown, erythema, warmth, callus or dry skin.   Skin:     Capillary Refill: Capillary refill takes less than 2 seconds.   Neurological:      General: No focal deficit present.      Mental Status: She is alert and oriented to person, place, and time.   Psychiatric:         Mood and Affect: Mood normal.       Patient's shoes and socks removed.    Right Foot/Ankle   Right Foot Inspection  Skin Exam: skin normal and skin intact. No dry skin, no warmth, no callus, no erythema, no maceration, no abnormal color, no pre-ulcer, no ulcer and no callus.     Toe Exam: ROM and strength within normal limits.     Sensory   Vibration: intact  Monofilament testing: intact    Vascular  Capillary refills: < 3 seconds  The right DP pulse is 2+.     Left Foot/Ankle  Left Foot Inspection  Skin Exam: skin normal and skin intact. No dry skin, no warmth, no erythema, no maceration, normal color, no pre-ulcer, no ulcer and no callus.     Toe Exam: ROM and strength within normal limits.     Sensory   Vibration: intact  Monofilament testing: intact    Vascular  Capillary refills: < 3 seconds  The left DP pulse is 2+.     Assign Risk Category  No deformity present  No loss of protective sensation  No weak pulses  Risk: 0     Labs:      Latest Reference Range & Units 11/15/22 09:56 03/28/23 11:11 09/12/23 09:32 02/10/24 10:45 03/11/24 00:00 05/29/24 09:29 08/13/24 11:38   Hemoglobin A1C Normal 4.0-5.6%;  PreDiabetic 5.7-6.4%; Diabetic >=6.5%; Glycemic control for adults with diabetes <7.0% % 5.9 (H) 6.3 (H) 6.7 (H) 6.4 (H) 6.1 (E) 6.1 (H) 6.3 (H)   eAG, EST AVG Glucose mg/dl 123 134 146 137  128 134   (H): Data is abnormally high  (E): External lab result     Latest Reference Range & Units 02/10/24 10:45   TSH 3RD GENERATON 0.450 - 4.500 uIU/mL 1.037   FREE T4 0.61 - 1.12 ng/dL 0.79      Latest Reference Range & Units 02/10/24 10:45 05/01/24 12:52 08/13/24 11:38   EXT Creatinine Urine Reference range not established. mg/dL 234.6 136.7 84.0   Albumin Creat Ratio 0 - 30 mg/g creatinine 4     Albumin,U,Random <20.0 mg/L 8.9     Protein Urine Random Reference range not established. mg/dL  7 9.4   Prot/Creat Ratio, Ur 0.0 - 0.1   0.05 0.1      Latest Reference Range & Units 11/15/22 09:56 09/12/23 09:32 02/10/24 10:45   Cholesterol See Comment mg/dL 182 200 (H) 183   Triglycerides See Comment mg/dL 56 67 58   HDL >=50 mg/dL 96 84 77   Non-HDL Cholesterol mg/dl 86 116 106   LDL Calculated 0 - 100 mg/dL 75 103 (H) 94   (H): Data is abnormally high   Latest Reference Range & Units 03/28/23 11:11 09/12/23 09:32 02/10/24 10:45 08/13/24 11:38   VITAMIN D, 25-HYDROXY 30.0 - 100.0 ng/mL 18.7 (L) 23.4 (L) 13.6 (L) 43.2   (L): Data is abnormally low  Impression & Plan:    Problem List Items Addressed This Visit    None      No orders of the defined types were placed in this encounter.      There are no Patient Instructions on file for this visit.    Patient is a 33yF With PMHx of T1DM since age 13 with no complications, on insulin pump with other Pmhx of vitD deficiency and incidentally noted hypercalcemia presents today for DM follow up. Last visit 09/23      1) T1DM:- Patients A1C is well controlled at 6.5%. slightly higher than before but still very reasonable considering a major change of pregnancy. Pump download does show low BG overnight but also when nursing, she isnt nursing anymore. Discussed is nursing overnight please  have a snack before to avoid low BG. Otherwise has mild PPH with bk and more with lunch and hence will make mild adjustment to her carb ratios. Otherwise no major changes. Will also check thyroid, VitD to make sure ok specially since is now PP     Mn-7am:- 0.8u.hr, CR 1:9, ISF 1:32 with goal 110  7am-9am:- 0.9uhr, CR 1:7.5, ISF 1:32 with goal 110  9am-11am:- 0.85 hr, CR 1:7 ISF 1:32 with goal 110  11am- MN:-  0.85u.hr, CR 1:8, ISF 1:32 with goal 110     Screening:-   Retinopathy- UTD  Nephropathy- 08/24 normal   Lipide levels- uptodate , LDL <100, repeat now     2) VitD deficiency:- Will order repeat now, taking 2000IU daily         RTC in 6 months     Counseled patient on diagnostic results, prognosis, risk and benefit of treatment options, instruction for management, importance of treatment compliance, Risk  factor reduction and impressions      Candis Momin MD

## 2025-04-25 ENCOUNTER — DOCUMENTATION (OUTPATIENT)
Dept: ENDOCRINOLOGY | Facility: HOSPITAL | Age: 35
End: 2025-04-25

## 2025-04-25 NOTE — PROGRESS NOTES
Makayla order for cgm supplies and syringe needle completed and faxed with the last chart note to [phone]

## 2025-05-06 ENCOUNTER — DOCUMENTATION (OUTPATIENT)
Dept: ENDOCRINOLOGY | Facility: HOSPITAL | Age: 35
End: 2025-05-06

## 2025-06-02 DIAGNOSIS — E10.65 PRE-EXISTING TYPE 1 DIABETES MELLITUS WITH HYPERGLYCEMIA DURING PREGNANCY IN THIRD TRIMESTER (HCC): ICD-10-CM

## 2025-06-02 DIAGNOSIS — O24.013 PRE-EXISTING TYPE 1 DIABETES MELLITUS WITH HYPERGLYCEMIA DURING PREGNANCY IN THIRD TRIMESTER (HCC): ICD-10-CM

## 2025-06-02 RX ORDER — INSULIN ASPART 100 [IU]/ML
INJECTION, SOLUTION INTRAVENOUS; SUBCUTANEOUS
Qty: 110 ML | Refills: 1 | Status: SHIPPED | OUTPATIENT
Start: 2025-06-02

## 2025-06-02 NOTE — TELEPHONE ENCOUNTER
Reason for call:   [x] Refill   [] Prior Auth  [] Other:     Office:   [] PCP/Provider -   [x] Specialty/Provider - Scott Hunt,     Medication: NovoLOG 100 UNIT/ML  VIAL NOT PEN    Dose/Frequency: USE UP  UNITS DAILY VIA INSULIN PUMP IN THE MORNING.     Quantity: 110ml    Pharmacy: [facility]   Does the patient have enough for 3 days?   [] Yes   [x] No - Send as HP to POD    Mail Away Pharmacy   Does the patient have enough for 10 days?   [] Yes   [] No - Send as HP to POD

## 2025-06-24 LAB
LEFT EYE DIABETIC RETINOPATHY: NORMAL
RIGHT EYE DIABETIC RETINOPATHY: NORMAL

## 2025-07-01 ENCOUNTER — OFFICE VISIT (OUTPATIENT)
Dept: FAMILY MEDICINE CLINIC | Facility: CLINIC | Age: 35
End: 2025-07-01
Payer: COMMERCIAL

## 2025-07-01 VITALS
BODY MASS INDEX: 30.58 KG/M2 | TEMPERATURE: 98 F | RESPIRATION RATE: 18 BRPM | WEIGHT: 162 LBS | SYSTOLIC BLOOD PRESSURE: 100 MMHG | HEART RATE: 90 BPM | HEIGHT: 61 IN | DIASTOLIC BLOOD PRESSURE: 72 MMHG | OXYGEN SATURATION: 98 %

## 2025-07-01 DIAGNOSIS — Z23 ENCOUNTER FOR IMMUNIZATION: ICD-10-CM

## 2025-07-01 DIAGNOSIS — E10.9 TYPE 1 DIABETES MELLITUS WITHOUT COMPLICATION (HCC): ICD-10-CM

## 2025-07-01 DIAGNOSIS — Z00.00 ANNUAL PHYSICAL EXAM: Primary | ICD-10-CM

## 2025-07-01 DIAGNOSIS — Z12.4 SCREENING FOR CERVICAL CANCER: ICD-10-CM

## 2025-07-01 PROCEDURE — 90677 PCV20 VACCINE IM: CPT

## 2025-07-01 PROCEDURE — 99395 PREV VISIT EST AGE 18-39: CPT | Performed by: STUDENT IN AN ORGANIZED HEALTH CARE EDUCATION/TRAINING PROGRAM

## 2025-07-01 PROCEDURE — 90471 IMMUNIZATION ADMIN: CPT

## 2025-07-01 NOTE — PATIENT INSTRUCTIONS
"Patient Education     Routine physical for adults   The Basics   Written by the doctors and editors at [facility]   What is a physical? -- A physical is a routine visit, or \"check-up,\" with your doctor. You might also hear it called a \"wellness visit\" or \"preventive visit.\"  During each visit, the doctor will:   Ask about your physical and mental health   Ask about your habits, behaviors, and lifestyle   Do an exam   Give you vaccines if needed   Talk to you about any medicines you take   Give advice about your health   Answer your questions  Getting regular check-ups is an important part of taking care of your health. It can help your doctor find and treat any problems you have. But it's also important for preventing health problems.  A routine physical is different from a \"sick visit.\" A sick visit is when you see a doctor because of a health concern or problem. Since physicals are scheduled ahead of time, you can think about what you want to ask the doctor.  How often should I get a physical? -- It depends on your age and health. In general, for people age 21 years and older:   If you are younger than 50 years, you might be able to get a physical every 3 years.   If you are 50 years or older, your doctor might recommend a physical every year.  If you have an ongoing health condition, like diabetes or high blood pressure, your doctor will probably want to see you more often.  What happens during a physical? -- In general, each visit will include:   Physical exam - The doctor or nurse will check your height, weight, heart rate, and blood pressure. They will also look at your eyes and ears. They will ask about how you are feeling and whether you have any symptoms that bother you.   Medicines - It's a good idea to bring a list of all the medicines you take to each doctor visit. Your doctor will talk to you about your medicines and answer any questions. Tell them if you are having any side effects that bother you. You " "should also tell them if you are having trouble paying for any of your medicines.   Habits and behaviors - This includes:   Your diet   Your exercise habits   Whether you smoke, drink alcohol, or use drugs   Whether you are sexually active   Whether you feel safe at home  Your doctor will talk to you about things you can do to improve your health and lower your risk of health problems. They will also offer help and support. For example, if you want to quit smoking, they can give you advice and might prescribe medicines. If you want to improve your diet or get more physical activity, they can help you with this, too.   Lab tests, if needed - The tests you get will depend on your age and situation. For example, your doctor might want to check your:   Cholesterol   Blood sugar   Iron level   Vaccines - The recommended vaccines will depend on your age, health, and what vaccines you already had. Vaccines are very important because they can prevent certain serious or deadly infections.   Discussion of screening - \"Screening\" means checking for diseases or other health problems before they cause symptoms. Your doctor can recommend screening based on your age, risk, and preferences. This might include tests to check for:   Cancer, such as breast, prostate, cervical, ovarian, colorectal, prostate, lung, or skin cancer   Sexually transmitted infections, such as chlamydia and gonorrhea   Mental health conditions like depression and anxiety  Your doctor will talk to you about the different types of screening tests. They can help you decide which screenings to have. They can also explain what the results might mean.   Answering questions - The physical is a good time to ask the doctor or nurse questions about your health. If needed, they can refer you to other doctors or specialists, too.  Adults older than 65 years often need other care, too. As you get older, your doctor will talk to you about:   How to prevent falling at " home   Hearing or vision tests   Memory testing   How to take your medicines safely   Making sure that you have the help and support you need at home  All topics are updated as new evidence becomes available and our peer review process is complete.  This topic retrieved from [employer] on: May 02, 2024.  Topic 310474 Version 1.0  Release: 32.4.3 - C32.122  © 2024 UpToDate, Inc. and/or its affiliates. All rights reserved.  Consumer Information Use and Disclaimer   Disclaimer: This generalized information is a limited summary of diagnosis, treatment, and/or medication information. It is not meant to be comprehensive and should be used as a tool to help the user understand and/or assess potential diagnostic and treatment options. It does NOT include all information about conditions, treatments, medications, side effects, or risks that may apply to a specific patient. It is not intended to be medical advice or a substitute for the medical advice, diagnosis, or treatment of a health care provider based on the health care provider's examination and assessment of a patient's specific and unique circumstances. Patients must speak with a health care provider for complete information about their health, medical questions, and treatment options, including any risks or benefits regarding use of medications. This information does not endorse any treatments or medications as safe, effective, or approved for treating a specific patient. UpToDate, Inc. and its affiliates disclaim any warranty or liability relating to this information or the use thereof.The use of this information is governed by the Terms of Use, available at https://www.woltersT[employer] Mediauwer.com/en/know/clinical-effectiveness-terms. 2024© UpToDate, Inc. and its affiliates and/or licensors. All rights reserved.  Copyright   © 2024 UpToDate, Inc. and/or its affiliates. All rights reserved.

## 2025-07-01 NOTE — PROGRESS NOTES
Adult Annual Physical  Name: Cathie Ward      : 1990      MRN: [id number]  Encounter Provider: Milly Obando MD  Encounter Date: 2025   Encounter department: [facility]    :  Assessment & Plan  Annual physical exam  Overall doing well; no acute complaints        Screening for cervical cancer  PAP-  [facility] - caregap message sent        Type 1 diabetes mellitus without complication (HCC)    Lab Results   Component Value Date    HGBA1C 6.5 2025     C/w endocrine follow up and management; pt  Insulin pump with Tandem t slim- Dexcom closed loop    Hypoglycemia: denies  Polyuria or polydipsia: denies  Labs pending   PCV 20 due   Orders:    CBC and differential; Future    Encounter for immunization    Orders:    Pneumococcal Conjugate Vaccine 20-valent (Pcv20)    Annual physical exam         Screening for cervical cancer               Preventive Screenings:  - Diabetes Screening: screening not indicated and has diabetes  - Hepatitis C screening: screening up-to-date   - HIV screening: screening up-to-date   - Lung cancer screening: screening not indicated          History of Present Illness     Adult Annual Physical:  Patient presents for annual physical. 30 yo F with type 1 DM presenting for annual physical   Follows with Endocrine- denies any hypoglycemiia. Notes she has hypoglycemic awareness- pump turns off. Highest was 265 in past few weeks     PAP-  [facility]     .     Diet and Physical Activity:  - Diet/Nutrition: well balanced diet.  - Exercise: moderate cardiovascular exercise.    Depression Screening:  - PHQ-2 Score: 0    General Health:  - Sleep: sleeps well.  - Hearing: normal hearing bilateral ears.  - Vision: no vision problems.  - Dental: regular dental visits.    /GYN Health:  - Follows with GYN: yes.   - Last menstrual cycle: 2025.   - History of STDs: no  - Contraception: barrier methods.   "    Advanced Care Planning:  - Has an advanced directive?: no    - Has a durable medical POA?: no    - ACP document given to patient?: yes      Review of Systems   Constitutional:  Negative for chills and fever.   HENT:  Negative for hearing loss and trouble swallowing.    Eyes:  Negative for pain and visual disturbance.   Respiratory:  Negative for cough and shortness of breath.    Cardiovascular:  Negative for chest pain and palpitations.   Gastrointestinal:  Negative for abdominal pain, blood in stool and vomiting.   Endocrine: Negative for polydipsia and polyuria.   Genitourinary:  Negative for dysuria and hematuria.   Musculoskeletal:  Negative for gait problem.   Skin:  Negative for color change and rash.   Neurological:  Negative for syncope and light-headedness.   All other systems reviewed and are negative.    Medical History Reviewed by provider this encounter:  Tobacco  Allergies  Meds  Problems  Med Hx  Surg Hx  Fam Hx     .  Medications Ordered Prior to Encounter[1]   Social History[2]    Objective   /72 (BP Location: Left arm, Patient Position: Sitting, Cuff Size: Standard)   Pulse 90   Temp 98 °F (36.7 °C) (Tympanic)   Resp 18   Ht 5' 1\" (1.549 m)   Wt 73.5 kg (162 lb)   SpO2 98%   BMI 30.61 kg/m²     Physical Exam  Vitals and nursing note reviewed.   Constitutional:       General: She is not in acute distress.     Appearance: Normal appearance. She is well-developed.   HENT:      Head: Normocephalic and atraumatic.      Right Ear: Tympanic membrane, ear canal and external ear normal.      Left Ear: Tympanic membrane, ear canal and external ear normal.      Nose: Nose normal.      Mouth/Throat:      Mouth: Mucous membranes are moist.      Pharynx: Oropharynx is clear. No oropharyngeal exudate or posterior oropharyngeal erythema.     Eyes:      Extraocular Movements: Extraocular movements intact.      Conjunctiva/sclera: Conjunctivae normal.      Pupils: Pupils are equal, round, and " reactive to light.       Cardiovascular:      Rate and Rhythm: Normal rate and regular rhythm.      Pulses: Normal pulses.      Heart sounds: Normal heart sounds. No murmur heard.  Pulmonary:      Effort: Pulmonary effort is normal. No respiratory distress.      Breath sounds: Normal breath sounds. No wheezing or rales.   Abdominal:      General: There is no distension.      Palpations: Abdomen is soft.      Tenderness: There is no abdominal tenderness.     Musculoskeletal:         General: No swelling. Normal range of motion.      Cervical back: Normal range of motion and neck supple.      Right lower leg: No edema.      Left lower leg: No edema.     Skin:     General: Skin is warm and dry.      Capillary Refill: Capillary refill takes less than 2 seconds.     Neurological:      Mental Status: She is alert.      Motor: No weakness.      Gait: Gait normal.     Psychiatric:         Mood and Affect: Mood normal.       Administrative Statements   I have spent a total time of 30 minutes in caring for this patient on the day of the visit/encounter including Prognosis, Risks and benefits of tx options, Instructions for management, Patient and family education, Importance of tx compliance, Risk factor reductions, Impressions, Counseling / Coordination of care, Documenting in the medical record, and Reviewing/placing orders in the medical record (including tests, medications, and/or procedures).       [1]   Current Outpatient Medications on File Prior to Visit   Medication Sig Dispense Refill    acetone, urine, test strip Use 1 strip if needed for high blood sugar      albuterol (PROVENTIL HFA,VENTOLIN HFA) 90 mcg/act inhaler       cholecalciferol (VITAMIN D3) 1,000 units tablet Take 2 tablets (2,000 Units total) by mouth daily 100 tablet 1    glucagon 1 MG injection Inject 1 mg under the skin once as needed for low blood sugar 1 kit 1    insulin aspart (NovoLOG FlexPen) 100 UNIT/ML injection pen Inject 1-10 Units under the  "skin if needed for high blood sugar (Pump failure) 15 mL 0    Insulin Aspart (NovoLOG) 100 units/mL injection USE UP  UNITS DAILY VIA INSULIN PUMP IN THE MORNING. NOVOLOG BRAND 110 mL 1    Insulin Glargine Solostar (Lantus SoloStar) 100 UNIT/ML SOPN Inject 0.26 mL (26 Units total) under the skin if needed (pump failure) 3 mL 0    Insulin Pen Needle (Pen Needles 3/16\") 31G X 5 MM MISC Use in the morning 30 each 0    NovoLOG 100 UNIT/ML injection Use up to 120 units daily via insulin pump 110 mL 1    OneTouch Delica Lancets 30G MISC Use up to 8 a day. Pregnancy, T1DM. 200 each 6    OneTouch Verio test strip Test up to 8 times a day and as instructed. Pregnancy; T1DM. 200 each 6    amoxicillin (AMOXIL) 500 mg capsule Take 500 mg by mouth every 8 (eight) hours (Patient not taking: Reported on 6/25/2024)      ofloxacin (OCUFLOX) 0.3 % ophthalmic solution Apply 1 drop to eye 4 (four) times a day (Patient not taking: Reported on 8/13/2024) 5 mL 0    prenatal vitamin (CLASSIC FORMULA) 27-0.8 mg Take 1 tablet by mouth every morning before breakfast (Patient not taking: Reported on 3/28/2025)       No current facility-administered medications on file prior to visit.   [2]   Social History  Tobacco Use    Smoking status: Never     Passive exposure: Past    Smokeless tobacco: Never   Vaping Use    Vaping status: Never Used   Substance and Sexual Activity    Alcohol use: Not Currently     Comment: social    Drug use: Never    Sexual activity: Yes     Partners: Male     Birth control/protection: None     "

## 2025-07-02 ENCOUNTER — TELEPHONE (OUTPATIENT)
Dept: ADMINISTRATIVE | Facility: OTHER | Age: 35
End: 2025-07-02

## 2025-07-02 NOTE — LETTER
Procedure Request Form: Cervical Cancer Screening      Date Requested: 25  Patient: Cathie Ward  Patient : 1990   Referring Provider: Deja Wang, DO        Date of Procedure ______________________________       The above patient has informed us that they have completed their   most recent Cervical Cancer Screening at your facility. Please complete   this form and attach all corresponding procedure reports/results.    Comments __________________________________________________________  ____________________________________________________________________  ____________________________________________________________________  ____________________________________________________________________    Facility Completing Procedure _________________________________________    Form Completed By (print name) _______________________________________      Signature __________________________________________________________        These reports are needed for  compliance.    Please fax this completed form and a copy of the Report   to the [facility] and the [facility] Based Department as soon as possible via   Fax [phone] attention [place]: Phone [phone].   Our office located at [address] [place] [address]     We thank you for your assistance in treating our mutual patient.

## 2025-07-02 NOTE — TELEPHONE ENCOUNTER
Upon review of the In Basket request and the patient's chart, initial outreach has been made via fax to facility. Please see Contacts section for details.     Thank you  [place] Nielsen MA

## 2025-07-02 NOTE — TELEPHONE ENCOUNTER
----- Message from Milly Obando MD sent at 7/1/2025  9:36 AM EDT -----  Regarding: Care Gap  07/01/25 9:36 AM    Hello, our patient Cathie Ward has had Pap Smear (HPV) aka Cervical Cancer Screening completed/performed. Please assist in updating the patient chart by making an External outreach to PAP- [address] [facility] [address] Zhu  .    Thank you,  Milly Obando MD  [facility]

## 2025-07-07 NOTE — TELEPHONE ENCOUNTER
Upon review of the In Basket request we were able to locate, review, and update the patient chart as requested for Pap Smear (HPV) aka Cervical Cancer Screening.    Any additional questions or concerns should be emailed to the Practice Liaisons via the appropriate education email address, please do not reply via In Basket.    Thank you  [place] Nielsen MA   [facility]

## 2025-08-06 ENCOUNTER — DOCUMENTATION (OUTPATIENT)
Dept: ENDOCRINOLOGY | Facility: HOSPITAL | Age: 35
End: 2025-08-06